# Patient Record
Sex: FEMALE | Race: WHITE | NOT HISPANIC OR LATINO | Employment: OTHER | URBAN - NONMETROPOLITAN AREA
[De-identification: names, ages, dates, MRNs, and addresses within clinical notes are randomized per-mention and may not be internally consistent; named-entity substitution may affect disease eponyms.]

---

## 2017-01-01 ENCOUNTER — HOSPITAL ENCOUNTER (OUTPATIENT)
Dept: PHYSICAL THERAPY | Facility: HOSPITAL | Age: 57
Setting detail: THERAPIES SERIES
Discharge: HOME | End: 2017-01-20
Attending: ORTHOPAEDIC SURGERY | Admitting: ORTHOPAEDIC SURGERY

## 2017-01-20 ENCOUNTER — TRANSCRIBE ORDERS (OUTPATIENT)
Dept: PHYSICAL THERAPY | Facility: HOSPITAL | Age: 57
End: 2017-01-20

## 2017-01-20 DIAGNOSIS — M25.552 LEFT HIP PAIN: Primary | ICD-10-CM

## 2017-01-25 ENCOUNTER — HOSPITAL ENCOUNTER (OUTPATIENT)
Dept: PHYSICAL THERAPY | Facility: HOSPITAL | Age: 57
Setting detail: THERAPIES SERIES
Discharge: HOME | End: 2017-01-25

## 2017-01-25 DIAGNOSIS — M25.552 LEFT HIP PAIN: Primary | ICD-10-CM

## 2017-01-25 PROCEDURE — 97110 THERAPEUTIC EXERCISES: CPT

## 2017-01-25 NOTE — PROGRESS NOTES
Outpatient Physical Therapy Ortho Treatment Note  [facility]     Patient Name: Erika Bowens  : 1960  MRN: [id number]  Today's Date: 2017      Visit Date: 2017     Subjective Improvement 60%  Pain prior to PT 7/10, Pain after PT 5/10;  Visits /; Recert Date 17,  RTMD 4 weeks    Visit Dx:    ICD-10-CM ICD-9-CM   1. Left hip pain M25.552 719.45       Patient Active Problem List   Diagnosis   • Coronary arteriosclerosis   • Depressive disorder   • Essential hypertension   • Generalized anxiety disorder   • GERD (gastroesophageal reflux disease)   • Hip pain   • Hyperlipidemia   • Osteoarthritis   • Vitamin D deficiency   • Chronic renal failure, stage 3 (moderate)        Past Medical History   Diagnosis Date   • Abdominal pain    • Coronary arteriosclerosis    • Depressive disorder      with anxiety   • Encounter for routine adult health examination    • Essential hypertension    • Generalized anxiety disorder    • GERD (gastroesophageal reflux disease)    • Hip pain    • Hyperlipidemia    • Kidney stone    • Osteoarthritis    • Radial styloid tenosynovitis of left hand    • Urinary tract infectious disease    • Vitamin D deficiency         Past Surgical History   Procedure Laterality Date   • Coronary angioplasty with stent placement  2012     PTCA implantation in the vein graft of the OM branch. Done at [facility] in [place], [place].   • Coronary artery bypass graft  2003     Emergent CABG x 5 CAD   • Cardiac catheterization  04/15/2016     Enlarged left ventricle with reduced contractility.Severe coronary artery disease as described above. [facility].   • Procedure generic converted  2015     MOST RECENT DIASTOLIC BP < 90 mmHg    • Procedure generic converted  2015     MOST RECENT SYSTOLIC BP > or = 140 mmHg    • Pap smear  2012     normal   • De quervain's release Right 2009     Release of De Quervain's to the right  thumb   • Dequervain release Left 11/18/2015     Release of de Quervain's to the left wrist.   • Total knee arthroplasty     • Procedure generic converted  11/22/2015     TOBACCO NON-USER              PT Ortho       01/25/17 1100    Precautions and Contraindications    Precautions/Limitations cardiac precautions  -CP    Precautions --   No IFC, Pacemeker, CHF  -CP    Gait Assessment/Treatment    Gait, Assistive Device --   Amb in Clinic with one axillary crutch  -CP    Gait, Gait Deviations ataxia  -CP    Gait, Comment --   Decrease stance time on left LE  -CP      User Key  (r) = Recorded By, (t) = Taken By, (c) = Cosigned By    Initials Name Provider Type    CP Edelmira Ferraro PTA Physical Therapy Assistant                            PT Assessment/Plan       01/25/17 1154          PT Assessment    Impairments Endurance;Gait  -CP      Assessment Comments Patients Increase pain limits wt bearing activities.  Patient requires rest breaks secondary to cardiac issues   -CP      PT Plan    PT Frequency 2x/week  -CP      Predicted Duration of Therapy Intervention (days/wks) 4 weeks  -CP      PT Plan Comments Biodex Balance, up and down ramp forward in rehab gym.  recheck next week  -CP        User Key  (r) = Recorded By, (t) = Taken By, (c) = Cosigned By    Initials Name Provider Type    CP Edelmira Ferraro PTA Physical Therapy Assistant                Modalities       01/25/17 1100          Moist Heat    MH Applied Yes  -CP      Location --   Left Hip/thigh  -CP      Rx Minutes 15 mins  -CP      MH S/P Rx Yes  -CP        User Key  (r) = Recorded By, (t) = Taken By, (c) = Cosigned By    Initials Name Provider Type    CP Edelmira Ferraro PTA Physical Therapy Assistant                Exercises       01/25/17 1100          Subjective Comments    Subjective Comments Patient reports in pain this date.  Pain located outer left thiigh.  States she didnt do much over the weekend.  patient states she at times walk I inside  "her home.  -CP      Subjective Pain    Able to rate subjective pain? yes  -CP      Pre-Treatment Pain Level 7  -CP      Post-Treatment Pain Level 5  -CP      Subjective Pain Comment Pain decrease to 5/10 after Pro II  -CP      Exercise 1    Exercise Name 1 Pro II Seat 10   -CP      Weights/Plates 1 --   Level 1  -CP      Time (Minutes) 1 10  -CP      Exercise 2    Exercise Name 2 Incline Stretch  -CP      Reps 2 3  -CP      Time (Seconds) 2 30  -CP      Exercise 3    Exercise Name 3 HS Stretch  -CP      Reps 3 3  -CP      Time (Seconds) 3 30  -CP      Exercise 4    Exercise Name 4 Standing Hip Fl Stretch  -CP      Reps 4 3  -CP      Time (Seconds) 4 30  -CP      Exercise 5    Exercise Name 5 Step Up 4\"  -CP      Sets 5 2  -CP      Reps 5 10  -CP      Exercise 6    Exercise Name 6 LAQ with AD Squeeze  -CP      Equipment 6 --   small ball  -CP      Sets 6 2  -CP      Reps 6 10  -CP      Exercise 7    Exercise Name 7 Supine Heelslide with AW  -CP      Weights/Plates 7 1  -CP      Sets 7 2  -CP      Reps 7 10  -CP      Exercise 8    Exercise Name 8 Single leg press therabank  -CP      Equipment 8 Theraband  -CP      Resistance 8 Yellow  -CP      Reps 8 15  -CP      Exercise 9    Exercise Name 9 Supine BKFO  -CP      Equipment 9 Theraband  -CP      Resistance 9 Yellow  -CP      Reps 9 15  -CP        User Key  (r) = Recorded By, (t) = Taken By, (c) = Cosigned By    Initials Name Provider Type    CP Edelmira Ferraro, PTA Physical Therapy Assistant                               PT OP Goals       01/25/17 1158 01/25/17 1100 01/21/17 1300    PT Short Term Goals    STG Date to Achieve   01/26/17  -DD    STG 1  Patient will increase glute stength MMT 4+/5.  -CP Patient will increase glute stength MMT 4+/5.  -DD    STG 1 Progress  --   Not assessed at this time  -CP     STG 2  Patient will walk with symmetrical weightbearing and stance time on bilateral LE.  -CP Patient will walk with symmetrical weightbearing and stance time on " bilateral LE.  -DD    STG 2 Progress  Progressing  -CP     STG 3  Patient will demonstrate nomalized left hip extension while walking.  -CP Patient will demonstrate nomalized left hip extension while walking.  -DD    STG 3 Progress  Progressing  -CP     Long Term Goals    LTG Date to Achieve   02/09/17  -DD    LTG 1  Decreased pain to 1/10.  -CP Decreased pain to 1/10.  -DD    LTG 1 Progress  Not Met  -CP     LTG 2  Increase strenght in the Left LE to 4+/5  -CP Increase strenght in the Left LE to 4+/5  -DD    LTG 2 Progress  --   Not Assessed this date  -CP     LTG 3  Patient will be able to ambulate > or = to 1,200 feet without AD.  -CP Patient will be able to ambulate > or = to 1,200 feet without AD.  -DD    LTG 3 Progress  Progressing  -CP     LTG 4  Patient will score 55/80 on the LEFS.  -CP Patient will score 55/80 on the LEFS.  -DD    LTG 4 Progress  --   Not assessed this date  -CP     LTG 5  Patient will normalize her gait pattern.  -CP Patient will normalize her gait pattern.  -DD    LTG 5 Progress  Progressing  -CP     LTG 6  Patient will resume hobbies and ADLs without limitation secondary to hip pain.  -CP Patient will resume hobbies and ADLs without limitation secondary to hip pain.  -DD    LTG 6 Progress  Progressing  -CP     LTG 7  Independent in final HEP with progression parameters.  -CP Independent in final HEP with progression parameters.  -DD    LTG 7 Progress  Ongoing  -CP     Time Calculation    PT Goal Re-Cert Due Date 02/02/17  -CP  02/02/17  -DD      User Key  (r) = Recorded By, (t) = Taken By, (c) = Cosigned By    Initials Name Provider Type    MÓNICA Jamison, PT Physical Therapist    CP Edelmira Ferraro, JANET Physical Therapy Assistant                Therapy Education       01/25/17 1148    Therapy Education    Given HEP   HEP:   Single Leg Press with theraband; BKFO with theraband  -CP    Program New  -CP    How Provided Verbal;Demonstration;Written  -CP    Provided to Patient   -CP    Level of Understanding Demonstrated  -CP      User Key  (r) = Recorded By, (t) = Taken By, (c) = Cosigned By    Initials Name Provider Type    CP Edelmira Ferraro PTA Physical Therapy Assistant                Time Calculation:   Start Time: 1105  Stop Time: 1159  Time Calculation (min): 54 min  Total Timed Code Minutes- PT: 40 minute(s)    Therapy Charges for Today     Code Description Service Date Service Provider Modifiers Qty    52411656748 HC PT THER SUPP EA 15 MIN 1/25/2017 Edelmira Ferraro PTA GP 3    89596254075 HC PT THER PROC EA 15 MIN 1/25/2017 Edelmira Ferraro PTA GP 1                    Edelmira Ferraro PTA  1/25/2017

## 2017-01-25 NOTE — MR AVS SNAPSHOT
[facility]   [phone]                    Erika Bowens   1/25/2017 11:00 AM   Therapy Treatment    Dept Phone:  [phone]   Encounter #:  [id number]    Provider:  Edelmira Ferraro PTA   Department:  [facility]                 Your Full Care Plan              Your Updated Medication List      ASK your doctor about these medications     acetaminophen 500 MG tablet   Commonly known as:  TYLENOL       aspirin 325 MG tablet       atenolol 25 MG tablet   Commonly known as:  TENORMIN       calcium carbonate-vitamin d 600-400 MG-UNIT per tablet       clonazePAM 1 MG tablet   Commonly known as:  KlonoPIN   Take 1 tablet by mouth 2 (Two) Times a Day.       clopidogrel 75 MG tablet   Commonly known as:  PLAVIX   TAKE ONE TABLET BY MOUTH DAILY       cyclobenzaprine 10 MG tablet   Commonly known as:  FLEXERIL   TAKE ONE TABLET BY MOUTH THREE TIMES A DAY AS NEEDED FOR MUSCLE SPASMS       FLUoxetine 20 MG capsule   Commonly known as:  PROzac   Take 1 capsule by mouth 3 (three) times a day.       furosemide 40 MG tablet   Commonly known as:  LASIX   TAKE ONE TABLET BY MOUTH TWICE A DAY       gabapentin 600 MG tablet   Commonly known as:  NEURONTIN   Take 1 tablet by mouth 3 (Three) Times a Day.       NITROSTAT 0.4 MG SL tablet   Generic drug:  nitroglycerin   DISSOLVE 1 TAB UNDER TONGUE FOR CHEST PAIN - IF PAIN REMAINS AFTER 5 MIN, CALL 911 AND REPEAT DOSE. MAX 3 TABS IN 15 MINUTES       omeprazole 40 MG capsule   Commonly known as:  priLOSEC   TAKE ONE CAPSULE BY MOUTH DAILY ** TAKE 12 HOURS APART FROM PLAVIX **       potassium chloride 10 MEQ CR tablet   Commonly known as:  K-DUR       PRILOSEC PO       ranolazine 500 MG 12 hr tablet   Commonly known as:  RANEXA       spironolactone 25 MG tablet   Commonly known as:  ALDACTONE   TAKE ONE TABLET BY MOUTH DAILY       tiZANidine 4 MG tablet   Commonly known as:  ZANAFLEX   Take 1 tablet by mouth 3 (Three) Times a Day  As Needed for muscle spasms.       traZODone 50 MG tablet   Commonly known as:  DESYREL   TAKE ONE AND 1/2 TABLET BY MOUTH EVERY NIGHT AT BEDTIME               You Were Diagnosed With        Codes Comments    Left hip pain    -  Primary ICD-10-CM: M25.552  ICD-9-CM: 719.45       Instructions     None    Patient Instructions History      Upcoming Appointments     Visit Type Date Time Department    TREATMENT 2017 11:00 AM  [facility]     RE-EVALUATION 2017 10:15 AM [facility]     TREATMENT 2017 10:15 AM  [facility]     OFFICE VISIT 3/6/2017  2:30 PM [facility]      MyChart Signup     [facility] [employer] allows you to send messages to your doctor, view your test results, renew your prescriptions, schedule appointments, and more. To sign up, go to [employer] and click on the Sign Up Now link in the New User? box. Enter your [employer] Activation Code exactly as it appears below along with the last four digits of your Social Security Number and your Date of Birth () to complete the sign-up process. If you do not sign up before the expiration date, you must request a new code.    [employer] Activation Code: [email or web address]  Expires: 2017 12:21 PM    If you have questions, you can email Melanie Clark [email or web address] or call [phone] to talk to our [employer] staff. Remember, [employer] is NOT to be used for urgent needs. For medical emergencies, dial 911.               Other Info from Your Visit           Your Appointments     2017 10:15 AM CST   REEVALUATION with PT DELIA SANCHEZ   [facility]  (--)    [address] [address]   [phone] 10:15 AM CST   Therapy Treatment with Edelmira Ferraro PTA   [facility]  (--)    [address] [address]   [phone]            Mar 06, 2017  2:30 PM CST   Office Visit with Nilam Willis MD   [facility]  [facility] MED[facility] (--)    [address]  [address] [place] [address] [phone]           Arrive 15 minutes prior to appointment.              Allergies     Nsaids        Reason for Visit     Left Hip - Pain     PT Treatment           Vital Signs     Smoking Status                   Never Smoker           Problems and Diagnoses Noted     Left hip pain    -  Primary

## 2017-02-10 ENCOUNTER — HOSPITAL ENCOUNTER (OUTPATIENT)
Dept: PHYSICAL THERAPY | Facility: HOSPITAL | Age: 57
Setting detail: THERAPIES SERIES
Discharge: HOME | End: 2017-02-10

## 2017-02-10 DIAGNOSIS — M25.552 LEFT HIP PAIN: Primary | ICD-10-CM

## 2017-02-10 PROCEDURE — 97164 PT RE-EVAL EST PLAN CARE: CPT | Performed by: PHYSICAL THERAPIST

## 2017-02-10 PROCEDURE — 97110 THERAPEUTIC EXERCISES: CPT | Performed by: PHYSICAL THERAPIST

## 2017-02-10 NOTE — PROGRESS NOTES
Outpatient Physical Therapy Ortho Re-Assessment  [facility]     Patient Name: Erika Bowens  : 1960  MRN: [id number]  Today's Date: 2/10/2017      Visit Date: 02/10/2017     Pt attended 13/13 scheduled visits.   Pt feels has improved 60% since beginning therapy.  Re-cert date 3/3/17  Pt will RTD after therapy      Patient Active Problem List   Diagnosis   • Coronary arteriosclerosis   • Depressive disorder   • Essential hypertension   • Generalized anxiety disorder   • GERD (gastroesophageal reflux disease)   • Hip pain   • Hyperlipidemia   • Osteoarthritis   • Vitamin D deficiency   • Chronic renal failure, stage 3 (moderate)        Past Medical History   Diagnosis Date   • Abdominal pain    • Coronary arteriosclerosis    • Depressive disorder      with anxiety   • Encounter for routine adult health examination    • Essential hypertension    • Generalized anxiety disorder    • GERD (gastroesophageal reflux disease)    • Hip pain    • Hyperlipidemia    • Kidney stone    • Osteoarthritis    • Radial styloid tenosynovitis of left hand    • Urinary tract infectious disease    • Vitamin D deficiency         Past Surgical History   Procedure Laterality Date   • Coronary angioplasty with stent placement  2012     PTCA implantation in the vein graft of the OM branch. Done at [facility] in [place], [place].   • Coronary artery bypass graft  2003     Emergent CABG x 5 CAD   • Cardiac catheterization  04/15/2016     Enlarged left ventricle with reduced contractility.Severe coronary artery disease as described above. [facility].   • Procedure generic converted  2015     MOST RECENT DIASTOLIC BP < 90 mmHg    • Procedure generic converted  2015     MOST RECENT SYSTOLIC BP > or = 140 mmHg    • Pap smear  2012     normal   • De quervain's release Right 2009     Release of De Quervain's to the right thumb   • Dequervain release Left 2015     Release  of de Quervain's to the left wrist.   • Total knee arthroplasty     • Procedure generic converted  11/22/2015     TOBACCO NON-USER        Visit Dx:     ICD-10-CM ICD-9-CM   1. Left hip pain M25.552 719.45                 PT Ortho       02/10/17 1100    Subjective Comments    Subjective Comments Pt relates stopped using the crutch to walk on 2/1/17. Relates feels is improving, less pain. States is continuing HEP.  had a death in the familly and was unable to attend therapy last week.   -LF    Precautions and Contraindications    Precautions/Limitations cardiac precautions  -LF    Precautions --   No IFC, Pacemeker, CHF  -LF    Subjective Pain    Able to rate subjective pain? yes  -LF    Pre-Treatment Pain Level 4  -LF    Gait Assessment/Treatment    Gait, Gait Deviations ataxia  -LF    Gait, Comment Decreased stance time on L LE  -LF      User Key  (r) = Recorded By, (t) = Taken By, (c) = Cosigned By    Initials Name Provider Type    LF Charlene Barrera, PT Physical Therapist                                PT OP Goals       02/10/17 1100          PT Short Term Goals    STG Date to Achieve 02/24/17  -LF      STG 1 Patient will increase glute stength MMT 4+/5.  -LF      STG 1 Progress Progressing  -LF      STG 2 Patient will walk with symmetrical weightbearing and stance time on bilateral LE.  -LF      STG 2 Progress Progressing  -LF      STG 3 Patient will demonstrate nomalized left hip extension while walking.  -LF      STG 3 Progress Progressing  -LF      Long Term Goals    LTG Date to Achieve 03/10/17  -LF      LTG 1 Decreased pain to 1/10.  -LF      LTG 1 Progress Progressing  -LF      LTG 2 Increase strenght in the Left LE to 4+/5  -LF      LTG 2 Progress Progressing  -LF      LTG 3 Patient will be able to ambulate > or = to 1,200 feet without AD.  -LF      LTG 3 Progress Progressing  -LF      LTG 4 Patient will score 55/80 on the LEFS.  -LF      LTG 4 Progress Progressing   Not assessed this date  -LF       LTG 4 Progress Comments scored 51 today, 2/10/17  -LF      LTG 5 Patient will normalize her gait pattern.  -LF      LTG 5 Progress Progressing  -LF      LTG 6 Patient will resume hobbies and ADLs without limitation secondary to hip pain.  -LF      LTG 6 Progress Progressing  -LF      LTG 7 Independent in final HEP with progression parameters.  -LF      LTG 7 Progress Ongoing  -LF      Time Calculation    PT Goal Re-Cert Due Date 03/03/17  -LF        User Key  (r) = Recorded By, (t) = Taken By, (c) = Cosigned By    Initials Name Provider Type     Charlene Barrera, PT Physical Therapist                PT Assessment/Plan       02/10/17 1100          PT Assessment    Functional Limitations Impaired gait;Limitation in home management;Limitations in community activities;Performance in sport activities  -LF      Impairments Balance;Coordination;Gait;Endurance;Impaired muscle length;Impaired muscle power;Motor function;Muscle strength;Pain;Range of motion  -LF      Assessment Comments Pt is progressing nicely. Will benefit from continuing therapy to help increase endurance, strength, normalize gait, stretch to facilitate performance of ADLs and community ambulation.   -LF      Please refer to paper survey for additional self-reported information Yes  -LF      Rehab Potential Good  -LF      Patient/caregiver participated in establishment of treatment plan and goals Yes  -LF      Patient would benefit from skilled therapy intervention Yes  -LF      PT Plan    PT Frequency 2x/week  -LF      Predicted Duration of Therapy Intervention (days/wks) 4 weeks  -LF      Planned CPT's? PT RE-EVAL: 16220;PT THER PROC EA 15 MIN: 62008;PT THER ACT EA 15 MIN: 36941;PT MANUAL THERAPY EA 15 MIN: 34970;PT NEUROMUSC RE-EDUCATION EA 15 MIN: 29769;PT GAIT TRAINING EA 15 MIN: 01512;PT AQUATIC THERAPY EA 15 MIN: 40942;PT HOT OR COLD PACK TREAT MCARE  -LF      Physical Therapy Interventions (Optional Details) aquatics exercise;gait training;gross  "motor skills;home exercise program;manual therapy techniques;modalities;neuromuscular re-education;patient/family education;postural re-education;ROM (Range of Motion);strengthening;stretching  -LF      PT Plan Comments Continue therapy involving stretchign, strengthening, Le stabilization training, modalities as ndicated, progress HEP .   -LF        User Key  (r) = Recorded By, (t) = Taken By, (c) = Cosigned By    Initials Name Provider Type    DEVIN Barrera PT Physical Therapist                Modalities       02/10/17 1100          Moist Heat    Location --   Left Hip/thigh  -LF      Rx Minutes 15 mins  -LF      MH S/P Rx Yes  -LF        User Key  (r) = Recorded By, (t) = Taken By, (c) = Cosigned By    Initials Name Provider Type    DEVIN Barrera PT Physical Therapist              Exercises       02/10/17 1100          Subjective Comments    Subjective Comments Pt viviana stopped using the crutch to walk on 2/1/17. Relates feels is improving, less pain.  is continuing HEP.  had a death in the familly and was unable to attend therapy last week.   -LF      Subjective Pain    Able to rate subjective pain? yes  -LF      Pre-Treatment Pain Level 4  -LF      Exercise 1    Exercise Name 1 Pro II   -LF      Time (Minutes) 1 20  -LF      Exercise 2    Exercise Name 2 Incline Stretch  -LF      Reps 2 3  -LF      Time (Seconds) 2 30  -LF      Exercise 3    Exercise Name 3 HS Stretch  -LF      Reps 3 3  -LF      Time (Seconds) 3 30  -LF      Exercise 4    Exercise Name 4 Standing Hip Fl Stretch  -LF      Reps 4 3  -LF      Time (Seconds) 4 30  -LF      Exercise 5    Exercise Name 5 Step Up 4\"  -LF      Sets 5 2  -LF      Reps 5 10  -LF        User Key  (r) = Recorded By, (t) = Taken By, (c) = Cosigned By    Initials Name Provider Type    DEVIN Barrera PT Physical Therapist                              Outcome Measures       02/10/17 1000          Lower Extremity Functional Index    Any of your usual " work, housework or school activities 3  -LF      Your usual hobbies, recreational or sporting activities 4  -LF      Getting into or out of the bath 4  -LF      Walking between rooms 4  -LF      Putting on your shoes or socks 4  -LF      Squatting 4  -LF      Lifting an object, like a bag of groceries from the floor 4  -LF      Performing light activities around your home 4  -LF      Performing heavy activities around your home 4  -LF      Getting into or out of a car 4  -LF      Walking 2 blocks 2  -LF      Walking a mile 0  -LF      Going up or down 10 stairs (about 1 flight of stairs) 4  -LF      Standing for 1 hour 1  -LF      Sitting for 1 hour 1  -LF      Running on even ground 0  -LF      Running on uneven ground 0  -LF      Making sharp turns while running fast 0  -LF      Hopping 0  -LF      Rolling over in bed 4  -LF      Total 51  -LF      Functional Assessment    Outcome Measure Options Lower Extremity Functional Scale (LEFS)  -LF        User Key  (r) = Recorded By, (t) = Taken By, (c) = Cosigned By    Initials Name Provider Type    LF Charlene Barrera, PT Physical Therapist            Time Calculation:         Therapy Charges for Today     Code Description Service Date Service Provider Modifiers Qty    69308485799 HC PT RE-EVAL ESTABLISHED PLAN 2 2/10/2017 Charlene Barrera, PT GP 1    22568566745 HC PT THER PROC EA 15 MIN 2/10/2017 Charlene Barrera, PT GP 1    46727812983 HC PT THER SUPP EA 15 MIN 2/10/2017 Charlene Barrera, PT GP 1          PT G-Codes  Outcome Measure Options: Lower Extremity Functional Scale (LEFS)         Charlene Barrera PT  2/10/2017

## 2017-02-14 ENCOUNTER — HOSPITAL ENCOUNTER (OUTPATIENT)
Dept: PHYSICAL THERAPY | Facility: HOSPITAL | Age: 57
Setting detail: THERAPIES SERIES
Discharge: HOME | End: 2017-02-14

## 2017-02-14 DIAGNOSIS — M25.552 LEFT HIP PAIN: Primary | ICD-10-CM

## 2017-02-14 PROCEDURE — 97110 THERAPEUTIC EXERCISES: CPT

## 2017-02-14 RX ORDER — CYCLOBENZAPRINE HCL 10 MG
TABLET ORAL
Qty: 90 TABLET | Refills: 1 | Status: SHIPPED | OUTPATIENT
Start: 2017-02-14 | End: 2017-04-12 | Stop reason: SDUPTHER

## 2017-02-14 NOTE — PROGRESS NOTES
Outpatient Physical Therapy Ortho Treatment Note  [facility]     Patient Name: Erika Bowens  : 1960  MRN: [id number]  Today's Date: 2017      Visit Date: 2017    Subjective Improvement 70%  Visits   Visit approved 30  RTMD March  Recert 3-  S/P Ant Approach THR  On 2016    Visit Dx:    ICD-10-CM ICD-9-CM   1. Left hip pain M25.552 719.45       Patient Active Problem List   Diagnosis   • Coronary arteriosclerosis   • Depressive disorder   • Essential hypertension   • Generalized anxiety disorder   • GERD (gastroesophageal reflux disease)   • Hip pain   • Hyperlipidemia   • Osteoarthritis   • Vitamin D deficiency   • Chronic renal failure, stage 3 (moderate)        Past Medical History   Diagnosis Date   • Abdominal pain    • Coronary arteriosclerosis    • Depressive disorder      with anxiety   • Encounter for routine adult health examination    • Essential hypertension    • Generalized anxiety disorder    • GERD (gastroesophageal reflux disease)    • Hip pain    • Hyperlipidemia    • Kidney stone    • Osteoarthritis    • Radial styloid tenosynovitis of left hand    • Urinary tract infectious disease    • Vitamin D deficiency         Past Surgical History   Procedure Laterality Date   • Coronary angioplasty with stent placement  2012     PTCA implantation in the vein graft of the OM branch. Done at [facility] in [place], [place].   • Coronary artery bypass graft  2003     Emergent CABG x 5 CAD   • Cardiac catheterization  04/15/2016     Enlarged left ventricle with reduced contractility.Severe coronary artery disease as described above. [facility].   • Procedure generic converted  2015     MOST RECENT DIASTOLIC BP < 90 mmHg    • Procedure generic converted  2015     MOST RECENT SYSTOLIC BP > or = 140 mmHg    • Pap smear  2012     normal   • De quervain's release Right 2009     Release of De Quervain's to the  right thumb   • Dequervain release Left 11/18/2015     Release of de Quervain's to the left wrist.   • Total knee arthroplasty     • Procedure generic converted  11/22/2015     TOBACCO NON-USER              PT Ortho       02/14/17 1500    Subjective Comments    Subjective Comments Patient now able to go up  and down stairs the right way.  -CP    Precautions and Contraindications    Precautions/Limitations cardiac precautions  -CP    Precautions No IFC, pacemeaker  -CP    Subjective Pain    Able to rate subjective pain? yes  -CP    Pre-Treatment Pain Level --   1-2  -CP    Gait Assessment/Treatment    Gait, Ward Level independent  -CP      User Key  (r) = Recorded By, (t) = Taken By, (c) = Cosigned By    Initials Name Provider Type    CP Edelmira Ferraro PTA Physical Therapy Assistant                            PT Assessment/Plan       02/14/17 1620 02/10/17 1100       PT Assessment    Functional Limitations  Impaired gait;Limitation in home management;Limitations in community activities;Performance in sport activities  -LF     Impairments  Balance;Coordination;Gait;Endurance;Impaired muscle length;Impaired muscle power;Motor function;Muscle strength;Pain;Range of motion  -LF     Assessment Comments Patient is progressing nicely.  Increase endurance this date with no rest breaks.  -CP Pt is progressing nicely. Will benefit from continuing therapy to help increase endurance, strength, normalize gait, stretch to facilitate performance of ADLs and community ambulation.   -LF     Please refer to paper survey for additional self-reported information  Yes  -LF     Rehab Potential  Good  -LF     Patient/caregiver participated in establishment of treatment plan and goals  Yes  -LF     Patient would benefit from skilled therapy intervention  Yes  -LF     PT Plan    PT Frequency 2x/week  -CP 2x/week  -LF     Predicted Duration of Therapy Intervention (days/wks) 2 weeks  -CP 4 weeks  -LF     Planned CPT's?  PT RE-EVAL:  "61433;PT THER PROC EA 15 MIN: 65216;PT THER ACT EA 15 MIN: 53834;PT MANUAL THERAPY EA 15 MIN: 47719;PT NEUROMUSC RE-EDUCATION EA 15 MIN: 80787;PT GAIT TRAINING EA 15 MIN: 84162;PT AQUATIC THERAPY EA 15 MIN: 03827;PT HOT OR COLD PACK TREAT MCARE  -LF     Physical Therapy Interventions (Optional Details)  aquatics exercise;gait training;gross motor skills;home exercise program;manual therapy techniques;modalities;neuromuscular re-education;patient/family education;postural re-education;ROM (Range of Motion);strengthening;stretching  -LF     PT Plan Comments Possible D/C at end of week to home and two month of fitness membership  -CP Continue therapy involving stretchign, strengthening, Le stabilization training, modalities as ndicated, progress HEP .   -LF       User Key  (r) = Recorded By, (t) = Taken By, (c) = Cosigned By    Initials Name Provider Type    CP Edelmira Ferraro PTA Physical Therapy Assistant    LF Charlene Barrera, PT Physical Therapist                Modalities       02/14/17 1500          Moist Heat    MH Applied Yes  -CP      Rx Minutes 15 mins  -CP      MH S/P Rx Yes  -CP        User Key  (r) = Recorded By, (t) = Taken By, (c) = Cosigned By    Initials Name Provider Type    CP Edelmira Ferraro PTA Physical Therapy Assistant                Exercises       02/14/17 1500          Subjective Comments    Subjective Comments Patient now able to go up  and down stairs the right way.  -CP      Subjective Pain    Able to rate subjective pain? yes  -CP      Pre-Treatment Pain Level --   1-2  -CP      Post-Treatment Pain Level --   2/3  -CP      Exercise 1    Exercise Name 1 Pro II  -CP      Time (Minutes) 1 10  -CP      Exercise 2    Exercise Name 2 Incline Stretch  -CP      Reps 2 3  -CP      Time (Seconds) 2 30  -CP      Exercise 3    Exercise Name 3 HS Stretch  -CP      Reps 3 3  -CP      Time (Seconds) 3 30  -CP      Exercise 4    Exercise Name 4 Step up 6\"  -CP      Sets 4 2  -CP      Reps 4 10  -CP      " Exercise 5    Exercise Name 5 Heel/toe raises  -CP      Sets 5 2  -CP      Reps 5 10  -CP      Exercise 6    Exercise Name 6 Cybex Hip AD  -CP      Weights/Plates 6 15  -CP      Sets 6 3  -CP      Reps 6 10  -CP      Exercise 7    Exercise Name 7 Cybex hip AB  -CP      Weights/Plates 7 15  -CP      Sets 7 3  -CP      Reps 7 10  -CP      Exercise 8    Exercise Name 8 Cybex leg press  -CP      Weights/Plates 8 --   65 lb  -CP      Sets 8 3  -CP      Reps 8 10  -CP        User Key  (r) = Recorded By, (t) = Taken By, (c) = Cosigned By    Initials Name Provider Type    CP Edelmira Ferraro, PTA Physical Therapy Assistant                               PT OP Goals       02/14/17 1708 02/14/17 1600 02/10/17 1100    PT Short Term Goals    STG Date to Achieve  02/24/17  -CP 02/24/17  -LF    STG 1  Patient will increase glute stength MMT 4+/5.  -CP Patient will increase glute stength MMT 4+/5.  -LF    STG 1 Progress  Progressing  -CP Progressing  -LF    STG 2  Patient will walk with symmetrical weightbearing and stance time on bilateral LE.  -CP Patient will walk with symmetrical weightbearing and stance time on bilateral LE.  -LF    STG 2 Progress  Met  -CP Progressing  -LF    STG 3  Patient will demonstrate nomalized left hip extension while walking.  -CP Patient will demonstrate nomalized left hip extension while walking.  -LF    STG 3 Progress  Met  -CP Progressing  -LF    Long Term Goals    LTG Date to Achieve  03/10/17  -CP 03/10/17  -LF    LTG 1  Decreased pain to 1/10.  -CP Decreased pain to 1/10.  -LF    LTG 1 Progress  Met  -CP Progressing  -LF    LTG 2  Increase strenght in the Left LE to 4+/5  -CP Increase strenght in the Left LE to 4+/5  -LF    LTG 2 Progress  Progressing  -CP Progressing  -LF    LTG 3  Patient will be able to ambulate > or = to 1,200 feet without AD.  -CP Patient will be able to ambulate > or = to 1,200 feet without AD.  -LF    LTG 3 Progress  Progressing  -CP Progressing  -LF    LTG 4  Patient  will score 55/80 on the LEFS.  -CP Patient will score 55/80 on the LEFS.  -LF    LTG 4 Progress  Progressing   Not assessed this date  -CP Progressing   Not assessed this date  -LF    LTG 4 Progress Comments   scored 51 today, 2/10/17  -LF    LTG 5  Patient will normalize her gait pattern.  -CP Patient will normalize her gait pattern.  -LF    LTG 5 Progress  Met  -CP Progressing  -LF    LTG 6  Patient will resume hobbies and ADLs without limitation secondary to hip pain.  -CP Patient will resume hobbies and ADLs without limitation secondary to hip pain.  -LF    LTG 6 Progress  Met  -CP Progressing  -LF    LTG 7  Independent in final HEP with progression parameters.  -CP Independent in final HEP with progression parameters.  -LF    LTG 7 Progress  Ongoing  -CP Ongoing  -LF    Time Calculation    PT Goal Re-Cert Due Date 03/03/17  -CP  03/03/17  -LF      User Key  (r) = Recorded By, (t) = Taken By, (c) = Cosigned By    Initials Name Provider Type    CP Edelmira Ferraro PTA Physical Therapy Assistant     Charlene Barrera, PT Physical Therapist                Therapy Education       02/14/17 1618    Therapy Education    Given HEP   no change to HEP  -CP    Program Reinforced  -CP    How Provided Verbal  -CP    Provided to Patient  -CP    Level of Understanding Verbalized  -CP      User Key  (r) = Recorded By, (t) = Taken By, (c) = Cosigned By    Initials Name Provider Type    CP Edelmira Ferraro PTA Physical Therapy Assistant                Time Calculation:   Start Time: 1535  Stop Time: 1630  Time Calculation (min): 55 min  Total Timed Code Minutes- PT: 40 minute(s)    Therapy Charges for Today     Code Description Service Date Service Provider Modifiers Qty    [id number]  PT THER PROC EA 15 MIN 2/14/2017 Edelmira Ferraro PTA GP 3    [id number] HC PT THER SUPP EA 15 MIN 2/14/2017 Edelmira Ferraro PTA GP 1                    Edelmira Ferraro PTA  2/14/2017

## 2017-02-16 ENCOUNTER — HOSPITAL ENCOUNTER (OUTPATIENT)
Dept: PHYSICAL THERAPY | Facility: HOSPITAL | Age: 57
Setting detail: THERAPIES SERIES
Discharge: HOME | End: 2017-02-16

## 2017-02-16 DIAGNOSIS — M25.552 LEFT HIP PAIN: Primary | ICD-10-CM

## 2017-02-16 NOTE — THERAPY DISCHARGE NOTE
Outpatient Physical Therapy Ortho Progress Note/Discharge Summary  [facility]     Patient Name: Erika Bowens  : 1960  MRN: [id number]  Today's Date: 2017      Visit Date: 2017  Patient reports 2/10 pain and 70 % improvement since initiating therapy.  Attendance  15/15 appointments scheduled,  30 approved by insurance. Next MD follow up is  3/2017.  Visit Dx:    ICD-10-CM ICD-9-CM   1. Left hip pain M25.552 719.45       Patient Active Problem List   Diagnosis   • Coronary arteriosclerosis   • Depressive disorder   • Essential hypertension   • Generalized anxiety disorder   • GERD (gastroesophageal reflux disease)   • Hip pain   • Hyperlipidemia   • Osteoarthritis   • Vitamin D deficiency   • Chronic renal failure, stage 3 (moderate)        Past Medical History   Diagnosis Date   • Abdominal pain    • Coronary arteriosclerosis    • Depressive disorder      with anxiety   • Encounter for routine adult health examination    • Essential hypertension    • Generalized anxiety disorder    • GERD (gastroesophageal reflux disease)    • Hip pain    • Hyperlipidemia    • Kidney stone    • Osteoarthritis    • Radial styloid tenosynovitis of left hand    • Urinary tract infectious disease    • Vitamin D deficiency         Past Surgical History   Procedure Laterality Date   • Coronary angioplasty with stent placement  2012     PTCA implantation in the vein graft of the OM branch. Done at [facility] in [place], [place].   • Coronary artery bypass graft  2003     Emergent CABG x 5 CAD   • Cardiac catheterization  04/15/2016     Enlarged left ventricle with reduced contractility.Severe coronary artery disease as described above. [facility].   • Procedure generic converted  2015     MOST RECENT DIASTOLIC BP < 90 mmHg    • Procedure generic converted  2015     MOST RECENT SYSTOLIC BP > or = 140 mmHg    • Pap smear  2012     normal   • De quervain's  release Right 11/30/2009     Release of De Quervain's to the right thumb   • Dequervain release Left 11/18/2015     Release of de Quervain's to the left wrist.   • Total knee arthroplasty     • Procedure generic converted  11/22/2015     TOBACCO NON-USER              PT Ortho       02/16/17 1557    Precautions and Contraindications    Precautions No IFC, pacemeaker  -DD    Posture/Observations    Posture/Observations Comments Rounded shoulder posture  -DD    Gait Assessment/Treatment    Gait, Stoddard Level independent  -DD    Gait, Comment Slow  -DD      02/14/17 1500    Subjective Comments    Subjective Comments Patient now able to go up  and down stairs the right way.  -CP    Precautions and Contraindications    Precautions/Limitations cardiac precautions  -CP    Precautions No IFC, pacemeaker  -CP    Subjective Pain    Able to rate subjective pain? yes  -CP    Pre-Treatment Pain Level --   1-2  -CP    Gait Assessment/Treatment    Gait, Stoddard Level independent  -CP      User Key  (r) = Recorded By, (t) = Taken By, (c) = Cosigned By    Initials Name Provider Type    DD Génesis Jamison, PT Physical Therapist    CP Edelmira Ferraro, PTA Physical Therapy Assistant                PT Assessment/Plan       02/16/17 1704 02/14/17 1620 02/10/17 1100    PT Assessment    Functional Limitations Limitation in home management;Limitations in community activities;Impaired gait  -DD  Impaired gait;Limitation in home management;Limitations in community activities;Performance in sport activities  -LF    Impairments Gait;Endurance;Impaired muscle power  -DD  Balance;Coordination;Gait;Endurance;Impaired muscle length;Impaired muscle power;Motor function;Muscle strength;Pain;Range of motion  -LF    Assessment Comments  Patient is progressing nicely.  Increase endurance this date with no rest breaks.  -CP Pt is progressing nicely. Will benefit from continuing therapy to help increase endurance, strength, normalize  gait, stretch to facilitate performance of ADLs and community ambulation.   -LF    Please refer to paper survey for additional self-reported information   Yes  -LF    Rehab Potential Good  -DD  Good  -LF    Patient/caregiver participated in establishment of treatment plan and goals Yes  -DD  Yes  -LF    Patient would benefit from skilled therapy intervention   Yes  -LF    PT Plan    PT Frequency  2x/week  -CP 2x/week  -LF    Predicted Duration of Therapy Intervention (days/wks)  2 weeks  -CP 4 weeks  -LF    Planned CPT's?   PT RE-EVAL: 25432;PT THER PROC EA 15 MIN: 79091;PT THER ACT EA 15 MIN: 97223;PT MANUAL THERAPY EA 15 MIN: 85656;PT NEUROMUSC RE-EDUCATION EA 15 MIN: 88091;PT GAIT TRAINING EA 15 MIN: 55363;PT AQUATIC THERAPY EA 15 MIN: 10988;PT HOT OR COLD PACK TREAT MCARE  -LF    Physical Therapy Interventions (Optional Details)   aquatics exercise;gait training;gross motor skills;home exercise program;manual therapy techniques;modalities;neuromuscular re-education;patient/family education;postural re-education;ROM (Range of Motion);strengthening;stretching  -LF    PT Plan Comments DC therapy this date  -DD Possible D/C at end of week to home and two month of fitness membership  -CP Continue therapy involving stretchign, strengthening, Le stabilization training, modalities as ndicated, progress HEP .   -LF      User Key  (r) = Recorded By, (t) = Taken By, (c) = Cosigned By    Initials Name Provider Type    MÓNICA Jamison, PT Physical Therapist    CP Edelmira Ferraro, PTA Physical Therapy Assistant     Charlene Barrera, PT Physical Therapist                Modalities       02/16/17 1557          Moist Heat    Rx Minutes 15 mins  -DD      MH S/P Rx Yes  -DD        User Key  (r) = Recorded By, (t) = Taken By, (c) = Cosigned By    Initials Name Provider Type    MÓNICA Jamison, PT Physical Therapist                Exercises       02/16/17 1557          Subjective Pain    Able to rate subjective  "pain? yes  -DD      Pre-Treatment Pain Level 2  -DD      Exercise 1    Exercise Name 1 Pro II  -DD      Resistance 1 other (comment)   Level 1  -DD      Time (Minutes) 1 10  -DD      Exercise 2    Exercise Name 2 Incline Stretch  -DD      Reps 2 3  -DD      Time (Seconds) 2 30  -DD      Exercise 3    Exercise Name 3 HS Stretch  -DD      Reps 3 3  -DD      Time (Seconds) 3 30  -DD      Exercise 4    Exercise Name 4 Step up 6\"  -DD      Sets 4 2  -DD      Reps 4 10  -DD      Exercise 5    Exercise Name 5 Heel/toe raises  -DD      Sets 5 2  -DD      Reps 5 10  -DD      Exercise 6    Exercise Name 6 Cybex Hip AD  -DD      Weights/Plates 6 15  -DD      Sets 6 3  -DD      Reps 6 10  -DD      Exercise 7    Exercise Name 7 Cybex hip AB  -DD      Weights/Plates 7 15  -DD      Sets 7 3  -DD      Reps 7 10  -DD      Exercise 8    Exercise Name 8 Cybex leg press  -DD      Weights/Plates 8 --   65 lb  -DD      Sets 8 3  -DD      Reps 8 10  -DD        User Key  (r) = Recorded By, (t) = Taken By, (c) = Cosigned By    Initials Name Provider Type    DD Génesis Jamison, PT Physical Therapist      The patient took frequent rest breaks today and was short of breath at times.            PT OP Goals       02/16/17 1557 02/14/17 1708 02/14/17 1600    PT Short Term Goals    STG Date to Achieve 02/24/17  -DD  02/24/17  -CP    STG 1 Patient will increase glute stength MMT 4+/5.  -DD  Patient will increase glute stength MMT 4+/5.  -CP    STG 1 Progress Progressing  -DD  Progressing  -CP    STG 2 Patient will walk with symmetrical weightbearing and stance time on bilateral LE.  -DD  Patient will walk with symmetrical weightbearing and stance time on bilateral LE.  -CP    STG 2 Progress Met  -DD  Met  -CP    STG 3 Patient will demonstrate nomalized left hip extension while walking.  -DD  Patient will demonstrate nomalized left hip extension while walking.  -CP    STG 3 Progress Met  -DD  Met  -CP    Long Term Goals    LTG Date to Achieve " 03/10/17  -DD  03/10/17  -CP    LTG 1 Decreased pain to 1/10.  -DD  Decreased pain to 1/10.  -CP    LTG 1 Progress Met;Partially Met   Intermittantly pain is 1  -DD  Met  -CP    LTG 2 Increase strenght in the Left LE to 4+/5  -DD  Increase strenght in the Left LE to 4+/5  -CP    LTG 2 Progress Progressing  -DD  Progressing  -CP    LTG 3 Patient will be able to ambulate > or = to 1,200 feet without AD.  -DD  Patient will be able to ambulate > or = to 1,200 feet without AD.  -CP    LTG 3 Progress Progressing  -DD  Progressing  -CP    LTG 4 Patient will score 55/80 on the LEFS.  -DD  Patient will score 55/80 on the LEFS.  -CP    LTG 4 Progress Progressing   Not assessed this date  -DD  Progressing   Not assessed this date  -CP    LTG 5 Patient will normalize her gait pattern.  -DD  Patient will normalize her gait pattern.  -CP    LTG 5 Progress Met  -DD  Met  -CP    LTG 6 Patient will resume hobbies and ADLs without limitation secondary to hip pain.  -DD  Patient will resume hobbies and ADLs without limitation secondary to hip pain.  -CP    LTG 6 Progress Met  -DD  Met  -CP    LTG 7 Independent in final HEP with progression parameters.  -DD  Independent in final HEP with progression parameters.  -CP    LTG 7 Progress Progressing  -DD  Ongoing  -CP    Time Calculation    PT Goal Re-Cert Due Date  03/03/17  -CP       02/10/17 1100          PT Short Term Goals    STG Date to Achieve 02/24/17  -LF      STG 1 Patient will increase glute stength MMT 4+/5.  -LF      STG 1 Progress Progressing  -LF      STG 2 Patient will walk with symmetrical weightbearing and stance time on bilateral LE.  -LF      STG 2 Progress Progressing  -LF      STG 3 Patient will demonstrate nomalized left hip extension while walking.  -LF      STG 3 Progress Progressing  -LF      Long Term Goals    LTG Date to Achieve 03/10/17  -LF      LTG 1 Decreased pain to 1/10.  -LF      LTG 1 Progress Progressing  -LF      LTG 2 Increase strenght in the Left LE  to 4+/5  -LF      LTG 2 Progress Progressing  -LF      LTG 3 Patient will be able to ambulate > or = to 1,200 feet without AD.  -LF      LTG 3 Progress Progressing  -LF      LTG 4 Patient will score 55/80 on the LEFS.  -LF      LTG 4 Progress Progressing   Not assessed this date  -LF      LTG 4 Progress Comments scored 51 today, 2/10/17  -LF      LTG 5 Patient will normalize her gait pattern.  -LF      LTG 5 Progress Progressing  -LF      LTG 6 Patient will resume hobbies and ADLs without limitation secondary to hip pain.  -LF      LTG 6 Progress Progressing  -LF      LTG 7 Independent in final HEP with progression parameters.  -LF      LTG 7 Progress Ongoing  -LF      Time Calculation    PT Goal Re-Cert Due Date 03/03/17  -LF        User Key  (r) = Recorded By, (t) = Taken By, (c) = Cosigned By    Initials Name Provider Type    DD Génesis Jamison, PT Physical Therapist    CP Edelmira Ferraro, JANET Physical Therapy Assistant     Charlene Barrera, PT Physical Therapist                Therapy Education       02/14/17 1618    Therapy Education    Given HEP   no change to HEP  -CP    Program Reinforced  -CP    How Provided Verbal  -CP    Provided to Patient  -CP    Level of Understanding Verbalized  -CP      User Key  (r) = Recorded By, (t) = Taken By, (c) = Cosigned By    Initials Name Provider Type    CP Edelmira Ferraro, JANET Physical Therapy Assistant          Time Calculation:   Start Time: 1557  Stop Time: 1715  Time Calculation (min): 78 min  PT Non-Billable Time (min): 15 min  Total Timed Code Minutes- PT: 36 minute(s)    Génesis Jamison, PT  2/16/2017

## 2017-03-06 ENCOUNTER — APPOINTMENT (OUTPATIENT)
Dept: LAB | Facility: HOSPITAL | Age: 57
End: 2017-03-06

## 2017-03-06 ENCOUNTER — OFFICE VISIT (OUTPATIENT)
Dept: FAMILY MEDICINE CLINIC | Facility: CLINIC | Age: 57
End: 2017-03-06

## 2017-03-06 VITALS
HEART RATE: 88 BPM | DIASTOLIC BLOOD PRESSURE: 90 MMHG | WEIGHT: 146.4 LBS | HEIGHT: 62 IN | BODY MASS INDEX: 26.94 KG/M2 | OXYGEN SATURATION: 98 % | SYSTOLIC BLOOD PRESSURE: 124 MMHG

## 2017-03-06 DIAGNOSIS — N18.30 CHRONIC RENAL FAILURE, STAGE 3 (MODERATE) (HCC): Chronic | ICD-10-CM

## 2017-03-06 DIAGNOSIS — F41.1 GENERALIZED ANXIETY DISORDER: Chronic | ICD-10-CM

## 2017-03-06 DIAGNOSIS — F32.A DEPRESSIVE DISORDER: Primary | Chronic | ICD-10-CM

## 2017-03-06 DIAGNOSIS — G47.09 OTHER INSOMNIA: Chronic | ICD-10-CM

## 2017-03-06 DIAGNOSIS — I25.10 CORONARY ARTERIOSCLEROSIS: Primary | ICD-10-CM

## 2017-03-06 LAB
ALBUMIN SERPL-MCNC: 4.5 G/DL (ref 3.4–4.8)
ALBUMIN/GLOB SERPL: 1.2 G/DL (ref 1.1–1.8)
ALP SERPL-CCNC: 84 U/L (ref 38–126)
ALT SERPL W P-5'-P-CCNC: 16 U/L (ref 9–52)
ANION GAP SERPL CALCULATED.3IONS-SCNC: 15 MMOL/L (ref 5–15)
ARTICHOKE IGE QN: 77 MG/DL (ref 1–129)
AST SERPL-CCNC: 75 U/L (ref 14–36)
BASOPHILS # BLD AUTO: 0.01 10*3/MM3 (ref 0–0.2)
BASOPHILS NFR BLD AUTO: 0.1 % (ref 0–2)
BILIRUB SERPL-MCNC: 0.6 MG/DL (ref 0.2–1.3)
BUN BLD-MCNC: 25 MG/DL (ref 7–21)
BUN/CREAT SERPL: 14.5 (ref 7–25)
CALCIUM SPEC-SCNC: 9.4 MG/DL (ref 8.4–10.2)
CHLORIDE SERPL-SCNC: 102 MMOL/L (ref 95–110)
CO2 SERPL-SCNC: 24 MMOL/L (ref 22–31)
CREAT BLD-MCNC: 1.73 MG/DL (ref 0.5–1)
DEPRECATED RDW RBC AUTO: 47.9 FL (ref 36.4–46.3)
EOSINOPHIL # BLD AUTO: 0.21 10*3/MM3 (ref 0–0.7)
EOSINOPHIL NFR BLD AUTO: 2.2 % (ref 0–7)
ERYTHROCYTE [DISTWIDTH] IN BLOOD BY AUTOMATED COUNT: 13.5 % (ref 11.5–14.5)
GFR SERPL CREATININE-BSD FRML MDRD: 30 ML/MIN/1.73 (ref 60–120)
GLOBULIN UR ELPH-MCNC: 3.9 GM/DL (ref 2.3–3.5)
GLUCOSE BLD-MCNC: 121 MG/DL (ref 60–100)
HCT VFR BLD AUTO: 45.4 % (ref 35–45)
HGB BLD-MCNC: 15.3 G/DL (ref 12–15.5)
IMM GRANULOCYTES # BLD: 0.03 10*3/MM3 (ref 0–0.02)
IMM GRANULOCYTES NFR BLD: 0.3 % (ref 0–0.5)
LYMPHOCYTES # BLD AUTO: 3.4 10*3/MM3 (ref 0.6–4.2)
LYMPHOCYTES NFR BLD AUTO: 35.2 % (ref 10–50)
MCH RBC QN AUTO: 32.6 PG (ref 26.5–34)
MCHC RBC AUTO-ENTMCNC: 33.7 G/DL (ref 31.4–36)
MCV RBC AUTO: 96.6 FL (ref 80–98)
MONOCYTES # BLD AUTO: 0.73 10*3/MM3 (ref 0–0.9)
MONOCYTES NFR BLD AUTO: 7.6 % (ref 0–12)
NEUTROPHILS # BLD AUTO: 5.28 10*3/MM3 (ref 2–8.6)
NEUTROPHILS NFR BLD AUTO: 54.6 % (ref 37–80)
PLATELET # BLD AUTO: 236 10*3/MM3 (ref 150–450)
PMV BLD AUTO: 13.4 FL (ref 8–12)
POTASSIUM BLD-SCNC: 4.3 MMOL/L (ref 3.5–5.1)
PROT SERPL-MCNC: 8.4 G/DL (ref 6.3–8.6)
RBC # BLD AUTO: 4.7 10*6/MM3 (ref 3.77–5.16)
SODIUM BLD-SCNC: 141 MMOL/L (ref 137–145)
WBC NRBC COR # BLD: 9.66 10*3/MM3 (ref 3.2–9.8)

## 2017-03-06 PROCEDURE — 83721 ASSAY OF BLOOD LIPOPROTEIN: CPT | Performed by: GENERAL PRACTICE

## 2017-03-06 PROCEDURE — 80053 COMPREHEN METABOLIC PANEL: CPT | Performed by: GENERAL PRACTICE

## 2017-03-06 PROCEDURE — 85025 COMPLETE CBC W/AUTO DIFF WBC: CPT | Performed by: GENERAL PRACTICE

## 2017-03-06 PROCEDURE — 99214 OFFICE O/P EST MOD 30 MIN: CPT | Performed by: GENERAL PRACTICE

## 2017-03-06 PROCEDURE — 36415 COLL VENOUS BLD VENIPUNCTURE: CPT | Performed by: GENERAL PRACTICE

## 2017-03-06 RX ORDER — FUROSEMIDE 40 MG/1
TABLET ORAL
Qty: 60 TABLET | Refills: 3 | Status: SHIPPED | OUTPATIENT
Start: 2017-03-06 | End: 2017-07-27 | Stop reason: SDUPTHER

## 2017-03-06 RX ORDER — SPIRONOLACTONE 25 MG/1
25 TABLET ORAL DAILY
Qty: 30 TABLET | Refills: 5 | Status: SHIPPED | OUTPATIENT
Start: 2017-03-06 | End: 2017-10-03 | Stop reason: SDUPTHER

## 2017-03-06 RX ORDER — HYDROCODONE BITARTRATE AND ACETAMINOPHEN 7.5; 325 MG/1; MG/1
1 TABLET ORAL EVERY 8 HOURS PRN
COMMUNITY
End: 2017-06-23

## 2017-03-06 RX ORDER — ATENOLOL 25 MG/1
TABLET ORAL
Qty: 30 TABLET | Refills: 7 | Status: SHIPPED | OUTPATIENT
Start: 2017-03-06 | End: 2018-04-06 | Stop reason: SDUPTHER

## 2017-03-06 RX ORDER — OMEPRAZOLE 40 MG/1
40 CAPSULE, DELAYED RELEASE ORAL DAILY
COMMUNITY
End: 2017-05-18 | Stop reason: SDUPTHER

## 2017-03-06 RX ORDER — TRAZODONE HYDROCHLORIDE 100 MG/1
TABLET ORAL
Qty: 45 TABLET | Refills: 5 | Status: SHIPPED | OUTPATIENT
Start: 2017-03-06 | End: 2018-10-16

## 2017-03-06 RX ORDER — TRAZODONE HYDROCHLORIDE 50 MG/1
TABLET ORAL
Qty: 45 TABLET | Refills: 3 | Status: SHIPPED | OUTPATIENT
Start: 2017-03-06 | End: 2017-03-06

## 2017-03-06 RX ORDER — OMEPRAZOLE 40 MG/1
CAPSULE, DELAYED RELEASE ORAL
Qty: 30 CAPSULE | Refills: 5 | Status: SHIPPED | OUTPATIENT
Start: 2017-03-06 | End: 2017-11-03 | Stop reason: SDUPTHER

## 2017-03-06 RX ORDER — CLONAZEPAM 1 MG/1
1 TABLET ORAL 2 TIMES DAILY
Qty: 60 TABLET | Refills: 2 | Status: SHIPPED | OUTPATIENT
Start: 2017-03-06 | End: 2017-06-23 | Stop reason: SDUPTHER

## 2017-03-06 NOTE — PROGRESS NOTES
Subjective   Erika Bowens is a 57 y.o. female.     Chief Complaint   Patient presents with   • Depression   • Hypertension   • Hyperlipidemia     History of Present Illness     For review and evaluation of management of chronic medical problems. Labs pending. Not sleeping well, lost her mother a month ago. Tearful. Still having some left hip pain from previous total hip arthroplasty. No further heart issues.     The following portions of the patient's history were reviewed and updated as appropriate: allergies, current medications, past social history and problem list.    Current Outpatient Prescriptions:   •  acetaminophen (TYLENOL) 500 MG tablet, Take 500-1,000 mg by mouth every 4 (four) hours as needed for mild pain (1-3)., Disp: , Rfl:   •  aspirin 325 MG tablet, Take 325 mg by mouth daily with breakfast., Disp: , Rfl:   •  calcium carbonate-vitamin d 600-400 MG-UNIT per tablet, Take 1 tablet by mouth every night., Disp: , Rfl:   •  clonazePAM (KlonoPIN) 1 MG tablet, Take 1 tablet by mouth 2 (Two) Times a Day., Disp: 60 tablet, Rfl: 2  •  clopidogrel (PLAVIX) 75 MG tablet, TAKE ONE TABLET BY MOUTH DAILY, Disp: 30 tablet, Rfl: 3  •  cyclobenzaprine (FLEXERIL) 10 MG tablet, TAKE ONE TABLET BY MOUTH THREE TIMES A DAY AS NEEDED FOR MUSCLE SPASMS, Disp: 90 tablet, Rfl: 1  •  FLUoxetine (PROzac) 20 MG capsule, Take 1 capsule by mouth 3 (three) times a day., Disp: 90 capsule, Rfl: 5  •  gabapentin (NEURONTIN) 600 MG tablet, Take 1 tablet by mouth 3 (Three) Times a Day., Disp: 90 tablet, Rfl: 5  •  HYDROcodone-acetaminophen (NORCO) 7.5-325 MG per tablet, Take 1 tablet by mouth Every 8 (Eight) Hours As Needed for moderate pain (4-6)., Disp: , Rfl:   •  NITROSTAT 0.4 MG SL tablet, DISSOLVE 1 TAB UNDER TONGUE FOR CHEST PAIN - IF PAIN REMAINS AFTER 5 MIN, CALL 911 AND REPEAT DOSE. MAX 3 TABS IN 15 MINUTES, Disp: 25 tablet, Rfl: 5  •  omeprazole (priLOSEC) 40 MG capsule, TAKE ONE CAPSULE BY MOUTH DAILY ** TAKE 12 HOURS  APART FROM PLAVIX **, Disp: 30 capsule, Rfl: 1  •  omeprazole (priLOSEC) 40 MG capsule, Take 40 mg by mouth Daily., Disp: , Rfl:   •  potassium chloride (K-DUR) 10 MEQ CR tablet, , Disp: , Rfl:   •  ranolazine (RANEXA) 500 MG 12 hr tablet, Take 500 mg by mouth 2 (two) times a day., Disp: , Rfl:   •  tiZANidine (ZANAFLEX) 4 MG tablet, Take 1 tablet by mouth 3 (Three) Times a Day As Needed for muscle spasms., Disp: 90 tablet, Rfl: 5  •  atenolol (TENORMIN) 25 MG tablet, TAKE ONE TABLET BY MOUTH DAILY, Disp: 30 tablet, Rfl: 7  •  furosemide (LASIX) 40 MG tablet, TAKE ONE TABLET BY MOUTH TWICE A DAY, Disp: 60 tablet, Rfl: 3  •  omeprazole (priLOSEC) 40 MG capsule, TAKE ONE CAPSULE BY MOUTH DAILY *TAKE 12 HOURS APART FROM PLAVIX*, Disp: 30 capsule, Rfl: 5  •  spironolactone (ALDACTONE) 25 MG tablet, Take 1 tablet by mouth Daily., Disp: 30 tablet, Rfl: 5  •  traZODone (DESYREL) 50 MG tablet, TAKE ONE AND ONE-HALF (1 & 1/2) TABLET BY MOUTH EVERY NIGHT AT BEDTIME, Disp: 45 tablet, Rfl: 3    Review of Systems   Constitutional: Negative.  Negative for activity change, appetite change, chills, fatigue, fever and unexpected weight change.   HENT: Negative.  Negative for congestion, ear pain, hearing loss, nosebleeds, rhinorrhea, sinus pressure, sneezing, sore throat, tinnitus and trouble swallowing.    Eyes: Negative.  Negative for pain, discharge, redness, itching and visual disturbance.   Respiratory: Negative.  Negative for apnea, cough, chest tightness, shortness of breath and wheezing.    Cardiovascular: Negative.  Negative for chest pain, palpitations and leg swelling.   Gastrointestinal: Negative.  Negative for abdominal distention, abdominal pain, constipation, diarrhea, nausea and vomiting.   Endocrine: Negative.    Genitourinary: Negative.  Negative for dysuria, frequency and urgency.   Musculoskeletal: Positive for arthralgias, back pain and gait problem. Negative for joint swelling, myalgias, neck pain and neck  "stiffness.   Skin: Negative.  Negative for color change and rash.   Allergic/Immunologic: Negative.    Neurological: Negative for dizziness, weakness, light-headedness, numbness and headaches.   Hematological: Negative.  Negative for adenopathy.   Psychiatric/Behavioral: Positive for dysphoric mood and sleep disturbance. The patient is nervous/anxious.      Objective     Visit Vitals   • /90 (BP Location: Left arm, Patient Position: Sitting, Cuff Size: Adult)   • Pulse 88   • Ht 62\" (157.5 cm)   • Wt 146 lb 6.4 oz (66.4 kg)   • SpO2 98%   • BMI 26.78 kg/m2     Physical Exam   Constitutional: She is oriented to person, place, and time. She appears well-developed and well-nourished. No distress.   HENT:   Head: Normocephalic and atraumatic.   Nose: Nose normal.   Mouth/Throat: Oropharynx is clear and moist.   Eyes: Conjunctivae and EOM are normal. Pupils are equal, round, and reactive to light. Right eye exhibits no discharge. Left eye exhibits no discharge.   Neck: No thyromegaly present.   Cardiovascular: Normal rate, regular rhythm, normal heart sounds and intact distal pulses.    Pulmonary/Chest: Effort normal and breath sounds normal.   Lymphadenopathy:     She has no cervical adenopathy.   Neurological: She is alert and oriented to person, place, and time.   Skin: Skin is warm and dry.   Psychiatric: She exhibits a depressed mood (tearful regarding her mother's death).   Nursing note and vitals reviewed.    Assessment/Plan     Problem List Items Addressed This Visit        Genitourinary    Chronic renal failure, stage 3 (moderate)       Other    Depressive disorder - Primary    Generalized anxiety disorder      Other Visit Diagnoses     Other insomnia  (Chronic)           Will notify regarding results. Increase trazodone.     New Medications Ordered This Visit   Medications   • omeprazole (priLOSEC) 40 MG capsule     Sig: Take 40 mg by mouth Daily.   • HYDROcodone-acetaminophen (NORCO) 7.5-325 MG per " tablet     Sig: Take 1 tablet by mouth Every 8 (Eight) Hours As Needed for moderate pain (4-6).   • clonazePAM (KlonoPIN) 1 MG tablet     Sig: Take 1 tablet by mouth 2 (Two) Times a Day.     Dispense:  60 tablet     Refill:  2

## 2017-04-12 RX ORDER — FLUOXETINE HYDROCHLORIDE 20 MG/1
CAPSULE ORAL
Qty: 90 CAPSULE | Refills: 0 | Status: SHIPPED | OUTPATIENT
Start: 2017-04-12 | End: 2017-05-18 | Stop reason: SDUPTHER

## 2017-04-12 RX ORDER — CYCLOBENZAPRINE HCL 10 MG
TABLET ORAL
Qty: 90 TABLET | Refills: 0 | Status: SHIPPED | OUTPATIENT
Start: 2017-04-12 | End: 2017-05-18 | Stop reason: SDUPTHER

## 2017-04-27 RX ORDER — CLOPIDOGREL BISULFATE 75 MG/1
TABLET ORAL
Qty: 30 TABLET | Refills: 5 | Status: SHIPPED | OUTPATIENT
Start: 2017-04-27 | End: 2017-12-06 | Stop reason: SDUPTHER

## 2017-05-18 RX ORDER — FLUOXETINE HYDROCHLORIDE 20 MG/1
CAPSULE ORAL
Qty: 90 CAPSULE | Refills: 1 | Status: SHIPPED | OUTPATIENT
Start: 2017-05-18 | End: 2017-07-27 | Stop reason: SDUPTHER

## 2017-05-18 RX ORDER — CYCLOBENZAPRINE HCL 10 MG
TABLET ORAL
Qty: 90 TABLET | Refills: 1 | Status: SHIPPED | OUTPATIENT
Start: 2017-05-18 | End: 2017-07-27 | Stop reason: SDUPTHER

## 2017-06-23 ENCOUNTER — OFFICE VISIT (OUTPATIENT)
Dept: FAMILY MEDICINE CLINIC | Facility: CLINIC | Age: 57
End: 2017-06-23

## 2017-06-23 VITALS
HEART RATE: 75 BPM | OXYGEN SATURATION: 98 % | SYSTOLIC BLOOD PRESSURE: 138 MMHG | WEIGHT: 146 LBS | DIASTOLIC BLOOD PRESSURE: 70 MMHG | BODY MASS INDEX: 26.87 KG/M2 | HEIGHT: 62 IN

## 2017-06-23 DIAGNOSIS — F41.1 GENERALIZED ANXIETY DISORDER: ICD-10-CM

## 2017-06-23 DIAGNOSIS — J20.9 ACUTE BRONCHITIS, UNSPECIFIED ORGANISM: ICD-10-CM

## 2017-06-23 DIAGNOSIS — I10 ESSENTIAL HYPERTENSION: Primary | Chronic | ICD-10-CM

## 2017-06-23 PROCEDURE — 99213 OFFICE O/P EST LOW 20 MIN: CPT | Performed by: GENERAL PRACTICE

## 2017-06-23 RX ORDER — CEFPROZIL 500 MG/1
500 TABLET, FILM COATED ORAL 2 TIMES DAILY
Qty: 14 TABLET | Refills: 0 | Status: SHIPPED | OUTPATIENT
Start: 2017-06-23 | End: 2017-09-19

## 2017-06-23 RX ORDER — TIZANIDINE 4 MG/1
4 TABLET ORAL NIGHTLY PRN
Qty: 13 TABLET | Refills: 5 | Status: SHIPPED | OUTPATIENT
Start: 2017-06-23 | End: 2017-06-26 | Stop reason: SDUPTHER

## 2017-06-23 RX ORDER — PRAVASTATIN SODIUM 80 MG/1
TABLET ORAL
COMMUNITY
Start: 2017-05-17 | End: 2018-10-16 | Stop reason: SDUPTHER

## 2017-06-23 RX ORDER — TIZANIDINE 4 MG/1
4 TABLET ORAL NIGHTLY PRN
COMMUNITY
End: 2017-06-23 | Stop reason: SDUPTHER

## 2017-06-23 RX ORDER — CLONAZEPAM 1 MG/1
1 TABLET ORAL 2 TIMES DAILY
Qty: 60 TABLET | Refills: 2 | Status: SHIPPED | OUTPATIENT
Start: 2017-06-23 | End: 2017-09-19 | Stop reason: SDUPTHER

## 2017-06-23 NOTE — PROGRESS NOTES
Subjective   Erika Bowens is a 57 y.o. female.     Chief Complaint   Patient presents with   • Depression   • Hypertension     For review and evaluation of management of chronic medical problems. Still having some left hip pain from previous total hip arthroplasty. No further heart issues. Recent cruise and now has sore throat and cough, nonproductive. No fever or chills. Anxiety is stable.   Hypertension   This is a chronic problem. The current episode started more than 1 year ago. The problem is unchanged. The problem is controlled. Pertinent negatives include no chest pain, headaches or neck pain. There are no associated agents to hypertension. Past treatments include beta blockers. The current treatment provides significant improvement. There are no compliance problems.       The following portions of the patient's history were reviewed and updated as appropriate: allergies, current medications, past social history and problem list.    Current Outpatient Prescriptions:   •  acetaminophen (TYLENOL) 500 MG tablet, Take 500-1,000 mg by mouth every 4 (four) hours as needed for mild pain (1-3)., Disp: , Rfl:   •  aspirin 325 MG tablet, Take 325 mg by mouth daily with breakfast., Disp: , Rfl:   •  atenolol (TENORMIN) 25 MG tablet, TAKE ONE TABLET BY MOUTH DAILY, Disp: 30 tablet, Rfl: 7  •  calcium carbonate-vitamin d 600-400 MG-UNIT per tablet, Take 1 tablet by mouth every night., Disp: , Rfl:   •  clonazePAM (KlonoPIN) 1 MG tablet, Take 1 tablet by mouth 2 (Two) Times a Day., Disp: 60 tablet, Rfl: 2  •  clopidogrel (PLAVIX) 75 MG tablet, TAKE ONE TABLET BY MOUTH DAILY, Disp: 30 tablet, Rfl: 5  •  cyclobenzaprine (FLEXERIL) 10 MG tablet, TAKE ONE TABLET BY MOUTH THREE TIMES A DAY AS NEEDED FOR MUSCLE SPASMS, Disp: 90 tablet, Rfl: 1  •  FLUoxetine (PROzac) 20 MG capsule, TAKE ONE CAPSULE BY MOUTH THREE TIMES A DAY, Disp: 90 capsule, Rfl: 1  •  furosemide (LASIX) 40 MG tablet, TAKE ONE TABLET BY MOUTH TWICE A DAY,  Disp: 60 tablet, Rfl: 3  •  gabapentin (NEURONTIN) 600 MG tablet, Take 1 tablet by mouth 3 (Three) Times a Day., Disp: 90 tablet, Rfl: 5  •  NITROSTAT 0.4 MG SL tablet, DISSOLVE 1 TAB UNDER TONGUE FOR CHEST PAIN - IF PAIN REMAINS AFTER 5 MIN, CALL 911 AND REPEAT DOSE. MAX 3 TABS IN 15 MINUTES, Disp: 25 tablet, Rfl: 5  •  omeprazole (priLOSEC) 40 MG capsule, TAKE ONE CAPSULE BY MOUTH DAILY *TAKE 12 HOURS APART FROM PLAVIX*, Disp: 30 capsule, Rfl: 5  •  potassium chloride (K-DUR) 10 MEQ CR tablet, , Disp: , Rfl:   •  pravastatin (PRAVACHOL) 80 MG tablet, , Disp: , Rfl:   •  ranolazine (RANEXA) 500 MG 12 hr tablet, Take 500 mg by mouth 2 (two) times a day., Disp: , Rfl:   •  spironolactone (ALDACTONE) 25 MG tablet, Take 1 tablet by mouth Daily., Disp: 30 tablet, Rfl: 5  •  tiZANidine (ZANAFLEX) 4 MG tablet, Take 1 tablet by mouth Every 8 (Eight) Hours As Needed for Muscle Spasms., Disp: 90 tablet, Rfl: 3  •  traZODone (DESYREL) 100 MG tablet, 1 - 1 1/2 tab qhs prn sleep, Disp: 45 tablet, Rfl: 5  •  cefprozil (CEFZIL) 500 MG tablet, Take 1 tablet by mouth 2 (Two) Times a Day., Disp: 14 tablet, Rfl: 0    Review of Systems   Constitutional: Negative.  Negative for activity change, appetite change, chills, fatigue, fever and unexpected weight change.   HENT: Negative.  Negative for congestion, ear pain, hearing loss, nosebleeds, rhinorrhea, sinus pressure, sneezing, sore throat, tinnitus and trouble swallowing.    Eyes: Negative.  Negative for pain, discharge, redness, itching and visual disturbance.   Respiratory: Negative.  Negative for apnea, cough, chest tightness and wheezing.    Cardiovascular: Negative.  Negative for chest pain and leg swelling.   Gastrointestinal: Negative.  Negative for abdominal distention, abdominal pain, constipation, diarrhea, nausea and vomiting.   Endocrine: Negative.    Genitourinary: Negative.  Negative for dysuria, frequency and urgency.   Musculoskeletal: Positive for arthralgias, back  "pain and gait problem. Negative for joint swelling, myalgias, neck pain and neck stiffness.   Skin: Negative.  Negative for color change and rash.   Allergic/Immunologic: Negative.    Neurological: Negative for dizziness, weakness, light-headedness, numbness and headaches.   Hematological: Negative.  Negative for adenopathy.   Psychiatric/Behavioral: Positive for dysphoric mood and sleep disturbance.     Objective     Visit Vitals   • /70   • Pulse 75   • Ht 62\" (157.5 cm)   • Wt 146 lb (66.2 kg)   • SpO2 98%   • BMI 26.7 kg/m2      Physical Exam   Constitutional: She is oriented to person, place, and time. She appears well-developed and well-nourished. No distress.   HENT:   Head: Normocephalic and atraumatic.   Nose: Nose normal.   Mouth/Throat: Oropharynx is clear and moist.   Eyes: Conjunctivae and EOM are normal. Pupils are equal, round, and reactive to light. Right eye exhibits no discharge. Left eye exhibits no discharge.   Neck: No thyromegaly present.   Cardiovascular: Normal rate, regular rhythm, normal heart sounds and intact distal pulses.    Pulmonary/Chest: Effort normal and breath sounds normal.   Lymphadenopathy:     She has no cervical adenopathy.   Neurological: She is alert and oriented to person, place, and time.   Skin: Skin is warm and dry.   Nursing note and vitals reviewed.    Assessment/Plan     Problem List Items Addressed This Visit        Cardiovascular and Mediastinum    Essential hypertension - Primary (Chronic)       Other    Generalized anxiety disorder      Other Visit Diagnoses     Acute bronchitis, unspecified organism            Will notify regarding results.    New Medications Ordered This Visit   Medications   • pravastatin (PRAVACHOL) 80 MG tablet   • cefprozil (CEFZIL) 500 MG tablet     Sig: Take 1 tablet by mouth 2 (Two) Times a Day.     Dispense:  14 tablet     Refill:  0   • clonazePAM (KlonoPIN) 1 MG tablet     Sig: Take 1 tablet by mouth 2 (Two) Times a Day.     " Dispense:  60 tablet     Refill:  2   • tiZANidine (ZANAFLEX) 4 MG tablet     Sig: Take 1 tablet by mouth Every 8 (Eight) Hours As Needed for Muscle Spasms.     Dispense:  90 tablet     Refill:  3

## 2017-06-26 RX ORDER — TIZANIDINE 4 MG/1
4 TABLET ORAL EVERY 8 HOURS PRN
Qty: 90 TABLET | Refills: 3 | Status: SHIPPED | OUTPATIENT
Start: 2017-06-26 | End: 2017-11-03 | Stop reason: SDUPTHER

## 2017-07-27 RX ORDER — FLUOXETINE HYDROCHLORIDE 20 MG/1
CAPSULE ORAL
Qty: 90 CAPSULE | Refills: 1 | Status: SHIPPED | OUTPATIENT
Start: 2017-07-27 | End: 2017-10-03 | Stop reason: SDUPTHER

## 2017-07-27 RX ORDER — TRAZODONE HYDROCHLORIDE 50 MG/1
TABLET ORAL
Qty: 45 TABLET | Refills: 2 | Status: SHIPPED | OUTPATIENT
Start: 2017-07-27 | End: 2017-11-03 | Stop reason: SDUPTHER

## 2017-07-27 RX ORDER — FUROSEMIDE 40 MG/1
TABLET ORAL
Qty: 60 TABLET | Refills: 2 | Status: SHIPPED | OUTPATIENT
Start: 2017-07-27 | End: 2017-11-03 | Stop reason: SDUPTHER

## 2017-07-27 RX ORDER — CYCLOBENZAPRINE HCL 10 MG
TABLET ORAL
Qty: 90 TABLET | Refills: 1 | Status: SHIPPED | OUTPATIENT
Start: 2017-07-27 | End: 2017-10-03 | Stop reason: SDUPTHER

## 2017-09-19 ENCOUNTER — OFFICE VISIT (OUTPATIENT)
Dept: FAMILY MEDICINE CLINIC | Facility: CLINIC | Age: 57
End: 2017-09-19

## 2017-09-19 ENCOUNTER — APPOINTMENT (OUTPATIENT)
Dept: LAB | Facility: HOSPITAL | Age: 57
End: 2017-09-19

## 2017-09-19 VITALS
DIASTOLIC BLOOD PRESSURE: 75 MMHG | HEART RATE: 70 BPM | HEIGHT: 63 IN | SYSTOLIC BLOOD PRESSURE: 122 MMHG | BODY MASS INDEX: 26.15 KG/M2 | WEIGHT: 147.6 LBS | OXYGEN SATURATION: 98 %

## 2017-09-19 DIAGNOSIS — E78.2 MIXED HYPERLIPIDEMIA: ICD-10-CM

## 2017-09-19 DIAGNOSIS — Z13.820 ENCOUNTER FOR SCREENING FOR OSTEOPOROSIS: Primary | ICD-10-CM

## 2017-09-19 DIAGNOSIS — I10 ESSENTIAL HYPERTENSION: Primary | Chronic | ICD-10-CM

## 2017-09-19 DIAGNOSIS — F32.A DEPRESSIVE DISORDER: Chronic | ICD-10-CM

## 2017-09-19 DIAGNOSIS — N18.30 CHRONIC RENAL FAILURE, STAGE 3 (MODERATE) (HCC): ICD-10-CM

## 2017-09-19 DIAGNOSIS — F41.1 GENERALIZED ANXIETY DISORDER: Chronic | ICD-10-CM

## 2017-09-19 DIAGNOSIS — Z12.31 ENCOUNTER FOR SCREENING MAMMOGRAM FOR MALIGNANT NEOPLASM OF BREAST: ICD-10-CM

## 2017-09-19 DIAGNOSIS — I25.10 CORONARY ARTERIOSCLEROSIS: ICD-10-CM

## 2017-09-19 LAB
ALBUMIN SERPL-MCNC: 4.4 G/DL (ref 3.4–4.8)
ALBUMIN/GLOB SERPL: 1.2 G/DL (ref 1.1–1.8)
ALP SERPL-CCNC: 77 U/L (ref 38–126)
ALT SERPL W P-5'-P-CCNC: 20 U/L (ref 9–52)
ANION GAP SERPL CALCULATED.3IONS-SCNC: 12 MMOL/L (ref 5–15)
ARTICHOKE IGE QN: 97 MG/DL (ref 1–129)
AST SERPL-CCNC: 44 U/L (ref 14–36)
BASOPHILS # BLD AUTO: 0.04 10*3/MM3 (ref 0–0.2)
BASOPHILS NFR BLD AUTO: 0.5 % (ref 0–2)
BILIRUB SERPL-MCNC: 0.5 MG/DL (ref 0.2–1.3)
BILIRUB UR QL STRIP: NEGATIVE
BUN BLD-MCNC: 37 MG/DL (ref 7–21)
BUN/CREAT SERPL: 16.5 (ref 7–25)
CALCIUM SPEC-SCNC: 9 MG/DL (ref 8.4–10.2)
CHLORIDE SERPL-SCNC: 102 MMOL/L (ref 95–110)
CLARITY UR: CLEAR
CO2 SERPL-SCNC: 25 MMOL/L (ref 22–31)
COLOR UR: YELLOW
CREAT BLD-MCNC: 2.24 MG/DL (ref 0.5–1)
DEPRECATED RDW RBC AUTO: 44.8 FL (ref 36.4–46.3)
EOSINOPHIL # BLD AUTO: 0.14 10*3/MM3 (ref 0–0.7)
EOSINOPHIL NFR BLD AUTO: 1.8 % (ref 0–7)
ERYTHROCYTE [DISTWIDTH] IN BLOOD BY AUTOMATED COUNT: 12.4 % (ref 11.5–14.5)
GFR SERPL CREATININE-BSD FRML MDRD: 22 ML/MIN/1.73 (ref 60–120)
GLOBULIN UR ELPH-MCNC: 3.8 GM/DL (ref 2.3–3.5)
GLUCOSE BLD-MCNC: 100 MG/DL (ref 60–100)
GLUCOSE UR STRIP-MCNC: NEGATIVE MG/DL
HCT VFR BLD AUTO: 41.4 % (ref 35–45)
HGB BLD-MCNC: 13.3 G/DL (ref 12–15.5)
HGB UR QL STRIP.AUTO: NEGATIVE
IMM GRANULOCYTES # BLD: 0.02 10*3/MM3 (ref 0–0.02)
IMM GRANULOCYTES NFR BLD: 0.3 % (ref 0–0.5)
KETONES UR QL STRIP: NEGATIVE
LEUKOCYTE ESTERASE UR QL STRIP.AUTO: NEGATIVE
LYMPHOCYTES # BLD AUTO: 2.22 10*3/MM3 (ref 0.6–4.2)
LYMPHOCYTES NFR BLD AUTO: 29.2 % (ref 10–50)
MCH RBC QN AUTO: 31.4 PG (ref 26.5–34)
MCHC RBC AUTO-ENTMCNC: 32.1 G/DL (ref 31.4–36)
MCV RBC AUTO: 97.9 FL (ref 80–98)
MONOCYTES # BLD AUTO: 0.64 10*3/MM3 (ref 0–0.9)
MONOCYTES NFR BLD AUTO: 8.4 % (ref 0–12)
NEUTROPHILS # BLD AUTO: 4.55 10*3/MM3 (ref 2–8.6)
NEUTROPHILS NFR BLD AUTO: 59.8 % (ref 37–80)
NITRITE UR QL STRIP: NEGATIVE
PH UR STRIP.AUTO: 7 [PH] (ref 5–9)
PLATELET # BLD AUTO: 226 10*3/MM3 (ref 150–450)
PMV BLD AUTO: 12.9 FL (ref 8–12)
POTASSIUM BLD-SCNC: 5.2 MMOL/L (ref 3.5–5.1)
PROT SERPL-MCNC: 8.2 G/DL (ref 6.3–8.6)
PROT UR QL STRIP: NEGATIVE
RBC # BLD AUTO: 4.23 10*6/MM3 (ref 3.77–5.16)
SODIUM BLD-SCNC: 139 MMOL/L (ref 137–145)
SP GR UR STRIP: 1.02 (ref 1–1.03)
UROBILINOGEN UR QL STRIP: NORMAL
WBC NRBC COR # BLD: 7.61 10*3/MM3 (ref 3.2–9.8)

## 2017-09-19 PROCEDURE — 85025 COMPLETE CBC W/AUTO DIFF WBC: CPT | Performed by: GENERAL PRACTICE

## 2017-09-19 PROCEDURE — 99214 OFFICE O/P EST MOD 30 MIN: CPT | Performed by: GENERAL PRACTICE

## 2017-09-19 PROCEDURE — 83721 ASSAY OF BLOOD LIPOPROTEIN: CPT | Performed by: GENERAL PRACTICE

## 2017-09-19 PROCEDURE — 81003 URINALYSIS AUTO W/O SCOPE: CPT | Performed by: GENERAL PRACTICE

## 2017-09-19 PROCEDURE — 80053 COMPREHEN METABOLIC PANEL: CPT | Performed by: GENERAL PRACTICE

## 2017-09-19 PROCEDURE — 36415 COLL VENOUS BLD VENIPUNCTURE: CPT | Performed by: GENERAL PRACTICE

## 2017-09-19 RX ORDER — CLONAZEPAM 1 MG/1
1 TABLET ORAL 2 TIMES DAILY
Qty: 60 TABLET | Refills: 2 | Status: SHIPPED | OUTPATIENT
Start: 2017-09-19 | End: 2018-01-05 | Stop reason: SDUPTHER

## 2017-09-20 DIAGNOSIS — N18.30 CHRONIC RENAL FAILURE, STAGE 3 (MODERATE) (HCC): Primary | ICD-10-CM

## 2017-10-04 RX ORDER — CYCLOBENZAPRINE HCL 10 MG
TABLET ORAL
Qty: 90 TABLET | Refills: 1 | Status: SHIPPED | OUTPATIENT
Start: 2017-10-04 | End: 2017-12-06 | Stop reason: SDUPTHER

## 2017-10-04 RX ORDER — FLUOXETINE HYDROCHLORIDE 20 MG/1
CAPSULE ORAL
Qty: 90 CAPSULE | Refills: 1 | Status: SHIPPED | OUTPATIENT
Start: 2017-10-04 | End: 2017-12-06 | Stop reason: SDUPTHER

## 2017-10-04 RX ORDER — SPIRONOLACTONE 25 MG/1
TABLET ORAL
Qty: 30 TABLET | Refills: 1 | Status: SHIPPED | OUTPATIENT
Start: 2017-10-04 | End: 2017-12-06 | Stop reason: SDUPTHER

## 2017-11-06 RX ORDER — TRAZODONE HYDROCHLORIDE 50 MG/1
TABLET ORAL
Qty: 45 TABLET | Refills: 3 | Status: SHIPPED | OUTPATIENT
Start: 2017-11-06 | End: 2018-10-16

## 2017-11-06 RX ORDER — FUROSEMIDE 40 MG/1
TABLET ORAL
Qty: 60 TABLET | Refills: 3 | Status: SHIPPED | OUTPATIENT
Start: 2017-11-06 | End: 2018-04-06 | Stop reason: SDUPTHER

## 2017-11-06 RX ORDER — OMEPRAZOLE 40 MG/1
CAPSULE, DELAYED RELEASE ORAL
Qty: 30 CAPSULE | Refills: 3 | Status: SHIPPED | OUTPATIENT
Start: 2017-11-06 | End: 2018-04-06 | Stop reason: SDUPTHER

## 2017-11-06 RX ORDER — TIZANIDINE 4 MG/1
TABLET ORAL
Qty: 90 TABLET | Refills: 3 | Status: SHIPPED | OUTPATIENT
Start: 2017-11-06 | End: 2018-04-06 | Stop reason: SDUPTHER

## 2017-12-06 RX ORDER — CLOPIDOGREL BISULFATE 75 MG/1
TABLET ORAL
Qty: 30 TABLET | Refills: 1 | Status: SHIPPED | OUTPATIENT
Start: 2017-12-06 | End: 2018-02-20 | Stop reason: SDUPTHER

## 2017-12-06 RX ORDER — SPIRONOLACTONE 25 MG/1
TABLET ORAL
Qty: 30 TABLET | Refills: 0 | Status: SHIPPED | OUTPATIENT
Start: 2017-12-06 | End: 2018-01-10 | Stop reason: SDUPTHER

## 2017-12-06 RX ORDER — CYCLOBENZAPRINE HCL 10 MG
TABLET ORAL
Qty: 90 TABLET | Refills: 0 | Status: SHIPPED | OUTPATIENT
Start: 2017-12-06 | End: 2018-01-10 | Stop reason: SDUPTHER

## 2017-12-06 RX ORDER — FLUOXETINE HYDROCHLORIDE 20 MG/1
CAPSULE ORAL
Qty: 90 CAPSULE | Refills: 0 | Status: SHIPPED | OUTPATIENT
Start: 2017-12-06 | End: 2018-01-10 | Stop reason: SDUPTHER

## 2018-01-04 ENCOUNTER — LAB (OUTPATIENT)
Dept: LAB | Facility: HOSPITAL | Age: 58
End: 2018-01-04

## 2018-01-04 DIAGNOSIS — I10 ESSENTIAL HYPERTENSION: Chronic | ICD-10-CM

## 2018-01-04 DIAGNOSIS — I10 ESSENTIAL HYPERTENSION: Primary | Chronic | ICD-10-CM

## 2018-01-04 LAB
ANION GAP SERPL CALCULATED.3IONS-SCNC: 12 MMOL/L (ref 5–15)
BUN BLD-MCNC: 13 MG/DL (ref 7–21)
BUN/CREAT SERPL: 12.3 (ref 7–25)
CALCIUM SPEC-SCNC: 9.2 MG/DL (ref 8.4–10.2)
CHLORIDE SERPL-SCNC: 102 MMOL/L (ref 95–110)
CO2 SERPL-SCNC: 24 MMOL/L (ref 22–31)
CREAT BLD-MCNC: 1.06 MG/DL (ref 0.5–1)
GFR SERPL CREATININE-BSD FRML MDRD: 53 ML/MIN/1.73 (ref 51–120)
GLUCOSE BLD-MCNC: 68 MG/DL (ref 60–100)
POTASSIUM BLD-SCNC: 3.6 MMOL/L (ref 3.5–5.1)
SODIUM BLD-SCNC: 138 MMOL/L (ref 137–145)

## 2018-01-04 PROCEDURE — 36415 COLL VENOUS BLD VENIPUNCTURE: CPT

## 2018-01-04 PROCEDURE — 80048 BASIC METABOLIC PNL TOTAL CA: CPT

## 2018-01-05 ENCOUNTER — OFFICE VISIT (OUTPATIENT)
Dept: FAMILY MEDICINE CLINIC | Facility: CLINIC | Age: 58
End: 2018-01-05

## 2018-01-05 VITALS
HEIGHT: 63 IN | HEART RATE: 75 BPM | DIASTOLIC BLOOD PRESSURE: 70 MMHG | BODY MASS INDEX: 26.05 KG/M2 | WEIGHT: 147 LBS | OXYGEN SATURATION: 98 % | SYSTOLIC BLOOD PRESSURE: 110 MMHG

## 2018-01-05 DIAGNOSIS — I10 ESSENTIAL HYPERTENSION: Primary | ICD-10-CM

## 2018-01-05 DIAGNOSIS — Z00.00 ANNUAL PHYSICAL EXAM: ICD-10-CM

## 2018-01-05 DIAGNOSIS — F32.A DEPRESSIVE DISORDER: ICD-10-CM

## 2018-01-05 DIAGNOSIS — N18.30 CHRONIC RENAL FAILURE, STAGE 3 (MODERATE) (HCC): ICD-10-CM

## 2018-01-05 DIAGNOSIS — F41.1 GENERALIZED ANXIETY DISORDER: ICD-10-CM

## 2018-01-05 DIAGNOSIS — E55.9 VITAMIN D DEFICIENCY: ICD-10-CM

## 2018-01-05 DIAGNOSIS — I25.10 CORONARY ARTERIOSCLEROSIS: ICD-10-CM

## 2018-01-05 PROCEDURE — 99214 OFFICE O/P EST MOD 30 MIN: CPT | Performed by: GENERAL PRACTICE

## 2018-01-05 RX ORDER — CLONAZEPAM 1 MG/1
1 TABLET ORAL 2 TIMES DAILY PRN
Qty: 60 TABLET | Refills: 2 | Status: SHIPPED | OUTPATIENT
Start: 2018-01-05 | End: 2018-04-06 | Stop reason: SDUPTHER

## 2018-01-05 NOTE — PROGRESS NOTES
Subjective   Erika Bowens is a 57 y.o. female.   Chief Complaint   Patient presents with   • Follow-up   • Hypertension   • Depression   • Anxiety     For review and evaluation of management of chronic medical problems. Depression stable.  Anxiety controlled. Labs reviewed.   Hypertension   This is a chronic problem. The current episode started more than 1 year ago. The problem is unchanged. The problem is controlled. Pertinent negatives include no chest pain, headaches, neck pain, palpitations or shortness of breath. There are no associated agents to hypertension. Past treatments include beta blockers. The current treatment provides significant improvement. There are no compliance problems.  Hypertensive end-organ damage includes CAD/MI.      The following portions of the patient's history were reviewed and updated as appropriate: allergies, current medications, past social history and problem list.    Outpatient Medications Prior to Visit   Medication Sig Dispense Refill   • acetaminophen (TYLENOL) 500 MG tablet Take 500-1,000 mg by mouth every 4 (four) hours as needed for mild pain (1-3).     • aspirin 325 MG tablet Take 325 mg by mouth daily with breakfast.     • atenolol (TENORMIN) 25 MG tablet TAKE ONE TABLET BY MOUTH DAILY 30 tablet 7   • calcium carbonate-vitamin d 600-400 MG-UNIT per tablet Take 1 tablet by mouth every night.     • clopidogrel (PLAVIX) 75 MG tablet TAKE ONE TABLET BY MOUTH DAILY 30 tablet 1   • furosemide (LASIX) 40 MG tablet TAKE ONE TABLET BY MOUTH TWICE A DAY 60 tablet 3   • gabapentin (NEURONTIN) 600 MG tablet Take 1 tablet by mouth 3 (Three) Times a Day. 90 tablet 5   • omeprazole (priLOSEC) 40 MG capsule TAKE ONE CAPSULE BY MOUTH DAILY *TAKE 12 HOURS APART FROM PLAVIX* 30 capsule 3   • pravastatin (PRAVACHOL) 80 MG tablet      • ranolazine (RANEXA) 500 MG 12 hr tablet Take 500 mg by mouth 2 (two) times a day.     • tiZANidine (ZANAFLEX) 4 MG tablet TAKE ONE TABLET BY MOUTH EVERY 8  HOURS AS NEEDED FOR MUSCLE SPASMS 90 tablet 3   • traZODone (DESYREL) 100 MG tablet 1 - 1 1/2 tab qhs prn sleep 45 tablet 5   • traZODone (DESYREL) 50 MG tablet TAKE 1 AND 1/2 TABLETS BY MOUTH EVERY NIGHT AT BEDTIME 45 tablet 3   • clonazePAM (KlonoPIN) 1 MG tablet Take 1 tablet by mouth 2 (Two) Times a Day. 60 tablet 2   • cyclobenzaprine (FLEXERIL) 10 MG tablet TAKE ONE TABLET BY MOUTH THREE TIMES A DAY AS NEEDED FOR MUSCLE SPASMS 90 tablet 0   • FLUoxetine (PROzac) 20 MG capsule TAKE ONE CAPSULE BY MOUTH THREE TIMES A DAY 90 capsule 0   • NITROSTAT 0.4 MG SL tablet DISSOLVE 1 TAB UNDER TONGUE FOR CHEST PAIN - IF PAIN REMAINS AFTER 5 MIN, CALL 911 AND REPEAT DOSE. MAX 3 TABS IN 15 MINUTES 25 tablet 5   • potassium chloride (K-DUR) 10 MEQ CR tablet      • spironolactone (ALDACTONE) 25 MG tablet TAKE ONE TABLET BY MOUTH DAILY 30 tablet 0     No facility-administered medications prior to visit.        Review of Systems   Constitutional: Negative.  Negative for activity change, appetite change, chills, fatigue, fever and unexpected weight change.   HENT: Negative.  Negative for congestion, ear pain, hearing loss, nosebleeds, rhinorrhea, sinus pressure, sneezing, sore throat, tinnitus and trouble swallowing.    Eyes: Negative.  Negative for pain, discharge, redness, itching and visual disturbance.   Respiratory: Negative.  Negative for apnea, cough, chest tightness, shortness of breath and wheezing.    Cardiovascular: Negative.  Negative for chest pain, palpitations and leg swelling.   Gastrointestinal: Negative.  Negative for abdominal distention, abdominal pain, constipation, diarrhea, nausea and vomiting.   Endocrine: Negative.    Genitourinary: Negative.  Negative for dysuria, frequency and urgency.   Musculoskeletal: Negative.  Negative for arthralgias, back pain, joint swelling, myalgias, neck pain and neck stiffness.   Skin: Negative.  Negative for color change and rash.   Allergic/Immunologic: Negative.   "  Neurological: Negative.  Negative for dizziness, weakness, light-headedness, numbness and headaches.   Hematological: Negative.  Negative for adenopathy.   Psychiatric/Behavioral: Negative.  Negative for dysphoric mood and sleep disturbance. The patient is not nervous/anxious.      Objective   Visit Vitals   • /70 (BP Location: Left arm, Patient Position: Sitting, Cuff Size: Adult)   • Pulse 75   • Ht 160 cm (63\")   • Wt 66.7 kg (147 lb)   • SpO2 98%   • BMI 26.04 kg/m2     Physical Exam   Constitutional: She is oriented to person, place, and time. She appears well-developed and well-nourished. No distress.   HENT:   Head: Normocephalic and atraumatic.   Nose: Nose normal.   Mouth/Throat: Oropharynx is clear and moist.   Eyes: Conjunctivae and EOM are normal. Pupils are equal, round, and reactive to light. Right eye exhibits no discharge. Left eye exhibits no discharge.   Neck: No thyromegaly present.   Cardiovascular: Normal rate, regular rhythm, normal heart sounds and intact distal pulses.    Pulmonary/Chest: Effort normal and breath sounds normal.   Lymphadenopathy:     She has no cervical adenopathy.   Neurological: She is alert and oriented to person, place, and time.   Skin: Skin is warm and dry.   Psychiatric: She has a normal mood and affect.   Nursing note and vitals reviewed.    Results for orders placed or performed in visit on 01/04/18   Basic Metabolic Panel   Result Value Ref Range    Glucose 68 60 - 100 mg/dL    BUN 13 7 - 21 mg/dL    Creatinine 1.06 (H) 0.50 - 1.00 mg/dL    Sodium 138 137 - 145 mmol/L    Potassium 3.6 3.5 - 5.1 mmol/L    Chloride 102 95 - 110 mmol/L    CO2 24.0 22.0 - 31.0 mmol/L    Calcium 9.2 8.4 - 10.2 mg/dL    eGFR Non  Amer 53 51 - 120 mL/min/1.73    BUN/Creatinine Ratio 12.3 7.0 - 25.0    Anion Gap 12.0 5.0 - 15.0 mmol/L      Assessment/Plan   Problem List Items Addressed This Visit        Cardiovascular and Mediastinum    Essential hypertension - Primary " (Chronic)    Relevant Orders    Comprehensive Metabolic Panel    Lipid Panel    Urinalysis With Microscopic - Urine, Clean Catch    Coronary arteriosclerosis    Relevant Orders    Lipid Panel    CBC & Differential       Digestive    Vitamin D deficiency    Relevant Orders    Vitamin D 25 Hydroxy       Genitourinary    Chronic renal failure, stage 3 (moderate)       Other    Depressive disorder (Chronic)    Generalized anxiety disorder (Chronic)      Other Visit Diagnoses     Annual physical exam        Relevant Orders    Comprehensive Metabolic Panel    Lipid Panel    Urinalysis With Microscopic - Urine, Clean Catch    Vitamin D 25 Hydroxy    CBC & Differential          Continue current treatment. Luis Angel reviewed and appropriate. Not recommended to drive or operate heavy equipment while taking potentially sedating meds.  Patient understands the risks associated with this controlled medication, including tolerance and addiction. They also agree to obtain this medication only from me, and not from a another provider, unless that provider is covering for me in my absence. They also agree to be compliant in dosing, and not self adjust the dose of medication.  A signed controlled substance agreement is on file, and they have received a controlled substance education sheet at this or a previous visit. They have also signed a consent for treatment with a controlled substance as per [facility] policy.      New Medications Ordered This Visit   Medications   • clonazePAM (KlonoPIN) 1 MG tablet     Sig: Take 1 tablet by mouth 2 (Two) Times a Day As Needed for Anxiety.     Dispense:  60 tablet     Refill:  2     Return in about 3 months (around 4/5/2018) for Annual physical.

## 2018-01-10 RX ORDER — CLONAZEPAM 1 MG/1
TABLET ORAL
Qty: 60 TABLET | Refills: 1 | OUTPATIENT
Start: 2018-01-10

## 2018-01-10 RX ORDER — NITROGLYCERIN 0.4 MG/1
TABLET SUBLINGUAL
Qty: 25 TABLET | Refills: 2 | Status: SHIPPED | OUTPATIENT
Start: 2018-01-10 | End: 2022-11-18 | Stop reason: SDUPTHER

## 2018-01-10 RX ORDER — CYCLOBENZAPRINE HCL 10 MG
TABLET ORAL
Qty: 90 TABLET | Refills: 1 | Status: SHIPPED | OUTPATIENT
Start: 2018-01-10 | End: 2018-04-06 | Stop reason: SDUPTHER

## 2018-01-10 RX ORDER — SPIRONOLACTONE 25 MG/1
TABLET ORAL
Qty: 30 TABLET | Refills: 5 | Status: SHIPPED | OUTPATIENT
Start: 2018-01-10 | End: 2018-09-12 | Stop reason: SDUPTHER

## 2018-01-10 RX ORDER — FLUOXETINE HYDROCHLORIDE 20 MG/1
CAPSULE ORAL
Qty: 90 CAPSULE | Refills: 1 | Status: SHIPPED | OUTPATIENT
Start: 2018-01-10 | End: 2018-04-06 | Stop reason: SDUPTHER

## 2018-02-21 RX ORDER — CLOPIDOGREL BISULFATE 75 MG/1
TABLET ORAL
Qty: 30 TABLET | Refills: 3 | Status: SHIPPED | OUTPATIENT
Start: 2018-02-21 | End: 2018-07-10 | Stop reason: SDUPTHER

## 2018-03-28 ENCOUNTER — LAB (OUTPATIENT)
Dept: LAB | Facility: HOSPITAL | Age: 58
End: 2018-03-28

## 2018-03-28 DIAGNOSIS — Z00.00 ANNUAL PHYSICAL EXAM: ICD-10-CM

## 2018-03-28 DIAGNOSIS — I25.10 CORONARY ARTERIOSCLEROSIS: ICD-10-CM

## 2018-03-28 DIAGNOSIS — I10 ESSENTIAL HYPERTENSION: ICD-10-CM

## 2018-03-28 DIAGNOSIS — E55.9 VITAMIN D DEFICIENCY: ICD-10-CM

## 2018-03-28 LAB
25(OH)D3 SERPL-MCNC: 15.3 NG/ML (ref 30–100)
ALBUMIN SERPL-MCNC: 4.6 G/DL (ref 3.4–4.8)
ALBUMIN/GLOB SERPL: 1.2 G/DL (ref 1.1–1.8)
ALP SERPL-CCNC: 83 U/L (ref 38–126)
ALT SERPL W P-5'-P-CCNC: 46 U/L (ref 9–52)
ANION GAP SERPL CALCULATED.3IONS-SCNC: 19 MMOL/L (ref 5–15)
ARTICHOKE IGE QN: 144 MG/DL (ref 1–129)
AST SERPL-CCNC: 81 U/L (ref 14–36)
BACTERIA UR QL AUTO: ABNORMAL /HPF
BASOPHILS # BLD AUTO: 0.03 10*3/MM3 (ref 0–0.2)
BASOPHILS NFR BLD AUTO: 0.2 % (ref 0–2)
BILIRUB SERPL-MCNC: 0.4 MG/DL (ref 0.2–1.3)
BILIRUB UR QL STRIP: ABNORMAL
BUN BLD-MCNC: 18 MG/DL (ref 7–21)
BUN/CREAT SERPL: 12.2 (ref 7–25)
CALCIUM SPEC-SCNC: 9.2 MG/DL (ref 8.4–10.2)
CHLORIDE SERPL-SCNC: 99 MMOL/L (ref 95–110)
CHOLEST SERPL-MCNC: 250 MG/DL (ref 0–199)
CLARITY UR: CLEAR
CO2 SERPL-SCNC: 23 MMOL/L (ref 22–31)
COLOR UR: YELLOW
CREAT BLD-MCNC: 1.47 MG/DL (ref 0.5–1)
DEPRECATED RDW RBC AUTO: 43.6 FL (ref 36.4–46.3)
EOSINOPHIL # BLD AUTO: 0.03 10*3/MM3 (ref 0–0.7)
EOSINOPHIL NFR BLD AUTO: 0.2 % (ref 0–7)
ERYTHROCYTE [DISTWIDTH] IN BLOOD BY AUTOMATED COUNT: 12.7 % (ref 11.5–14.5)
GFR SERPL CREATININE-BSD FRML MDRD: 36 ML/MIN/1.73 (ref 45–104)
GLOBULIN UR ELPH-MCNC: 3.8 GM/DL (ref 2.3–3.5)
GLUCOSE BLD-MCNC: 102 MG/DL (ref 60–100)
GLUCOSE UR STRIP-MCNC: NEGATIVE MG/DL
HCT VFR BLD AUTO: 42.3 % (ref 35–45)
HDLC SERPL-MCNC: 50 MG/DL (ref 60–200)
HGB BLD-MCNC: 14.6 G/DL (ref 12–15.5)
HGB UR QL STRIP.AUTO: NEGATIVE
HYALINE CASTS UR QL AUTO: ABNORMAL /LPF
IMM GRANULOCYTES # BLD: 0.03 10*3/MM3 (ref 0–0.02)
IMM GRANULOCYTES NFR BLD: 0.2 % (ref 0–0.5)
KETONES UR QL STRIP: ABNORMAL
LDLC/HDLC SERPL: 3.2 {RATIO} (ref 0–3.22)
LEUKOCYTE ESTERASE UR QL STRIP.AUTO: NEGATIVE
LYMPHOCYTES # BLD AUTO: 3.1 10*3/MM3 (ref 0.6–4.2)
LYMPHOCYTES NFR BLD AUTO: 25.4 % (ref 10–50)
MCH RBC QN AUTO: 32.5 PG (ref 26.5–34)
MCHC RBC AUTO-ENTMCNC: 34.5 G/DL (ref 31.4–36)
MCV RBC AUTO: 94.2 FL (ref 80–98)
MONOCYTES # BLD AUTO: 0.79 10*3/MM3 (ref 0–0.9)
MONOCYTES NFR BLD AUTO: 6.5 % (ref 0–12)
NEUTROPHILS # BLD AUTO: 8.21 10*3/MM3 (ref 2–8.6)
NEUTROPHILS NFR BLD AUTO: 67.5 % (ref 37–80)
NITRITE UR QL STRIP: NEGATIVE
PH UR STRIP.AUTO: 5.5 [PH] (ref 5–9)
PLATELET # BLD AUTO: 290 10*3/MM3 (ref 150–450)
PMV BLD AUTO: 12.4 FL (ref 8–12)
POTASSIUM BLD-SCNC: 3.6 MMOL/L (ref 3.5–5.1)
PROT SERPL-MCNC: 8.4 G/DL (ref 6.3–8.6)
PROT UR QL STRIP: NEGATIVE
RBC # BLD AUTO: 4.49 10*6/MM3 (ref 3.77–5.16)
RBC # UR: ABNORMAL /HPF
REF LAB TEST METHOD: ABNORMAL
SODIUM BLD-SCNC: 141 MMOL/L (ref 137–145)
SP GR UR STRIP: 1.02 (ref 1–1.03)
SQUAMOUS #/AREA URNS HPF: ABNORMAL /HPF
TRIGL SERPL-MCNC: 199 MG/DL (ref 20–199)
UROBILINOGEN UR QL STRIP: ABNORMAL
WBC NRBC COR # BLD: 12.19 10*3/MM3 (ref 3.2–9.8)
WBC UR QL AUTO: ABNORMAL /HPF

## 2018-03-28 PROCEDURE — 81001 URINALYSIS AUTO W/SCOPE: CPT

## 2018-03-28 PROCEDURE — 85025 COMPLETE CBC W/AUTO DIFF WBC: CPT

## 2018-03-28 PROCEDURE — 80053 COMPREHEN METABOLIC PANEL: CPT

## 2018-03-28 PROCEDURE — 36415 COLL VENOUS BLD VENIPUNCTURE: CPT

## 2018-03-28 PROCEDURE — 82306 VITAMIN D 25 HYDROXY: CPT

## 2018-03-28 PROCEDURE — 80061 LIPID PANEL: CPT

## 2018-04-06 ENCOUNTER — OFFICE VISIT (OUTPATIENT)
Dept: FAMILY MEDICINE CLINIC | Facility: CLINIC | Age: 58
End: 2018-04-06

## 2018-04-06 VITALS
OXYGEN SATURATION: 98 % | HEIGHT: 63 IN | DIASTOLIC BLOOD PRESSURE: 80 MMHG | SYSTOLIC BLOOD PRESSURE: 140 MMHG | WEIGHT: 159.2 LBS | BODY MASS INDEX: 28.21 KG/M2 | HEART RATE: 75 BPM

## 2018-04-06 DIAGNOSIS — E55.9 VITAMIN D DEFICIENCY: Chronic | ICD-10-CM

## 2018-04-06 DIAGNOSIS — F41.1 GENERALIZED ANXIETY DISORDER: Chronic | ICD-10-CM

## 2018-04-06 DIAGNOSIS — F32.A DEPRESSIVE DISORDER: Chronic | ICD-10-CM

## 2018-04-06 DIAGNOSIS — I10 ESSENTIAL HYPERTENSION: Primary | Chronic | ICD-10-CM

## 2018-04-06 DIAGNOSIS — E78.2 MIXED HYPERLIPIDEMIA: Chronic | ICD-10-CM

## 2018-04-06 PROCEDURE — 99214 OFFICE O/P EST MOD 30 MIN: CPT | Performed by: GENERAL PRACTICE

## 2018-04-06 RX ORDER — CLONAZEPAM 1 MG/1
1 TABLET ORAL 2 TIMES DAILY PRN
Qty: 60 TABLET | Refills: 2 | Status: SHIPPED | OUTPATIENT
Start: 2018-04-06 | End: 2018-07-10 | Stop reason: SDUPTHER

## 2018-04-06 RX ORDER — FUROSEMIDE 40 MG/1
40 TABLET ORAL 2 TIMES DAILY
Qty: 60 TABLET | Refills: 5 | Status: SHIPPED | OUTPATIENT
Start: 2018-04-06 | End: 2018-11-27 | Stop reason: SDUPTHER

## 2018-04-06 RX ORDER — CYCLOBENZAPRINE HCL 10 MG
10 TABLET ORAL 3 TIMES DAILY PRN
Qty: 90 TABLET | Refills: 5 | Status: SHIPPED | OUTPATIENT
Start: 2018-04-06 | End: 2018-10-16 | Stop reason: SDUPTHER

## 2018-04-06 RX ORDER — FLUOXETINE HYDROCHLORIDE 20 MG/1
60 CAPSULE ORAL DAILY
Qty: 90 CAPSULE | Refills: 5 | Status: SHIPPED | OUTPATIENT
Start: 2018-04-06 | End: 2018-10-16 | Stop reason: SDUPTHER

## 2018-04-06 RX ORDER — OMEPRAZOLE 40 MG/1
40 CAPSULE, DELAYED RELEASE ORAL DAILY
Qty: 30 CAPSULE | Refills: 5 | Status: SHIPPED | OUTPATIENT
Start: 2018-04-06 | End: 2019-05-21 | Stop reason: SDUPTHER

## 2018-04-06 RX ORDER — ATENOLOL 25 MG/1
25 TABLET ORAL DAILY
Qty: 30 TABLET | Refills: 11 | Status: SHIPPED | OUTPATIENT
Start: 2018-04-06 | End: 2019-05-14

## 2018-04-06 RX ORDER — TIZANIDINE 4 MG/1
4 TABLET ORAL EVERY 8 HOURS PRN
Qty: 90 TABLET | Refills: 5 | Status: SHIPPED | OUTPATIENT
Start: 2018-04-06 | End: 2018-07-16 | Stop reason: SDUPTHER

## 2018-04-06 NOTE — PROGRESS NOTES
Subjective   Erika Bowens is a 58 y.o. female.   Chief Complaint   Patient presents with   • Follow-up   • Hypertension   • Anxiety   • Depression   • Osteoarthritis   • Hyperlipidemia     For review and evaluation of management of chronic medical problems. Labs reviewed. Depression and anxiety are stable. Has moved to [place].   Hypertension   This is a chronic problem. The current episode started more than 1 year ago. The problem is unchanged. The problem is controlled. Pertinent negatives include no chest pain, headaches, neck pain, palpitations or shortness of breath. There are no associated agents to hypertension. Current antihypertension treatment includes beta blockers. The current treatment provides significant improvement. There are no compliance problems.  Hypertensive end-organ damage includes CAD/MI. Identifiable causes of hypertension include chronic renal disease.   Hyperlipidemia   This is a chronic problem. The current episode started more than 1 year ago. The problem is controlled. Recent lipid tests were reviewed and are normal. Exacerbating diseases include chronic renal disease. There are no known factors aggravating her hyperlipidemia. Pertinent negatives include no chest pain, myalgias or shortness of breath. Current antihyperlipidemic treatment includes statins. The current treatment provides significant improvement of lipids. There are no compliance problems.       The following portions of the patient's history were reviewed and updated as appropriate: allergies, current medications, past social history and problem list.    Outpatient Medications Prior to Visit   Medication Sig Dispense Refill   • acetaminophen (TYLENOL) 500 MG tablet Take 500-1,000 mg by mouth every 4 (four) hours as needed for mild pain (1-3).     • aspirin 325 MG tablet Take 325 mg by mouth daily with breakfast.     • calcium carbonate-vitamin d 600-400 MG-UNIT per tablet Take 1 tablet by mouth every night.      • clopidogrel (PLAVIX) 75 MG tablet TAKE ONE TABLET BY MOUTH DAILY 30 tablet 3   • gabapentin (NEURONTIN) 600 MG tablet Take 1 tablet by mouth 3 (Three) Times a Day. 90 tablet 5   • nitroglycerin (NITROSTAT) 0.4 MG SL tablet DISSOLVE 1 TAB UNDER TONGUE FOR CHEST PAIN - IF PAIN REMAINS AFTER 5 MIN, CALL 911 AND REPEAT DOSE. MAX 3 TABS IN 15 MINUTES 25 tablet 2   • pravastatin (PRAVACHOL) 80 MG tablet      • ranolazine (RANEXA) 500 MG 12 hr tablet Take 500 mg by mouth 2 (two) times a day.     • spironolactone (ALDACTONE) 25 MG tablet TAKE ONE TABLET BY MOUTH DAILY 30 tablet 5   • traZODone (DESYREL) 100 MG tablet 1 - 1 1/2 tab qhs prn sleep 45 tablet 5   • traZODone (DESYREL) 50 MG tablet TAKE 1 AND 1/2 TABLETS BY MOUTH EVERY NIGHT AT BEDTIME 45 tablet 3   • atenolol (TENORMIN) 25 MG tablet TAKE ONE TABLET BY MOUTH DAILY 30 tablet 7   • clonazePAM (KlonoPIN) 1 MG tablet Take 1 tablet by mouth 2 (Two) Times a Day As Needed for Anxiety. 60 tablet 2   • cyclobenzaprine (FLEXERIL) 10 MG tablet TAKE ONE TABLET BY MOUTH THREE TIMES A DAY AS NEEDED FOR MUSCLE SPASMS 90 tablet 1   • FLUoxetine (PROzac) 20 MG capsule TAKE ONE CAPSULE BY MOUTH THREE TIMES A DAY 90 capsule 1   • furosemide (LASIX) 40 MG tablet TAKE ONE TABLET BY MOUTH TWICE A DAY 60 tablet 3   • omeprazole (priLOSEC) 40 MG capsule TAKE ONE CAPSULE BY MOUTH DAILY *TAKE 12 HOURS APART FROM PLAVIX* 30 capsule 3   • tiZANidine (ZANAFLEX) 4 MG tablet TAKE ONE TABLET BY MOUTH EVERY 8 HOURS AS NEEDED FOR MUSCLE SPASMS 90 tablet 3     No facility-administered medications prior to visit.        Review of Systems   Constitutional: Negative.  Negative for activity change, appetite change, chills, fatigue, fever and unexpected weight change.   HENT: Negative.  Negative for congestion, ear pain, hearing loss, nosebleeds, rhinorrhea, sinus pressure, sneezing, sore throat, tinnitus and trouble swallowing.    Eyes: Negative.  Negative for pain, discharge, redness, itching and  "visual disturbance.   Respiratory: Negative.  Negative for apnea, cough, chest tightness, shortness of breath and wheezing.    Cardiovascular: Negative.  Negative for chest pain, palpitations and leg swelling.   Gastrointestinal: Negative.  Negative for abdominal distention, abdominal pain, constipation, diarrhea, nausea and vomiting.   Endocrine: Negative.    Genitourinary: Negative.  Negative for dysuria, frequency and urgency.   Musculoskeletal: Positive for arthralgias and back pain. Negative for joint swelling, myalgias, neck pain and neck stiffness.   Skin: Negative.  Negative for color change and rash.   Allergic/Immunologic: Negative.    Neurological: Negative for dizziness, weakness, light-headedness, numbness and headaches.   Hematological: Negative.  Negative for adenopathy.   Psychiatric/Behavioral: Negative.  Negative for dysphoric mood and sleep disturbance. The patient is not nervous/anxious.        Objective   Visit Vitals  /80 (BP Location: Left arm, Patient Position: Sitting, Cuff Size: Adult)   Pulse 75   Ht 160 cm (63\")   Wt 72.2 kg (159 lb 3.2 oz)   SpO2 98%   BMI 28.20 kg/m²     Physical Exam   Constitutional: She is oriented to person, place, and time. She appears well-developed and well-nourished. No distress.   HENT:   Head: Normocephalic and atraumatic.   Nose: Nose normal.   Mouth/Throat: Oropharynx is clear and moist.   Eyes: Conjunctivae and EOM are normal. Pupils are equal, round, and reactive to light. Right eye exhibits no discharge. Left eye exhibits no discharge.   Neck: No thyromegaly present.   Cardiovascular: Normal rate, regular rhythm, normal heart sounds and intact distal pulses.    Pulmonary/Chest: Effort normal and breath sounds normal.   Lymphadenopathy:     She has no cervical adenopathy.   Neurological: She is alert and oriented to person, place, and time.   Skin: Skin is warm and dry.   Psychiatric: She has a normal mood and affect.   Nursing note and vitals " reviewed.    Results for orders placed or performed in visit on 03/28/18   Comprehensive Metabolic Panel   Result Value Ref Range    Glucose 102 (H) 60 - 100 mg/dL    BUN 18 7 - 21 mg/dL    Creatinine 1.47 (H) 0.50 - 1.00 mg/dL    Sodium 141 137 - 145 mmol/L    Potassium 3.6 3.5 - 5.1 mmol/L    Chloride 99 95 - 110 mmol/L    CO2 23.0 22.0 - 31.0 mmol/L    Calcium 9.2 8.4 - 10.2 mg/dL    Total Protein 8.4 6.3 - 8.6 g/dL    Albumin 4.60 3.40 - 4.80 g/dL    ALT (SGPT) 46 9 - 52 U/L    AST (SGOT) 81 (H) 14 - 36 U/L    Alkaline Phosphatase 83 38 - 126 U/L    Total Bilirubin 0.4 0.2 - 1.3 mg/dL    eGFR Non  Amer 36 (L) 45 - 104 mL/min/1.73    Globulin 3.8 (H) 2.3 - 3.5 gm/dL    A/G Ratio 1.2 1.1 - 1.8 g/dL    BUN/Creatinine Ratio 12.2 7.0 - 25.0    Anion Gap 19.0 (H) 5.0 - 15.0 mmol/L   Lipid Panel   Result Value Ref Range    Total Cholesterol 250 (H) 0 - 199 mg/dL    Triglycerides 199 20 - 199 mg/dL    HDL Cholesterol 50 (L) 60 - 200 mg/dL    LDL Cholesterol  144 (H) 1 - 129 mg/dL    LDL/HDL Ratio 3.20 0.00 - 3.22   Vitamin D 25 Hydroxy   Result Value Ref Range    25 Hydroxy, Vitamin D 15.3 (L) 30.0 - 100.0 ng/ml   Urinalysis - Urine, Clean Catch   Result Value Ref Range    Color, UA Yellow Yellow, Straw, Dark Yellow, Willow    Appearance, UA Clear Clear    pH, UA 5.5 5.0 - 9.0    Specific Gravity, UA 1.022 1.003 - 1.030    Glucose, UA Negative Negative    Ketones, UA Trace (A) Negative    Bilirubin, UA Small (1+) (A) Negative    Blood, UA Negative Negative    Protein, UA Negative Negative    Leuk Esterase, UA Negative Negative    Nitrite, UA Negative Negative    Urobilinogen, UA 1.0 E.U./dL 0.2 - 1.0 E.U./dL   Urinalysis, Microscopic Only - Urine, Clean Catch   Result Value Ref Range    RBC, UA 0-2 (A) None Seen /HPF    WBC, UA 0-2 None Seen, 0-2, 3-5 /HPF    Bacteria, UA None Seen None Seen /HPF    Squamous Epithelial Cells, UA None Seen None Seen, 0-2 /HPF    Hyaline Casts, UA 0-2 None Seen /LPF    Methodology  Automated Microscopy    CBC Auto Differential   Result Value Ref Range    WBC 12.19 (H) 3.20 - 9.80 10*3/mm3    RBC 4.49 3.77 - 5.16 10*6/mm3    Hemoglobin 14.6 12.0 - 15.5 g/dL    Hematocrit 42.3 35.0 - 45.0 %    MCV 94.2 80.0 - 98.0 fL    MCH 32.5 26.5 - 34.0 pg    MCHC 34.5 31.4 - 36.0 g/dL    RDW 12.7 11.5 - 14.5 %    RDW-SD 43.6 36.4 - 46.3 fl    MPV 12.4 (H) 8.0 - 12.0 fL    Platelets 290 150 - 450 10*3/mm3    Neutrophil % 67.5 37.0 - 80.0 %    Lymphocyte % 25.4 10.0 - 50.0 %    Monocyte % 6.5 0.0 - 12.0 %    Eosinophil % 0.2 0.0 - 7.0 %    Basophil % 0.2 0.0 - 2.0 %    Immature Grans % 0.2 0.0 - 0.5 %    Neutrophils, Absolute 8.21 2.00 - 8.60 10*3/mm3    Lymphocytes, Absolute 3.10 0.60 - 4.20 10*3/mm3    Monocytes, Absolute 0.79 0.00 - 0.90 10*3/mm3    Eosinophils, Absolute 0.03 0.00 - 0.70 10*3/mm3    Basophils, Absolute 0.03 0.00 - 0.20 10*3/mm3    Immature Grans, Absolute 0.03 (H) 0.00 - 0.02 10*3/mm3        Assessment/Plan   Problem List Items Addressed This Visit        Cardiovascular and Mediastinum    Essential hypertension - Primary (Chronic)    Relevant Medications    furosemide (LASIX) 40 MG tablet    atenolol (TENORMIN) 25 MG tablet    Mixed hyperlipidemia    Relevant Orders    Comprehensive Metabolic Panel    LDL Cholesterol, Direct       Digestive    Vitamin D deficiency    Relevant Orders    Vitamin D 25 Hydroxy       Other    Depressive disorder (Chronic)    Relevant Medications    FLUoxetine (PROzac) 20 MG capsule    Generalized anxiety disorder (Chronic)    Relevant Medications    FLUoxetine (PROzac) 20 MG capsule    clonazePAM (KlonoPIN) 1 MG tablet      Other Visit Diagnoses    None.         Continue current treatment. Decrease fat in diet. Vit D supplement 2000 units daily    New Medications Ordered This Visit   Medications   • omeprazole (priLOSEC) 40 MG capsule     Sig: Take 1 capsule by mouth Daily.     Dispense:  30 capsule     Refill:  5   • furosemide (LASIX) 40 MG tablet     Sig:  Take 1 tablet by mouth 2 (Two) Times a Day.     Dispense:  60 tablet     Refill:  5   • tiZANidine (ZANAFLEX) 4 MG tablet     Sig: Take 1 tablet by mouth Every 8 (Eight) Hours As Needed for Muscle Spasms.     Dispense:  90 tablet     Refill:  5   • cyclobenzaprine (FLEXERIL) 10 MG tablet     Sig: Take 1 tablet by mouth 3 (Three) Times a Day As Needed for Muscle Spasms.     Dispense:  90 tablet     Refill:  5   • FLUoxetine (PROzac) 20 MG capsule     Sig: Take 3 capsules by mouth Daily.     Dispense:  90 capsule     Refill:  5   • atenolol (TENORMIN) 25 MG tablet     Sig: Take 1 tablet by mouth Daily.     Dispense:  30 tablet     Refill:  11   • clonazePAM (KlonoPIN) 1 MG tablet     Sig: Take 1 tablet by mouth 2 (Two) Times a Day As Needed for Anxiety.     Dispense:  60 tablet     Refill:  2     Return in about 3 months (around 7/6/2018) for Recheck.

## 2018-07-10 ENCOUNTER — OFFICE VISIT (OUTPATIENT)
Dept: FAMILY MEDICINE CLINIC | Facility: CLINIC | Age: 58
End: 2018-07-10

## 2018-07-10 ENCOUNTER — LAB (OUTPATIENT)
Dept: LAB | Facility: HOSPITAL | Age: 58
End: 2018-07-10

## 2018-07-10 VITALS
DIASTOLIC BLOOD PRESSURE: 75 MMHG | WEIGHT: 157.3 LBS | HEIGHT: 63 IN | OXYGEN SATURATION: 98 % | HEART RATE: 54 BPM | SYSTOLIC BLOOD PRESSURE: 130 MMHG | BODY MASS INDEX: 27.87 KG/M2

## 2018-07-10 DIAGNOSIS — F41.1 GENERALIZED ANXIETY DISORDER: Chronic | ICD-10-CM

## 2018-07-10 DIAGNOSIS — E55.9 VITAMIN D DEFICIENCY: Chronic | ICD-10-CM

## 2018-07-10 DIAGNOSIS — I10 ESSENTIAL HYPERTENSION: Primary | Chronic | ICD-10-CM

## 2018-07-10 DIAGNOSIS — E78.2 MIXED HYPERLIPIDEMIA: Chronic | ICD-10-CM

## 2018-07-10 LAB
25(OH)D3 SERPL-MCNC: 21.3 NG/ML (ref 30–100)
ALBUMIN SERPL-MCNC: 4.5 G/DL (ref 3.4–4.8)
ALBUMIN/GLOB SERPL: 1.2 G/DL (ref 1.1–1.8)
ALP SERPL-CCNC: 73 U/L (ref 38–126)
ALT SERPL W P-5'-P-CCNC: 36 U/L (ref 9–52)
ANION GAP SERPL CALCULATED.3IONS-SCNC: 12 MMOL/L (ref 5–15)
ARTICHOKE IGE QN: 83 MG/DL (ref 1–129)
AST SERPL-CCNC: 29 U/L (ref 14–36)
BILIRUB SERPL-MCNC: 0.3 MG/DL (ref 0.2–1.3)
BUN BLD-MCNC: 15 MG/DL (ref 7–21)
BUN/CREAT SERPL: 12.8 (ref 7–25)
CALCIUM SPEC-SCNC: 9.1 MG/DL (ref 8.4–10.2)
CHLORIDE SERPL-SCNC: 107 MMOL/L (ref 95–110)
CO2 SERPL-SCNC: 21 MMOL/L (ref 22–31)
CREAT BLD-MCNC: 1.17 MG/DL (ref 0.5–1)
GFR SERPL CREATININE-BSD FRML MDRD: 47 ML/MIN/1.73 (ref 45–104)
GLOBULIN UR ELPH-MCNC: 3.8 GM/DL (ref 2.3–3.5)
GLUCOSE BLD-MCNC: 103 MG/DL (ref 60–100)
POTASSIUM BLD-SCNC: 4 MMOL/L (ref 3.5–5.1)
PROT SERPL-MCNC: 8.3 G/DL (ref 6.3–8.6)
SODIUM BLD-SCNC: 140 MMOL/L (ref 137–145)

## 2018-07-10 PROCEDURE — 83721 ASSAY OF BLOOD LIPOPROTEIN: CPT

## 2018-07-10 PROCEDURE — 36415 COLL VENOUS BLD VENIPUNCTURE: CPT

## 2018-07-10 PROCEDURE — 80053 COMPREHEN METABOLIC PANEL: CPT

## 2018-07-10 PROCEDURE — 82306 VITAMIN D 25 HYDROXY: CPT

## 2018-07-10 PROCEDURE — 99213 OFFICE O/P EST LOW 20 MIN: CPT | Performed by: GENERAL PRACTICE

## 2018-07-10 RX ORDER — CLONAZEPAM 1 MG/1
1 TABLET ORAL 2 TIMES DAILY PRN
Qty: 60 TABLET | Refills: 2 | Status: SHIPPED | OUTPATIENT
Start: 2018-07-10 | End: 2018-10-16 | Stop reason: SDUPTHER

## 2018-07-10 RX ORDER — CLOPIDOGREL BISULFATE 75 MG/1
75 TABLET ORAL DAILY
Qty: 30 TABLET | Refills: 5 | Status: SHIPPED | OUTPATIENT
Start: 2018-07-10 | End: 2019-08-13 | Stop reason: SDUPTHER

## 2018-07-10 NOTE — PROGRESS NOTES
Subjective   Erika Bowens is a 58 y.o. female.   Chief Complaint   Patient presents with   • Follow-up   • Hypertension   • Anxiety     For review and evaluation of management of chronic medical problems. Labs reviewed. Depression and anxiety are stable. Has moved to [place]. Has had some chest pain off and on, seeing cardiology next week.   Hypertension   This is a chronic problem. The current episode started more than 1 year ago. The problem is unchanged. The problem is controlled. Pertinent negatives include no neck pain. There are no associated agents to hypertension. Current antihypertension treatment includes beta blockers. The current treatment provides significant improvement. There are no compliance problems.  Hypertensive end-organ damage includes CAD/MI.      The following portions of the patient's history were reviewed and updated as appropriate: allergies, current medications, past social history and problem list.    Outpatient Medications Prior to Visit   Medication Sig Dispense Refill   • acetaminophen (TYLENOL) 500 MG tablet Take 500-1,000 mg by mouth every 4 (four) hours as needed for mild pain (1-3).     • aspirin 325 MG tablet Take 325 mg by mouth daily with breakfast.     • atenolol (TENORMIN) 25 MG tablet Take 1 tablet by mouth Daily. 30 tablet 11   • calcium carbonate-vitamin d 600-400 MG-UNIT per tablet Take 1 tablet by mouth every night.     • cyclobenzaprine (FLEXERIL) 10 MG tablet Take 1 tablet by mouth 3 (Three) Times a Day As Needed for Muscle Spasms. 90 tablet 5   • FLUoxetine (PROzac) 20 MG capsule Take 3 capsules by mouth Daily. 90 capsule 5   • furosemide (LASIX) 40 MG tablet Take 1 tablet by mouth 2 (Two) Times a Day. 60 tablet 5   • gabapentin (NEURONTIN) 600 MG tablet Take 1 tablet by mouth 3 (Three) Times a Day. 90 tablet 5   • nitroglycerin (NITROSTAT) 0.4 MG SL tablet DISSOLVE 1 TAB UNDER TONGUE FOR CHEST PAIN - IF PAIN REMAINS AFTER 5 MIN, CALL 911 AND REPEAT DOSE.  MAX 3 TABS IN 15 MINUTES 25 tablet 2   • omeprazole (priLOSEC) 40 MG capsule Take 1 capsule by mouth Daily. 30 capsule 5   • pravastatin (PRAVACHOL) 80 MG tablet      • ranolazine (RANEXA) 500 MG 12 hr tablet Take 500 mg by mouth 2 (two) times a day.     • spironolactone (ALDACTONE) 25 MG tablet TAKE ONE TABLET BY MOUTH DAILY 30 tablet 5   • traZODone (DESYREL) 100 MG tablet 1 - 1 1/2 tab qhs prn sleep 45 tablet 5   • traZODone (DESYREL) 50 MG tablet TAKE 1 AND 1/2 TABLETS BY MOUTH EVERY NIGHT AT BEDTIME 45 tablet 3   • clonazePAM (KlonoPIN) 1 MG tablet Take 1 tablet by mouth 2 (Two) Times a Day As Needed for Anxiety. 60 tablet 2   • clopidogrel (PLAVIX) 75 MG tablet TAKE ONE TABLET BY MOUTH DAILY 30 tablet 3   • tiZANidine (ZANAFLEX) 4 MG tablet Take 1 tablet by mouth Every 8 (Eight) Hours As Needed for Muscle Spasms. 90 tablet 5     No facility-administered medications prior to visit.        Review of Systems   Constitutional: Negative.  Negative for activity change, appetite change, chills, fatigue and unexpected weight change.   HENT: Negative.  Negative for congestion, ear pain, hearing loss, nosebleeds, rhinorrhea, sinus pressure, sneezing, sore throat, tinnitus and trouble swallowing.    Eyes: Negative.  Negative for pain, discharge, redness, itching and visual disturbance.   Respiratory: Negative.  Negative for apnea, chest tightness and wheezing.    Cardiovascular: Negative for leg swelling.   Gastrointestinal: Negative.  Negative for abdominal distention, constipation and diarrhea.   Endocrine: Negative.    Genitourinary: Negative.  Negative for dysuria, frequency and urgency.   Musculoskeletal: Positive for arthralgias. Negative for joint swelling, neck pain and neck stiffness.   Skin: Negative.  Negative for color change and rash.   Allergic/Immunologic: Negative.    Neurological: Negative for light-headedness.   Hematological: Negative.  Negative for adenopathy.   Psychiatric/Behavioral: Negative.   "Negative for dysphoric mood and sleep disturbance.       Objective   Visit Vitals  /75 (BP Location: Left arm, Patient Position: Sitting, Cuff Size: Adult)   Pulse 54   Ht 160 cm (63\")   Wt 71.4 kg (157 lb 4.8 oz)   SpO2 98%   BMI 27.86 kg/m²     Physical Exam   Constitutional: She is oriented to person, place, and time. She appears well-developed and well-nourished. No distress.   HENT:   Head: Normocephalic and atraumatic.   Nose: Nose normal.   Mouth/Throat: Oropharynx is clear and moist.   Eyes: Conjunctivae and EOM are normal. Pupils are equal, round, and reactive to light. Right eye exhibits no discharge. Left eye exhibits no discharge.   Neck: No thyromegaly present.   Cardiovascular: Normal rate, regular rhythm, normal heart sounds and intact distal pulses.    Pulmonary/Chest: Effort normal and breath sounds normal.   Lymphadenopathy:     She has no cervical adenopathy.   Neurological: She is alert and oriented to person, place, and time.   Skin: Skin is warm and dry.   Psychiatric: She has a normal mood and affect.   Nursing note and vitals reviewed.    Assessment/Plan   Problem List Items Addressed This Visit        Cardiovascular and Mediastinum    Essential hypertension - Primary (Chronic)       Other    Generalized anxiety disorder (Chronic)    Relevant Medications    clonazePAM (KlonoPIN) 1 MG tablet          Continue current treatment.    New Medications Ordered This Visit   Medications   • clopidogrel (PLAVIX) 75 MG tablet     Sig: Take 1 tablet by mouth Daily.     Dispense:  30 tablet     Refill:  5   • clonazePAM (KlonoPIN) 1 MG tablet     Sig: Take 1 tablet by mouth 2 (Two) Times a Day As Needed for Anxiety.     Dispense:  60 tablet     Refill:  2     Return in about 3 months (around 10/10/2018) for Recheck.  "

## 2018-07-11 DIAGNOSIS — F41.1 GENERALIZED ANXIETY DISORDER: Chronic | ICD-10-CM

## 2018-07-11 RX ORDER — CLONAZEPAM 1 MG/1
TABLET ORAL
Qty: 60 TABLET | Refills: 0 | OUTPATIENT
Start: 2018-07-11

## 2018-07-11 NOTE — TELEPHONE ENCOUNTER
----- Message from Nilam Willis MD sent at 7/11/2018  3:27 PM CDT -----  Call and tell labs are looking better, continue on current medications.

## 2018-07-11 NOTE — PROGRESS NOTES
Pr Dr. Willis, Ms. Bowens has been called with her recent lab results & recommendations.  Continue her current medications and follow-up as planned or sooner if any problems.

## 2018-07-16 ENCOUNTER — TELEPHONE (OUTPATIENT)
Dept: FAMILY MEDICINE CLINIC | Facility: CLINIC | Age: 58
End: 2018-07-16

## 2018-07-16 RX ORDER — TIZANIDINE 4 MG/1
4 TABLET ORAL EVERY 8 HOURS PRN
Qty: 90 TABLET | Refills: 5 | Status: SHIPPED | OUTPATIENT
Start: 2018-07-16 | End: 2018-07-23 | Stop reason: SDUPTHER

## 2018-07-16 NOTE — TELEPHONE ENCOUNTER
Requested Refills Sent    ----- Message from Araceli Pagan sent at 7/16/2018  2:52 PM CDT -----  Contact: ROD  Needs refill for Tizanidine to [facility]    [phone]

## 2018-07-23 RX ORDER — TIZANIDINE 4 MG/1
4 TABLET ORAL EVERY 8 HOURS PRN
Qty: 90 TABLET | Refills: 5 | Status: SHIPPED | OUTPATIENT
Start: 2018-07-23 | End: 2019-08-08 | Stop reason: SDUPTHER

## 2018-09-12 RX ORDER — SPIRONOLACTONE 25 MG/1
TABLET ORAL
Qty: 30 TABLET | Refills: 1 | Status: SHIPPED | OUTPATIENT
Start: 2018-09-12 | End: 2018-10-16 | Stop reason: SDUPTHER

## 2018-10-16 ENCOUNTER — OFFICE VISIT (OUTPATIENT)
Dept: FAMILY MEDICINE CLINIC | Facility: CLINIC | Age: 58
End: 2018-10-16

## 2018-10-16 VITALS
BODY MASS INDEX: 27.75 KG/M2 | DIASTOLIC BLOOD PRESSURE: 72 MMHG | OXYGEN SATURATION: 99 % | WEIGHT: 156.6 LBS | HEART RATE: 64 BPM | SYSTOLIC BLOOD PRESSURE: 128 MMHG | HEIGHT: 63 IN

## 2018-10-16 DIAGNOSIS — I10 ESSENTIAL HYPERTENSION: Primary | Chronic | ICD-10-CM

## 2018-10-16 DIAGNOSIS — F41.1 GENERALIZED ANXIETY DISORDER: Chronic | ICD-10-CM

## 2018-10-16 DIAGNOSIS — F32.A DEPRESSIVE DISORDER: Chronic | ICD-10-CM

## 2018-10-16 PROCEDURE — 99213 OFFICE O/P EST LOW 20 MIN: CPT | Performed by: GENERAL PRACTICE

## 2018-10-16 RX ORDER — FLUOXETINE HYDROCHLORIDE 20 MG/1
60 CAPSULE ORAL DAILY
Qty: 90 CAPSULE | Refills: 5 | Status: SHIPPED | OUTPATIENT
Start: 2018-10-16 | End: 2019-06-02 | Stop reason: SDUPTHER

## 2018-10-16 RX ORDER — CYCLOBENZAPRINE HCL 10 MG
10 TABLET ORAL 3 TIMES DAILY PRN
Qty: 90 TABLET | Refills: 5 | Status: SHIPPED | OUTPATIENT
Start: 2018-10-16 | End: 2019-06-02 | Stop reason: SDUPTHER

## 2018-10-16 RX ORDER — PRAVASTATIN SODIUM 80 MG/1
80 TABLET ORAL NIGHTLY
Qty: 30 TABLET | Refills: 11 | Status: SHIPPED | OUTPATIENT
Start: 2018-10-16 | End: 2020-11-23 | Stop reason: SDUPTHER

## 2018-10-16 RX ORDER — CLONAZEPAM 1 MG/1
1 TABLET ORAL 2 TIMES DAILY PRN
Qty: 60 TABLET | Refills: 2 | Status: SHIPPED | OUTPATIENT
Start: 2018-10-16 | End: 2019-02-12 | Stop reason: SDUPTHER

## 2018-10-16 RX ORDER — ISOSORBIDE MONONITRATE 30 MG/1
15 TABLET, EXTENDED RELEASE ORAL DAILY
COMMUNITY
End: 2019-02-12

## 2018-10-16 RX ORDER — SPIRONOLACTONE 25 MG/1
25 TABLET ORAL DAILY
Qty: 30 TABLET | Refills: 11 | Status: SHIPPED | OUTPATIENT
Start: 2018-10-16 | End: 2021-01-08 | Stop reason: SDUPTHER

## 2018-10-16 NOTE — PROGRESS NOTES
Subjective   Erika Bowens is a 58 y.o. female.   Chief Complaint   Patient presents with   • Follow-up   • Hypertension   • Anxiety     For review and evaluation of management of chronic medical problems. Labs reviewed. Depression and anxiety are stable. Has moved to [place]. Seeing cardiology there.   Hypertension   This is a chronic problem. The current episode started more than 1 year ago. The problem is unchanged. The problem is controlled. Pertinent negatives include no neck pain. There are no associated agents to hypertension. Current antihypertension treatment includes beta blockers and diuretics. The current treatment provides significant improvement. There are no compliance problems.  Hypertensive end-organ damage includes CAD/MI.      The following portions of the patient's history were reviewed and updated as appropriate: allergies, current medications, past social history and problem list.    Outpatient Medications Prior to Visit   Medication Sig Dispense Refill   • acetaminophen (TYLENOL) 500 MG tablet Take 500-1,000 mg by mouth every 4 (four) hours as needed for mild pain (1-3).     • aspirin 325 MG tablet Take 325 mg by mouth daily with breakfast.     • atenolol (TENORMIN) 25 MG tablet Take 1 tablet by mouth Daily. 30 tablet 11   • calcium carbonate-vitamin d 600-400 MG-UNIT per tablet Take 1 tablet by mouth every night.     • clopidogrel (PLAVIX) 75 MG tablet Take 1 tablet by mouth Daily. 30 tablet 5   • furosemide (LASIX) 40 MG tablet Take 1 tablet by mouth 2 (Two) Times a Day. 60 tablet 5   • gabapentin (NEURONTIN) 600 MG tablet Take 1 tablet by mouth 3 (Three) Times a Day. 90 tablet 5   • nitroglycerin (NITROSTAT) 0.4 MG SL tablet DISSOLVE 1 TAB UNDER TONGUE FOR CHEST PAIN - IF PAIN REMAINS AFTER 5 MIN, CALL 911 AND REPEAT DOSE. MAX 3 TABS IN 15 MINUTES 25 tablet 2   • omeprazole (priLOSEC) 40 MG capsule Take 1 capsule by mouth Daily. 30 capsule 5   • tiZANidine (ZANAFLEX) 4 MG tablet  Take 1 tablet by mouth Every 8 (Eight) Hours As Needed for Muscle Spasms. 90 tablet 5   • clonazePAM (KlonoPIN) 1 MG tablet Take 1 tablet by mouth 2 (Two) Times a Day As Needed for Anxiety. 60 tablet 2   • cyclobenzaprine (FLEXERIL) 10 MG tablet Take 1 tablet by mouth 3 (Three) Times a Day As Needed for Muscle Spasms. 90 tablet 5   • FLUoxetine (PROzac) 20 MG capsule Take 3 capsules by mouth Daily. 90 capsule 5   • pravastatin (PRAVACHOL) 80 MG tablet      • ranolazine (RANEXA) 500 MG 12 hr tablet Take 500 mg by mouth 2 (two) times a day.     • spironolactone (ALDACTONE) 25 MG tablet TAKE ONE TABLET BY MOUTH DAILY 30 tablet 1   • traZODone (DESYREL) 100 MG tablet 1 - 1 1/2 tab qhs prn sleep 45 tablet 5   • traZODone (DESYREL) 50 MG tablet TAKE 1 AND 1/2 TABLETS BY MOUTH EVERY NIGHT AT BEDTIME 45 tablet 3     No facility-administered medications prior to visit.        Review of Systems   Constitutional: Negative.  Negative for activity change, appetite change, chills, fatigue and unexpected weight change.   HENT: Negative.  Negative for congestion, ear pain, hearing loss, nosebleeds, rhinorrhea, sinus pressure, sneezing, sore throat, tinnitus and trouble swallowing.    Eyes: Negative.  Negative for pain, discharge, redness, itching and visual disturbance.   Respiratory: Negative.  Negative for apnea, chest tightness and wheezing.    Cardiovascular: Negative for leg swelling.   Gastrointestinal: Negative.  Negative for abdominal distention, constipation and diarrhea.   Endocrine: Negative.    Genitourinary: Negative.  Negative for dysuria, frequency and urgency.   Musculoskeletal: Positive for arthralgias. Negative for joint swelling, neck pain and neck stiffness.   Skin: Negative.  Negative for color change and rash.   Allergic/Immunologic: Negative.    Neurological: Negative for light-headedness.   Hematological: Negative.  Negative for adenopathy.   Psychiatric/Behavioral: Negative.  Negative for dysphoric mood and  "sleep disturbance.       Objective   Visit Vitals  /72 (BP Location: Left arm, Patient Position: Sitting, Cuff Size: Adult)   Pulse 64   Ht 160 cm (63\")   Wt 71 kg (156 lb 9.6 oz)   SpO2 99%   BMI 27.74 kg/m²     Physical Exam   Constitutional: She is oriented to person, place, and time. She appears well-developed and well-nourished. No distress.   HENT:   Head: Normocephalic and atraumatic.   Nose: Nose normal.   Mouth/Throat: Oropharynx is clear and moist.   Eyes: Pupils are equal, round, and reactive to light. Conjunctivae and EOM are normal. Right eye exhibits no discharge. Left eye exhibits no discharge.   Neck: No thyromegaly present.   Cardiovascular: Normal rate, regular rhythm, normal heart sounds and intact distal pulses.    Pulmonary/Chest: Effort normal and breath sounds normal.   Lymphadenopathy:     She has no cervical adenopathy.   Neurological: She is alert and oriented to person, place, and time.   Skin: Skin is warm and dry.   Psychiatric: She has a normal mood and affect.   Nursing note and vitals reviewed.    Assessment/Plan   Problem List Items Addressed This Visit        Cardiovascular and Mediastinum    Essential hypertension - Primary (Chronic)    Relevant Medications    spironolactone (ALDACTONE) 25 MG tablet       Other    Depressive disorder (Chronic)    Relevant Medications    FLUoxetine (PROzac) 20 MG capsule    Generalized anxiety disorder (Chronic)    Relevant Medications    FLUoxetine (PROzac) 20 MG capsule    clonazePAM (KlonoPIN) 1 MG tablet          Continue current treatment.    New Medications Ordered This Visit   Medications   • spironolactone (ALDACTONE) 25 MG tablet     Sig: Take 1 tablet by mouth Daily.     Dispense:  30 tablet     Refill:  11   • FLUoxetine (PROzac) 20 MG capsule     Sig: Take 3 capsules by mouth Daily.     Dispense:  90 capsule     Refill:  5   • cyclobenzaprine (FLEXERIL) 10 MG tablet     Sig: Take 1 tablet by mouth 3 (Three) Times a Day As Needed " for Muscle Spasms.     Dispense:  90 tablet     Refill:  5   • pravastatin (PRAVACHOL) 80 MG tablet     Sig: Take 1 tablet by mouth Every Night.     Dispense:  30 tablet     Refill:  11   • clonazePAM (KlonoPIN) 1 MG tablet     Sig: Take 1 tablet by mouth 2 (Two) Times a Day As Needed for Anxiety.     Dispense:  60 tablet     Refill:  2     Return in about 3 months (around 1/16/2019) for Annual physical.

## 2018-11-16 ENCOUNTER — OFFICE VISIT CONVERTED (OUTPATIENT)
Dept: ORTHOPEDIC SURGERY | Facility: CLINIC | Age: 58
End: 2018-11-16
Attending: ORTHOPAEDIC SURGERY

## 2018-11-27 RX ORDER — FUROSEMIDE 40 MG/1
TABLET ORAL
Qty: 60 TABLET | Refills: 3 | Status: SHIPPED | OUTPATIENT
Start: 2018-11-27 | End: 2019-04-17 | Stop reason: SDUPTHER

## 2018-12-06 ENCOUNTER — TELEPHONE (OUTPATIENT)
Dept: FAMILY MEDICINE CLINIC | Facility: CLINIC | Age: 58
End: 2018-12-06

## 2018-12-06 NOTE — TELEPHONE ENCOUNTER
MARISA SALOMON IS  WITH ROD CASTANEDA..WHO IS NOW IN THE [facility] IN [facility]..SHE HAS HAD A FULL BLOWN HEART ATTACK...IF  WILL CALL MARISA AT  [phone] or  [phone] SHE WILL BRING HER UP TO DATE....ROD IS WANTING SOMEONE TO BE TREATING HER FOR HER HEAD AND SHLDR INJURYS TOO BUT NOT ADDRESSING THAT AT ALL..ROD IS WANTING TO BE MOVED ELSEWHERE ?

## 2019-01-24 ENCOUNTER — OFFICE VISIT CONVERTED (OUTPATIENT)
Dept: ORTHOPEDIC SURGERY | Facility: CLINIC | Age: 59
End: 2019-01-24
Attending: PHYSICIAN ASSISTANT

## 2019-02-11 DIAGNOSIS — F41.1 GENERALIZED ANXIETY DISORDER: Chronic | ICD-10-CM

## 2019-02-11 RX ORDER — CLONAZEPAM 1 MG/1
TABLET ORAL
Qty: 60 TABLET | Refills: 1 | Status: CANCELLED | OUTPATIENT
Start: 2019-02-11

## 2019-02-11 NOTE — TELEPHONE ENCOUNTER
Dr. Willis,     Ms. Erika Bowens is requesting a refill on her Clonazepam 1 mg #60 Take 1 BID as needed for Anxiety.      Last OV           10/16/18          Next Pam OV 02/12/19  Tomorrow      Last Script Written 10/16/18  #60 with 2 refills  (Last Filled at the Pharmacy 10/10/19)    Last ALEJANDRA  10/16/18    Please Advise on refills    Thank you

## 2019-02-12 ENCOUNTER — APPOINTMENT (OUTPATIENT)
Dept: LAB | Facility: HOSPITAL | Age: 59
End: 2019-02-12

## 2019-02-12 ENCOUNTER — OFFICE VISIT (OUTPATIENT)
Dept: FAMILY MEDICINE CLINIC | Facility: CLINIC | Age: 59
End: 2019-02-12

## 2019-02-12 VITALS
SYSTOLIC BLOOD PRESSURE: 100 MMHG | WEIGHT: 153.5 LBS | DIASTOLIC BLOOD PRESSURE: 70 MMHG | BODY MASS INDEX: 27.2 KG/M2 | OXYGEN SATURATION: 99 % | HEART RATE: 58 BPM | HEIGHT: 63 IN

## 2019-02-12 DIAGNOSIS — K62.5 RECTAL BLEEDING: ICD-10-CM

## 2019-02-12 DIAGNOSIS — E86.0 DEHYDRATION: ICD-10-CM

## 2019-02-12 DIAGNOSIS — F41.1 GENERALIZED ANXIETY DISORDER: Chronic | ICD-10-CM

## 2019-02-12 DIAGNOSIS — I25.10 CORONARY ARTERIOSCLEROSIS: Primary | Chronic | ICD-10-CM

## 2019-02-12 DIAGNOSIS — I10 ESSENTIAL HYPERTENSION: Chronic | ICD-10-CM

## 2019-02-12 DIAGNOSIS — E78.2 MIXED HYPERLIPIDEMIA: ICD-10-CM

## 2019-02-12 LAB
ALBUMIN SERPL-MCNC: 4.7 G/DL (ref 3.4–4.8)
ALBUMIN/GLOB SERPL: 1.3 G/DL (ref 1.1–1.8)
ALP SERPL-CCNC: 76 U/L (ref 38–126)
ALT SERPL W P-5'-P-CCNC: 13 U/L (ref 9–52)
ANION GAP SERPL CALCULATED.3IONS-SCNC: 9 MMOL/L (ref 5–15)
ARTICHOKE IGE QN: 87 MG/DL (ref 1–129)
AST SERPL-CCNC: 76 U/L (ref 14–36)
BASOPHILS # BLD AUTO: 0.04 10*3/MM3 (ref 0–0.2)
BASOPHILS NFR BLD AUTO: 0.5 % (ref 0–1.5)
BILIRUB SERPL-MCNC: 0.4 MG/DL (ref 0.2–1.3)
BUN BLD-MCNC: 31 MG/DL (ref 7–21)
BUN/CREAT SERPL: 16.7 (ref 7–25)
CALCIUM SPEC-SCNC: 9.5 MG/DL (ref 8.4–10.2)
CHLORIDE SERPL-SCNC: 98 MMOL/L (ref 95–110)
CHOLEST SERPL-MCNC: 167 MG/DL (ref 0–199)
CO2 SERPL-SCNC: 29 MMOL/L (ref 22–31)
CREAT BLD-MCNC: 1.86 MG/DL (ref 0.5–1)
DEPRECATED RDW RBC AUTO: 44.6 FL (ref 37–54)
EOSINOPHIL # BLD AUTO: 0.09 10*3/MM3 (ref 0–0.4)
EOSINOPHIL NFR BLD AUTO: 1.1 % (ref 0.3–6.2)
ERYTHROCYTE [DISTWIDTH] IN BLOOD BY AUTOMATED COUNT: 13.2 % (ref 12.3–15.4)
GFR SERPL CREATININE-BSD FRML MDRD: 28 ML/MIN/1.73 (ref 45–104)
GLOBULIN UR ELPH-MCNC: 3.5 GM/DL (ref 2.3–3.5)
GLUCOSE BLD-MCNC: 106 MG/DL (ref 60–100)
HCT VFR BLD AUTO: 38 % (ref 34–46.6)
HDLC SERPL-MCNC: 42 MG/DL (ref 60–200)
HGB BLD-MCNC: 11.9 G/DL (ref 12–15.9)
IMM GRANULOCYTES # BLD AUTO: 0.02 10*3/MM3 (ref 0–0.05)
IMM GRANULOCYTES NFR BLD AUTO: 0.2 % (ref 0–0.5)
LDLC/HDLC SERPL: 2.25 {RATIO} (ref 0–3.22)
LYMPHOCYTES # BLD AUTO: 1.89 10*3/MM3 (ref 0.7–3.1)
LYMPHOCYTES NFR BLD AUTO: 22.3 % (ref 19.6–45.3)
MCH RBC QN AUTO: 29 PG (ref 26.6–33)
MCHC RBC AUTO-ENTMCNC: 31.3 G/DL (ref 31.5–35.7)
MCV RBC AUTO: 92.7 FL (ref 79–97)
MONOCYTES # BLD AUTO: 0.67 10*3/MM3 (ref 0.1–0.9)
MONOCYTES NFR BLD AUTO: 7.9 % (ref 5–12)
NEUTROPHILS # BLD AUTO: 5.77 10*3/MM3 (ref 1.4–7)
NEUTROPHILS NFR BLD AUTO: 68 % (ref 42.7–76)
NRBC BLD AUTO-RTO: 0 /100 WBC (ref 0–0)
PLATELET # BLD AUTO: 295 10*3/MM3 (ref 140–450)
PMV BLD AUTO: 12.2 FL (ref 6–12)
POTASSIUM BLD-SCNC: 4.7 MMOL/L (ref 3.5–5.1)
PROT SERPL-MCNC: 8.2 G/DL (ref 6.3–8.6)
RBC # BLD AUTO: 4.1 10*6/MM3 (ref 3.77–5.28)
SODIUM BLD-SCNC: 136 MMOL/L (ref 137–145)
TRIGL SERPL-MCNC: 152 MG/DL (ref 20–199)
WBC NRBC COR # BLD: 8.48 10*3/MM3 (ref 3.4–10.8)

## 2019-02-12 PROCEDURE — 80061 LIPID PANEL: CPT | Performed by: GENERAL PRACTICE

## 2019-02-12 PROCEDURE — 36415 COLL VENOUS BLD VENIPUNCTURE: CPT | Performed by: GENERAL PRACTICE

## 2019-02-12 PROCEDURE — 99214 OFFICE O/P EST MOD 30 MIN: CPT | Performed by: GENERAL PRACTICE

## 2019-02-12 PROCEDURE — 80053 COMPREHEN METABOLIC PANEL: CPT | Performed by: GENERAL PRACTICE

## 2019-02-12 PROCEDURE — 85025 COMPLETE CBC W/AUTO DIFF WBC: CPT | Performed by: GENERAL PRACTICE

## 2019-02-12 RX ORDER — CLONAZEPAM 1 MG/1
1 TABLET ORAL 2 TIMES DAILY PRN
Qty: 60 TABLET | Refills: 2 | Status: SHIPPED | OUTPATIENT
Start: 2019-02-12 | End: 2019-05-14 | Stop reason: SDUPTHER

## 2019-02-12 RX ORDER — SACUBITRIL AND VALSARTAN 24; 26 MG/1; MG/1
1 TABLET, FILM COATED ORAL DAILY
COMMUNITY
Start: 2019-01-27 | End: 2019-05-14

## 2019-02-12 NOTE — PROGRESS NOTES
Subjective   Erika Bowens is a 59 y.o. female.   Chief Complaint   Patient presents with   • Annual Exam   • Hypertension   • Hyperlipidemia     For review and evaluation of management of chronic medical problems. Had an MI 2 months ago requiring stent to 3 of her vein grafts, was hospitalized in [place]. Is on nocturnal 02. Has been having some blood in stool, denies having any history of hemorrhoids. Has never had a colonoscopy. Is on Plavix and ASA.  Hypertension   This is a chronic problem. The current episode started more than 1 year ago. The problem is unchanged. The problem is controlled. Associated symptoms include shortness of breath. Pertinent negatives include no chest pain, headaches, neck pain or palpitations. There are no associated agents to hypertension. Current antihypertension treatment includes beta blockers and diuretics. The current treatment provides significant improvement. There are no compliance problems.  Hypertensive end-organ damage includes CAD/MI. Identifiable causes of hypertension include chronic renal disease.   Hyperlipidemia   This is a chronic problem. The current episode started more than 1 year ago. The problem is controlled. Recent lipid tests were reviewed and are normal. Exacerbating diseases include chronic renal disease. There are no known factors aggravating her hyperlipidemia. Associated symptoms include shortness of breath. Pertinent negatives include no chest pain or myalgias. Current antihyperlipidemic treatment includes statins. The current treatment provides significant improvement of lipids. There are no compliance problems.       The following portions of the patient's history were reviewed and updated as appropriate: allergies, current medications, past social history and problem list.    Outpatient Medications Prior to Visit   Medication Sig Dispense Refill   • acetaminophen (TYLENOL) 500 MG tablet Take 500-1,000 mg by mouth every 4 (four) hours as  needed for mild pain (1-3).     • aspirin 325 MG tablet Take 325 mg by mouth daily with breakfast.     • atenolol (TENORMIN) 25 MG tablet Take 1 tablet by mouth Daily. 30 tablet 11   • calcium carbonate-vitamin d 600-400 MG-UNIT per tablet Take 1 tablet by mouth every night.     • clopidogrel (PLAVIX) 75 MG tablet Take 1 tablet by mouth Daily. 30 tablet 5   • cyclobenzaprine (FLEXERIL) 10 MG tablet Take 1 tablet by mouth 3 (Three) Times a Day As Needed for Muscle Spasms. 90 tablet 5   • ENTRESTO 24-26 MG tablet Take 1 tablet by mouth Daily. Takes 1/2 tablet bid with spironolactone     • FLUoxetine (PROzac) 20 MG capsule Take 3 capsules by mouth Daily. 90 capsule 5   • furosemide (LASIX) 40 MG tablet TAKE ONE TABLET BY MOUTH TWICE A DAY 60 tablet 3   • nitroglycerin (NITROSTAT) 0.4 MG SL tablet DISSOLVE 1 TAB UNDER TONGUE FOR CHEST PAIN - IF PAIN REMAINS AFTER 5 MIN, CALL 911 AND REPEAT DOSE. MAX 3 TABS IN 15 MINUTES 25 tablet 2   • omeprazole (priLOSEC) 40 MG capsule Take 1 capsule by mouth Daily. 30 capsule 5   • pravastatin (PRAVACHOL) 80 MG tablet Take 1 tablet by mouth Every Night. 30 tablet 11   • spironolactone (ALDACTONE) 25 MG tablet Take 1 tablet by mouth Daily. (Patient taking differently: Take 25 mg by mouth 2 (Two) Times a Day.) 30 tablet 11   • tiZANidine (ZANAFLEX) 4 MG tablet Take 1 tablet by mouth Every 8 (Eight) Hours As Needed for Muscle Spasms. 90 tablet 5   • clonazePAM (KlonoPIN) 1 MG tablet Take 1 tablet by mouth 2 (Two) Times a Day As Needed for Anxiety. 60 tablet 2   • gabapentin (NEURONTIN) 600 MG tablet Take 1 tablet by mouth 3 (Three) Times a Day. 90 tablet 5   • isosorbide mononitrate (IMDUR) 30 MG 24 hr tablet Take 15 mg by mouth Daily.       No facility-administered medications prior to visit.        Review of Systems   Constitutional: Negative.  Negative for chills, fatigue, fever and unexpected weight change.   HENT: Negative.  Negative for congestion, ear pain, hearing loss,  "nosebleeds, rhinorrhea, sneezing, sore throat and tinnitus.    Eyes: Negative.  Negative for discharge.   Respiratory: Positive for shortness of breath. Negative for cough and wheezing.    Cardiovascular: Negative.  Negative for chest pain and palpitations.   Gastrointestinal: Positive for blood in stool. Negative for abdominal pain, constipation, diarrhea, nausea and vomiting.   Endocrine: Negative.    Genitourinary: Negative.  Negative for dysuria, frequency and urgency.   Musculoskeletal: Negative.  Negative for arthralgias, back pain, joint swelling, myalgias and neck pain.   Skin: Negative.  Negative for rash.   Allergic/Immunologic: Negative.    Neurological: Negative.  Negative for dizziness, weakness, numbness and headaches.   Hematological: Negative.  Negative for adenopathy.   Psychiatric/Behavioral: Negative.  Negative for dysphoric mood and sleep disturbance. The patient is not nervous/anxious.      Objective   Visit Vitals  /70 (BP Location: Left arm, Patient Position: Sitting)   Pulse 58   Ht 160 cm (63\")   Wt 69.6 kg (153 lb 8 oz)   SpO2 99%   BMI 27.19 kg/m²     Physical Exam   Constitutional: She is oriented to person, place, and time. She appears well-developed and well-nourished. No distress.   HENT:   Head: Normocephalic and atraumatic.   Nose: Nose normal.   Mouth/Throat: Oropharynx is clear and moist.   Eyes: Conjunctivae and EOM are normal. Pupils are equal, round, and reactive to light. Right eye exhibits no discharge. Left eye exhibits no discharge.   Neck: No thyromegaly present.   Cardiovascular: Normal rate, regular rhythm, normal heart sounds and intact distal pulses.   Pulmonary/Chest: Effort normal and breath sounds normal.   Lymphadenopathy:     She has no cervical adenopathy.   Neurological: She is alert and oriented to person, place, and time.   Skin: Skin is warm and dry.   Psychiatric: She has a normal mood and affect.   Nursing note and vitals reviewed.    Results for " orders placed or performed in visit on 02/12/19   Lipid Panel   Result Value Ref Range    Total Cholesterol 167 0 - 199 mg/dL    Triglycerides 152 20 - 199 mg/dL    HDL Cholesterol 42 (L) 60 - 200 mg/dL    LDL Cholesterol  87 1 - 129 mg/dL    LDL/HDL Ratio 2.25 0.00 - 3.22   Comprehensive Metabolic Panel   Result Value Ref Range    Glucose 106 (H) 60 - 100 mg/dL    BUN 31 (H) 7 - 21 mg/dL    Creatinine 1.86 (H) 0.50 - 1.00 mg/dL    Sodium 136 (L) 137 - 145 mmol/L    Potassium 4.7 3.5 - 5.1 mmol/L    Chloride 98 95 - 110 mmol/L    CO2 29.0 22.0 - 31.0 mmol/L    Calcium 9.5 8.4 - 10.2 mg/dL    Total Protein 8.2 6.3 - 8.6 g/dL    Albumin 4.70 3.40 - 4.80 g/dL    ALT (SGPT) 13 9 - 52 U/L    AST (SGOT) 76 (H) 14 - 36 U/L    Alkaline Phosphatase 76 38 - 126 U/L    Total Bilirubin 0.4 0.2 - 1.3 mg/dL    eGFR Non  Amer 28 (L) 45 - 104 mL/min/1.73    Globulin 3.5 2.3 - 3.5 gm/dL    A/G Ratio 1.3 1.1 - 1.8 g/dL    BUN/Creatinine Ratio 16.7 7.0 - 25.0    Anion Gap 9.0 5.0 - 15.0 mmol/L   CBC Auto Differential   Result Value Ref Range    WBC 8.48 3.40 - 10.80 10*3/mm3    RBC 4.10 3.77 - 5.28 10*6/mm3    Hemoglobin 11.9 (L) 12.0 - 15.9 g/dL    Hematocrit 38.0 34.0 - 46.6 %    MCV 92.7 79.0 - 97.0 fL    MCH 29.0 26.6 - 33.0 pg    MCHC 31.3 (L) 31.5 - 35.7 g/dL    RDW 13.2 12.3 - 15.4 %    RDW-SD 44.6 37.0 - 54.0 fl    MPV 12.2 (H) 6.0 - 12.0 fL    Platelets 295 140 - 450 10*3/mm3    Neutrophil % 68.0 42.7 - 76.0 %    Lymphocyte % 22.3 19.6 - 45.3 %    Monocyte % 7.9 5.0 - 12.0 %    Eosinophil % 1.1 0.3 - 6.2 %    Basophil % 0.5 0.0 - 1.5 %    Immature Grans % 0.2 0.0 - 0.5 %    Neutrophils, Absolute 5.77 1.40 - 7.00 10*3/mm3    Lymphocytes, Absolute 1.89 0.70 - 3.10 10*3/mm3    Monocytes, Absolute 0.67 0.10 - 0.90 10*3/mm3    Eosinophils, Absolute 0.09 0.00 - 0.40 10*3/mm3    Basophils, Absolute 0.04 0.00 - 0.20 10*3/mm3    Immature Grans, Absolute 0.02 0.00 - 0.05 10*3/mm3    nRBC 0.0 0.0 - 0.0 /100 WBC       Assessment/Plan   Problem List Items Addressed This Visit        Cardiovascular and Mediastinum    Essential hypertension (Chronic)    Coronary arteriosclerosis - Primary    Relevant Medications    ENTRESTO 24-26 MG tablet    Mixed hyperlipidemia    Relevant Orders    Lipid Panel (Completed)    Comprehensive Metabolic Panel (Completed)       Other    Generalized anxiety disorder (Chronic)    Relevant Medications    clonazePAM (KlonoPIN) 1 MG tablet      Other Visit Diagnoses     Rectal bleeding        Relevant Orders    CBC & Differential (Completed)    CBC Auto Differential (Completed)    Ambulatory Referral to Gastroenterology    Dehydration             Advised to cut down on her Lasix to one half tab for the next 3 days.  Follow-up with her cardiologist as scheduled.  Will need referral to gastroenterologist.  If she develops any gross rectal bleeding then she should present to the emergency room.  Luis Angel reviewed and appropriate. Not recommended to drive or operate heavy equipment while taking potentially sedating meds.      New Medications Ordered This Visit   Medications   • clonazePAM (KlonoPIN) 1 MG tablet     Sig: Take 1 tablet by mouth 2 (Two) Times a Day As Needed for Anxiety.     Dispense:  60 tablet     Refill:  2     Return in about 3 months (around 5/12/2019) for Recheck.

## 2019-03-07 ENCOUNTER — OFFICE VISIT CONVERTED (OUTPATIENT)
Dept: GASTROENTEROLOGY | Facility: CLINIC | Age: 59
End: 2019-03-07
Attending: INTERNAL MEDICINE

## 2019-03-12 ENCOUNTER — OFFICE VISIT CONVERTED (OUTPATIENT)
Dept: ORTHOPEDIC SURGERY | Facility: CLINIC | Age: 59
End: 2019-03-12
Attending: PHYSICIAN ASSISTANT

## 2019-03-25 ENCOUNTER — HOSPITAL ENCOUNTER (OUTPATIENT)
Dept: GASTROENTEROLOGY | Facility: HOSPITAL | Age: 59
Setting detail: HOSPITAL OUTPATIENT SURGERY
Discharge: HOME | End: 2019-03-25
Attending: INTERNAL MEDICINE

## 2019-03-28 ENCOUNTER — OFFICE VISIT CONVERTED (OUTPATIENT)
Dept: SURGERY | Facility: CLINIC | Age: 59
End: 2019-03-28
Attending: SURGERY

## 2019-03-28 ENCOUNTER — HOSPITAL ENCOUNTER (OUTPATIENT)
Dept: CT IMAGING | Facility: HOSPITAL | Age: 59
Discharge: HOME | End: 2019-03-28
Attending: INTERNAL MEDICINE

## 2019-03-28 LAB
CREAT BLD-MCNC: 2 MG/DL (ref 0.6–1.4)
GFR SERPLBLD BASED ON 1.73 SQ M-ARVRAT: 26 ML/MIN/{1.73_M2}

## 2019-04-04 ENCOUNTER — HOSPITAL ENCOUNTER (OUTPATIENT)
Dept: ONCOLOGY | Facility: HOSPITAL | Age: 59
Discharge: HOME | End: 2019-04-04
Attending: INTERNAL MEDICINE

## 2019-04-04 ENCOUNTER — OFFICE VISIT CONVERTED (OUTPATIENT)
Dept: ONCOLOGY | Facility: HOSPITAL | Age: 59
End: 2019-04-04
Attending: INTERNAL MEDICINE

## 2019-04-04 LAB
ALBUMIN SERPL-MCNC: 4.3 G/DL (ref 3.5–5)
ALBUMIN/GLOB SERPL: 1.2 {RATIO} (ref 1.4–2.6)
ALP SERPL-CCNC: 78 U/L (ref 43–160)
ALT SERPL-CCNC: 18 U/L (ref 10–40)
ANION GAP SERPL CALC-SCNC: 17 MMOL/L (ref 8–19)
AST SERPL-CCNC: 16 U/L (ref 15–50)
BASOPHILS # BLD AUTO: 0.05 10*3/UL (ref 0–0.2)
BASOPHILS NFR BLD AUTO: 0.6 % (ref 0–3)
BILIRUB SERPL-MCNC: 0.22 MG/DL (ref 0.2–1.3)
BUN SERPL-MCNC: 45 MG/DL (ref 5–25)
BUN/CREAT SERPL: 17 {RATIO} (ref 6–20)
CALCIUM SERPL-MCNC: 9.3 MG/DL (ref 8.7–10.4)
CEA SERPL-MCNC: 3.4 NG/ML (ref 0–5)
CHLORIDE SERPL-SCNC: 100 MMOL/L (ref 99–111)
CONV ABS IMM GRAN: 0.03 10*3/UL (ref 0–0.2)
CONV CO2: 25 MMOL/L (ref 22–32)
CONV IMMATURE GRAN: 0.4 % (ref 0–1.8)
CONV TOTAL PROTEIN: 7.8 G/DL (ref 6.3–8.2)
CREAT UR-MCNC: 2.59 MG/DL (ref 0.5–0.9)
DEPRECATED RDW RBC AUTO: 47.2 FL (ref 36.4–46.3)
EOSINOPHIL # BLD AUTO: 0.17 10*3/UL (ref 0–0.7)
EOSINOPHIL # BLD AUTO: 2.2 % (ref 0–7)
ERYTHROCYTE [DISTWIDTH] IN BLOOD BY AUTOMATED COUNT: 13.8 % (ref 11.7–14.4)
GFR SERPLBLD BASED ON 1.73 SQ M-ARVRAT: 19 ML/MIN/{1.73_M2}
GLOBULIN UR ELPH-MCNC: 3.5 G/DL (ref 2–3.5)
GLUCOSE SERPL-MCNC: 108 MG/DL (ref 65–99)
HBA1C MFR BLD: 11 G/DL (ref 12–16)
HCT VFR BLD AUTO: 37.1 % (ref 37–47)
LYMPHOCYTES # BLD AUTO: 1.89 10*3/UL (ref 1–5)
MCH RBC QN AUTO: 27.3 PG (ref 27–31)
MCHC RBC AUTO-ENTMCNC: 29.6 G/DL (ref 33–37)
MCV RBC AUTO: 92.1 FL (ref 81–99)
MONOCYTES # BLD AUTO: 0.76 10*3/UL (ref 0.2–1.2)
MONOCYTES NFR BLD AUTO: 9.7 % (ref 3–10)
NEUTROPHILS # BLD AUTO: 4.91 10*3/UL (ref 2–8)
NEUTROPHILS NFR BLD AUTO: 62.9 % (ref 30–85)
NRBC CBCN: 0 % (ref 0–0.7)
OSMOLALITY SERPL CALC.SUM OF ELEC: 298 MOSM/KG (ref 273–304)
PLATELET # BLD AUTO: 325 10*3/UL (ref 130–400)
PMV BLD AUTO: 11.7 FL (ref 9.4–12.3)
POTASSIUM SERPL-SCNC: 4.1 MMOL/L (ref 3.5–5.3)
RBC # BLD AUTO: 4.03 10*6/UL (ref 4.2–5.4)
SODIUM SERPL-SCNC: 138 MMOL/L (ref 135–147)
VARIANT LYMPHS NFR BLD MANUAL: 24.2 % (ref 20–45)
WBC # BLD AUTO: 7.81 10*3/UL (ref 4.8–10.8)

## 2019-04-18 RX ORDER — FUROSEMIDE 40 MG/1
TABLET ORAL
Qty: 60 TABLET | Refills: 2 | Status: SHIPPED | OUTPATIENT
Start: 2019-04-18 | End: 2021-02-19 | Stop reason: SDUPTHER

## 2019-05-08 ENCOUNTER — HOSPITAL ENCOUNTER (OUTPATIENT)
Dept: INFUSION THERAPY | Facility: HOSPITAL | Age: 59
Setting detail: RECURRING SERIES
Discharge: HOME | End: 2019-05-31
Attending: NURSE PRACTITIONER

## 2019-05-13 ENCOUNTER — OFFICE VISIT CONVERTED (OUTPATIENT)
Dept: ONCOLOGY | Facility: HOSPITAL | Age: 59
End: 2019-05-13
Attending: INTERNAL MEDICINE

## 2019-05-13 ENCOUNTER — HOSPITAL ENCOUNTER (OUTPATIENT)
Dept: ONCOLOGY | Facility: HOSPITAL | Age: 59
Discharge: HOME | End: 2019-05-13
Attending: INTERNAL MEDICINE

## 2019-05-14 ENCOUNTER — OFFICE VISIT (OUTPATIENT)
Dept: FAMILY MEDICINE CLINIC | Facility: CLINIC | Age: 59
End: 2019-05-14

## 2019-05-14 ENCOUNTER — APPOINTMENT (OUTPATIENT)
Dept: LAB | Facility: HOSPITAL | Age: 59
End: 2019-05-14

## 2019-05-14 VITALS
DIASTOLIC BLOOD PRESSURE: 80 MMHG | SYSTOLIC BLOOD PRESSURE: 130 MMHG | WEIGHT: 142.6 LBS | OXYGEN SATURATION: 96 % | HEART RATE: 91 BPM | HEIGHT: 63 IN | BODY MASS INDEX: 25.27 KG/M2

## 2019-05-14 DIAGNOSIS — F41.1 GENERALIZED ANXIETY DISORDER: Chronic | ICD-10-CM

## 2019-05-14 DIAGNOSIS — C18.7 MALIGNANT NEOPLASM OF SIGMOID COLON (HCC): ICD-10-CM

## 2019-05-14 DIAGNOSIS — I10 ESSENTIAL HYPERTENSION: Primary | ICD-10-CM

## 2019-05-14 DIAGNOSIS — D64.9 ANEMIA, UNSPECIFIED TYPE: ICD-10-CM

## 2019-05-14 DIAGNOSIS — N18.30 CHRONIC RENAL FAILURE, STAGE 3 (MODERATE) (HCC): ICD-10-CM

## 2019-05-14 LAB
ALBUMIN SERPL-MCNC: 3.9 G/DL (ref 3.5–5.2)
ALBUMIN/GLOB SERPL: 1 G/DL
ALP SERPL-CCNC: 94 U/L (ref 39–117)
ALT SERPL W P-5'-P-CCNC: 12 U/L (ref 1–33)
ANION GAP SERPL CALCULATED.3IONS-SCNC: 14.6 MMOL/L
AST SERPL-CCNC: 18 U/L (ref 1–32)
BASOPHILS # BLD AUTO: 0.07 10*3/MM3 (ref 0–0.2)
BASOPHILS NFR BLD AUTO: 0.7 % (ref 0–1.5)
BILIRUB SERPL-MCNC: 0.5 MG/DL (ref 0.2–1.2)
BUN BLD-MCNC: 11 MG/DL (ref 8–23)
BUN/CREAT SERPL: 8.3 (ref 7–25)
CALCIUM SPEC-SCNC: 9.2 MG/DL (ref 8.6–10.5)
CHLORIDE SERPL-SCNC: 100 MMOL/L (ref 98–107)
CO2 SERPL-SCNC: 25.4 MMOL/L (ref 22–29)
CREAT BLD-MCNC: 1.32 MG/DL (ref 0.57–1)
DEPRECATED RDW RBC AUTO: 44.8 FL (ref 37–54)
EOSINOPHIL # BLD AUTO: 0.17 10*3/MM3 (ref 0–0.4)
EOSINOPHIL NFR BLD AUTO: 1.7 % (ref 0.3–6.2)
ERYTHROCYTE [DISTWIDTH] IN BLOOD BY AUTOMATED COUNT: 14.6 % (ref 12.3–15.4)
GFR SERPL CREATININE-BSD FRML MDRD: 41 ML/MIN/1.73
GLOBULIN UR ELPH-MCNC: 3.9 GM/DL
GLUCOSE BLD-MCNC: 106 MG/DL (ref 65–99)
HCT VFR BLD AUTO: 31.7 % (ref 34–46.6)
HGB BLD-MCNC: 9.6 G/DL (ref 12–15.9)
IMM GRANULOCYTES # BLD AUTO: 0.05 10*3/MM3 (ref 0–0.05)
IMM GRANULOCYTES NFR BLD AUTO: 0.5 % (ref 0–0.5)
LYMPHOCYTES # BLD AUTO: 1.55 10*3/MM3 (ref 0.7–3.1)
LYMPHOCYTES NFR BLD AUTO: 15.8 % (ref 19.6–45.3)
MCH RBC QN AUTO: 25.6 PG (ref 26.6–33)
MCHC RBC AUTO-ENTMCNC: 30.3 G/DL (ref 31.5–35.7)
MCV RBC AUTO: 84.5 FL (ref 79–97)
MONOCYTES # BLD AUTO: 0.8 10*3/MM3 (ref 0.1–0.9)
MONOCYTES NFR BLD AUTO: 8.1 % (ref 5–12)
NEUTROPHILS # BLD AUTO: 7.18 10*3/MM3 (ref 1.7–7)
NEUTROPHILS NFR BLD AUTO: 73.2 % (ref 42.7–76)
NRBC BLD AUTO-RTO: 0 /100 WBC (ref 0–0.2)
PLATELET # BLD AUTO: 533 10*3/MM3 (ref 140–450)
PMV BLD AUTO: 10.9 FL (ref 6–12)
POTASSIUM BLD-SCNC: 3.5 MMOL/L (ref 3.5–5.2)
PROT SERPL-MCNC: 7.8 G/DL (ref 6–8.5)
RBC # BLD AUTO: 3.75 10*6/MM3 (ref 3.77–5.28)
SODIUM BLD-SCNC: 140 MMOL/L (ref 136–145)
WBC NRBC COR # BLD: 9.82 10*3/MM3 (ref 3.4–10.8)

## 2019-05-14 PROCEDURE — 99214 OFFICE O/P EST MOD 30 MIN: CPT | Performed by: GENERAL PRACTICE

## 2019-05-14 PROCEDURE — 85025 COMPLETE CBC W/AUTO DIFF WBC: CPT | Performed by: GENERAL PRACTICE

## 2019-05-14 PROCEDURE — 36415 COLL VENOUS BLD VENIPUNCTURE: CPT | Performed by: GENERAL PRACTICE

## 2019-05-14 PROCEDURE — 80053 COMPREHEN METABOLIC PANEL: CPT | Performed by: GENERAL PRACTICE

## 2019-05-14 RX ORDER — ASPIRIN 81 MG/1
81 TABLET ORAL DAILY
COMMUNITY

## 2019-05-14 RX ORDER — LOSARTAN POTASSIUM 25 MG/1
25 TABLET ORAL DAILY
COMMUNITY
End: 2021-03-16 | Stop reason: SDUPTHER

## 2019-05-14 RX ORDER — CLONAZEPAM 1 MG/1
1 TABLET ORAL 2 TIMES DAILY PRN
Qty: 60 TABLET | Refills: 2 | Status: SHIPPED | OUTPATIENT
Start: 2019-05-14 | End: 2019-08-13 | Stop reason: SDUPTHER

## 2019-05-14 RX ORDER — METOPROLOL SUCCINATE 25 MG/1
25 TABLET, EXTENDED RELEASE ORAL DAILY
COMMUNITY
End: 2020-11-23 | Stop reason: SDUPTHER

## 2019-05-14 NOTE — PROGRESS NOTES
Subjective   Erika Bowens is a 59 y.o. female.   Chief Complaint   Patient presents with   • Hypertension     For review and evaluation of management of chronic medical problems. Had an MI 2 months ago requiring stent to 3 of her vein grafts, was hospitalized in [place]. Is on nocturnal 02. Had been having some blood in stool,  had never had a colonoscopy. Had one recently and found to have colon cancer requiring a sigmoid colon resection. Is recovering.  Depression and anxiety is stable.  She is still very fatigued.  Hypertension   This is a chronic problem. The current episode started more than 1 year ago. The problem is unchanged. The problem is controlled. Associated symptoms include shortness of breath. Pertinent negatives include no chest pain, headaches, neck pain or palpitations. There are no associated agents to hypertension. Current antihypertension treatment includes beta blockers and diuretics. The current treatment provides significant improvement. There are no compliance problems.  Hypertensive end-organ damage includes CAD/MI. Identifiable causes of hypertension include chronic renal disease.   Hyperlipidemia   This is a chronic problem. The current episode started more than 1 year ago. The problem is controlled. Recent lipid tests were reviewed and are normal. Exacerbating diseases include chronic renal disease. There are no known factors aggravating her hyperlipidemia. Associated symptoms include shortness of breath. Pertinent negatives include no chest pain or myalgias. Current antihyperlipidemic treatment includes statins. The current treatment provides significant improvement of lipids. There are no compliance problems.       The following portions of the patient's history were reviewed and updated as appropriate: allergies, current medications, past social history and problem list.    Outpatient Medications Prior to Visit   Medication Sig Dispense Refill   • acetaminophen (TYLENOL) 500  MG tablet Take 500-1,000 mg by mouth every 4 (four) hours as needed for mild pain (1-3).     • aspirin 81 MG EC tablet Take 81 mg by mouth Daily.     • calcium carbonate-vitamin d 600-400 MG-UNIT per tablet Take 1 tablet by mouth every night.     • clopidogrel (PLAVIX) 75 MG tablet Take 1 tablet by mouth Daily. 30 tablet 5   • cyclobenzaprine (FLEXERIL) 10 MG tablet Take 1 tablet by mouth 3 (Three) Times a Day As Needed for Muscle Spasms. 90 tablet 5   • FLUoxetine (PROzac) 20 MG capsule Take 3 capsules by mouth Daily. 90 capsule 5   • furosemide (LASIX) 40 MG tablet TAKE ONE TABLET BY MOUTH TWICE A DAY 60 tablet 2   • losartan (COZAAR) 25 MG tablet Take 25 mg by mouth Daily.     • metoprolol succinate XL (TOPROL-XL) 25 MG 24 hr tablet Take 25 mg by mouth Daily.     • nitroglycerin (NITROSTAT) 0.4 MG SL tablet DISSOLVE 1 TAB UNDER TONGUE FOR CHEST PAIN - IF PAIN REMAINS AFTER 5 MIN, CALL 911 AND REPEAT DOSE. MAX 3 TABS IN 15 MINUTES 25 tablet 2   • omeprazole (priLOSEC) 40 MG capsule Take 1 capsule by mouth Daily. 30 capsule 5   • pravastatin (PRAVACHOL) 80 MG tablet Take 1 tablet by mouth Every Night. 30 tablet 11   • spironolactone (ALDACTONE) 25 MG tablet Take 1 tablet by mouth Daily. (Patient taking differently: Take 25 mg by mouth 2 (Two) Times a Day.) 30 tablet 11   • tiZANidine (ZANAFLEX) 4 MG tablet Take 1 tablet by mouth Every 8 (Eight) Hours As Needed for Muscle Spasms. 90 tablet 5   • aspirin 325 MG tablet Take 325 mg by mouth daily with breakfast.     • atenolol (TENORMIN) 25 MG tablet Take 1 tablet by mouth Daily. 30 tablet 11   • clonazePAM (KlonoPIN) 1 MG tablet Take 1 tablet by mouth 2 (Two) Times a Day As Needed for Anxiety. 60 tablet 2   • ENTRESTO 24-26 MG tablet Take 1 tablet by mouth Daily. Takes 1/2 tablet bid with spironolactone       No facility-administered medications prior to visit.        Review of Systems   Constitutional: Positive for fatigue. Negative for chills, fever and unexpected  "weight change.   HENT: Negative.  Negative for congestion, ear pain, hearing loss, nosebleeds, rhinorrhea, sneezing, sore throat and tinnitus.    Eyes: Negative.  Negative for discharge.   Respiratory: Positive for shortness of breath. Negative for cough and wheezing.    Cardiovascular: Negative.  Negative for chest pain and palpitations.   Gastrointestinal: Negative for abdominal pain, blood in stool, constipation, diarrhea, nausea and vomiting.   Endocrine: Negative.    Genitourinary: Negative.  Negative for dysuria, frequency and urgency.   Musculoskeletal: Negative.  Negative for arthralgias, back pain, joint swelling, myalgias and neck pain.   Skin: Negative.  Negative for rash.   Allergic/Immunologic: Negative.    Neurological: Negative.  Negative for dizziness, weakness, numbness and headaches.   Hematological: Negative.  Negative for adenopathy.   Psychiatric/Behavioral: Negative.  Negative for dysphoric mood and sleep disturbance. The patient is not nervous/anxious.      Objective   Visit Vitals  /80 (BP Location: Left arm)   Pulse 91   Ht 160 cm (63\")   Wt 64.7 kg (142 lb 9.6 oz)   SpO2 96%   BMI 25.26 kg/m²     Physical Exam   Constitutional: She is oriented to person, place, and time. She appears well-developed and well-nourished. No distress.   HENT:   Head: Normocephalic and atraumatic.   Nose: Nose normal.   Mouth/Throat: Oropharynx is clear and moist.   Eyes: Conjunctivae and EOM are normal. Pupils are equal, round, and reactive to light. Right eye exhibits no discharge. Left eye exhibits no discharge.   Neck: No thyromegaly present.   Cardiovascular: Normal rate, regular rhythm, normal heart sounds and intact distal pulses.   Pulmonary/Chest: Effort normal and breath sounds normal.   Abdominal: Soft. Normal appearance and bowel sounds are normal. She exhibits no distension. There is no tenderness.       Lymphadenopathy:     She has no cervical adenopathy.   Neurological: She is alert and " oriented to person, place, and time.   Skin: Skin is warm and dry.   Psychiatric: She has a normal mood and affect.   Nursing note and vitals reviewed.    Assessment/Plan   Problem List Items Addressed This Visit        Cardiovascular and Mediastinum    Essential hypertension - Primary (Chronic)    Relevant Medications    losartan (COZAAR) 25 MG tablet    metoprolol succinate XL (TOPROL-XL) 25 MG 24 hr tablet    Other Relevant Orders    CBC & Differential (Completed)    Comprehensive Metabolic Panel (Completed)    CBC Auto Differential (Completed)    CBC & Differential    Comprehensive Metabolic Panel       Digestive    Malignant neoplasm of sigmoid colon (CMS/HCC)       Genitourinary    Chronic renal failure, stage 3 (moderate) (CMS/HCC)    Relevant Orders    CBC & Differential (Completed)    Comprehensive Metabolic Panel (Completed)    CBC Auto Differential (Completed)    CBC & Differential    Comprehensive Metabolic Panel       Other    Generalized anxiety disorder (Chronic)    Relevant Medications    clonazePAM (KlonoPIN) 1 MG tablet      Other Visit Diagnoses     Anemia, unspecified type        Relevant Orders    CBC & Differential (Completed)    Comprehensive Metabolic Panel (Completed)    CBC Auto Differential (Completed)    CBC & Differential    Comprehensive Metabolic Panel        Will notify regarding results. Continue current treatment. Follow up with specialists as scheduled. Luis Angel reviewed and appropriate. Not recommended to drive or operate heavy equipment while taking potentially sedating meds.  Patient understands the risks associated with this controlled medication, including tolerance and addiction. They also agree to obtain this medication only from me, and not from a another provider, unless that provider is covering for me in my absence. They also agree to be compliant in dosing, and not self adjust the dose of medication.  A signed controlled substance agreement is on file, and they have  received a controlled substance education sheet at this or a previous visit. They have also signed a consent for treatment with a controlled substance as per [facility] policy.          New Medications Ordered This Visit   Medications   • clonazePAM (KlonoPIN) 1 MG tablet     Sig: Take 1 tablet by mouth 2 (Two) Times a Day As Needed for Anxiety.     Dispense:  60 tablet     Refill:  2     Return in about 3 months (around 8/14/2019) for Recheck.

## 2019-05-15 ENCOUNTER — OFFICE VISIT CONVERTED (OUTPATIENT)
Dept: SURGERY | Facility: CLINIC | Age: 59
End: 2019-05-15
Attending: SURGERY

## 2019-05-15 RX ORDER — FERROUS SULFATE 325(65) MG
325 TABLET ORAL
Qty: 50 TABLET | Refills: 0 | Status: SHIPPED | OUTPATIENT
Start: 2019-05-15 | End: 2021-03-16

## 2019-05-20 PROBLEM — C18.7 MALIGNANT NEOPLASM OF SIGMOID COLON: Status: ACTIVE | Noted: 2019-05-20

## 2019-05-21 RX ORDER — OMEPRAZOLE 40 MG/1
40 CAPSULE, DELAYED RELEASE ORAL DAILY
Qty: 30 CAPSULE | Refills: 5 | Status: SHIPPED | OUTPATIENT
Start: 2019-05-21 | End: 2020-03-09

## 2019-05-24 ENCOUNTER — HOSPITAL ENCOUNTER (OUTPATIENT)
Dept: LAB | Facility: HOSPITAL | Age: 59
Discharge: HOME | End: 2019-05-24
Attending: INTERNAL MEDICINE

## 2019-05-24 LAB
25(OH)D3 SERPL-MCNC: 23.2 NG/ML (ref 30–100)
ALBUMIN SERPL-MCNC: 3.8 G/DL (ref 3.5–5)
ANION GAP SERPL CALC-SCNC: 19 MMOL/L (ref 8–19)
APPEARANCE UR: CLEAR
BASOPHILS # BLD AUTO: 0.05 10*3/UL (ref 0–0.2)
BASOPHILS NFR BLD AUTO: 0.7 % (ref 0–3)
BILIRUB UR QL: NEGATIVE
BUN SERPL-MCNC: 13 MG/DL (ref 5–25)
BUN/CREAT SERPL: 10 {RATIO} (ref 6–20)
CALCIUM SERPL-MCNC: 9.3 MG/DL (ref 8.7–10.4)
CHLORIDE SERPL-SCNC: 104 MMOL/L (ref 99–111)
COLOR UR: YELLOW
CONV ABS IMM GRAN: 0.02 10*3/UL (ref 0–0.2)
CONV BACTERIA: NEGATIVE
CONV CO2: 25 MMOL/L (ref 22–32)
CONV COLLECTION SOURCE (UA): ABNORMAL
CONV CREATININE URINE, RANDOM: 97 MG/DL (ref 10–300)
CONV IMMATURE GRAN: 0.3 % (ref 0–1.8)
CONV UROBILINOGEN IN URINE BY AUTOMATED TEST STRIP: 0.2 {EHRLICHU}/DL (ref 0.1–1)
CREAT UR-MCNC: 1.27 MG/DL (ref 0.5–0.9)
DEPRECATED RDW RBC AUTO: 56.5 FL (ref 36.4–46.3)
EOSINOPHIL # BLD AUTO: 0.12 10*3/UL (ref 0–0.7)
EOSINOPHIL # BLD AUTO: 1.7 % (ref 0–7)
ERYTHROCYTE [DISTWIDTH] IN BLOOD BY AUTOMATED COUNT: 18 % (ref 11.7–14.4)
GFR SERPLBLD BASED ON 1.73 SQ M-ARVRAT: 45 ML/MIN/{1.73_M2}
GLUCOSE SERPL-MCNC: 113 MG/DL (ref 65–99)
GLUCOSE UR QL: NEGATIVE MG/DL
HBA1C MFR BLD: 10.1 G/DL (ref 12–16)
HCT VFR BLD AUTO: 34.3 % (ref 37–47)
HGB UR QL STRIP: NEGATIVE
KETONES UR QL STRIP: NEGATIVE MG/DL
LEUKOCYTE ESTERASE UR QL STRIP: ABNORMAL
LYMPHOCYTES # BLD AUTO: 1.06 10*3/UL (ref 1–5)
MCH RBC QN AUTO: 26.2 PG (ref 27–31)
MCHC RBC AUTO-ENTMCNC: 29.4 G/DL (ref 33–37)
MCV RBC AUTO: 89.1 FL (ref 81–99)
MONOCYTES # BLD AUTO: 0.6 10*3/UL (ref 0.2–1.2)
MONOCYTES NFR BLD AUTO: 8.7 % (ref 3–10)
NEUTROPHILS # BLD AUTO: 5.01 10*3/UL (ref 2–8)
NEUTROPHILS NFR BLD AUTO: 73.1 % (ref 30–85)
NITRITE UR QL STRIP: NEGATIVE
NRBC CBCN: 0 % (ref 0–0.7)
PH UR STRIP.AUTO: 6 [PH] (ref 5–8)
PHOSPHATE SERPL-MCNC: 2.2 MG/DL (ref 2.4–4.5)
PLATELET # BLD AUTO: 296 10*3/UL (ref 130–400)
PMV BLD AUTO: 12.5 FL (ref 9.4–12.3)
POTASSIUM SERPL-SCNC: 3.2 MMOL/L (ref 3.5–5.3)
PROT UR QL: NEGATIVE MG/DL
PROT UR-MCNC: 8.2 MG/DL
PTH-INTACT SERPL-MCNC: 129.2 PG/ML (ref 11.1–79.5)
RBC # BLD AUTO: 3.85 10*6/UL (ref 4.2–5.4)
RBC #/AREA URNS HPF: ABNORMAL /[HPF]
SODIUM SERPL-SCNC: 145 MMOL/L (ref 135–147)
SP GR UR: 1.01 (ref 1–1.03)
SQUAMOUS SPT QL MICRO: ABNORMAL /[HPF]
VARIANT LYMPHS NFR BLD MANUAL: 15.5 % (ref 20–45)
WBC # BLD AUTO: 6.86 10*3/UL (ref 4.8–10.8)
WBC #/AREA URNS HPF: ABNORMAL /[HPF]

## 2019-06-01 ENCOUNTER — HOSPITAL ENCOUNTER (OUTPATIENT)
Dept: INFUSION THERAPY | Facility: HOSPITAL | Age: 59
Setting detail: RECURRING SERIES
Discharge: HOME | End: 2019-06-30
Attending: NURSE PRACTITIONER

## 2019-06-02 DIAGNOSIS — F41.1 GENERALIZED ANXIETY DISORDER: Chronic | ICD-10-CM

## 2019-06-02 DIAGNOSIS — F32.A DEPRESSIVE DISORDER: Chronic | ICD-10-CM

## 2019-06-03 RX ORDER — FLUOXETINE HYDROCHLORIDE 20 MG/1
CAPSULE ORAL
Qty: 90 CAPSULE | Refills: 2 | Status: SHIPPED | OUTPATIENT
Start: 2019-06-03 | End: 2019-09-15 | Stop reason: SDUPTHER

## 2019-06-03 RX ORDER — CLONAZEPAM 1 MG/1
TABLET ORAL
Qty: 60 TABLET | Refills: 1 | OUTPATIENT
Start: 2019-06-03

## 2019-06-03 RX ORDER — CYCLOBENZAPRINE HCL 10 MG
TABLET ORAL
Qty: 90 TABLET | Refills: 2 | Status: SHIPPED | OUTPATIENT
Start: 2019-06-03 | End: 2019-09-15 | Stop reason: SDUPTHER

## 2019-06-19 ENCOUNTER — OFFICE VISIT CONVERTED (OUTPATIENT)
Dept: SURGERY | Facility: CLINIC | Age: 59
End: 2019-06-19
Attending: SURGERY

## 2019-06-26 ENCOUNTER — HOSPITAL ENCOUNTER (OUTPATIENT)
Dept: LAB | Facility: HOSPITAL | Age: 59
Discharge: HOME | End: 2019-06-26
Attending: INTERNAL MEDICINE

## 2019-06-26 LAB
ANION GAP SERPL CALC-SCNC: 17 MMOL/L (ref 8–19)
BUN SERPL-MCNC: 16 MG/DL (ref 5–25)
BUN/CREAT SERPL: 10 {RATIO} (ref 6–20)
CALCIUM SERPL-MCNC: 9.3 MG/DL (ref 8.7–10.4)
CHLORIDE SERPL-SCNC: 101 MMOL/L (ref 99–111)
CONV CO2: 26 MMOL/L (ref 22–32)
CREAT UR-MCNC: 1.65 MG/DL (ref 0.5–0.9)
GFR SERPLBLD BASED ON 1.73 SQ M-ARVRAT: 33 ML/MIN/{1.73_M2}
GLUCOSE SERPL-MCNC: 96 MG/DL (ref 65–99)
MAGNESIUM SERPL-MCNC: 2.33 MG/DL (ref 1.6–2.3)
OSMOLALITY SERPL CALC.SUM OF ELEC: 291 MOSM/KG (ref 273–304)
POTASSIUM SERPL-SCNC: 4.3 MMOL/L (ref 3.5–5.3)
SODIUM SERPL-SCNC: 140 MMOL/L (ref 135–147)

## 2019-07-01 ENCOUNTER — HOSPITAL ENCOUNTER (OUTPATIENT)
Dept: INFUSION THERAPY | Facility: HOSPITAL | Age: 59
Setting detail: RECURRING SERIES
Discharge: HOME | End: 2019-07-31
Attending: NURSE PRACTITIONER

## 2019-07-22 ENCOUNTER — OFFICE VISIT CONVERTED (OUTPATIENT)
Dept: SURGERY | Facility: CLINIC | Age: 59
End: 2019-07-22
Attending: SURGERY

## 2019-08-08 RX ORDER — TIZANIDINE 4 MG/1
TABLET ORAL
Qty: 90 TABLET | Refills: 4 | Status: SHIPPED | OUTPATIENT
Start: 2019-08-08 | End: 2020-02-10

## 2019-08-12 ENCOUNTER — OFFICE VISIT CONVERTED (OUTPATIENT)
Dept: ONCOLOGY | Facility: HOSPITAL | Age: 59
End: 2019-08-12
Attending: INTERNAL MEDICINE

## 2019-08-12 ENCOUNTER — HOSPITAL ENCOUNTER (OUTPATIENT)
Dept: ONCOLOGY | Facility: HOSPITAL | Age: 59
Discharge: HOME | End: 2019-08-12
Attending: INTERNAL MEDICINE

## 2019-08-12 LAB
ALBUMIN SERPL-MCNC: 4.3 G/DL (ref 3.5–5)
ALBUMIN/GLOB SERPL: 1.3 {RATIO} (ref 1.4–2.6)
ALP SERPL-CCNC: 87 U/L (ref 43–160)
ALT SERPL-CCNC: 54 U/L (ref 10–40)
ANION GAP SERPL CALC-SCNC: 21 MMOL/L (ref 8–19)
AST SERPL-CCNC: 42 U/L (ref 15–50)
BASOPHILS # BLD AUTO: 0.03 10*3/UL (ref 0–0.2)
BASOPHILS NFR BLD AUTO: 0.3 % (ref 0–3)
BILIRUB SERPL-MCNC: 0.26 MG/DL (ref 0.2–1.3)
BUN SERPL-MCNC: 16 MG/DL (ref 5–25)
BUN/CREAT SERPL: 11 {RATIO} (ref 6–20)
CALCIUM SERPL-MCNC: 9.6 MG/DL (ref 8.7–10.4)
CEA SERPL-MCNC: 2.5 NG/ML (ref 0–5)
CHLORIDE SERPL-SCNC: 104 MMOL/L (ref 99–111)
CONV ABS IMM GRAN: 0.05 10*3/UL (ref 0–0.2)
CONV CO2: 23 MMOL/L (ref 22–32)
CONV IMMATURE GRAN: 0.6 % (ref 0–1.8)
CONV TOTAL PROTEIN: 7.7 G/DL (ref 6.3–8.2)
CREAT UR-MCNC: 1.45 MG/DL (ref 0.5–0.9)
DEPRECATED RDW RBC AUTO: 52.2 FL (ref 36.4–46.3)
EOSINOPHIL # BLD AUTO: 0.12 10*3/UL (ref 0–0.7)
EOSINOPHIL # BLD AUTO: 1.4 % (ref 0–7)
ERYTHROCYTE [DISTWIDTH] IN BLOOD BY AUTOMATED COUNT: 14.8 % (ref 11.7–14.4)
GFR SERPLBLD BASED ON 1.73 SQ M-ARVRAT: 39 ML/MIN/{1.73_M2}
GLOBULIN UR ELPH-MCNC: 3.4 G/DL (ref 2–3.5)
GLUCOSE SERPL-MCNC: 87 MG/DL (ref 65–99)
HCT VFR BLD AUTO: 45.6 % (ref 37–47)
HGB BLD-MCNC: 14.2 G/DL (ref 12–16)
LYMPHOCYTES # BLD AUTO: 2.05 10*3/UL (ref 1–5)
LYMPHOCYTES NFR BLD AUTO: 23.4 % (ref 20–45)
MCH RBC QN AUTO: 30 PG (ref 27–31)
MCHC RBC AUTO-ENTMCNC: 31.1 G/DL (ref 33–37)
MCV RBC AUTO: 96.2 FL (ref 81–99)
MONOCYTES # BLD AUTO: 0.67 10*3/UL (ref 0.2–1.2)
MONOCYTES NFR BLD AUTO: 7.6 % (ref 3–10)
NEUTROPHILS # BLD AUTO: 5.85 10*3/UL (ref 2–8)
NEUTROPHILS NFR BLD AUTO: 66.7 % (ref 30–85)
NRBC CBCN: 0 % (ref 0–0.7)
OSMOLALITY SERPL CALC.SUM OF ELEC: 299 MOSM/KG (ref 273–304)
PLATELET # BLD AUTO: 222 10*3/UL (ref 130–400)
PMV BLD AUTO: 12.1 FL (ref 9.4–12.3)
POTASSIUM SERPL-SCNC: 4.2 MMOL/L (ref 3.5–5.3)
RBC # BLD AUTO: 4.74 10*6/UL (ref 4.2–5.4)
SODIUM SERPL-SCNC: 144 MMOL/L (ref 135–147)
WBC # BLD AUTO: 8.77 10*3/UL (ref 4.8–10.8)

## 2019-08-13 ENCOUNTER — OFFICE VISIT (OUTPATIENT)
Dept: FAMILY MEDICINE CLINIC | Facility: CLINIC | Age: 59
End: 2019-08-13

## 2019-08-13 VITALS
OXYGEN SATURATION: 98 % | HEIGHT: 63 IN | DIASTOLIC BLOOD PRESSURE: 80 MMHG | WEIGHT: 145.9 LBS | HEART RATE: 62 BPM | BODY MASS INDEX: 25.85 KG/M2 | SYSTOLIC BLOOD PRESSURE: 105 MMHG

## 2019-08-13 DIAGNOSIS — Z00.00 ANNUAL PHYSICAL EXAM: ICD-10-CM

## 2019-08-13 DIAGNOSIS — I10 ESSENTIAL HYPERTENSION: Chronic | ICD-10-CM

## 2019-08-13 DIAGNOSIS — F32.5 MAJOR DEPRESSIVE DISORDER WITH SINGLE EPISODE, IN FULL REMISSION (HCC): Primary | ICD-10-CM

## 2019-08-13 DIAGNOSIS — F41.1 GENERALIZED ANXIETY DISORDER: Chronic | ICD-10-CM

## 2019-08-13 PROCEDURE — 99213 OFFICE O/P EST LOW 20 MIN: CPT | Performed by: GENERAL PRACTICE

## 2019-08-13 RX ORDER — CLONAZEPAM 1 MG/1
1 TABLET ORAL 2 TIMES DAILY PRN
Qty: 60 TABLET | Refills: 2 | Status: SHIPPED | OUTPATIENT
Start: 2019-08-13 | End: 2019-12-13 | Stop reason: SDUPTHER

## 2019-08-13 RX ORDER — CLOPIDOGREL BISULFATE 75 MG/1
75 TABLET ORAL DAILY
Qty: 30 TABLET | Refills: 11 | Status: SHIPPED | OUTPATIENT
Start: 2019-08-13 | End: 2020-11-23 | Stop reason: SDUPTHER

## 2019-08-13 NOTE — PROGRESS NOTES
Subjective   Erika Bowens is a 59 y.o. female.   Chief Complaint   Patient presents with   • Hypertension     For review and evaluation of management of chronic medical problems. No further heart issues. Has recovered from sigmoid colon resection for colon cancer, no further treatment required. Depression and anxiety is stable.   Hypertension   This is a chronic problem. The current episode started more than 1 year ago. The problem is unchanged. The problem is controlled. Pertinent negatives include no headaches, neck pain or palpitations. There are no associated agents to hypertension. Current antihypertension treatment includes beta blockers, diuretics and angiotensin blockers. The current treatment provides significant improvement. There are no compliance problems.  Hypertensive end-organ damage includes CAD/MI.      The following portions of the patient's history were reviewed and updated as appropriate: allergies, current medications, past social history and problem list.    Outpatient Medications Prior to Visit   Medication Sig Dispense Refill   • acetaminophen (TYLENOL) 500 MG tablet Take 500-1,000 mg by mouth every 4 (four) hours as needed for mild pain (1-3).     • aspirin 81 MG EC tablet Take 81 mg by mouth Daily.     • calcium carbonate-vitamin d 600-400 MG-UNIT per tablet Take 1 tablet by mouth every night.     • cyclobenzaprine (FLEXERIL) 10 MG tablet TAKE ONE TABLET BY MOUTH THREE TIMES A DAY AS NEEDED FOR MUSCLE SPASMS 90 tablet 2   • ferrous sulfate 325 (65 FE) MG tablet Take 1 tablet by mouth Daily With Breakfast. 50 tablet 0   • FLUoxetine (PROzac) 20 MG capsule TAKE THREE CAPSULES BY MOUTH DAILY 90 capsule 2   • furosemide (LASIX) 40 MG tablet TAKE ONE TABLET BY MOUTH TWICE A DAY 60 tablet 2   • losartan (COZAAR) 25 MG tablet Take 25 mg by mouth Daily.     • metoprolol succinate XL (TOPROL-XL) 25 MG 24 hr tablet Take 25 mg by mouth Daily.     • nitroglycerin (NITROSTAT) 0.4 MG SL tablet  DISSOLVE 1 TAB UNDER TONGUE FOR CHEST PAIN - IF PAIN REMAINS AFTER 5 MIN, CALL 911 AND REPEAT DOSE. MAX 3 TABS IN 15 MINUTES 25 tablet 2   • omeprazole (priLOSEC) 40 MG capsule Take 1 capsule by mouth Daily. 30 capsule 5   • pravastatin (PRAVACHOL) 80 MG tablet Take 1 tablet by mouth Every Night. 30 tablet 11   • spironolactone (ALDACTONE) 25 MG tablet Take 1 tablet by mouth Daily. (Patient taking differently: Take 25 mg by mouth 2 (Two) Times a Day.) 30 tablet 11   • tiZANidine (ZANAFLEX) 4 MG tablet TAKE ONE TABLET BY MOUTH EVERY 8 HOURS AS NEEDED FOR MUSCLE SPASMS 90 tablet 4   • clonazePAM (KlonoPIN) 1 MG tablet Take 1 tablet by mouth 2 (Two) Times a Day As Needed for Anxiety. 60 tablet 2   • clopidogrel (PLAVIX) 75 MG tablet Take 1 tablet by mouth Daily. 30 tablet 5     No facility-administered medications prior to visit.        Review of Systems   Constitutional: Positive for fatigue. Negative for chills, fever and unexpected weight change.   HENT: Negative.  Negative for congestion, ear pain, hearing loss, nosebleeds, rhinorrhea, sneezing, sore throat and tinnitus.    Eyes: Negative.  Negative for discharge.   Respiratory: Negative for cough and wheezing.    Cardiovascular: Negative.  Negative for palpitations.   Gastrointestinal: Negative for abdominal pain, blood in stool, constipation, diarrhea, nausea and vomiting.   Endocrine: Negative.    Genitourinary: Negative.  Negative for dysuria, frequency and urgency.   Musculoskeletal: Negative.  Negative for arthralgias, back pain, joint swelling and neck pain.   Skin: Negative.  Negative for rash.   Allergic/Immunologic: Negative.    Neurological: Negative.  Negative for dizziness, weakness, numbness and headaches.   Hematological: Negative.  Negative for adenopathy.   Psychiatric/Behavioral: Negative.  Negative for dysphoric mood and sleep disturbance. The patient is not nervous/anxious.      Objective   Visit Vitals  /80 (BP Location: Left arm)   Pulse  "62   Ht 160 cm (63\")   Wt 66.2 kg (145 lb 14.4 oz)   SpO2 98%   BMI 25.85 kg/m²     Physical Exam   Constitutional: She is oriented to person, place, and time. She appears well-developed and well-nourished. No distress.   HENT:   Head: Normocephalic and atraumatic.   Nose: Nose normal.   Mouth/Throat: Oropharynx is clear and moist.   Eyes: Conjunctivae and EOM are normal. Pupils are equal, round, and reactive to light. Right eye exhibits no discharge. Left eye exhibits no discharge.   Neck: No thyromegaly present.   Cardiovascular: Normal rate, regular rhythm, normal heart sounds and intact distal pulses.   Pulmonary/Chest: Effort normal and breath sounds normal.   Abdominal: Soft. Normal appearance and bowel sounds are normal. She exhibits no distension. There is no tenderness.       Lymphadenopathy:     She has no cervical adenopathy.   Neurological: She is alert and oriented to person, place, and time.   Skin: Skin is warm and dry.   Psychiatric: She has a normal mood and affect.   Nursing note and vitals reviewed.    Assessment/Plan   Problem List Items Addressed This Visit        Cardiovascular and Mediastinum    Essential hypertension (Chronic)       Other    Generalized anxiety disorder (Chronic)    Relevant Medications    clonazePAM (KlonoPIN) 1 MG tablet      Other Visit Diagnoses     Major depressive disorder with single episode, in full remission (CMS/[facility])    -  Primary    Annual physical exam        Relevant Orders    CBC & Differential    Comprehensive Metabolic Panel    Lipid Panel    Urinalysis With Culture If Indicated -        Continue current treatment. Follow up with specialists as scheduled. Luis Angel reviewed and appropriate. Not recommended to drive or operate heavy equipment while taking potentially sedating meds.  Patient understands the risks associated with this controlled medication, including tolerance and addiction. They also agree to obtain this medication only from me, and not from a " another provider, unless that provider is covering for me in my absence. They also agree to be compliant in dosing, and not self adjust the dose of medication.  A signed controlled substance agreement is on file, and they have received a controlled substance education sheet at this or a previous visit. They have also signed a consent for treatment with a controlled substance as per [facility] policy.        New Medications Ordered This Visit   Medications   • clonazePAM (KlonoPIN) 1 MG tablet     Sig: Take 1 tablet by mouth 2 (Two) Times a Day As Needed for Anxiety.     Dispense:  60 tablet     Refill:  2   • clopidogrel (PLAVIX) 75 MG tablet     Sig: Take 1 tablet by mouth Daily.     Dispense:  30 tablet     Refill:  11     Return in about 3 months (around 11/13/2019) for Annual physical.

## 2019-08-15 DIAGNOSIS — Z78.0 POST-MENOPAUSAL: Primary | ICD-10-CM

## 2019-08-15 DIAGNOSIS — Z12.31 ENCOUNTER FOR SCREENING MAMMOGRAM FOR MALIGNANT NEOPLASM OF BREAST: ICD-10-CM

## 2019-08-27 ENCOUNTER — HOSPITAL ENCOUNTER (OUTPATIENT)
Dept: INFUSION THERAPY | Facility: HOSPITAL | Age: 59
Setting detail: RECURRING SERIES
Discharge: HOME | End: 2019-08-31
Attending: FAMILY MEDICINE

## 2019-09-01 ENCOUNTER — HOSPITAL ENCOUNTER (OUTPATIENT)
Dept: INFUSION THERAPY | Facility: HOSPITAL | Age: 59
Setting detail: RECURRING SERIES
Discharge: HOME | End: 2019-09-30
Attending: FAMILY MEDICINE

## 2019-09-03 ENCOUNTER — OFFICE VISIT CONVERTED (OUTPATIENT)
Dept: SURGERY | Facility: CLINIC | Age: 59
End: 2019-09-03
Attending: SURGERY

## 2019-09-13 ENCOUNTER — CONVERSION ENCOUNTER (OUTPATIENT)
Dept: SURGERY | Facility: CLINIC | Age: 59
End: 2019-09-13

## 2019-09-13 ENCOUNTER — OFFICE VISIT CONVERTED (OUTPATIENT)
Dept: SURGERY | Facility: CLINIC | Age: 59
End: 2019-09-13
Attending: SURGERY

## 2019-09-15 DIAGNOSIS — F41.1 GENERALIZED ANXIETY DISORDER: Chronic | ICD-10-CM

## 2019-09-15 DIAGNOSIS — F32.A DEPRESSIVE DISORDER: Chronic | ICD-10-CM

## 2019-09-16 RX ORDER — CYCLOBENZAPRINE HCL 10 MG
TABLET ORAL
Qty: 90 TABLET | Refills: 1 | Status: SHIPPED | OUTPATIENT
Start: 2019-09-16 | End: 2019-12-05 | Stop reason: SDUPTHER

## 2019-09-16 RX ORDER — CLONAZEPAM 1 MG/1
TABLET ORAL
Qty: 60 TABLET | Refills: 1 | OUTPATIENT
Start: 2019-09-16

## 2019-09-16 RX ORDER — FLUOXETINE HYDROCHLORIDE 20 MG/1
CAPSULE ORAL
Qty: 90 CAPSULE | Refills: 1 | Status: SHIPPED | OUTPATIENT
Start: 2019-09-16 | End: 2019-12-13 | Stop reason: SDUPTHER

## 2019-09-16 NOTE — TELEPHONE ENCOUNTER
Script for Clonazepam should be on file at the Pharmacy, Patient state she gave them the script that was written on 08/13/19

## 2019-09-23 ENCOUNTER — HOSPITAL ENCOUNTER (OUTPATIENT)
Dept: LAB | Facility: HOSPITAL | Age: 59
Discharge: HOME | End: 2019-09-23
Attending: INTERNAL MEDICINE

## 2019-09-23 LAB
25(OH)D3 SERPL-MCNC: 24.5 NG/ML (ref 30–100)
ALBUMIN SERPL-MCNC: 4.6 G/DL (ref 3.5–5)
ANION GAP SERPL CALC-SCNC: 23 MMOL/L (ref 8–19)
BASOPHILS # BLD AUTO: 0.03 10*3/UL (ref 0–0.2)
BASOPHILS NFR BLD AUTO: 0.4 % (ref 0–3)
BUN SERPL-MCNC: 18 MG/DL (ref 5–25)
BUN/CREAT SERPL: 12 {RATIO} (ref 6–20)
CALCIUM SERPL-MCNC: 9.3 MG/DL (ref 8.7–10.4)
CHLORIDE SERPL-SCNC: 100 MMOL/L (ref 99–111)
CONV ABS IMM GRAN: 0.02 10*3/UL (ref 0–0.2)
CONV CO2: 22 MMOL/L (ref 22–32)
CONV IMMATURE GRAN: 0.3 % (ref 0–1.8)
CREAT UR-MCNC: 1.45 MG/DL (ref 0.5–0.9)
DEPRECATED RDW RBC AUTO: 54 FL (ref 36.4–46.3)
EOSINOPHIL # BLD AUTO: 0.13 10*3/UL (ref 0–0.7)
EOSINOPHIL # BLD AUTO: 1.8 % (ref 0–7)
ERYTHROCYTE [DISTWIDTH] IN BLOOD BY AUTOMATED COUNT: 14.9 % (ref 11.7–14.4)
GFR SERPLBLD BASED ON 1.73 SQ M-ARVRAT: 39 ML/MIN/{1.73_M2}
GLUCOSE SERPL-MCNC: 95 MG/DL (ref 65–99)
HCT VFR BLD AUTO: 41.6 % (ref 37–47)
HGB BLD-MCNC: 12.9 G/DL (ref 12–16)
LYMPHOCYTES # BLD AUTO: 1.87 10*3/UL (ref 1–5)
LYMPHOCYTES NFR BLD AUTO: 26.3 % (ref 20–45)
MCH RBC QN AUTO: 30.4 PG (ref 27–31)
MCHC RBC AUTO-ENTMCNC: 31 G/DL (ref 33–37)
MCV RBC AUTO: 98.1 FL (ref 81–99)
MONOCYTES # BLD AUTO: 0.68 10*3/UL (ref 0.2–1.2)
MONOCYTES NFR BLD AUTO: 9.6 % (ref 3–10)
NEUTROPHILS # BLD AUTO: 4.37 10*3/UL (ref 2–8)
NEUTROPHILS NFR BLD AUTO: 61.6 % (ref 30–85)
NRBC CBCN: 0 % (ref 0–0.7)
PHOSPHATE SERPL-MCNC: 3.8 MG/DL (ref 2.4–4.5)
PLATELET # BLD AUTO: 215 10*3/UL (ref 130–400)
PMV BLD AUTO: 13 FL (ref 9.4–12.3)
POTASSIUM SERPL-SCNC: 3.6 MMOL/L (ref 3.5–5.3)
RBC # BLD AUTO: 4.24 10*6/UL (ref 4.2–5.4)
SODIUM SERPL-SCNC: 141 MMOL/L (ref 135–147)
WBC # BLD AUTO: 7.1 10*3/UL (ref 4.8–10.8)

## 2019-10-07 ENCOUNTER — HOSPITAL ENCOUNTER (OUTPATIENT)
Dept: LAB | Facility: HOSPITAL | Age: 59
Discharge: HOME | End: 2019-10-07
Attending: INTERNAL MEDICINE

## 2019-10-07 LAB
ALBUMIN SERPL-MCNC: 4.3 G/DL (ref 3.5–5)
ALBUMIN/GLOB SERPL: 1.3 {RATIO} (ref 1.4–2.6)
ALP SERPL-CCNC: 77 U/L (ref 43–160)
ALT SERPL-CCNC: 23 U/L (ref 10–40)
ANION GAP SERPL CALC-SCNC: 21 MMOL/L (ref 8–19)
AST SERPL-CCNC: 27 U/L (ref 15–50)
BILIRUB SERPL-MCNC: 0.34 MG/DL (ref 0.2–1.3)
BUN SERPL-MCNC: 18 MG/DL (ref 5–25)
BUN/CREAT SERPL: 12 {RATIO} (ref 6–20)
CALCIUM SERPL-MCNC: 9.2 MG/DL (ref 8.7–10.4)
CHLORIDE SERPL-SCNC: 102 MMOL/L (ref 99–111)
CHOLEST SERPL-MCNC: 148 MG/DL (ref 107–200)
CHOLEST/HDLC SERPL: 3.2 {RATIO} (ref 3–6)
CONV CO2: 24 MMOL/L (ref 22–32)
CONV TOTAL PROTEIN: 7.6 G/DL (ref 6.3–8.2)
CREAT UR-MCNC: 1.55 MG/DL (ref 0.5–0.9)
GFR SERPLBLD BASED ON 1.73 SQ M-ARVRAT: 36 ML/MIN/{1.73_M2}
GLOBULIN UR ELPH-MCNC: 3.3 G/DL (ref 2–3.5)
GLUCOSE SERPL-MCNC: 98 MG/DL (ref 65–99)
HDLC SERPL-MCNC: 46 MG/DL (ref 40–60)
LDLC SERPL CALC-MCNC: 79 MG/DL (ref 70–100)
OSMOLALITY SERPL CALC.SUM OF ELEC: 298 MOSM/KG (ref 273–304)
POTASSIUM SERPL-SCNC: 4.2 MMOL/L (ref 3.5–5.3)
SODIUM SERPL-SCNC: 143 MMOL/L (ref 135–147)
TRIGL SERPL-MCNC: 116 MG/DL (ref 40–150)
VLDLC SERPL-MCNC: 23 MG/DL (ref 5–37)

## 2019-11-12 ENCOUNTER — HOSPITAL ENCOUNTER (OUTPATIENT)
Dept: CT IMAGING | Facility: HOSPITAL | Age: 59
Discharge: HOME | End: 2019-11-12
Attending: NURSE PRACTITIONER

## 2019-11-12 LAB
ALBUMIN SERPL-MCNC: 3.5 G/DL (ref 3.5–5)
ALBUMIN/GLOB SERPL: 1 {RATIO} (ref 1.4–2.6)
ALP SERPL-CCNC: 94 U/L (ref 43–160)
ALT SERPL-CCNC: 57 U/L (ref 10–40)
ANION GAP SERPL CALC-SCNC: 15 MMOL/L (ref 8–19)
AST SERPL-CCNC: 57 U/L (ref 15–50)
BASOPHILS # BLD AUTO: 0.04 10*3/UL (ref 0–0.2)
BASOPHILS NFR BLD AUTO: 0.4 % (ref 0–3)
BILIRUB SERPL-MCNC: 0.42 MG/DL (ref 0.2–1.3)
BUN SERPL-MCNC: 16 MG/DL (ref 5–25)
BUN/CREAT SERPL: 13 {RATIO} (ref 6–20)
CALCIUM SERPL-MCNC: 9.1 MG/DL (ref 8.7–10.4)
CHLORIDE SERPL-SCNC: 97 MMOL/L (ref 99–111)
CONV ABS IMM GRAN: 0.04 10*3/UL (ref 0–0.2)
CONV CO2: 29 MMOL/L (ref 22–32)
CONV IMMATURE GRAN: 0.4 % (ref 0–1.8)
CONV TOTAL PROTEIN: 6.9 G/DL (ref 6.3–8.2)
CREAT BLD-MCNC: 1.4 MG/DL (ref 0.6–1.4)
CREAT UR-MCNC: 1.24 MG/DL (ref 0.5–0.9)
DEPRECATED RDW RBC AUTO: 50.9 FL (ref 36.4–46.3)
EOSINOPHIL # BLD AUTO: 0.08 10*3/UL (ref 0–0.7)
EOSINOPHIL # BLD AUTO: 0.8 % (ref 0–7)
ERYTHROCYTE [DISTWIDTH] IN BLOOD BY AUTOMATED COUNT: 13.9 % (ref 11.7–14.4)
GFR SERPLBLD BASED ON 1.73 SQ M-ARVRAT: 40 ML/MIN/{1.73_M2}
GFR SERPLBLD BASED ON 1.73 SQ M-ARVRAT: 47 ML/MIN/{1.73_M2}
GLOBULIN UR ELPH-MCNC: 3.4 G/DL (ref 2–3.5)
GLUCOSE SERPL-MCNC: 100 MG/DL (ref 65–99)
HCT VFR BLD AUTO: 34.8 % (ref 37–47)
HGB BLD-MCNC: 10.9 G/DL (ref 12–16)
LYMPHOCYTES # BLD AUTO: 1.27 10*3/UL (ref 1–5)
LYMPHOCYTES NFR BLD AUTO: 12.7 % (ref 20–45)
MCH RBC QN AUTO: 31.3 PG (ref 27–31)
MCHC RBC AUTO-ENTMCNC: 31.3 G/DL (ref 33–37)
MCV RBC AUTO: 100 FL (ref 81–99)
MONOCYTES # BLD AUTO: 0.95 10*3/UL (ref 0.2–1.2)
MONOCYTES NFR BLD AUTO: 9.5 % (ref 3–10)
NEUTROPHILS # BLD AUTO: 7.63 10*3/UL (ref 2–8)
NEUTROPHILS NFR BLD AUTO: 76.2 % (ref 30–85)
NRBC CBCN: 0 % (ref 0–0.7)
OSMOLALITY SERPL CALC.SUM OF ELEC: 285 MOSM/KG (ref 273–304)
PLATELET # BLD AUTO: 188 10*3/UL (ref 130–400)
PMV BLD AUTO: 12.7 FL (ref 9.4–12.3)
POTASSIUM SERPL-SCNC: 3.6 MMOL/L (ref 3.5–5.3)
RBC # BLD AUTO: 3.48 10*6/UL (ref 4.2–5.4)
SODIUM SERPL-SCNC: 137 MMOL/L (ref 135–147)
WBC # BLD AUTO: 10.01 10*3/UL (ref 4.8–10.8)

## 2019-11-14 ENCOUNTER — OFFICE VISIT CONVERTED (OUTPATIENT)
Dept: ONCOLOGY | Facility: HOSPITAL | Age: 59
End: 2019-11-14
Attending: INTERNAL MEDICINE

## 2019-11-14 ENCOUNTER — HOSPITAL ENCOUNTER (OUTPATIENT)
Dept: ONCOLOGY | Facility: HOSPITAL | Age: 59
Discharge: HOME | End: 2019-11-14
Attending: INTERNAL MEDICINE

## 2019-11-28 ENCOUNTER — HOSPITAL ENCOUNTER (OUTPATIENT)
Dept: INFUSION THERAPY | Facility: HOSPITAL | Age: 59
Setting detail: RECURRING SERIES
Discharge: HOME | End: 2019-11-30
Attending: NURSE PRACTITIONER

## 2019-12-01 ENCOUNTER — HOSPITAL ENCOUNTER (OUTPATIENT)
Dept: INFUSION THERAPY | Facility: HOSPITAL | Age: 59
Setting detail: RECURRING SERIES
Discharge: HOME | End: 2019-12-31
Attending: NURSE PRACTITIONER

## 2019-12-05 ENCOUNTER — OFFICE VISIT CONVERTED (OUTPATIENT)
Dept: SURGERY | Facility: CLINIC | Age: 59
End: 2019-12-05
Attending: SURGERY

## 2019-12-05 RX ORDER — CYCLOBENZAPRINE HCL 10 MG
TABLET ORAL
Qty: 90 TABLET | Refills: 0 | Status: SHIPPED | OUTPATIENT
Start: 2019-12-05 | End: 2019-12-13 | Stop reason: SDUPTHER

## 2019-12-13 ENCOUNTER — OFFICE VISIT (OUTPATIENT)
Dept: FAMILY MEDICINE CLINIC | Facility: CLINIC | Age: 59
End: 2019-12-13

## 2019-12-13 VITALS
OXYGEN SATURATION: 96 % | HEART RATE: 60 BPM | BODY MASS INDEX: 25.75 KG/M2 | WEIGHT: 145.3 LBS | HEIGHT: 63 IN | SYSTOLIC BLOOD PRESSURE: 140 MMHG | DIASTOLIC BLOOD PRESSURE: 80 MMHG

## 2019-12-13 DIAGNOSIS — F32.A DEPRESSIVE DISORDER: Chronic | ICD-10-CM

## 2019-12-13 DIAGNOSIS — Z01.419 ROUTINE GYNECOLOGICAL EXAMINATION: Primary | ICD-10-CM

## 2019-12-13 DIAGNOSIS — F41.1 GENERALIZED ANXIETY DISORDER: Chronic | ICD-10-CM

## 2019-12-13 PROCEDURE — G0123 SCREEN CERV/VAG THIN LAYER: HCPCS | Performed by: GENERAL PRACTICE

## 2019-12-13 PROCEDURE — 99396 PREV VISIT EST AGE 40-64: CPT | Performed by: GENERAL PRACTICE

## 2019-12-13 PROCEDURE — 87624 HPV HI-RISK TYP POOLED RSLT: CPT | Performed by: GENERAL PRACTICE

## 2019-12-13 RX ORDER — CLONAZEPAM 1 MG/1
1 TABLET ORAL 2 TIMES DAILY PRN
Qty: 60 TABLET | Refills: 2 | Status: SHIPPED | OUTPATIENT
Start: 2019-12-13 | End: 2020-03-24 | Stop reason: SDUPTHER

## 2019-12-13 RX ORDER — CYCLOBENZAPRINE HCL 10 MG
10 TABLET ORAL 3 TIMES DAILY PRN
Qty: 90 TABLET | Refills: 3 | Status: SHIPPED | OUTPATIENT
Start: 2019-12-13 | End: 2020-05-07 | Stop reason: SDUPTHER

## 2019-12-13 RX ORDER — FLUOXETINE HYDROCHLORIDE 20 MG/1
60 CAPSULE ORAL DAILY
Qty: 90 CAPSULE | Refills: 3 | Status: SHIPPED | OUTPATIENT
Start: 2019-12-13 | End: 2020-04-06

## 2019-12-17 LAB
GEN CATEG CVX/VAG CYTO-IMP: NORMAL
LAB AP CASE REPORT: NORMAL
LAB AP GYN ADDITIONAL INFORMATION: NORMAL
PATH INTERP SPEC-IMP: NORMAL
STAT OF ADQ CVX/VAG CYTO-IMP: NORMAL

## 2019-12-19 LAB — HPV I/H RISK 4 DNA CVX QL PROBE+SIG AMP: NEGATIVE

## 2019-12-27 ENCOUNTER — TELEPHONE (OUTPATIENT)
Dept: FAMILY MEDICINE CLINIC | Facility: CLINIC | Age: 59
End: 2019-12-27

## 2019-12-27 NOTE — TELEPHONE ENCOUNTER
Per Dr. Willis, Ms. Bowens has been called with recent DEXA Bone Density results & recommendations.  Continue current medications and follow-up as planned or sooner if any problems.      ----- Message from Nilam Willis MD sent at 12/23/2019  5:40 PM CST -----  Call and tell bone density shows some bone loss but not osteoporosis, make sure she is taking Ca + D 600mg daily and exercising regularly

## 2020-01-01 ENCOUNTER — HOSPITAL ENCOUNTER (OUTPATIENT)
Dept: INFUSION THERAPY | Facility: HOSPITAL | Age: 60
Setting detail: RECURRING SERIES
Discharge: HOME | End: 2020-01-31
Attending: NURSE PRACTITIONER

## 2020-01-06 ENCOUNTER — CONVERSION ENCOUNTER (OUTPATIENT)
Dept: SURGERY | Facility: CLINIC | Age: 60
End: 2020-01-06

## 2020-01-06 ENCOUNTER — OFFICE VISIT CONVERTED (OUTPATIENT)
Dept: SURGERY | Facility: CLINIC | Age: 60
End: 2020-01-06
Attending: SURGERY

## 2020-01-13 ENCOUNTER — OFFICE VISIT CONVERTED (OUTPATIENT)
Dept: SURGERY | Facility: CLINIC | Age: 60
End: 2020-01-13
Attending: NURSE PRACTITIONER

## 2020-01-20 ENCOUNTER — OFFICE VISIT CONVERTED (OUTPATIENT)
Dept: SURGERY | Facility: CLINIC | Age: 60
End: 2020-01-20
Attending: NURSE PRACTITIONER

## 2020-02-05 ENCOUNTER — CONVERSION ENCOUNTER (OUTPATIENT)
Dept: SURGERY | Facility: CLINIC | Age: 60
End: 2020-02-05

## 2020-02-05 ENCOUNTER — OFFICE VISIT CONVERTED (OUTPATIENT)
Dept: SURGERY | Facility: CLINIC | Age: 60
End: 2020-02-05
Attending: NURSE PRACTITIONER

## 2020-02-10 RX ORDER — TIZANIDINE 4 MG/1
TABLET ORAL
Qty: 90 TABLET | Refills: 1 | Status: SHIPPED | OUTPATIENT
Start: 2020-02-10 | End: 2020-04-06

## 2020-02-12 ENCOUNTER — OFFICE VISIT CONVERTED (OUTPATIENT)
Dept: SURGERY | Facility: CLINIC | Age: 60
End: 2020-02-12
Attending: SURGERY

## 2020-02-13 ENCOUNTER — HOSPITAL ENCOUNTER (OUTPATIENT)
Dept: GENERAL RADIOLOGY | Facility: HOSPITAL | Age: 60
Discharge: HOME | End: 2020-02-13
Attending: INTERNAL MEDICINE

## 2020-02-13 ENCOUNTER — HOSPITAL ENCOUNTER (OUTPATIENT)
Dept: LAB | Facility: HOSPITAL | Age: 60
Discharge: HOME | End: 2020-02-13
Attending: INTERNAL MEDICINE

## 2020-02-13 LAB
ALBUMIN SERPL-MCNC: 4.1 G/DL (ref 3.5–5)
ALBUMIN/GLOB SERPL: 1.2 {RATIO} (ref 1.4–2.6)
ALP SERPL-CCNC: 64 U/L (ref 43–160)
ALT SERPL-CCNC: 21 U/L (ref 10–40)
ANION GAP SERPL CALC-SCNC: 19 MMOL/L (ref 8–19)
AST SERPL-CCNC: 25 U/L (ref 15–50)
BASOPHILS # BLD AUTO: 0.05 10*3/UL (ref 0–0.2)
BASOPHILS NFR BLD AUTO: 0.8 % (ref 0–3)
BILIRUB SERPL-MCNC: 0.18 MG/DL (ref 0.2–1.3)
BUN SERPL-MCNC: 24 MG/DL (ref 5–25)
BUN/CREAT SERPL: 17 {RATIO} (ref 6–20)
CALCIUM SERPL-MCNC: 9.2 MG/DL (ref 8.7–10.4)
CEA SERPL-MCNC: 2.7 NG/ML (ref 0–5)
CHLORIDE SERPL-SCNC: 99 MMOL/L (ref 99–111)
CONV ABS IMM GRAN: 0.02 10*3/UL (ref 0–0.2)
CONV CO2: 23 MMOL/L (ref 22–32)
CONV IMMATURE GRAN: 0.3 % (ref 0–1.8)
CONV TOTAL PROTEIN: 7.4 G/DL (ref 6.3–8.2)
CREAT UR-MCNC: 1.41 MG/DL (ref 0.5–0.9)
DEPRECATED RDW RBC AUTO: 53.9 FL (ref 36.4–46.3)
EOSINOPHIL # BLD AUTO: 0.12 10*3/UL (ref 0–0.7)
EOSINOPHIL # BLD AUTO: 1.9 % (ref 0–7)
ERYTHROCYTE [DISTWIDTH] IN BLOOD BY AUTOMATED COUNT: 15.2 % (ref 11.7–14.4)
GFR SERPLBLD BASED ON 1.73 SQ M-ARVRAT: 40 ML/MIN/{1.73_M2}
GLOBULIN UR ELPH-MCNC: 3.3 G/DL (ref 2–3.5)
GLUCOSE SERPL-MCNC: 81 MG/DL (ref 65–99)
HCT VFR BLD AUTO: 43.3 % (ref 37–47)
HGB BLD-MCNC: 12.9 G/DL (ref 12–16)
LYMPHOCYTES # BLD AUTO: 1.32 10*3/UL (ref 1–5)
LYMPHOCYTES NFR BLD AUTO: 20.8 % (ref 20–45)
MCH RBC QN AUTO: 29 PG (ref 27–31)
MCHC RBC AUTO-ENTMCNC: 29.8 G/DL (ref 33–37)
MCV RBC AUTO: 97.3 FL (ref 81–99)
MONOCYTES # BLD AUTO: 0.63 10*3/UL (ref 0.2–1.2)
MONOCYTES NFR BLD AUTO: 9.9 % (ref 3–10)
NEUTROPHILS # BLD AUTO: 4.2 10*3/UL (ref 2–8)
NEUTROPHILS NFR BLD AUTO: 66.3 % (ref 30–85)
NRBC CBCN: 0 % (ref 0–0.7)
OSMOLALITY SERPL CALC.SUM OF ELEC: 287 MOSM/KG (ref 273–304)
PLATELET # BLD AUTO: 197 10*3/UL (ref 130–400)
PMV BLD AUTO: 12.8 FL (ref 9.4–12.3)
POTASSIUM SERPL-SCNC: 4 MMOL/L (ref 3.5–5.3)
RBC # BLD AUTO: 4.45 10*6/UL (ref 4.2–5.4)
SODIUM SERPL-SCNC: 137 MMOL/L (ref 135–147)
WBC # BLD AUTO: 6.34 10*3/UL (ref 4.8–10.8)

## 2020-02-17 ENCOUNTER — OFFICE VISIT CONVERTED (OUTPATIENT)
Dept: ONCOLOGY | Facility: HOSPITAL | Age: 60
End: 2020-02-17
Attending: INTERNAL MEDICINE

## 2020-02-17 ENCOUNTER — HOSPITAL ENCOUNTER (OUTPATIENT)
Dept: ONCOLOGY | Facility: HOSPITAL | Age: 60
Discharge: HOME | End: 2020-02-17
Attending: INTERNAL MEDICINE

## 2020-03-09 RX ORDER — OMEPRAZOLE 40 MG/1
CAPSULE, DELAYED RELEASE ORAL
Qty: 30 CAPSULE | Refills: 4 | Status: SHIPPED | OUTPATIENT
Start: 2020-03-09 | End: 2020-04-17 | Stop reason: SDUPTHER

## 2020-03-24 ENCOUNTER — TELEPHONE (OUTPATIENT)
Dept: FAMILY MEDICINE CLINIC | Facility: CLINIC | Age: 60
End: 2020-03-24

## 2020-03-24 DIAGNOSIS — F41.1 GENERALIZED ANXIETY DISORDER: Chronic | ICD-10-CM

## 2020-03-24 RX ORDER — CLONAZEPAM 1 MG/1
1 TABLET ORAL 2 TIMES DAILY PRN
Qty: 60 TABLET | Refills: 2 | Status: SHIPPED | OUTPATIENT
Start: 2020-03-24 | End: 2020-08-24

## 2020-04-05 DIAGNOSIS — F32.A DEPRESSIVE DISORDER: Chronic | ICD-10-CM

## 2020-04-06 RX ORDER — FLUOXETINE HYDROCHLORIDE 20 MG/1
CAPSULE ORAL
Qty: 90 CAPSULE | Refills: 2 | Status: SHIPPED | OUTPATIENT
Start: 2020-04-06 | End: 2020-07-17

## 2020-04-06 RX ORDER — TIZANIDINE 4 MG/1
TABLET ORAL
Qty: 90 TABLET | Refills: 0 | Status: SHIPPED | OUTPATIENT
Start: 2020-04-06 | End: 2020-05-07 | Stop reason: SDUPTHER

## 2020-04-17 DIAGNOSIS — F41.1 GENERALIZED ANXIETY DISORDER: Chronic | ICD-10-CM

## 2020-04-17 RX ORDER — OMEPRAZOLE 40 MG/1
40 CAPSULE, DELAYED RELEASE ORAL DAILY
Qty: 30 CAPSULE | Refills: 4 | Status: SHIPPED | OUTPATIENT
Start: 2020-04-17 | End: 2021-02-19 | Stop reason: SDUPTHER

## 2020-04-17 RX ORDER — CLONAZEPAM 1 MG/1
1 TABLET ORAL 2 TIMES DAILY PRN
Qty: 60 TABLET | Refills: 2 | OUTPATIENT
Start: 2020-04-17

## 2020-04-17 NOTE — TELEPHONE ENCOUNTER
PT CALLED REQUESTING A REFILL -clonazePAM (KlonoPIN) 1 MG tablet  -omeprazole (priLOSEC) 40 MG capsule    PHARMACY: [facility] [address] [place] [address] AT [facility] [address] - [phone]  - [phone] FX  [phone]    PT'S CALLBACK NUMBER: [phone]

## 2020-04-17 NOTE — TELEPHONE ENCOUNTER
Dr. Willis    Ms. Erika Bowens is requesting a refill on her Clonazepam 1 mg #60  Take 1 BID  No Refill is needed on the Clonazepam 1 mg.  Script sent 03/24/2020 for #60 with 2 refills  No Additional refill needed today

## 2020-05-07 RX ORDER — CYCLOBENZAPRINE HCL 10 MG
10 TABLET ORAL 3 TIMES DAILY PRN
Qty: 90 TABLET | Refills: 0 | Status: SHIPPED | OUTPATIENT
Start: 2020-05-07 | End: 2020-06-11

## 2020-05-07 RX ORDER — TIZANIDINE 4 MG/1
4 TABLET ORAL EVERY 8 HOURS PRN
Qty: 90 TABLET | Refills: 0 | Status: SHIPPED | OUTPATIENT
Start: 2020-05-07 | End: 2020-06-11

## 2020-05-07 NOTE — TELEPHONE ENCOUNTER
Patient called in requesting a refill on cyclobenzaprine (FLEXERIL) 10 MG tablet and tiZANidine (ZANAFLEX) 4 MG tablet.     Pharmacy confirmed.

## 2020-05-11 RX ORDER — CYCLOBENZAPRINE HCL 10 MG
TABLET ORAL
Qty: 90 TABLET | Refills: 2 | OUTPATIENT
Start: 2020-05-11

## 2020-05-11 RX ORDER — TIZANIDINE 4 MG/1
TABLET ORAL
Qty: 90 TABLET | Refills: 0 | OUTPATIENT
Start: 2020-05-11

## 2020-05-15 ENCOUNTER — CONVERSION ENCOUNTER (OUTPATIENT)
Dept: GASTROENTEROLOGY | Facility: CLINIC | Age: 60
End: 2020-05-15
Attending: INTERNAL MEDICINE

## 2020-05-15 ENCOUNTER — HOSPITAL ENCOUNTER (OUTPATIENT)
Dept: LAB | Facility: HOSPITAL | Age: 60
Discharge: HOME | End: 2020-05-15
Attending: INTERNAL MEDICINE

## 2020-05-15 LAB
ALBUMIN SERPL-MCNC: 4 G/DL (ref 3.5–5)
ALBUMIN/GLOB SERPL: 1.3 {RATIO} (ref 1.4–2.6)
ALP SERPL-CCNC: 71 U/L (ref 43–160)
ALT SERPL-CCNC: 22 U/L (ref 10–40)
ANION GAP SERPL CALC-SCNC: 19 MMOL/L (ref 8–19)
AST SERPL-CCNC: 25 U/L (ref 15–50)
BASOPHILS # BLD AUTO: 0.03 10*3/UL (ref 0–0.2)
BASOPHILS NFR BLD AUTO: 0.4 % (ref 0–3)
BILIRUB SERPL-MCNC: 0.26 MG/DL (ref 0.2–1.3)
BUN SERPL-MCNC: 22 MG/DL (ref 5–25)
BUN/CREAT SERPL: 13 {RATIO} (ref 6–20)
CALCIUM SERPL-MCNC: 9 MG/DL (ref 8.7–10.4)
CEA SERPL-MCNC: 2.3 NG/ML (ref 0–5)
CHLORIDE SERPL-SCNC: 99 MMOL/L (ref 99–111)
CONV ABS IMM GRAN: 0.04 10*3/UL (ref 0–0.2)
CONV CO2: 24 MMOL/L (ref 22–32)
CONV IMMATURE GRAN: 0.5 % (ref 0–1.8)
CONV TOTAL PROTEIN: 7.1 G/DL (ref 6.3–8.2)
CREAT UR-MCNC: 1.63 MG/DL (ref 0.5–0.9)
DEPRECATED RDW RBC AUTO: 58.6 FL (ref 36.4–46.3)
EOSINOPHIL # BLD AUTO: 0.19 10*3/UL (ref 0–0.7)
EOSINOPHIL # BLD AUTO: 2.3 % (ref 0–7)
ERYTHROCYTE [DISTWIDTH] IN BLOOD BY AUTOMATED COUNT: 16.1 % (ref 11.7–14.4)
GFR SERPLBLD BASED ON 1.73 SQ M-ARVRAT: 33 ML/MIN/{1.73_M2}
GLOBULIN UR ELPH-MCNC: 3.1 G/DL (ref 2–3.5)
GLUCOSE SERPL-MCNC: 89 MG/DL (ref 65–99)
HCT VFR BLD AUTO: 44.7 % (ref 37–47)
HGB BLD-MCNC: 13.6 G/DL (ref 12–16)
LYMPHOCYTES # BLD AUTO: 2.87 10*3/UL (ref 1–5)
LYMPHOCYTES NFR BLD AUTO: 34.3 % (ref 20–45)
MCH RBC QN AUTO: 30 PG (ref 27–31)
MCHC RBC AUTO-ENTMCNC: 30.4 G/DL (ref 33–37)
MCV RBC AUTO: 98.7 FL (ref 81–99)
MONOCYTES # BLD AUTO: 0.73 10*3/UL (ref 0.2–1.2)
MONOCYTES NFR BLD AUTO: 8.7 % (ref 3–10)
NEUTROPHILS # BLD AUTO: 4.51 10*3/UL (ref 2–8)
NEUTROPHILS NFR BLD AUTO: 53.8 % (ref 30–85)
NRBC CBCN: 0 % (ref 0–0.7)
OSMOLALITY SERPL CALC.SUM OF ELEC: 289 MOSM/KG (ref 273–304)
PLATELET # BLD AUTO: 201 10*3/UL (ref 130–400)
PMV BLD AUTO: 13.5 FL (ref 9.4–12.3)
POTASSIUM SERPL-SCNC: 3.8 MMOL/L (ref 3.5–5.3)
RBC # BLD AUTO: 4.53 10*6/UL (ref 4.2–5.4)
SODIUM SERPL-SCNC: 138 MMOL/L (ref 135–147)
WBC # BLD AUTO: 8.37 10*3/UL (ref 4.8–10.8)

## 2020-05-18 ENCOUNTER — HOSPITAL ENCOUNTER (OUTPATIENT)
Dept: ONCOLOGY | Facility: HOSPITAL | Age: 60
Discharge: HOME | End: 2020-05-18
Attending: INTERNAL MEDICINE

## 2020-05-18 ENCOUNTER — OFFICE VISIT CONVERTED (OUTPATIENT)
Dept: ONCOLOGY | Facility: HOSPITAL | Age: 60
End: 2020-05-18
Attending: INTERNAL MEDICINE

## 2020-06-11 RX ORDER — TIZANIDINE 4 MG/1
TABLET ORAL
Qty: 90 TABLET | Refills: 0 | Status: SHIPPED | OUTPATIENT
Start: 2020-06-11 | End: 2020-07-17

## 2020-06-11 RX ORDER — CYCLOBENZAPRINE HCL 10 MG
TABLET ORAL
Qty: 90 TABLET | Refills: 0 | Status: SHIPPED | OUTPATIENT
Start: 2020-06-11 | End: 2020-07-17

## 2020-06-12 RX ORDER — CYCLOBENZAPRINE HCL 10 MG
TABLET ORAL
Qty: 90 TABLET | Refills: 0 | OUTPATIENT
Start: 2020-06-12

## 2020-06-28 LAB — SARS-COV-2 RNA SPEC QL NAA+PROBE: NOT DETECTED

## 2020-06-29 ENCOUNTER — HOSPITAL ENCOUNTER (OUTPATIENT)
Dept: GASTROENTEROLOGY | Facility: HOSPITAL | Age: 60
Setting detail: HOSPITAL OUTPATIENT SURGERY
Discharge: HOME | End: 2020-06-29
Attending: INTERNAL MEDICINE

## 2020-07-17 DIAGNOSIS — F32.A DEPRESSIVE DISORDER: Chronic | ICD-10-CM

## 2020-07-17 RX ORDER — CYCLOBENZAPRINE HCL 10 MG
TABLET ORAL
Qty: 90 TABLET | Refills: 0 | Status: SHIPPED | OUTPATIENT
Start: 2020-07-17 | End: 2020-08-24

## 2020-07-17 RX ORDER — FLUOXETINE HYDROCHLORIDE 20 MG/1
CAPSULE ORAL
Qty: 90 CAPSULE | Refills: 0 | Status: SHIPPED | OUTPATIENT
Start: 2020-07-17 | End: 2020-07-20

## 2020-07-17 RX ORDER — TIZANIDINE 4 MG/1
TABLET ORAL
Qty: 90 TABLET | Refills: 0 | Status: SHIPPED | OUTPATIENT
Start: 2020-07-17 | End: 2020-07-20

## 2020-07-20 RX ORDER — FLUOXETINE HYDROCHLORIDE 20 MG/1
CAPSULE ORAL
Qty: 90 CAPSULE | Refills: 1 | Status: SHIPPED | OUTPATIENT
Start: 2020-07-20 | End: 2020-11-02

## 2020-07-20 RX ORDER — TIZANIDINE 4 MG/1
TABLET ORAL
Qty: 90 TABLET | Refills: 0 | Status: SHIPPED | OUTPATIENT
Start: 2020-07-20 | End: 2020-09-22

## 2020-07-23 ENCOUNTER — OFFICE VISIT CONVERTED (OUTPATIENT)
Dept: ORTHOPEDIC SURGERY | Facility: CLINIC | Age: 60
End: 2020-07-23
Attending: ORTHOPAEDIC SURGERY

## 2020-08-18 ENCOUNTER — HOSPITAL ENCOUNTER (OUTPATIENT)
Dept: CT IMAGING | Facility: HOSPITAL | Age: 60
Discharge: HOME | End: 2020-08-18
Attending: INTERNAL MEDICINE

## 2020-08-18 LAB
ALBUMIN SERPL-MCNC: 4 G/DL (ref 3.5–5)
ALBUMIN/GLOB SERPL: 1.3 {RATIO} (ref 1.4–2.6)
ALP SERPL-CCNC: 73 U/L (ref 43–160)
ALT SERPL-CCNC: 52 U/L (ref 10–40)
ANION GAP SERPL CALC-SCNC: 18 MMOL/L (ref 8–19)
AST SERPL-CCNC: 42 U/L (ref 15–50)
BASOPHILS # BLD AUTO: 0.03 10*3/UL (ref 0–0.2)
BASOPHILS NFR BLD AUTO: 0.4 % (ref 0–3)
BILIRUB SERPL-MCNC: 0.27 MG/DL (ref 0.2–1.3)
BUN SERPL-MCNC: 21 MG/DL (ref 5–25)
BUN/CREAT SERPL: 11 {RATIO} (ref 6–20)
CALCIUM SERPL-MCNC: 9.8 MG/DL (ref 8.7–10.4)
CHLORIDE SERPL-SCNC: 99 MMOL/L (ref 99–111)
CONV ABS IMM GRAN: 0.04 10*3/UL (ref 0–0.2)
CONV CO2: 27 MMOL/L (ref 22–32)
CONV IMMATURE GRAN: 0.5 % (ref 0–1.8)
CONV TOTAL PROTEIN: 7.2 G/DL (ref 6.3–8.2)
CREAT UR-MCNC: 1.88 MG/DL (ref 0.5–0.9)
DEPRECATED RDW RBC AUTO: 46.7 FL (ref 36.4–46.3)
EOSINOPHIL # BLD AUTO: 0.12 10*3/UL (ref 0–0.7)
EOSINOPHIL # BLD AUTO: 1.5 % (ref 0–7)
ERYTHROCYTE [DISTWIDTH] IN BLOOD BY AUTOMATED COUNT: 12.5 % (ref 11.7–14.4)
GFR SERPLBLD BASED ON 1.73 SQ M-ARVRAT: 28 ML/MIN/{1.73_M2}
GLOBULIN UR ELPH-MCNC: 3.2 G/DL (ref 2–3.5)
GLUCOSE SERPL-MCNC: 131 MG/DL (ref 65–99)
HCT VFR BLD AUTO: 47.3 % (ref 37–47)
HGB BLD-MCNC: 15.1 G/DL (ref 12–16)
LYMPHOCYTES # BLD AUTO: 2.37 10*3/UL (ref 1–5)
LYMPHOCYTES NFR BLD AUTO: 29.7 % (ref 20–45)
MCH RBC QN AUTO: 32.5 PG (ref 27–31)
MCHC RBC AUTO-ENTMCNC: 31.9 G/DL (ref 33–37)
MCV RBC AUTO: 101.7 FL (ref 81–99)
MONOCYTES # BLD AUTO: 0.69 10*3/UL (ref 0.2–1.2)
MONOCYTES NFR BLD AUTO: 8.6 % (ref 3–10)
NEUTROPHILS # BLD AUTO: 4.74 10*3/UL (ref 2–8)
NEUTROPHILS NFR BLD AUTO: 59.3 % (ref 30–85)
NRBC CBCN: 0 % (ref 0–0.7)
OSMOLALITY SERPL CALC.SUM OF ELEC: 295 MOSM/KG (ref 273–304)
PLATELET # BLD AUTO: 170 10*3/UL (ref 130–400)
PMV BLD AUTO: 13 FL (ref 9.4–12.3)
POTASSIUM SERPL-SCNC: 4 MMOL/L (ref 3.5–5.3)
RBC # BLD AUTO: 4.65 10*6/UL (ref 4.2–5.4)
SODIUM SERPL-SCNC: 140 MMOL/L (ref 135–147)
WBC # BLD AUTO: 7.99 10*3/UL (ref 4.8–10.8)

## 2020-08-22 DIAGNOSIS — F41.1 GENERALIZED ANXIETY DISORDER: Chronic | ICD-10-CM

## 2020-08-24 RX ORDER — CYCLOBENZAPRINE HCL 10 MG
TABLET ORAL
Qty: 90 TABLET | Refills: 0 | Status: SHIPPED | OUTPATIENT
Start: 2020-08-24 | End: 2020-09-22

## 2020-08-24 RX ORDER — CLONAZEPAM 1 MG/1
TABLET ORAL
Qty: 60 TABLET | Refills: 0 | Status: SHIPPED | OUTPATIENT
Start: 2020-08-24 | End: 2020-09-15 | Stop reason: SDUPTHER

## 2020-08-24 NOTE — TELEPHONE ENCOUNTER
Dr Willis,    Ms. Erika Bowens is requesting a refill on her Clonazepam 1 mg #60      Last OV    12/13/2019   (Canceled appts 03/13/2020 & 03/25/2020)    Next Pam OV    Visit date not found    Last Script Written  03/24/2020  #60 with 2 refills      Last Luis Agnel    03/24/2020    Please advise on refill    Thank you

## 2020-08-24 NOTE — TELEPHONE ENCOUNTER
Ms. Bowens has been called , Appointment schedule 09/15/2020 @ 1:30 for a doxy call for 9 month merle & refill meds

## 2020-09-15 ENCOUNTER — TELEMEDICINE (OUTPATIENT)
Dept: FAMILY MEDICINE CLINIC | Facility: CLINIC | Age: 60
End: 2020-09-15

## 2020-09-15 VITALS — HEIGHT: 63 IN | WEIGHT: 145 LBS | BODY MASS INDEX: 25.69 KG/M2

## 2020-09-15 DIAGNOSIS — I10 ESSENTIAL HYPERTENSION: Primary | Chronic | ICD-10-CM

## 2020-09-15 DIAGNOSIS — C18.9 MALIGNANT NEOPLASM OF COLON, UNSPECIFIED PART OF COLON (HCC): ICD-10-CM

## 2020-09-15 DIAGNOSIS — F41.1 GENERALIZED ANXIETY DISORDER: Chronic | ICD-10-CM

## 2020-09-15 DIAGNOSIS — I25.10 CORONARY ARTERIOSCLEROSIS: ICD-10-CM

## 2020-09-15 PROCEDURE — 99213 OFFICE O/P EST LOW 20 MIN: CPT | Performed by: GENERAL PRACTICE

## 2020-09-15 RX ORDER — CLONAZEPAM 1 MG/1
1 TABLET ORAL 2 TIMES DAILY PRN
Qty: 60 TABLET | Refills: 2 | Status: SHIPPED | OUTPATIENT
Start: 2020-09-15 | End: 2020-12-15 | Stop reason: SDUPTHER

## 2020-09-15 NOTE — PROGRESS NOTES
You have chosen to receive care through a telehealth visit.  Do you consent to use a video/audio connection for your medical care today? Yes    Subjective   Erika Bowens is a 60 y.o. female.   Chief Complaint   Patient presents with   • Hypertension   • Anxiety   • Depression     Has colon cancer and has new liver lesion, is scheduled for a biopsy. Heart disease is stable. No new medications. Depression and anxiety stable. Needs a refill on her clonazepam.   Hypertension  This is a chronic problem. The current episode started more than 1 year ago. The problem is unchanged. The problem is controlled. Pertinent negatives include no headaches, neck pain or palpitations. There are no associated agents to hypertension. Current antihypertension treatment includes beta blockers, diuretics and angiotensin blockers. The current treatment provides significant improvement. There are no compliance problems.  Hypertensive end-organ damage includes CAD/MI.      The following portions of the patient's history were reviewed and updated as appropriate: allergies, current medications, past social history and problem list.    Outpatient Medications Prior to Visit   Medication Sig Dispense Refill   • acetaminophen (TYLENOL) 500 MG tablet Take 500-1,000 mg by mouth every 4 (four) hours as needed for mild pain (1-3).     • aspirin 81 MG EC tablet Take 81 mg by mouth Daily.     • calcium carbonate-vitamin d 600-400 MG-UNIT per tablet Take 1 tablet by mouth every night.     • clopidogrel (PLAVIX) 75 MG tablet Take 1 tablet by mouth Daily. 30 tablet 11   • cyclobenzaprine (FLEXERIL) 10 MG tablet TAKE ONE TABLET BY MOUTH THREE TIMES A DAY AS NEEDED FOR MUSCLE SPASMS 90 tablet 0   • ferrous sulfate 325 (65 FE) MG tablet Take 1 tablet by mouth Daily With Breakfast. 50 tablet 0   • FLUoxetine (PROzac) 20 MG capsule TAKE 3 CAPSULES BY MOUTH DAILY 90 capsule 1   • furosemide (LASIX) 40 MG tablet TAKE ONE TABLET BY MOUTH TWICE A DAY 60  tablet 2   • losartan (COZAAR) 25 MG tablet Take 25 mg by mouth Daily.     • metoprolol succinate XL (TOPROL-XL) 25 MG 24 hr tablet Take 25 mg by mouth Daily.     • nitroglycerin (NITROSTAT) 0.4 MG SL tablet DISSOLVE 1 TAB UNDER TONGUE FOR CHEST PAIN - IF PAIN REMAINS AFTER 5 MIN, CALL 911 AND REPEAT DOSE. MAX 3 TABS IN 15 MINUTES 25 tablet 2   • omeprazole (priLOSEC) 40 MG capsule Take 1 capsule by mouth Daily. 30 capsule 4   • pravastatin (PRAVACHOL) 80 MG tablet Take 1 tablet by mouth Every Night. 30 tablet 11   • spironolactone (ALDACTONE) 25 MG tablet Take 1 tablet by mouth Daily. (Patient taking differently: Take 25 mg by mouth 2 (Two) Times a Day.) 30 tablet 11   • tiZANidine (ZANAFLEX) 4 MG tablet TAKE ONE TABLET BY MOUTH EVERY 8 HOURS AS NEEDED FOR MUSCLE SPASMS 90 tablet 0   • clonazePAM (KlonoPIN) 1 MG tablet TAKE ONE TABLET BY MOUTH TWICE A DAY AS NEEDED FOR ANXIETY 60 tablet 0     No facility-administered medications prior to visit.        Review of Systems   Constitutional: Negative.  Negative for chills, fatigue, fever and unexpected weight change.   HENT: Negative.  Negative for congestion, ear pain, hearing loss, nosebleeds, rhinorrhea, sneezing, sore throat and tinnitus.    Eyes: Negative.  Negative for discharge.   Respiratory: Negative for cough and wheezing.    Cardiovascular: Negative.  Negative for palpitations.   Gastrointestinal: Negative.  Negative for abdominal pain, constipation, diarrhea, nausea and vomiting.   Endocrine: Negative.    Genitourinary: Negative.  Negative for dysuria, frequency and urgency.   Musculoskeletal: Negative.  Negative for arthralgias, back pain, joint swelling and neck pain.   Skin: Negative.  Negative for rash.   Allergic/Immunologic: Negative.    Neurological: Negative.  Negative for dizziness, weakness, numbness and headaches.   Hematological: Negative.  Negative for adenopathy.   Psychiatric/Behavioral: Negative.  Negative for dysphoric mood and sleep  "disturbance. The patient is not nervous/anxious.        Objective   Visit Vitals  Ht 160 cm (63\")   Wt 65.8 kg (145 lb)   BMI 25.69 kg/m²     Physical Exam  Vitals signs reviewed.   Constitutional:       Appearance: She is well-developed.   HENT:      Head: Normocephalic and atraumatic.   Eyes:      Conjunctiva/sclera: Conjunctivae normal.      Pupils: Pupils are equal, round, and reactive to light.   Pulmonary:      Effort: Pulmonary effort is normal.   Neurological:      Mental Status: She is alert and oriented to person, place, and time.       Notes brought forward are reviewed and updated if indicated.    Assessment/Plan   Problem List Items Addressed This Visit        Cardiovascular and Mediastinum    Essential hypertension - Primary (Chronic)    Coronary arteriosclerosis       Digestive    Colon cancer (CMS/HCC)       Other    Generalized anxiety disorder (Chronic)    Relevant Medications    clonazePAM (KlonoPIN) 1 MG tablet           Patient Instructions   Continue current treatment.      Luis Angel reviewed and appropriate. Not recommended to drive or operate heavy equipment while taking potentially sedating meds.  Patient understands the risks associated with this controlled medication, including tolerance and addiction. They also agree to obtain this medication only from me, and not from a another provider, unless that provider is covering for me in my absence. They also agree to be compliant in dosing, and not self adjust the dose of medication.  A signed controlled substance agreement is on file, and they have received a controlled substance education sheet at this or a previous visit. They have also signed a consent for treatment with a controlled substance as per [facility] policy.      This was an audio and video enabled telemedicine encounter. The time that was spent in reviewing the patient's chart and addressing their symptoms, diagnosis and treatment was 15 mins.      New Medications Ordered This " Visit   Medications   • clonazePAM (KlonoPIN) 1 MG tablet     Sig: Take 1 tablet by mouth 2 (Two) Times a Day As Needed for Anxiety.     Dispense:  60 tablet     Refill:  2     Return in about 3 months (around 12/15/2020) for video recheck.

## 2020-09-22 RX ORDER — CYCLOBENZAPRINE HCL 10 MG
TABLET ORAL
Qty: 90 TABLET | Refills: 0 | Status: SHIPPED | OUTPATIENT
Start: 2020-09-22 | End: 2020-11-02

## 2020-09-22 RX ORDER — TIZANIDINE 4 MG/1
TABLET ORAL
Qty: 90 TABLET | Refills: 0 | Status: SHIPPED | OUTPATIENT
Start: 2020-09-22 | End: 2020-11-02

## 2020-10-09 ENCOUNTER — TELEPHONE (OUTPATIENT)
Dept: FAMILY MEDICINE CLINIC | Facility: CLINIC | Age: 60
End: 2020-10-09

## 2020-10-09 NOTE — TELEPHONE ENCOUNTER
PATIENT SISTER CALLED AND STATED THAT SHE HAD MAILED A DISABILITY FORM FOR THE STICKERS FOR HER CAR. SHE HAD SENT IT ABOUT TWO WEEKS AGO. SHE WOULD LIKE TO KNOW THE STATUS OF THIS PAPERWORK BEING SIGNED AND SENT BACK.     CALLBACK NUMBER: [phone]

## 2020-10-15 ENCOUNTER — HOSPITAL ENCOUNTER (OUTPATIENT)
Dept: URGENT CARE | Facility: CLINIC | Age: 60
Discharge: HOME | End: 2020-10-15

## 2020-10-15 ENCOUNTER — HOSPITAL ENCOUNTER (OUTPATIENT)
Dept: OTHER | Facility: HOSPITAL | Age: 60
Discharge: HOME | End: 2020-10-15

## 2020-10-15 LAB
ALBUMIN SERPL-MCNC: 4.4 G/DL (ref 3.5–5)
ALBUMIN/GLOB SERPL: 1.3 {RATIO} (ref 1.4–2.6)
ALP SERPL-CCNC: 78 U/L (ref 43–160)
ALT SERPL-CCNC: 25 U/L (ref 10–40)
ANION GAP SERPL CALC-SCNC: 17 MMOL/L (ref 8–19)
APTT BLD: 22.7 S (ref 22.2–34.2)
AST SERPL-CCNC: 26 U/L (ref 15–50)
BASOPHILS # BLD AUTO: 0.03 10*3/UL (ref 0–0.2)
BASOPHILS NFR BLD AUTO: 0.3 % (ref 0–3)
BILIRUB SERPL-MCNC: 0.48 MG/DL (ref 0.2–1.3)
BUN SERPL-MCNC: 38 MG/DL (ref 5–25)
BUN/CREAT SERPL: 18 {RATIO} (ref 6–20)
CALCIUM SERPL-MCNC: 9.5 MG/DL (ref 8.7–10.4)
CHLORIDE SERPL-SCNC: 100 MMOL/L (ref 99–111)
CONV ABS IMM GRAN: 0.07 10*3/UL (ref 0–0.2)
CONV CO2: 25 MMOL/L (ref 22–32)
CONV IMMATURE GRAN: 0.8 % (ref 0–1.8)
CONV TOTAL PROTEIN: 7.7 G/DL (ref 6.3–8.2)
CREAT UR-MCNC: 2.11 MG/DL (ref 0.5–0.9)
DEPRECATED RDW RBC AUTO: 45.3 FL (ref 36.4–46.3)
EOSINOPHIL # BLD AUTO: 0.08 10*3/UL (ref 0–0.7)
EOSINOPHIL # BLD AUTO: 0.9 % (ref 0–7)
ERYTHROCYTE [DISTWIDTH] IN BLOOD BY AUTOMATED COUNT: 12.4 % (ref 11.7–14.4)
GFR SERPLBLD BASED ON 1.73 SQ M-ARVRAT: 24 ML/MIN/{1.73_M2}
GLOBULIN UR ELPH-MCNC: 3.3 G/DL (ref 2–3.5)
GLUCOSE SERPL-MCNC: 89 MG/DL (ref 65–99)
HCT VFR BLD AUTO: 46.2 % (ref 37–47)
HGB BLD-MCNC: 14.9 G/DL (ref 12–16)
INR PPP: 0.92 (ref 2–3)
LYMPHOCYTES # BLD AUTO: 1.75 10*3/UL (ref 1–5)
LYMPHOCYTES NFR BLD AUTO: 18.8 % (ref 20–45)
MCH RBC QN AUTO: 32.3 PG (ref 27–31)
MCHC RBC AUTO-ENTMCNC: 32.3 G/DL (ref 33–37)
MCV RBC AUTO: 100 FL (ref 81–99)
MONOCYTES # BLD AUTO: 0.61 10*3/UL (ref 0.2–1.2)
MONOCYTES NFR BLD AUTO: 6.5 % (ref 3–10)
NEUTROPHILS # BLD AUTO: 6.78 10*3/UL (ref 2–8)
NEUTROPHILS NFR BLD AUTO: 72.7 % (ref 30–85)
NRBC CBCN: 0 % (ref 0–0.7)
OSMOLALITY SERPL CALC.SUM OF ELEC: 295 MOSM/KG (ref 273–304)
PLATELET # BLD AUTO: 201 10*3/UL (ref 130–400)
PMV BLD AUTO: 13.4 FL (ref 9.4–12.3)
POTASSIUM SERPL-SCNC: 4.3 MMOL/L (ref 3.5–5.3)
PROTHROMBIN TIME: 10.1 S (ref 9.4–12)
RBC # BLD AUTO: 4.62 10*6/UL (ref 4.2–5.4)
SODIUM SERPL-SCNC: 138 MMOL/L (ref 135–147)
WBC # BLD AUTO: 9.32 10*3/UL (ref 4.8–10.8)

## 2020-10-19 LAB — SARS-COV-2 RNA SPEC QL NAA+PROBE: NOT DETECTED

## 2020-10-22 ENCOUNTER — HOSPITAL ENCOUNTER (OUTPATIENT)
Dept: ONCOLOGY | Facility: HOSPITAL | Age: 60
Discharge: HOME | End: 2020-10-22
Attending: INTERNAL MEDICINE

## 2020-10-22 ENCOUNTER — OFFICE VISIT CONVERTED (OUTPATIENT)
Dept: ONCOLOGY | Facility: HOSPITAL | Age: 60
End: 2020-10-22
Attending: INTERNAL MEDICINE

## 2020-10-31 DIAGNOSIS — F32.A DEPRESSIVE DISORDER: Chronic | ICD-10-CM

## 2020-11-02 RX ORDER — TIZANIDINE 4 MG/1
TABLET ORAL
Qty: 90 TABLET | Refills: 0 | Status: SHIPPED | OUTPATIENT
Start: 2020-11-02 | End: 2020-12-11 | Stop reason: SDUPTHER

## 2020-11-02 RX ORDER — CYCLOBENZAPRINE HCL 10 MG
TABLET ORAL
Qty: 90 TABLET | Refills: 0 | Status: SHIPPED | OUTPATIENT
Start: 2020-11-02 | End: 2020-12-11 | Stop reason: SDUPTHER

## 2020-11-02 RX ORDER — FLUOXETINE HYDROCHLORIDE 20 MG/1
CAPSULE ORAL
Qty: 90 CAPSULE | Refills: 0 | Status: SHIPPED | OUTPATIENT
Start: 2020-11-02 | End: 2020-12-11 | Stop reason: SDUPTHER

## 2020-11-02 RX ORDER — TIZANIDINE 4 MG/1
TABLET ORAL
Qty: 90 TABLET | Refills: 0 | Status: CANCELLED | OUTPATIENT
Start: 2020-11-02

## 2020-11-02 RX ORDER — CYCLOBENZAPRINE HCL 10 MG
TABLET ORAL
Qty: 90 TABLET | Refills: 0 | Status: CANCELLED | OUTPATIENT
Start: 2020-11-02

## 2020-11-05 ENCOUNTER — HOSPITAL ENCOUNTER (OUTPATIENT)
Dept: CT IMAGING | Facility: HOSPITAL | Age: 60
Discharge: HOME | End: 2020-11-05
Attending: INTERNAL MEDICINE

## 2020-11-05 LAB
ALBUMIN SERPL-MCNC: 4.1 G/DL (ref 3.5–5)
ALBUMIN/GLOB SERPL: 1.1 {RATIO} (ref 1.4–2.6)
ALP SERPL-CCNC: 89 U/L (ref 43–160)
ALT SERPL-CCNC: 18 U/L (ref 10–40)
ANION GAP SERPL CALC-SCNC: 18 MMOL/L (ref 8–19)
AST SERPL-CCNC: 22 U/L (ref 15–50)
BASOPHILS # BLD AUTO: 0.07 10*3/UL (ref 0–0.2)
BASOPHILS NFR BLD AUTO: 0.7 % (ref 0–3)
BILIRUB SERPL-MCNC: 0.38 MG/DL (ref 0.2–1.3)
BUN SERPL-MCNC: 21 MG/DL (ref 5–25)
BUN/CREAT SERPL: 10 {RATIO} (ref 6–20)
CALCIUM SERPL-MCNC: 9.8 MG/DL (ref 8.7–10.4)
CEA SERPL-MCNC: 2.9 NG/ML (ref 0–5)
CHLORIDE SERPL-SCNC: 101 MMOL/L (ref 99–111)
CONV ABS IMM GRAN: 0.08 10*3/UL (ref 0–0.2)
CONV CO2: 25 MMOL/L (ref 22–32)
CONV IMMATURE GRAN: 0.7 % (ref 0–1.8)
CONV TOTAL PROTEIN: 7.7 G/DL (ref 6.3–8.2)
CREAT UR-MCNC: 2.14 MG/DL (ref 0.5–0.9)
DEPRECATED RDW RBC AUTO: 46.8 FL (ref 36.4–46.3)
EOSINOPHIL # BLD AUTO: 0.19 10*3/UL (ref 0–0.7)
EOSINOPHIL # BLD AUTO: 1.8 % (ref 0–7)
ERYTHROCYTE [DISTWIDTH] IN BLOOD BY AUTOMATED COUNT: 12.7 % (ref 11.7–14.4)
GFR SERPLBLD BASED ON 1.73 SQ M-ARVRAT: 24 ML/MIN/{1.73_M2}
GLOBULIN UR ELPH-MCNC: 3.6 G/DL (ref 2–3.5)
GLUCOSE SERPL-MCNC: 98 MG/DL (ref 65–99)
HCT VFR BLD AUTO: 45.5 % (ref 37–47)
HGB BLD-MCNC: 14.2 G/DL (ref 12–16)
LYMPHOCYTES # BLD AUTO: 1.52 10*3/UL (ref 1–5)
LYMPHOCYTES NFR BLD AUTO: 14.2 % (ref 20–45)
MCH RBC QN AUTO: 31.3 PG (ref 27–31)
MCHC RBC AUTO-ENTMCNC: 31.2 G/DL (ref 33–37)
MCV RBC AUTO: 100.4 FL (ref 81–99)
MONOCYTES # BLD AUTO: 0.74 10*3/UL (ref 0.2–1.2)
MONOCYTES NFR BLD AUTO: 6.9 % (ref 3–10)
NEUTROPHILS # BLD AUTO: 8.09 10*3/UL (ref 2–8)
NEUTROPHILS NFR BLD AUTO: 75.7 % (ref 30–85)
NRBC CBCN: 0 % (ref 0–0.7)
OSMOLALITY SERPL CALC.SUM OF ELEC: 293 MOSM/KG (ref 273–304)
PLATELET # BLD AUTO: 271 10*3/UL (ref 130–400)
PMV BLD AUTO: 12.1 FL (ref 9.4–12.3)
POTASSIUM SERPL-SCNC: 4.1 MMOL/L (ref 3.5–5.3)
RBC # BLD AUTO: 4.53 10*6/UL (ref 4.2–5.4)
SODIUM SERPL-SCNC: 140 MMOL/L (ref 135–147)
WBC # BLD AUTO: 10.69 10*3/UL (ref 4.8–10.8)

## 2020-11-09 ENCOUNTER — OFFICE VISIT CONVERTED (OUTPATIENT)
Dept: ONCOLOGY | Facility: HOSPITAL | Age: 60
End: 2020-11-09
Attending: INTERNAL MEDICINE

## 2020-11-09 ENCOUNTER — HOSPITAL ENCOUNTER (OUTPATIENT)
Dept: ONCOLOGY | Facility: HOSPITAL | Age: 60
Discharge: HOME | End: 2020-11-09
Attending: INTERNAL MEDICINE

## 2020-11-23 RX ORDER — PRAVASTATIN SODIUM 80 MG/1
80 TABLET ORAL NIGHTLY
Qty: 30 TABLET | Refills: 0 | Status: SHIPPED | OUTPATIENT
Start: 2020-11-23 | End: 2020-12-11 | Stop reason: SDUPTHER

## 2020-11-23 RX ORDER — METOPROLOL SUCCINATE 25 MG/1
25 TABLET, EXTENDED RELEASE ORAL DAILY
Qty: 30 TABLET | Refills: 0 | Status: SHIPPED | OUTPATIENT
Start: 2020-11-23 | End: 2020-12-11 | Stop reason: SDUPTHER

## 2020-11-23 RX ORDER — CLOPIDOGREL BISULFATE 75 MG/1
75 TABLET ORAL DAILY
Qty: 30 TABLET | Refills: 11 | Status: SHIPPED | OUTPATIENT
Start: 2020-11-23 | End: 2020-12-23

## 2020-11-23 NOTE — TELEPHONE ENCOUNTER
Caller: Erika Bowens    Relationship: Self    Best call back number: [phone]    Medication needed:   Requested Prescriptions     Pending Prescriptions Disp Refills   • metoprolol succinate XL (TOPROL-XL) 25 MG 24 hr tablet 30 tablet 0     Sig: Take 1 tablet by mouth Daily for 30 days.   • pravastatin (PRAVACHOL) 80 MG tablet 30 tablet 0     Sig: Take 1 tablet by mouth Every Night for 30 days.   • clopidogrel (PLAVIX) 75 MG tablet 30 tablet 11     Sig: Take 1 tablet by mouth Daily for 30 days.    PATIENT ALSO NEEDS VITAMIN D-2 1.25MG  (THERE IS VITAMIN D 600-400MG ON MED LIST BUT IT IS NOT THE CORRECT ONE THAT SHE NEEDS.)    When do you need the refill by: 11/24/2020    What details did the patient provide when requesting the medication:PATIENT IS COMPLETELY OUT OF PRAVASTATIN.    Does the patient have less than a 3 day supply:  [x] Yes  [] No    What is the patient's preferred pharmacy: [address] AT [facility] [address]  [phone] [facility] [phone] FX

## 2020-12-11 DIAGNOSIS — F32.A DEPRESSIVE DISORDER: Chronic | ICD-10-CM

## 2020-12-11 RX ORDER — FLUOXETINE HYDROCHLORIDE 20 MG/1
60 CAPSULE ORAL DAILY
Qty: 90 CAPSULE | Refills: 0 | Status: SHIPPED | OUTPATIENT
Start: 2020-12-11 | End: 2021-01-08 | Stop reason: SDUPTHER

## 2020-12-11 RX ORDER — PRAVASTATIN SODIUM 80 MG/1
80 TABLET ORAL NIGHTLY
Qty: 30 TABLET | Refills: 0 | Status: SHIPPED | OUTPATIENT
Start: 2020-12-11 | End: 2021-01-08 | Stop reason: SDUPTHER

## 2020-12-11 RX ORDER — METOPROLOL SUCCINATE 25 MG/1
25 TABLET, EXTENDED RELEASE ORAL DAILY
Qty: 30 TABLET | Refills: 0 | Status: SHIPPED | OUTPATIENT
Start: 2020-12-11 | End: 2021-01-08 | Stop reason: SDUPTHER

## 2020-12-11 RX ORDER — CYCLOBENZAPRINE HCL 10 MG
10 TABLET ORAL 3 TIMES DAILY PRN
Qty: 90 TABLET | Refills: 0 | Status: SHIPPED | OUTPATIENT
Start: 2020-12-11 | End: 2021-01-08 | Stop reason: SDUPTHER

## 2020-12-11 RX ORDER — TIZANIDINE 4 MG/1
4 TABLET ORAL EVERY 8 HOURS PRN
Qty: 90 TABLET | Refills: 0 | Status: SHIPPED | OUTPATIENT
Start: 2020-12-11 | End: 2021-01-08 | Stop reason: SDUPTHER

## 2020-12-11 NOTE — TELEPHONE ENCOUNTER
Caller: Erika Bowens    Relationship: Self    Best call back number: [phone]    Medication needed:   Requested Prescriptions     Pending Prescriptions Disp Refills   • metoprolol succinate XL (TOPROL-XL) 25 MG 24 hr tablet 30 tablet 0     Sig: Take 1 tablet by mouth Daily for 30 days.   • pravastatin (PRAVACHOL) 80 MG tablet 30 tablet 0     Sig: Take 1 tablet by mouth Every Night for 30 days.   • tiZANidine (ZANAFLEX) 4 MG tablet 90 tablet 0     Sig: Take 1 tablet by mouth Every 8 (Eight) Hours As Needed for Muscle Spasms.   • FLUoxetine (PROzac) 20 MG capsule 90 capsule 0     Sig: Take 3 capsules by mouth Daily.   • cyclobenzaprine (FLEXERIL) 10 MG tablet 90 tablet 0     Sig: Take 1 tablet by mouth 3 (Three) Times a Day As Needed for Muscle Spasms.       When do you need the refill by: TODAY      Does the patient have less than a 3 day supply:  [x] Yes  [] No    What is the patient's preferred pharmacy: [facility] [place]  [place] [facility] [address] [facility] [address] [address]  [phone] [facility] [phone] FX

## 2020-12-15 ENCOUNTER — TELEMEDICINE (OUTPATIENT)
Dept: FAMILY MEDICINE CLINIC | Facility: CLINIC | Age: 60
End: 2020-12-15

## 2020-12-15 VITALS — HEIGHT: 63 IN | BODY MASS INDEX: 24.8 KG/M2 | WEIGHT: 140 LBS

## 2020-12-15 DIAGNOSIS — F41.1 GENERALIZED ANXIETY DISORDER: Primary | Chronic | ICD-10-CM

## 2020-12-15 DIAGNOSIS — E78.2 MIXED HYPERLIPIDEMIA: ICD-10-CM

## 2020-12-15 DIAGNOSIS — I25.10 CORONARY ARTERIOSCLEROSIS: ICD-10-CM

## 2020-12-15 DIAGNOSIS — I10 ESSENTIAL HYPERTENSION: Chronic | ICD-10-CM

## 2020-12-15 DIAGNOSIS — C18.9 MALIGNANT NEOPLASM OF COLON, UNSPECIFIED PART OF COLON (HCC): ICD-10-CM

## 2020-12-15 PROCEDURE — 99214 OFFICE O/P EST MOD 30 MIN: CPT | Performed by: GENERAL PRACTICE

## 2020-12-15 RX ORDER — CLONAZEPAM 1 MG/1
1 TABLET ORAL 2 TIMES DAILY PRN
Qty: 60 TABLET | Refills: 2 | Status: SHIPPED | OUTPATIENT
Start: 2020-12-15 | End: 2021-01-08 | Stop reason: SDUPTHER

## 2020-12-15 NOTE — PROGRESS NOTES
You have chosen to receive care through a telehealth visit.  Do you consent to use a video/audio connection for your medical care today? Yes    Subjective   Erika Bowens is a 60 y.o. female.   Chief Complaint   Patient presents with   • Depression     For review and evaluation of management of chronic medical problems. Has had metastatic liver lesion ablated recently. Will have recheck in 3 months. May need chemo.   Hypertension  This is a chronic problem. The current episode started more than 1 year ago. The problem is unchanged. The problem is controlled. Associated symptoms include shortness of breath. Pertinent negatives include no chest pain, headaches, neck pain or palpitations. There are no associated agents to hypertension. Current antihypertension treatment includes beta blockers, diuretics and angiotensin blockers. The current treatment provides significant improvement. There are no compliance problems.  Hypertensive end-organ damage includes CAD/MI. Identifiable causes of hypertension include chronic renal disease.   Hyperlipidemia  This is a chronic problem. The current episode started more than 1 year ago. The problem is controlled. Recent lipid tests were reviewed and are normal. Exacerbating diseases include chronic renal disease. There are no known factors aggravating her hyperlipidemia. Associated symptoms include shortness of breath. Pertinent negatives include no chest pain or myalgias. Current antihyperlipidemic treatment includes statins. The current treatment provides significant improvement of lipids. There are no compliance problems.    Coronary Artery Disease  Presents for follow-up visit. Symptoms include shortness of breath. Pertinent negatives include no chest pain, chest pressure, dizziness, leg swelling or palpitations. Risk factors include hyperlipidemia. The symptoms have been stable. Compliance with diet is good. Compliance with exercise is variable. Compliance with medications  is good.      The following portions of the patient's history were reviewed and updated as appropriate: allergies, current medications, past social history and problem list.    Outpatient Medications Prior to Visit   Medication Sig Dispense Refill   • acetaminophen (TYLENOL) 500 MG tablet Take 500-1,000 mg by mouth every 4 (four) hours as needed for mild pain (1-3).     • aspirin 81 MG EC tablet Take 81 mg by mouth Daily.     • calcium carbonate-vitamin d 600-400 MG-UNIT per tablet Take 1 tablet by mouth every night.     • clopidogrel (PLAVIX) 75 MG tablet Take 1 tablet by mouth Daily for 30 days. 30 tablet 11   • cyclobenzaprine (FLEXERIL) 10 MG tablet Take 1 tablet by mouth 3 (Three) Times a Day As Needed for Muscle Spasms. 90 tablet 0   • ferrous sulfate 325 (65 FE) MG tablet Take 1 tablet by mouth Daily With Breakfast. 50 tablet 0   • FLUoxetine (PROzac) 20 MG capsule Take 3 capsules by mouth Daily. 90 capsule 0   • furosemide (LASIX) 40 MG tablet TAKE ONE TABLET BY MOUTH TWICE A DAY 60 tablet 2   • losartan (COZAAR) 25 MG tablet Take 25 mg by mouth Daily.     • metoprolol succinate XL (TOPROL-XL) 25 MG 24 hr tablet Take 1 tablet by mouth Daily for 30 days. 30 tablet 0   • nitroglycerin (NITROSTAT) 0.4 MG SL tablet DISSOLVE 1 TAB UNDER TONGUE FOR CHEST PAIN - IF PAIN REMAINS AFTER 5 MIN, CALL 911 AND REPEAT DOSE. MAX 3 TABS IN 15 MINUTES 25 tablet 2   • omeprazole (priLOSEC) 40 MG capsule Take 1 capsule by mouth Daily. 30 capsule 4   • pravastatin (PRAVACHOL) 80 MG tablet Take 1 tablet by mouth Every Night for 30 days. 30 tablet 0   • spironolactone (ALDACTONE) 25 MG tablet Take 1 tablet by mouth Daily. (Patient taking differently: Take 25 mg by mouth 2 (Two) Times a Day.) 30 tablet 11   • tiZANidine (ZANAFLEX) 4 MG tablet Take 1 tablet by mouth Every 8 (Eight) Hours As Needed for Muscle Spasms. 90 tablet 0   • clonazePAM (KlonoPIN) 1 MG tablet Take 1 tablet by mouth 2 (Two) Times a Day As Needed for Anxiety. 60  "tablet 2     No facility-administered medications prior to visit.        Review of Systems   Constitutional: Negative.  Negative for chills, fatigue, fever and unexpected weight change.   HENT: Negative.  Negative for congestion, ear pain, hearing loss, nosebleeds, rhinorrhea, sneezing, sore throat and tinnitus.    Eyes: Negative.  Negative for discharge.   Respiratory: Positive for shortness of breath. Negative for cough and wheezing.    Cardiovascular: Negative.  Negative for chest pain, palpitations and leg swelling.   Gastrointestinal: Negative.  Negative for abdominal pain, constipation, diarrhea, nausea and vomiting.   Endocrine: Negative.    Genitourinary: Negative.  Negative for dysuria, frequency and urgency.   Musculoskeletal: Negative.  Negative for arthralgias, back pain, joint swelling, myalgias and neck pain.   Skin: Negative.  Negative for rash.   Allergic/Immunologic: Negative.    Neurological: Negative.  Negative for dizziness, weakness, numbness and headaches.   Hematological: Negative.  Negative for adenopathy.   Psychiatric/Behavioral: Negative.  Negative for dysphoric mood and sleep disturbance. The patient is not nervous/anxious.      Objective   Visit Vitals  Ht 160 cm (63\")   Wt 63.5 kg (140 lb)   BMI 24.80 kg/m²     Physical Exam  Vitals signs reviewed.   Constitutional:       Appearance: She is well-developed.   HENT:      Head: Normocephalic and atraumatic.   Eyes:      Conjunctiva/sclera: Conjunctivae normal.      Pupils: Pupils are equal, round, and reactive to light.   Pulmonary:      Effort: Pulmonary effort is normal.   Neurological:      Mental Status: She is alert and oriented to person, place, and time.       Notes brought forward are reviewed and updated if indicated.    Assessment/Plan   Problems Addressed this Visit        Cardiovascular and Mediastinum    Essential hypertension (Chronic)    Relevant Orders    CBC & Differential    Comprehensive Metabolic Panel    Lipid Panel    " Urinalysis With Culture If Indicated -    Coronary arteriosclerosis    Relevant Orders    CBC & Differential    Comprehensive Metabolic Panel    Lipid Panel    Urinalysis With Culture If Indicated -    Mixed hyperlipidemia    Relevant Orders    CBC & Differential    Comprehensive Metabolic Panel    Lipid Panel    Urinalysis With Culture If Indicated -       Digestive    Colon cancer (CMS/HCC)       Other    Generalized anxiety disorder - Primary (Chronic)    Relevant Medications    clonazePAM (KlonoPIN) 1 MG tablet      Diagnoses       Codes Comments    Generalized anxiety disorder    -  Primary ICD-10-CM: F41.1  ICD-9-CM: 300.02     Essential hypertension     ICD-10-CM: I10  ICD-9-CM: 401.9     Coronary arteriosclerosis     ICD-10-CM: I25.10  ICD-9-CM: 414.00     Mixed hyperlipidemia     ICD-10-CM: E78.2  ICD-9-CM: 272.2     Malignant neoplasm of colon, unspecified part of colon (CMS/HCC)     ICD-10-CM: C18.9  ICD-9-CM: 153.9 with liver met           Patient Instructions   Continue current treatment.        This was an audio and video enabled telemedicine encounter. The time that was spent in reviewing the patient's chart and addressing their symptoms, diagnosis and treatment was 20  mins.      Unable to complete visit using a video connection to the patient. A phone visit was used to complete this visits. Total time of discussion was 20 minutes.    New Medications Ordered This Visit   Medications   • clonazePAM (KlonoPIN) 1 MG tablet     Sig: Take 1 tablet by mouth 2 (Two) Times a Day As Needed for Anxiety.     Dispense:  60 tablet     Refill:  2     Return in about 3 months (around 3/12/2021) for Annual physical.

## 2020-12-17 DIAGNOSIS — Z12.31 ENCOUNTER FOR SCREENING MAMMOGRAM FOR MALIGNANT NEOPLASM OF BREAST: Primary | ICD-10-CM

## 2021-01-04 ENCOUNTER — HOSPITAL ENCOUNTER (OUTPATIENT)
Dept: PREADMISSION TESTING | Facility: HOSPITAL | Age: 61
Discharge: HOME | End: 2021-01-04
Attending: INTERNAL MEDICINE

## 2021-01-04 ENCOUNTER — HOSPITAL ENCOUNTER (OUTPATIENT)
Dept: OTHER | Facility: HOSPITAL | Age: 61
Discharge: HOME | End: 2021-01-04
Attending: INTERNAL MEDICINE

## 2021-01-04 LAB
ANION GAP SERPL CALC-SCNC: 13 MMOL/L (ref 8–19)
BASOPHILS # BLD AUTO: 0.04 10*3/UL (ref 0–0.2)
BASOPHILS NFR BLD AUTO: 0.6 % (ref 0–3)
BUN SERPL-MCNC: 20 MG/DL (ref 5–25)
BUN/CREAT SERPL: 13 {RATIO} (ref 6–20)
CALCIUM SERPL-MCNC: 9.6 MG/DL (ref 8.7–10.4)
CHLORIDE SERPL-SCNC: 98 MMOL/L (ref 99–111)
CONV ABS IMM GRAN: 0.03 10*3/UL (ref 0–0.2)
CONV CO2: 29 MMOL/L (ref 22–32)
CONV IMMATURE GRAN: 0.5 % (ref 0–1.8)
CREAT UR-MCNC: 1.59 MG/DL (ref 0.5–0.9)
DEPRECATED RDW RBC AUTO: 49.1 FL (ref 36.4–46.3)
EOSINOPHIL # BLD AUTO: 0.1 10*3/UL (ref 0–0.7)
EOSINOPHIL # BLD AUTO: 1.6 % (ref 0–7)
ERYTHROCYTE [DISTWIDTH] IN BLOOD BY AUTOMATED COUNT: 13 % (ref 11.7–14.4)
GFR SERPLBLD BASED ON 1.73 SQ M-ARVRAT: 34 ML/MIN/{1.73_M2}
GLUCOSE SERPL-MCNC: 104 MG/DL (ref 65–99)
HCT VFR BLD AUTO: 44.7 % (ref 37–47)
HGB BLD-MCNC: 14.3 G/DL (ref 12–16)
INR PPP: 0.95 (ref 2–3)
LYMPHOCYTES # BLD AUTO: 1.25 10*3/UL (ref 1–5)
LYMPHOCYTES NFR BLD AUTO: 19.7 % (ref 20–45)
MCH RBC QN AUTO: 32.5 PG (ref 27–31)
MCHC RBC AUTO-ENTMCNC: 32 G/DL (ref 33–37)
MCV RBC AUTO: 101.6 FL (ref 81–99)
MONOCYTES # BLD AUTO: 0.58 10*3/UL (ref 0.2–1.2)
MONOCYTES NFR BLD AUTO: 9.1 % (ref 3–10)
NEUTROPHILS # BLD AUTO: 4.35 10*3/UL (ref 2–8)
NEUTROPHILS NFR BLD AUTO: 68.5 % (ref 30–85)
NRBC CBCN: 0 % (ref 0–0.7)
OSMOLALITY SERPL CALC.SUM OF ELEC: 285 MOSM/KG (ref 273–304)
PLATELET # BLD AUTO: 181 10*3/UL (ref 130–400)
PMV BLD AUTO: 12.4 FL (ref 9.4–12.3)
POTASSIUM SERPL-SCNC: 4 MMOL/L (ref 3.5–5.3)
PROTHROMBIN TIME: 10.3 S (ref 9.4–12)
RBC # BLD AUTO: 4.4 10*6/UL (ref 4.2–5.4)
SODIUM SERPL-SCNC: 136 MMOL/L (ref 135–147)
WBC # BLD AUTO: 6.35 10*3/UL (ref 4.8–10.8)

## 2021-01-05 LAB — SARS-COV-2 RNA SPEC QL NAA+PROBE: NOT DETECTED

## 2021-01-07 ENCOUNTER — TELEPHONE (OUTPATIENT)
Dept: FAMILY MEDICINE CLINIC | Facility: CLINIC | Age: 61
End: 2021-01-07

## 2021-01-07 DIAGNOSIS — F41.1 GENERALIZED ANXIETY DISORDER: Chronic | ICD-10-CM

## 2021-01-07 DIAGNOSIS — F32.A DEPRESSIVE DISORDER: Chronic | ICD-10-CM

## 2021-01-07 RX ORDER — FLUOXETINE HYDROCHLORIDE 20 MG/1
60 CAPSULE ORAL DAILY
Qty: 90 CAPSULE | Refills: 0 | Status: CANCELLED | OUTPATIENT
Start: 2021-01-07

## 2021-01-07 RX ORDER — CLONAZEPAM 1 MG/1
1 TABLET ORAL 2 TIMES DAILY PRN
Qty: 60 TABLET | Refills: 2 | Status: CANCELLED | OUTPATIENT
Start: 2021-01-07

## 2021-01-07 RX ORDER — METOPROLOL SUCCINATE 25 MG/1
25 TABLET, EXTENDED RELEASE ORAL DAILY
Qty: 30 TABLET | Refills: 0 | Status: CANCELLED | OUTPATIENT
Start: 2021-01-07 | End: 2021-02-06

## 2021-01-07 RX ORDER — PRAVASTATIN SODIUM 80 MG/1
80 TABLET ORAL NIGHTLY
Qty: 30 TABLET | Refills: 0 | Status: CANCELLED | OUTPATIENT
Start: 2021-01-07 | End: 2021-02-06

## 2021-01-07 RX ORDER — TIZANIDINE 4 MG/1
4 TABLET ORAL EVERY 8 HOURS PRN
Qty: 90 TABLET | Refills: 0 | Status: CANCELLED | OUTPATIENT
Start: 2021-01-07

## 2021-01-07 RX ORDER — CYCLOBENZAPRINE HCL 10 MG
10 TABLET ORAL 3 TIMES DAILY PRN
Qty: 90 TABLET | Refills: 0 | Status: CANCELLED | OUTPATIENT
Start: 2021-01-07

## 2021-01-07 RX ORDER — SPIRONOLACTONE 25 MG/1
25 TABLET ORAL DAILY
Qty: 30 TABLET | Refills: 11 | Status: CANCELLED | OUTPATIENT
Start: 2021-01-07

## 2021-01-07 NOTE — TELEPHONE ENCOUNTER
Caller: Erika Bowens    Relationship: Self    Best call back number: [phone]     Medication needed:   Requested Prescriptions     Pending Prescriptions Disp Refills   • clonazePAM (KlonoPIN) 1 MG tablet 60 tablet 2     Sig: Take 1 tablet by mouth 2 (Two) Times a Day As Needed for Anxiety.   • metoprolol succinate XL (TOPROL-XL) 25 MG 24 hr tablet 30 tablet 0     Sig: Take 1 tablet by mouth Daily for 30 days.   • pravastatin (PRAVACHOL) 80 MG tablet 30 tablet 0     Sig: Take 1 tablet by mouth Every Night for 30 days.   • FLUoxetine (PROzac) 20 MG capsule 90 capsule 0     Sig: Take 3 capsules by mouth Daily.   • tiZANidine (ZANAFLEX) 4 MG tablet 90 tablet 0     Sig: Take 1 tablet by mouth Every 8 (Eight) Hours As Needed for Muscle Spasms.   • cyclobenzaprine (FLEXERIL) 10 MG tablet 90 tablet 0     Sig: Take 1 tablet by mouth 3 (Three) Times a Day As Needed for Muscle Spasms.   • spironolactone (ALDACTONE) 25 MG tablet 30 tablet 11     Sig: Take 1 tablet by mouth Daily.       When do you need the refill by: 01/08/2021    What details did the patient provide when requesting the medication: COUPLE DAYS LEFT    Does the patient have less than a 3 day supply:  [x] Yes  [] No    What is the patient's preferred pharmacy: [facility] [place], [place] - [address] [place] [address] AT [facility] [address]  [phone] [facility] [phone] FX

## 2021-01-08 DIAGNOSIS — F41.1 GENERALIZED ANXIETY DISORDER: Chronic | ICD-10-CM

## 2021-01-08 DIAGNOSIS — F32.A DEPRESSIVE DISORDER: Chronic | ICD-10-CM

## 2021-01-08 RX ORDER — METOPROLOL SUCCINATE 25 MG/1
25 TABLET, EXTENDED RELEASE ORAL DAILY
Qty: 30 TABLET | Refills: 0 | Status: SHIPPED | OUTPATIENT
Start: 2021-01-08 | End: 2021-02-19 | Stop reason: SDUPTHER

## 2021-01-08 RX ORDER — SPIRONOLACTONE 25 MG/1
25 TABLET ORAL 2 TIMES DAILY
Qty: 90 TABLET | Refills: 0 | Status: SHIPPED | OUTPATIENT
Start: 2021-01-08 | End: 2021-03-16 | Stop reason: SDUPTHER

## 2021-01-08 RX ORDER — FLUOXETINE HYDROCHLORIDE 20 MG/1
60 CAPSULE ORAL DAILY
Qty: 90 CAPSULE | Refills: 0 | Status: SHIPPED | OUTPATIENT
Start: 2021-01-08 | End: 2021-02-19 | Stop reason: SDUPTHER

## 2021-01-08 RX ORDER — TIZANIDINE 4 MG/1
4 TABLET ORAL EVERY 8 HOURS PRN
Qty: 90 TABLET | Refills: 0 | Status: SHIPPED | OUTPATIENT
Start: 2021-01-08 | End: 2021-02-19 | Stop reason: SDUPTHER

## 2021-01-08 RX ORDER — CYCLOBENZAPRINE HCL 10 MG
10 TABLET ORAL 3 TIMES DAILY PRN
Qty: 90 TABLET | Refills: 0 | Status: SHIPPED | OUTPATIENT
Start: 2021-01-08 | End: 2021-02-19 | Stop reason: SDUPTHER

## 2021-01-08 RX ORDER — PRAVASTATIN SODIUM 80 MG/1
80 TABLET ORAL NIGHTLY
Qty: 30 TABLET | Refills: 0 | Status: SHIPPED | OUTPATIENT
Start: 2021-01-08 | End: 2021-02-19 | Stop reason: SDUPTHER

## 2021-01-08 RX ORDER — CLONAZEPAM 1 MG/1
1 TABLET ORAL 2 TIMES DAILY PRN
Qty: 60 TABLET | Refills: 2 | Status: SHIPPED | OUTPATIENT
Start: 2021-01-08 | End: 2021-03-16 | Stop reason: SDUPTHER

## 2021-01-13 ENCOUNTER — HOSPITAL ENCOUNTER (OUTPATIENT)
Dept: INFUSION THERAPY | Facility: HOSPITAL | Age: 61
Setting detail: HOSPITAL OUTPATIENT SURGERY
Discharge: HOME | End: 2021-01-14
Attending: INTERNAL MEDICINE

## 2021-01-13 LAB
ANION GAP SERPL CALC-SCNC: 17 MMOL/L (ref 8–19)
APTT BLD: 22.2 S (ref 22.2–34.2)
BASOPHILS # BLD AUTO: 0.05 10*3/UL (ref 0–0.2)
BASOPHILS NFR BLD AUTO: 0.6 % (ref 0–3)
BUN SERPL-MCNC: 23 MG/DL (ref 5–25)
BUN/CREAT SERPL: 15 {RATIO} (ref 6–20)
CALCIUM SERPL-MCNC: 9.2 MG/DL (ref 8.7–10.4)
CHLORIDE SERPL-SCNC: 98 MMOL/L (ref 99–111)
CONV ABS IMM GRAN: 0.04 10*3/UL (ref 0–0.2)
CONV CO2: 25 MMOL/L (ref 22–32)
CONV IMMATURE GRAN: 0.5 % (ref 0–1.8)
CREAT UR-MCNC: 1.58 MG/DL (ref 0.5–0.9)
DEPRECATED RDW RBC AUTO: 47.9 FL (ref 36.4–46.3)
EOSINOPHIL # BLD AUTO: 0.12 10*3/UL (ref 0–0.7)
EOSINOPHIL # BLD AUTO: 1.5 % (ref 0–7)
ERYTHROCYTE [DISTWIDTH] IN BLOOD BY AUTOMATED COUNT: 12.9 % (ref 11.7–14.4)
GFR SERPLBLD BASED ON 1.73 SQ M-ARVRAT: 34 ML/MIN/{1.73_M2}
GLUCOSE SERPL-MCNC: 94 MG/DL (ref 65–99)
HCT VFR BLD AUTO: 41.8 % (ref 37–47)
HGB BLD-MCNC: 13.6 G/DL (ref 12–16)
INR PPP: 0.94 (ref 2–3)
LYMPHOCYTES # BLD AUTO: 2.57 10*3/UL (ref 1–5)
LYMPHOCYTES NFR BLD AUTO: 33.1 % (ref 20–45)
MCH RBC QN AUTO: 32.8 PG (ref 27–31)
MCHC RBC AUTO-ENTMCNC: 32.5 G/DL (ref 33–37)
MCV RBC AUTO: 100.7 FL (ref 81–99)
MONOCYTES # BLD AUTO: 1.05 10*3/UL (ref 0.2–1.2)
MONOCYTES NFR BLD AUTO: 13.5 % (ref 3–10)
NEUTROPHILS # BLD AUTO: 3.93 10*3/UL (ref 2–8)
NEUTROPHILS NFR BLD AUTO: 50.8 % (ref 30–85)
NRBC CBCN: 0 % (ref 0–0.7)
OSMOLALITY SERPL CALC.SUM OF ELEC: 283 MOSM/KG (ref 273–304)
PLATELET # BLD AUTO: 157 10*3/UL (ref 130–400)
PMV BLD AUTO: 12.7 FL (ref 9.4–12.3)
POTASSIUM SERPL-SCNC: 4.6 MMOL/L (ref 3.5–5.3)
PROTHROMBIN TIME: 10.2 S (ref 9.4–12)
RBC # BLD AUTO: 4.15 10*6/UL (ref 4.2–5.4)
SODIUM SERPL-SCNC: 135 MMOL/L (ref 135–147)
WBC # BLD AUTO: 7.76 10*3/UL (ref 4.8–10.8)

## 2021-01-14 LAB
ANION GAP SERPL CALC-SCNC: 14 MMOL/L (ref 8–19)
BASOPHILS # BLD AUTO: 0.03 10*3/UL (ref 0–0.2)
BASOPHILS NFR BLD AUTO: 0.5 % (ref 0–3)
BUN SERPL-MCNC: 17 MG/DL (ref 5–25)
BUN/CREAT SERPL: 12 {RATIO} (ref 6–20)
CALCIUM SERPL-MCNC: 7.9 MG/DL (ref 8.7–10.4)
CHLORIDE SERPL-SCNC: 101 MMOL/L (ref 99–111)
CONV ABS IMM GRAN: 0.02 10*3/UL (ref 0–0.2)
CONV CO2: 25 MMOL/L (ref 22–32)
CONV IMMATURE GRAN: 0.3 % (ref 0–1.8)
CREAT UR-MCNC: 1.41 MG/DL (ref 0.5–0.9)
DEPRECATED RDW RBC AUTO: 48.1 FL (ref 36.4–46.3)
EOSINOPHIL # BLD AUTO: 0.09 10*3/UL (ref 0–0.7)
EOSINOPHIL # BLD AUTO: 1.5 % (ref 0–7)
ERYTHROCYTE [DISTWIDTH] IN BLOOD BY AUTOMATED COUNT: 13 % (ref 11.7–14.4)
GFR SERPLBLD BASED ON 1.73 SQ M-ARVRAT: 40 ML/MIN/{1.73_M2}
GLUCOSE SERPL-MCNC: 158 MG/DL (ref 65–99)
HCT VFR BLD AUTO: 35.2 % (ref 37–47)
HGB BLD-MCNC: 11.3 G/DL (ref 12–16)
LYMPHOCYTES # BLD AUTO: 1.08 10*3/UL (ref 1–5)
LYMPHOCYTES NFR BLD AUTO: 18.2 % (ref 20–45)
MCH RBC QN AUTO: 32.3 PG (ref 27–31)
MCHC RBC AUTO-ENTMCNC: 32.1 G/DL (ref 33–37)
MCV RBC AUTO: 100.6 FL (ref 81–99)
MONOCYTES # BLD AUTO: 0.56 10*3/UL (ref 0.2–1.2)
MONOCYTES NFR BLD AUTO: 9.4 % (ref 3–10)
NEUTROPHILS # BLD AUTO: 4.16 10*3/UL (ref 2–8)
NEUTROPHILS NFR BLD AUTO: 70.1 % (ref 30–85)
NRBC CBCN: 0 % (ref 0–0.7)
OSMOLALITY SERPL CALC.SUM OF ELEC: 289 MOSM/KG (ref 273–304)
PLATELET # BLD AUTO: 130 10*3/UL (ref 130–400)
PMV BLD AUTO: 13.1 FL (ref 9.4–12.3)
POTASSIUM SERPL-SCNC: 3.4 MMOL/L (ref 3.5–5.3)
RBC # BLD AUTO: 3.5 10*6/UL (ref 4.2–5.4)
SODIUM SERPL-SCNC: 137 MMOL/L (ref 135–147)
WBC # BLD AUTO: 5.94 10*3/UL (ref 4.8–10.8)

## 2021-02-09 ENCOUNTER — HOSPITAL ENCOUNTER (OUTPATIENT)
Dept: CT IMAGING | Facility: HOSPITAL | Age: 61
Discharge: HOME | End: 2021-02-09
Attending: INTERNAL MEDICINE

## 2021-02-09 LAB
ALBUMIN SERPL-MCNC: 4.5 G/DL (ref 3.5–5)
ALBUMIN/GLOB SERPL: 1.3 {RATIO} (ref 1.4–2.6)
ALP SERPL-CCNC: 80 U/L (ref 43–160)
ALT SERPL-CCNC: 25 U/L (ref 10–40)
ANION GAP SERPL CALC-SCNC: 17 MMOL/L (ref 8–19)
AST SERPL-CCNC: 24 U/L (ref 15–50)
BASOPHILS # BLD AUTO: 0.04 10*3/UL (ref 0–0.2)
BASOPHILS NFR BLD AUTO: 0.6 % (ref 0–3)
BILIRUB SERPL-MCNC: 0.37 MG/DL (ref 0.2–1.3)
BUN SERPL-MCNC: 26 MG/DL (ref 5–25)
BUN/CREAT SERPL: 12 {RATIO} (ref 6–20)
CALCIUM SERPL-MCNC: 9.1 MG/DL (ref 8.7–10.4)
CHLORIDE SERPL-SCNC: 97 MMOL/L (ref 99–111)
CONV ABS IMM GRAN: 0.04 10*3/UL (ref 0–0.2)
CONV CO2: 26 MMOL/L (ref 22–32)
CONV IMMATURE GRAN: 0.6 % (ref 0–1.8)
CONV TOTAL PROTEIN: 8 G/DL (ref 6.3–8.2)
CREAT UR-MCNC: 2.14 MG/DL (ref 0.5–0.9)
DEPRECATED RDW RBC AUTO: 46.1 FL (ref 36.4–46.3)
EOSINOPHIL # BLD AUTO: 0.17 10*3/UL (ref 0–0.7)
EOSINOPHIL # BLD AUTO: 2.3 % (ref 0–7)
ERYTHROCYTE [DISTWIDTH] IN BLOOD BY AUTOMATED COUNT: 12.4 % (ref 11.7–14.4)
GFR SERPLBLD BASED ON 1.73 SQ M-ARVRAT: 24 ML/MIN/{1.73_M2}
GLOBULIN UR ELPH-MCNC: 3.5 G/DL (ref 2–3.5)
GLUCOSE SERPL-MCNC: 94 MG/DL (ref 65–99)
HCT VFR BLD AUTO: 48.1 % (ref 37–47)
HGB BLD-MCNC: 15.4 G/DL (ref 12–16)
LYMPHOCYTES # BLD AUTO: 1.77 10*3/UL (ref 1–5)
LYMPHOCYTES NFR BLD AUTO: 24.4 % (ref 20–45)
MCH RBC QN AUTO: 32 PG (ref 27–31)
MCHC RBC AUTO-ENTMCNC: 32 G/DL (ref 33–37)
MCV RBC AUTO: 99.8 FL (ref 81–99)
MONOCYTES # BLD AUTO: 0.54 10*3/UL (ref 0.2–1.2)
MONOCYTES NFR BLD AUTO: 7.4 % (ref 3–10)
NEUTROPHILS # BLD AUTO: 4.69 10*3/UL (ref 2–8)
NEUTROPHILS NFR BLD AUTO: 64.7 % (ref 30–85)
NRBC CBCN: 0 % (ref 0–0.7)
OSMOLALITY SERPL CALC.SUM OF ELEC: 287 MOSM/KG (ref 273–304)
PLATELET # BLD AUTO: 171 10*3/UL (ref 130–400)
PMV BLD AUTO: 12.7 FL (ref 9.4–12.3)
POTASSIUM SERPL-SCNC: 4.1 MMOL/L (ref 3.5–5.3)
RBC # BLD AUTO: 4.82 10*6/UL (ref 4.2–5.4)
SODIUM SERPL-SCNC: 136 MMOL/L (ref 135–147)
WBC # BLD AUTO: 7.25 10*3/UL (ref 4.8–10.8)

## 2021-02-11 ENCOUNTER — OFFICE VISIT CONVERTED (OUTPATIENT)
Dept: ONCOLOGY | Facility: HOSPITAL | Age: 61
End: 2021-02-11
Attending: INTERNAL MEDICINE

## 2021-02-19 DIAGNOSIS — F32.A DEPRESSIVE DISORDER: Chronic | ICD-10-CM

## 2021-02-19 RX ORDER — MELATONIN
50000 WEEKLY
COMMUNITY
End: 2021-06-17 | Stop reason: SDUPTHER

## 2021-02-19 NOTE — TELEPHONE ENCOUNTER
Caller: GogoErika lucero    Relationship: Self    Best call back number: [phone]  Medication needed:   Requested Prescriptions     Pending Prescriptions Disp Refills   • furosemide (LASIX) 40 MG tablet 60 tablet 2     Sig: Take 1 tablet by mouth 2 (Two) Times a Day.   • metoprolol succinate XL (TOPROL-XL) 25 MG 24 hr tablet 30 tablet 0     Sig: Take 1 tablet by mouth Daily for 30 days.   • FLUoxetine (PROzac) 20 MG capsule 90 capsule 0     Sig: Take 3 capsules by mouth Daily.   • tiZANidine (ZANAFLEX) 4 MG tablet 90 tablet 0     Sig: Take 1 tablet by mouth Every 8 (Eight) Hours As Needed for Muscle Spasms.   • pravastatin (PRAVACHOL) 80 MG tablet 30 tablet 0     Sig: Take 1 tablet by mouth Every Night for 30 days.   • cyclobenzaprine (FLEXERIL) 10 MG tablet 90 tablet 0     Sig: Take 1 tablet by mouth 3 (Three) Times a Day As Needed for Muscle Spasms.       When do you need the refill by: 4 DAYS  What details did the patient provide when requesting the medication:MEDICATION REFILLS    Does the patient have less than a 3 day supply:  [] Yes  [x] No    What is the patient's preferred pharmacy: [facility] [address] [place], [place] [address] - [address] [place] [address] AT [facility] [address]  [phone] [facility] [phone] FX

## 2021-02-21 RX ORDER — TIZANIDINE 4 MG/1
4 TABLET ORAL EVERY 8 HOURS PRN
Qty: 90 TABLET | Refills: 0 | Status: SHIPPED | OUTPATIENT
Start: 2021-02-21 | End: 2021-03-16 | Stop reason: SDUPTHER

## 2021-02-21 RX ORDER — METOPROLOL SUCCINATE 25 MG/1
25 TABLET, EXTENDED RELEASE ORAL DAILY
Qty: 30 TABLET | Refills: 0 | Status: SHIPPED | OUTPATIENT
Start: 2021-02-21 | End: 2021-03-16 | Stop reason: SDUPTHER

## 2021-02-21 RX ORDER — FUROSEMIDE 40 MG/1
40 TABLET ORAL 2 TIMES DAILY
Qty: 60 TABLET | Refills: 2 | Status: SHIPPED | OUTPATIENT
Start: 2021-02-21 | End: 2023-01-20

## 2021-02-21 RX ORDER — MELATONIN
50000 WEEKLY
Qty: 4 TABLET | Refills: 3 | OUTPATIENT
Start: 2021-02-21

## 2021-02-21 RX ORDER — CYCLOBENZAPRINE HCL 10 MG
10 TABLET ORAL 3 TIMES DAILY PRN
Qty: 90 TABLET | Refills: 0 | Status: SHIPPED | OUTPATIENT
Start: 2021-02-21 | End: 2021-03-16 | Stop reason: SDUPTHER

## 2021-02-21 RX ORDER — OMEPRAZOLE 40 MG/1
40 CAPSULE, DELAYED RELEASE ORAL DAILY
Qty: 30 CAPSULE | Refills: 4 | Status: SHIPPED | OUTPATIENT
Start: 2021-02-21 | End: 2021-06-17 | Stop reason: SDUPTHER

## 2021-02-21 RX ORDER — FLUOXETINE HYDROCHLORIDE 20 MG/1
60 CAPSULE ORAL DAILY
Qty: 90 CAPSULE | Refills: 0 | Status: SHIPPED | OUTPATIENT
Start: 2021-02-21 | End: 2021-03-16 | Stop reason: SDUPTHER

## 2021-02-21 RX ORDER — PRAVASTATIN SODIUM 80 MG/1
80 TABLET ORAL NIGHTLY
Qty: 30 TABLET | Refills: 0 | Status: SHIPPED | OUTPATIENT
Start: 2021-02-21 | End: 2021-03-16 | Stop reason: SDUPTHER

## 2021-03-15 ENCOUNTER — HOSPITAL ENCOUNTER (OUTPATIENT)
Dept: LAB | Facility: HOSPITAL | Age: 61
Discharge: HOME | End: 2021-03-15
Attending: INTERNAL MEDICINE

## 2021-03-15 LAB
APPEARANCE UR: CLEAR
BASOPHILS # BLD AUTO: 0.04 10*3/UL (ref 0–0.2)
BASOPHILS NFR BLD AUTO: 0.5 % (ref 0–3)
BILIRUB UR QL: NEGATIVE
COLOR UR: YELLOW
CONV ABS IMM GRAN: 0.04 10*3/UL (ref 0–0.2)
CONV BACTERIA: NEGATIVE
CONV COLLECTION SOURCE (UA): ABNORMAL
CONV CREATININE URINE, RANDOM: 59.1 MG/DL (ref 10–300)
CONV IMMATURE GRAN: 0.5 % (ref 0–1.8)
CONV UROBILINOGEN IN URINE BY AUTOMATED TEST STRIP: 0.2 {EHRLICHU}/DL (ref 0.1–1)
DEPRECATED RDW RBC AUTO: 46.5 FL (ref 36.4–46.3)
EOSINOPHIL # BLD AUTO: 0.15 10*3/UL (ref 0–0.7)
EOSINOPHIL # BLD AUTO: 1.8 % (ref 0–7)
ERYTHROCYTE [DISTWIDTH] IN BLOOD BY AUTOMATED COUNT: 12.1 % (ref 11.7–14.4)
GLUCOSE UR QL: NEGATIVE MG/DL
HCT VFR BLD AUTO: 46.6 % (ref 37–47)
HGB BLD-MCNC: 14.8 G/DL (ref 12–16)
HGB UR QL STRIP: NEGATIVE
KETONES UR QL STRIP: NEGATIVE MG/DL
LEUKOCYTE ESTERASE UR QL STRIP: ABNORMAL
LYMPHOCYTES # BLD AUTO: 2.11 10*3/UL (ref 1–5)
LYMPHOCYTES NFR BLD AUTO: 25.1 % (ref 20–45)
MCH RBC QN AUTO: 33 PG (ref 27–31)
MCHC RBC AUTO-ENTMCNC: 31.8 G/DL (ref 33–37)
MCV RBC AUTO: 104 FL (ref 81–99)
MONOCYTES # BLD AUTO: 0.85 10*3/UL (ref 0.2–1.2)
MONOCYTES NFR BLD AUTO: 10.1 % (ref 3–10)
NEUTROPHILS # BLD AUTO: 5.2 10*3/UL (ref 2–8)
NEUTROPHILS NFR BLD AUTO: 62 % (ref 30–85)
NITRITE UR QL STRIP: NEGATIVE
NRBC CBCN: 0 % (ref 0–0.7)
PH UR STRIP.AUTO: 5.5 [PH] (ref 5–8)
PLATELET # BLD AUTO: 169 10*3/UL (ref 130–400)
PMV BLD AUTO: 13.4 FL (ref 9.4–12.3)
PROT UR QL: NEGATIVE MG/DL
PROT UR-MCNC: 4.3 MG/DL
PTH-INTACT SERPL-MCNC: 284.8 PG/ML (ref 11.1–79.5)
RBC # BLD AUTO: 4.48 10*6/UL (ref 4.2–5.4)
RBC #/AREA URNS HPF: ABNORMAL /[HPF]
SP GR UR: 1.01 (ref 1–1.03)
WBC # BLD AUTO: 8.39 10*3/UL (ref 4.8–10.8)
WBC #/AREA URNS HPF: ABNORMAL /[HPF]

## 2021-03-16 ENCOUNTER — LAB (OUTPATIENT)
Dept: LAB | Facility: HOSPITAL | Age: 61
End: 2021-03-16

## 2021-03-16 ENCOUNTER — OFFICE VISIT (OUTPATIENT)
Dept: FAMILY MEDICINE CLINIC | Facility: CLINIC | Age: 61
End: 2021-03-16

## 2021-03-16 VITALS
OXYGEN SATURATION: 98 % | SYSTOLIC BLOOD PRESSURE: 125 MMHG | HEART RATE: 49 BPM | DIASTOLIC BLOOD PRESSURE: 72 MMHG | HEIGHT: 63 IN | WEIGHT: 156 LBS | BODY MASS INDEX: 27.64 KG/M2

## 2021-03-16 DIAGNOSIS — E78.2 MIXED HYPERLIPIDEMIA: ICD-10-CM

## 2021-03-16 DIAGNOSIS — F41.1 GENERALIZED ANXIETY DISORDER: Chronic | ICD-10-CM

## 2021-03-16 DIAGNOSIS — I25.10 CORONARY ARTERIOSCLEROSIS: ICD-10-CM

## 2021-03-16 DIAGNOSIS — I10 ESSENTIAL HYPERTENSION: ICD-10-CM

## 2021-03-16 DIAGNOSIS — Z00.00 ANNUAL PHYSICAL EXAM: Primary | ICD-10-CM

## 2021-03-16 DIAGNOSIS — F32.5 MAJOR DEPRESSIVE DISORDER WITH SINGLE EPISODE, IN FULL REMISSION (HCC): ICD-10-CM

## 2021-03-16 DIAGNOSIS — F32.A DEPRESSIVE DISORDER: Chronic | ICD-10-CM

## 2021-03-16 DIAGNOSIS — M62.838 MUSCLE SPASM: ICD-10-CM

## 2021-03-16 DIAGNOSIS — E55.9 VITAMIN D DEFICIENCY: ICD-10-CM

## 2021-03-16 LAB
25(OH)D3 SERPL-MCNC: 22.2 NG/ML (ref 30–100)
ALBUMIN SERPL-MCNC: 4.2 G/DL (ref 3.5–5)
ALBUMIN SERPL-MCNC: 4.2 G/DL (ref 3.5–5.2)
ALBUMIN/GLOB SERPL: 1.4 G/DL
ALP SERPL-CCNC: 77 U/L (ref 39–117)
ALT SERPL W P-5'-P-CCNC: 21 U/L (ref 1–33)
ANION GAP SERPL CALC-SCNC: 16 MMOL/L (ref 8–19)
ANION GAP SERPL CALCULATED.3IONS-SCNC: 8.9 MMOL/L (ref 5–15)
AST SERPL-CCNC: 24 U/L (ref 1–32)
BACTERIA UR QL AUTO: NORMAL /HPF
BASOPHILS # BLD AUTO: 0.05 10*3/MM3 (ref 0–0.2)
BASOPHILS NFR BLD AUTO: 0.5 % (ref 0–1.5)
BILIRUB SERPL-MCNC: 0.3 MG/DL (ref 0–1.2)
BILIRUB UR QL STRIP: NEGATIVE
BUN SERPL-MCNC: 26 MG/DL (ref 5–25)
BUN SERPL-MCNC: 26 MG/DL (ref 8–23)
BUN/CREAT SERPL: 13 {RATIO} (ref 6–20)
BUN/CREAT SERPL: 13.6 (ref 7–25)
CALCIUM SERPL-MCNC: 9 MG/DL (ref 8.7–10.4)
CALCIUM SPEC-SCNC: 8.9 MG/DL (ref 8.6–10.5)
CHLORIDE SERPL-SCNC: 101 MMOL/L (ref 98–107)
CHLORIDE SERPL-SCNC: 98 MMOL/L (ref 99–111)
CHOLEST SERPL-MCNC: 140 MG/DL (ref 0–200)
CLARITY UR: CLEAR
CO2 SERPL-SCNC: 26.1 MMOL/L (ref 22–29)
COD CRY URNS QL: NORMAL /HPF
COLOR UR: YELLOW
CONV CO2: 26 MMOL/L (ref 22–32)
CREAT SERPL-MCNC: 1.91 MG/DL (ref 0.57–1)
CREAT UR-MCNC: 1.95 MG/DL (ref 0.5–0.9)
DEPRECATED RDW RBC AUTO: 43.1 FL (ref 37–54)
EOSINOPHIL # BLD AUTO: 0.15 10*3/MM3 (ref 0–0.4)
EOSINOPHIL NFR BLD AUTO: 1.5 % (ref 0.3–6.2)
ERYTHROCYTE [DISTWIDTH] IN BLOOD BY AUTOMATED COUNT: 12.1 % (ref 12.3–15.4)
GFR SERPL CREATININE-BSD FRML MDRD: 27 ML/MIN/1.73
GFR SERPLBLD BASED ON 1.73 SQ M-ARVRAT: 27 ML/MIN/{1.73_M2}
GLOBULIN UR ELPH-MCNC: 3 GM/DL
GLUCOSE SERPL-MCNC: 78 MG/DL (ref 65–99)
GLUCOSE SERPL-MCNC: 80 MG/DL (ref 65–99)
GLUCOSE UR STRIP-MCNC: NEGATIVE MG/DL
HCT VFR BLD AUTO: 41.7 % (ref 34–46.6)
HDLC SERPL-MCNC: 44 MG/DL (ref 40–60)
HGB BLD-MCNC: 14.1 G/DL (ref 12–15.9)
HGB UR QL STRIP.AUTO: NEGATIVE
HYALINE CASTS UR QL AUTO: NORMAL /LPF
KETONES UR QL STRIP: NEGATIVE
LDLC SERPL CALC-MCNC: 65 MG/DL (ref 0–100)
LDLC/HDLC SERPL: 1.33 {RATIO}
LEUKOCYTE ESTERASE UR QL STRIP.AUTO: ABNORMAL
LYMPHOCYTES # BLD AUTO: 2.62 10*3/MM3 (ref 0.7–3.1)
LYMPHOCYTES NFR BLD AUTO: 25.9 % (ref 19.6–45.3)
MAGNESIUM SERPL-MCNC: 2.16 MG/DL (ref 1.6–2.3)
MCH RBC QN AUTO: 33.1 PG (ref 26.6–33)
MCHC RBC AUTO-ENTMCNC: 33.8 G/DL (ref 31.5–35.7)
MCV RBC AUTO: 97.9 FL (ref 79–97)
MONOCYTES # BLD AUTO: 1.03 10*3/MM3 (ref 0.1–0.9)
MONOCYTES NFR BLD AUTO: 10.2 % (ref 5–12)
NEUTROPHILS NFR BLD AUTO: 6.18 10*3/MM3 (ref 1.7–7)
NEUTROPHILS NFR BLD AUTO: 61.1 % (ref 42.7–76)
NITRITE UR QL STRIP: NEGATIVE
PH UR STRIP.AUTO: 6 [PH] (ref 5–8)
PHOSPHATE SERPL-MCNC: 3.7 MG/DL (ref 2.4–4.5)
PLATELET # BLD AUTO: 169 10*3/MM3 (ref 140–450)
PMV BLD AUTO: 13.5 FL (ref 6–12)
POTASSIUM SERPL-SCNC: 4.8 MMOL/L (ref 3.5–5.2)
POTASSIUM SERPL-SCNC: 5.1 MMOL/L (ref 3.5–5.3)
PROT SERPL-MCNC: 7.2 G/DL (ref 6–8.5)
PROT UR QL STRIP: NEGATIVE
RBC # BLD AUTO: 4.26 10*6/MM3 (ref 3.77–5.28)
RBC # UR: NORMAL /HPF
REF LAB TEST METHOD: NORMAL
SODIUM SERPL-SCNC: 135 MMOL/L (ref 135–147)
SODIUM SERPL-SCNC: 136 MMOL/L (ref 136–145)
SP GR UR STRIP: 1.01 (ref 1–1.03)
SQUAMOUS #/AREA URNS HPF: NORMAL /HPF
TRIGL SERPL-MCNC: 188 MG/DL (ref 0–150)
UROBILINOGEN UR QL STRIP: ABNORMAL
VLDLC SERPL-MCNC: 31 MG/DL (ref 5–40)
WBC # BLD AUTO: 10.11 10*3/MM3 (ref 3.4–10.8)
WBC UR QL AUTO: NORMAL /HPF

## 2021-03-16 PROCEDURE — 99396 PREV VISIT EST AGE 40-64: CPT | Performed by: GENERAL PRACTICE

## 2021-03-16 PROCEDURE — 85025 COMPLETE CBC W/AUTO DIFF WBC: CPT

## 2021-03-16 PROCEDURE — 81001 URINALYSIS AUTO W/SCOPE: CPT

## 2021-03-16 PROCEDURE — 80053 COMPREHEN METABOLIC PANEL: CPT

## 2021-03-16 PROCEDURE — 36415 COLL VENOUS BLD VENIPUNCTURE: CPT

## 2021-03-16 PROCEDURE — 80061 LIPID PANEL: CPT

## 2021-03-16 RX ORDER — ERGOCALCIFEROL 1.25 MG/1
50000 CAPSULE ORAL WEEKLY
Qty: 4 CAPSULE | Refills: 11 | Status: SHIPPED | OUTPATIENT
Start: 2021-03-16 | End: 2021-06-17

## 2021-03-16 RX ORDER — CLOPIDOGREL BISULFATE 75 MG/1
75 TABLET ORAL DAILY
Qty: 30 TABLET | Refills: 11 | Status: SHIPPED | OUTPATIENT
Start: 2021-03-16 | End: 2021-12-17 | Stop reason: SDUPTHER

## 2021-03-16 RX ORDER — CLOPIDOGREL BISULFATE 75 MG/1
TABLET ORAL
COMMUNITY
Start: 2021-02-24 | End: 2021-03-16 | Stop reason: SDUPTHER

## 2021-03-16 RX ORDER — CLONAZEPAM 1 MG/1
1 TABLET ORAL 2 TIMES DAILY PRN
Qty: 60 TABLET | Refills: 2 | Status: SHIPPED | OUTPATIENT
Start: 2021-03-16 | End: 2021-06-17 | Stop reason: SDUPTHER

## 2021-03-16 RX ORDER — METOPROLOL SUCCINATE 25 MG/1
25 TABLET, EXTENDED RELEASE ORAL DAILY
Qty: 30 TABLET | Refills: 0 | Status: SHIPPED | OUTPATIENT
Start: 2021-03-16 | End: 2021-05-11

## 2021-03-16 RX ORDER — LOSARTAN POTASSIUM 25 MG/1
25 TABLET ORAL DAILY
Qty: 30 TABLET | Refills: 11 | Status: SHIPPED | OUTPATIENT
Start: 2021-03-16 | End: 2022-03-17 | Stop reason: SDUPTHER

## 2021-03-16 RX ORDER — FLUOXETINE HYDROCHLORIDE 20 MG/1
60 CAPSULE ORAL DAILY
Qty: 30 CAPSULE | Refills: 11 | Status: SHIPPED | OUTPATIENT
Start: 2021-03-16 | End: 2021-06-17 | Stop reason: SDUPTHER

## 2021-03-16 RX ORDER — CYCLOBENZAPRINE HCL 10 MG
10 TABLET ORAL 3 TIMES DAILY PRN
Qty: 30 TABLET | Refills: 11 | Status: SHIPPED | OUTPATIENT
Start: 2021-03-16 | End: 2021-06-17 | Stop reason: SDUPTHER

## 2021-03-16 RX ORDER — TIZANIDINE 4 MG/1
4 TABLET ORAL EVERY 8 HOURS PRN
Qty: 30 TABLET | Refills: 11 | Status: SHIPPED | OUTPATIENT
Start: 2021-03-16 | End: 2021-06-17 | Stop reason: SDUPTHER

## 2021-03-16 RX ORDER — PRAVASTATIN SODIUM 80 MG/1
80 TABLET ORAL NIGHTLY
Qty: 30 TABLET | Refills: 11 | Status: SHIPPED | OUTPATIENT
Start: 2021-03-16 | End: 2022-03-17 | Stop reason: SDUPTHER

## 2021-03-16 RX ORDER — SPIRONOLACTONE 25 MG/1
25 TABLET ORAL 2 TIMES DAILY
Qty: 30 TABLET | Refills: 11 | Status: SHIPPED | OUTPATIENT
Start: 2021-03-16 | End: 2021-06-17 | Stop reason: SDUPTHER

## 2021-03-16 NOTE — PROGRESS NOTES
Subjective   Erika Bowens is a 61 y.o. female.     Chief Complaint   Patient presents with   • Annual Exam       History of Present Illness     For annual wellness exam.  Labs reviewed. Due for mammogram but refuses.  Had another cardiac PTCA and stent placement.  Also being treated for metastatic colon cancer.  Had recent colon resection. Depression stable.      The following portions of the patient's history were reviewed and updated as appropriate: allergies, current medications, past family and social history and problem list.    Outpatient Medications Prior to Visit   Medication Sig Dispense Refill   • acetaminophen (TYLENOL) 500 MG tablet Take 500-1,000 mg by mouth every 4 (four) hours as needed for mild pain (1-3).     • aspirin 81 MG EC tablet Take 81 mg by mouth Daily.     • cholecalciferol (Vitamin D, Cholecalciferol,) 25 MCG (1000 UT) tablet Take 50,000 Units by mouth 1 (One) Time Per Week.     • furosemide (LASIX) 40 MG tablet Take 1 tablet by mouth 2 (Two) Times a Day. 60 tablet 2   • nitroglycerin (NITROSTAT) 0.4 MG SL tablet DISSOLVE 1 TAB UNDER TONGUE FOR CHEST PAIN - IF PAIN REMAINS AFTER 5 MIN, CALL 911 AND REPEAT DOSE. MAX 3 TABS IN 15 MINUTES 25 tablet 2   • omeprazole (priLOSEC) 40 MG capsule Take 1 capsule by mouth Daily. 30 capsule 4   • clonazePAM (KlonoPIN) 1 MG tablet Take 1 tablet by mouth 2 (Two) Times a Day As Needed for Anxiety. 60 tablet 2   • clopidogrel (PLAVIX) 75 MG tablet      • cyclobenzaprine (FLEXERIL) 10 MG tablet Take 1 tablet by mouth 3 (Three) Times a Day As Needed for Muscle Spasms. 90 tablet 0   • FLUoxetine (PROzac) 20 MG capsule Take 3 capsules by mouth Daily. 90 capsule 0   • losartan (COZAAR) 25 MG tablet Take 25 mg by mouth Daily.     • metoprolol succinate XL (TOPROL-XL) 25 MG 24 hr tablet Take 1 tablet by mouth Daily for 30 days. 30 tablet 0   • pravastatin (PRAVACHOL) 80 MG tablet Take 1 tablet by mouth Every Night for 30 days. 30 tablet 0   • spironolactone  "(ALDACTONE) 25 MG tablet Take 1 tablet by mouth 2 (Two) Times a Day. 90 tablet 0   • tiZANidine (ZANAFLEX) 4 MG tablet Take 1 tablet by mouth Every 8 (Eight) Hours As Needed for Muscle Spasms. 90 tablet 0   • calcium carbonate-vitamin d 600-400 MG-UNIT per tablet Take 1 tablet by mouth every night.     • ferrous sulfate 325 (65 FE) MG tablet Take 1 tablet by mouth Daily With Breakfast. 50 tablet 0     No facility-administered medications prior to visit.       Review of Systems   Constitutional: Negative.  Negative for chills, fatigue, fever and unexpected weight change.   HENT: Negative.  Negative for congestion, ear pain, hearing loss, nosebleeds, rhinorrhea, sneezing, sore throat and tinnitus.    Eyes: Negative.  Negative for discharge.   Respiratory: Negative.  Negative for cough, shortness of breath and wheezing.    Cardiovascular: Negative.  Negative for chest pain and palpitations.   Gastrointestinal: Negative.  Negative for abdominal pain, constipation, diarrhea, nausea and vomiting.   Endocrine: Negative.    Genitourinary: Negative.  Negative for dysuria, frequency and urgency.   Musculoskeletal: Negative.  Negative for arthralgias, back pain, joint swelling, myalgias and neck pain.   Skin: Negative.  Negative for rash.   Allergic/Immunologic: Negative.    Neurological: Negative.  Negative for dizziness, weakness, numbness and headaches.   Hematological: Negative.  Negative for adenopathy.   Psychiatric/Behavioral: Negative.  Negative for dysphoric mood and sleep disturbance. The patient is not nervous/anxious.        Objective     Visit Vitals  /72 (BP Location: Left arm)   Pulse (!) 49   Ht 160 cm (63\")   Wt 70.8 kg (156 lb)   SpO2 98%   BMI 27.63 kg/m²     Physical Exam  Vitals and nursing note reviewed.   Constitutional:       General: She is not in acute distress.     Appearance: She is well-developed.   HENT:      Head: Normocephalic and atraumatic.      Nose: Nose normal.   Eyes:      General:   "       Right eye: No discharge.         Left eye: No discharge.      Conjunctiva/sclera: Conjunctivae normal.      Pupils: Pupils are equal, round, and reactive to light.   Neck:      Thyroid: No thyromegaly.      Trachea: No tracheal deviation.   Cardiovascular:      Rate and Rhythm: Normal rate and regular rhythm.      Heart sounds: Normal heart sounds. No murmur.   Pulmonary:      Effort: Pulmonary effort is normal. No respiratory distress.      Breath sounds: Normal breath sounds. No wheezing or rales.   Chest:      Chest wall: No tenderness.      Breasts:         Right: No inverted nipple, mass, nipple discharge, skin change or tenderness.         Left: No inverted nipple, mass, nipple discharge, skin change or tenderness.   Abdominal:      General: Bowel sounds are normal. There is no distension.      Palpations: Abdomen is soft. There is no mass.      Tenderness: There is no abdominal tenderness.      Hernia: No hernia is present.       Musculoskeletal:         General: No deformity. Normal range of motion.   Lymphadenopathy:      Cervical: No cervical adenopathy.   Skin:     General: Skin is warm and dry.   Neurological:      Mental Status: She is alert and oriented to person, place, and time.      Deep Tendon Reflexes: Reflexes are normal and symmetric.   Psychiatric:         Behavior: Behavior normal.         Thought Content: Thought content normal.         Judgment: Judgment normal.       Notes brought forward are reviewed and updated if indicated.     Assessment/Plan   Problems Addressed this Visit        Cardiac and Vasculature    Essential hypertension (Chronic)    Relevant Medications    metoprolol succinate XL (TOPROL-XL) 25 MG 24 hr tablet    losartan (COZAAR) 25 MG tablet    spironolactone (ALDACTONE) 25 MG tablet    Coronary arteriosclerosis    Relevant Medications    clopidogrel (PLAVIX) 75 MG tablet    metoprolol succinate XL (TOPROL-XL) 25 MG 24 hr tablet    spironolactone (ALDACTONE) 25 MG  tablet    Mixed hyperlipidemia    Relevant Medications    pravastatin (PRAVACHOL) 80 MG tablet       Endocrine and Metabolic    Vitamin D deficiency    Relevant Medications    vitamin D (ERGOCALCIFEROL) 1.25 MG (24270 UT) capsule capsule       Mental Health    Depressive disorder (Chronic)    Relevant Medications    FLUoxetine (PROzac) 20 MG capsule    Generalized anxiety disorder (Chronic)    Relevant Medications    FLUoxetine (PROzac) 20 MG capsule    clonazePAM (KlonoPIN) 1 MG tablet      Other Visit Diagnoses     Annual physical exam    -  Primary    Muscle spasm        Relevant Medications    tiZANidine (ZANAFLEX) 4 MG tablet    cyclobenzaprine (FLEXERIL) 10 MG tablet    Major depressive disorder with single episode, in full remission (CMS/HCC)        Relevant Medications    FLUoxetine (PROzac) 20 MG capsule    clonazePAM (KlonoPIN) 1 MG tablet      Diagnoses       Codes Comments    Annual physical exam    -  Primary ICD-10-CM: Z00.00  ICD-9-CM: V70.0     Depressive disorder     ICD-10-CM: F32.9  ICD-9-CM: 311     Essential hypertension     ICD-10-CM: I10  ICD-9-CM: 401.9     Coronary arteriosclerosis     ICD-10-CM: I25.10  ICD-9-CM: 414.00     Mixed hyperlipidemia     ICD-10-CM: E78.2  ICD-9-CM: 272.2     Vitamin D deficiency     ICD-10-CM: E55.9  ICD-9-CM: 268.9     Muscle spasm     ICD-10-CM: M62.838  ICD-9-CM: 728.85     Generalized anxiety disorder     ICD-10-CM: F41.1  ICD-9-CM: 300.02     Major depressive disorder with single episode, in full remission (CMS/HCC)     ICD-10-CM: F32.5  ICD-9-CM: 296.26          Continue current treatment.  Follow-up with specialty care as scheduled.  Will monitor her bradycardia.  Encouraged to consider getting COVID-19 vaccine.  Luis Angel reviewed and appropriate. Not recommended to drive or operate heavy equipment while taking potentially sedating meds.  Patient understands the risks associated with this controlled medication, including tolerance and addiction. They also  agree to obtain this medication only from me, and not from a another provider, unless that provider is covering for me in my absence. They also agree to be compliant in dosing, and not self adjust the dose of medication.  A signed controlled substance agreement is on file, and they have received a controlled substance education sheet at this or a previous visit. They have also signed a consent for treatment with a controlled substance as per [facility] policy.      New Medications Ordered This Visit   Medications   • tiZANidine (ZANAFLEX) 4 MG tablet     Sig: Take 1 tablet by mouth Every 8 (Eight) Hours As Needed for Muscle Spasms.     Dispense:  30 tablet     Refill:  11   • clopidogrel (PLAVIX) 75 MG tablet     Sig: Take 1 tablet by mouth Daily.     Dispense:  30 tablet     Refill:  11   • FLUoxetine (PROzac) 20 MG capsule     Sig: Take 3 capsules by mouth Daily.     Dispense:  30 capsule     Refill:  11   • pravastatin (PRAVACHOL) 80 MG tablet     Sig: Take 1 tablet by mouth Every Night for 30 days.     Dispense:  30 tablet     Refill:  11   • cyclobenzaprine (FLEXERIL) 10 MG tablet     Sig: Take 1 tablet by mouth 3 (Three) Times a Day As Needed for Muscle Spasms.     Dispense:  30 tablet     Refill:  11   • metoprolol succinate XL (TOPROL-XL) 25 MG 24 hr tablet     Sig: Take 1 tablet by mouth Daily for 30 days.     Dispense:  30 tablet     Refill:  0   • losartan (COZAAR) 25 MG tablet     Sig: Take 1 tablet by mouth Daily.     Dispense:  30 tablet     Refill:  11   • vitamin D (ERGOCALCIFEROL) 1.25 MG (45322 UT) capsule capsule     Sig: Take 1 capsule by mouth 1 (One) Time Per Week.     Dispense:  4 capsule     Refill:  11   • spironolactone (ALDACTONE) 25 MG tablet     Sig: Take 1 tablet by mouth 2 (Two) Times a Day.     Dispense:  30 tablet     Refill:  11   • clonazePAM (KlonoPIN) 1 MG tablet     Sig: Take 1 tablet by mouth 2 (Two) Times a Day As Needed for Anxiety.     Dispense:  60 tablet     Refill:  2      Return in about 3 months (around 6/16/2021) for Recheck.

## 2021-03-16 NOTE — PATIENT INSTRUCTIONS
Check at pharmacy on Shingrix shingles vaccine  Check on tetanus/pertussis vaccine at pharmacy or with insurance.

## 2021-04-15 ENCOUNTER — HOSPITAL ENCOUNTER (OUTPATIENT)
Dept: LAB | Facility: HOSPITAL | Age: 61
Discharge: HOME | End: 2021-04-15
Attending: INTERNAL MEDICINE

## 2021-04-15 LAB
ALBUMIN SERPL-MCNC: 4.1 G/DL (ref 3.5–5)
ALBUMIN/GLOB SERPL: 1.2 {RATIO} (ref 1.4–2.6)
ALP SERPL-CCNC: 73 U/L (ref 43–160)
ALT SERPL-CCNC: 25 U/L (ref 10–40)
ANION GAP SERPL CALC-SCNC: 17 MMOL/L (ref 8–19)
AST SERPL-CCNC: 32 U/L (ref 15–50)
BILIRUB SERPL-MCNC: 0.28 MG/DL (ref 0.2–1.3)
BUN SERPL-MCNC: 22 MG/DL (ref 5–25)
BUN/CREAT SERPL: 14 {RATIO} (ref 6–20)
CALCIUM SERPL-MCNC: 9 MG/DL (ref 8.7–10.4)
CHLORIDE SERPL-SCNC: 102 MMOL/L (ref 99–111)
CHOLEST SERPL-MCNC: 150 MG/DL (ref 107–200)
CHOLEST/HDLC SERPL: 3.4 {RATIO} (ref 3–6)
CONV CO2: 24 MMOL/L (ref 22–32)
CONV TOTAL PROTEIN: 7.4 G/DL (ref 6.3–8.2)
CREAT UR-MCNC: 1.61 MG/DL (ref 0.5–0.9)
GFR SERPLBLD BASED ON 1.73 SQ M-ARVRAT: 34 ML/MIN/{1.73_M2}
GLOBULIN UR ELPH-MCNC: 3.3 G/DL (ref 2–3.5)
GLUCOSE SERPL-MCNC: 95 MG/DL (ref 65–99)
HDLC SERPL-MCNC: 44 MG/DL (ref 40–60)
LDLC SERPL CALC-MCNC: 79 MG/DL (ref 70–100)
OSMOLALITY SERPL CALC.SUM OF ELEC: 291 MOSM/KG (ref 273–304)
POTASSIUM SERPL-SCNC: 4.1 MMOL/L (ref 3.5–5.3)
SODIUM SERPL-SCNC: 139 MMOL/L (ref 135–147)
TRIGL SERPL-MCNC: 134 MG/DL (ref 40–150)
VLDLC SERPL-MCNC: 27 MG/DL (ref 5–37)

## 2021-05-07 ENCOUNTER — OFFICE VISIT CONVERTED (OUTPATIENT)
Dept: ORTHOPEDIC SURGERY | Facility: CLINIC | Age: 61
End: 2021-05-07
Attending: ORTHOPAEDIC SURGERY

## 2021-05-10 NOTE — H&P
History and Physical      Patient Name: Erika Bowens   Patient ID: [id number]   Sex: Female   YOB: 1960    Primary Care Provider: Nilam Willis MD    Visit Date: July 23, 2020    Provider: Yevgeniy Santiago MD   Location: [facility]   Location Address: [address] [place] [address]  [id number]   Location Phone: [phone]          Chief Complaint  · Right Knee Pain.      History Of Present Illness  Erika Bowens is a 60 year old /White female who presents today for evaluation of her right knee. She has been having increasing pain, she says her heart is doing some better. She sees Dr. Chong for that. She is curious about the gel injection. I told her that I am not sure insurance will pay. She has otherwise been doing pretty well.       Past Medical History  Anxiety; Arthritis; Cancer; Chest pain; CHF (congestive heart failure); Colon cancer; Congestive heart failure; Depression; Heart Attack; Heart Disease; High cholesterol; Hyperlipemia; Kidney Disease; Kidney disorder; Limb Swelling; Neurologic disorder; Reflux; Shortness of Breath         Past Surgical History  Artificial Joints/Limbs; bowel obstruction; CABG; Cardiac Catherization; cardiac stents; Colonoscopy; Eye Implant; heart surgery; Hip Replacement; Joint Surgery; Kidney; Knee Replacement; Pacemaker.         Medication List  aspirin 325 mg oral tablet,delayed release (DR/EC); clonazepam 1 mg oral tablet; cyclobenzaprine 10 mg oral tablet; fluoxetine 20 mg oral capsule; furosemide 40 mg oral tablet; metoprolol succinate 25 mg oral tablet extended release 24 hr; NITROSTAT 0.4 MG TABLET SL transdermal; omeprazole 20 mg oral capsule,delayed release(DR/EC); Plavix 75 mg oral tablet; pravastatin 80 mg oral tablet; Suprep Bowel Prep Kit 17.5-3.13-1.6 gram oral recon soln; tizanidine 4 mg oral tablet; Vitamin D2 1,250 mcg (50,000 unit) oral capsule         Allergy List  NO KNOWN DRUG ALLERGIES         Family Medical History  Colon  "Neoplasm, Malignant; Stroke; Heart Disease; Cancer, Unspecified; Family history of certain chronic disabling diseases; arthritis; Family history of stroke; Family history of heart disease         Social History  Alcohol (Never); Alcohol Use (Never); lives alone; Recreational Drug Use (Never); Retired; Retired.; Single; Single.; Tobacco (Never)         Review of Systems  · Constitutional  o Admits  o : weight loss  o Denies  o : fever, chills  · Cardiovascular  o Admits  o : chest pain  o Denies  o : shortness of breath  · Gastrointestinal  o Admits  o : heartburn, blood in stools  o Denies  o : liver disease, nausea  · Genitourinary  o Denies  o : painful urination, blood in urine  · Integument  o Denies  o : rash, itching  · Neurologic  o Admits  o : headache  o Denies  o : weakness, loss of consciousness  · Musculoskeletal  o Admits  o : painful, swollen joints  · Psychiatric  o Admits  o : anxiety  o Denies  o : drug/alcohol addiction, depression      Vitals  Date Time BP Position Site L\R Cuff Size HR RR TEMP (F) WT  HT  BMI kg/m2 BSA m2 O2 Sat        07/23/2020 03:11 PM      72 - R   141lbs 0oz 5'  3\" 24.98 1.69 90 %          Physical Examination  · Constitutional  o Appearance  o : well developed, well-nourished, no obvious deformities present  · Head and Face  o Head  o :   § Inspection  § : normocephalic  o Face  o :   § Inspection  § : no facial lesions  · Eyes  o Conjunctivae  o : conjunctivae normal  o Sclerae  o : sclerae white  · Ears, Nose, Mouth and Throat  o Ears  o :   § External Ears  § : appearance within normal limits  § Hearing  § : intact  o Nose  o :   § External Nose  § : appearance normal  · Neck  o Inspection/Palpation  o : normal appearance  o Range of Motion  o : full range of motion  · Respiratory  o Respiratory Effort  o : breathing unlabored  o Inspection of Chest  o : normal appearance  o Auscultation of Lungs  o : no audible wheezing or rales  · Cardiovascular  o Heart  o : regular " rate  · Gastrointestinal  o Abdominal Examination  o : soft and non-tender  · Skin and Subcutaneous Tissue  o General Inspection  o : intact, no rashes  · Psychiatric  o General  o : Alert and oriented x3  o Judgement and Insight  o : judgment and insight intact  o Mood and Affect  o : mood normal, affect appropriate  · Right Knee  o Inspection  o : Mild limp. Moderate medial joint line tenderness. Pain with movement. Positive crepitus. Neurovascularly intact. Pulses normal.   · In Office Procedures  o View  o : LAT/SUNRISE/STANDING   o Site  o : right knee  o Indication  o : Right knee pain  o Study  o : X-rays ordered, taken in the office, and reviewed today.  o Xray  o : Advanced bone-on-bone osteoarthritis.  o Comparative Data  o : No comparative data found          Assessment  · Primary osteoarthritis of right knee     715.16/M17.11  · Pain: Knee     719.46/M25.569      Plan  · Orders  o Knee (Right) [facility] Preferred View ([id number]) - 719.46/M25.569 - 07/23/2020  · Medications  o Medications have been Reconciled  o Transition of Care or Provider Policy  · Instructions  o Reviewed the patient's Past Medical, Social, and Family history as well as the ROS at today's visit, no changes.  o Call or return if worsening symptoms.  o Follow up as needed.  o This note was transcribed by Monik Perera mc.  o Request viscosupplementation approval.             Electronically Signed by: Monik Perera-, Other -Author on July 25, 2020 08:34:48 AM  Electronically Co-signed by: Yevgeniy Santiago MD -Reviewer on July 31, 2020 08:53:33 AM

## 2021-05-10 NOTE — H&P
History and Physical      Patient Name: Erika Bowens   Patient ID: [id number]   Sex: Female   YOB: 1960    Primary Care Provider: Nilam Willis MD    Visit Date: May 15, 2020    Provider: Coy Ordoñez MD   Location: [facility]   Location Address: [address] [place] [address]  [id number]   Location Phone: [phone]          Chief Complaint  · Surgical History and Physical  · Screening Colonoscopy      History Of Present Illness  NON-INPATIENT HISTORY AND PHYSICAL  Allergies: NO KNOWN DRUG ALLERGIES   Chief Complaint/History of Present Illness: Screening Colonoscopy, Personal History of Colon Cancer, Family History of Colon Cancer   Colon Recall: Yes How Lon years   Failed Outpatient Treatment/Contraindications: N/A   Current Medications: aspirin 325 mg oral tablet,delayed release (DR/EC), clonazepam 1 mg oral tablet, cyclobenzaprine 10 mg oral tablet, fluoxetine 20 mg oral capsule, furosemide 40 mg oral tablet, metoprolol succinate 25 mg oral tablet extended release 24 hr, NITROSTAT 0.4 MG TABLET SL transdermal, omeprazole 20 mg oral capsule,delayed release(DR/EC), Plavix 75 mg oral tablet, pravastatin 80 mg oral tablet, Suprep Bowel Prep Kit 17.5-3.13-1.6 gram oral recon soln, tizanidine 4 mg oral tablet, and Vitamin D2 1,250 mcg (50,000 unit) oral capsule   Significant Past Medical History: Anxiety, Arthritis, Chest pain, CHF (congestive heart failure), Colon cancer, Congestive heart failure, Depression, Heart Attack, Heart Disease, High cholesterol, Hyperlipemia, Kidney Disease, Kidney disorder, Limb Swelling, Neurologic disorder, Reflux, and Shortness of Breath   Significant Family Medical History: Family History of Colon Cancer- Paternal Uncle   Significant Past Surgical History: Artificial Joints/Limbs, bowel obstruction, CABG, Cardiac Catherization, cardiac stents, Colonoscopy, Eye Implant, Hip Replacement, Joint Surgery, Kidney, Knee Replacement, and Pacemaker.    Previous Colonoscopy: Yes YEAR: 2019 By Whom: Coy Ordoñez MD   Previous EGD: No   PHYSICAL EXAM:  Heart: Regular Rate and Rhythm   Lungs: Breathing Unlabored           Assessment  · Preoperative examination     V72.84/Z01.818  · Screening for colon cancer     V76.51/Z12.11  · History of colon cancer     V10.05/Z85.038  · Family history of colon cancer     V16.0/Z80.0      Plan  · Orders  o Consent for Colonoscopy Screening -Possible risk/complications, benefits, and alternatives to surgical or invasive procedure have been explained to patient and/or legal gaurdian. -Patient has been evaluated and can tolerate anethesia and/or sedation. Risk, benefits, and alternatives to anesthesia and sedation have been explained to patient or legal gaurdian. () - V72.84/Z01.818, V76.51/Z12.11, V10.05/Z85.038, V16.0/Z80.0 - 06/29/2020  · Medications  o Medications have been Reconciled  o Transition of Care or Provider Policy  · Instructions  o ****Surgical Orders****  o ***************  o Outpatient  o ***************  o RISK AND BENEFITS:  o Possible risks/complications, benefits and alternatives to surgical or invasive procedure have been explained to the patient and/or legal guardian.  o Patient has been evaluated and can tolerate anesthesia and/or sedation. Risks, benefits, and alternatives to anesthesia and sedation have been explained to the patient and/or legal guardian.  o ***************  o PREP: Per protocol  o IV: Per Anesthesia  o The above History and Physical Examination has been completed within 30 days of admission.  o This note has been transcribed by APPLE Fuentes MA. I have read and agree with the findings in this note.  o Electronically Identified Patient Education Materials Provided Electronically  · Disposition  o Call or Return if symptoms worsen or persist.            Electronically Signed by: Tera Fuentes, -Author on May 15, 2020 08:56:42 AM

## 2021-05-11 DIAGNOSIS — I25.10 CORONARY ARTERIOSCLEROSIS: ICD-10-CM

## 2021-05-11 DIAGNOSIS — I10 ESSENTIAL HYPERTENSION: ICD-10-CM

## 2021-05-11 RX ORDER — METOPROLOL SUCCINATE 25 MG/1
TABLET, EXTENDED RELEASE ORAL
Qty: 30 TABLET | Refills: 0 | Status: SHIPPED | OUTPATIENT
Start: 2021-05-11 | End: 2021-06-11

## 2021-05-15 VITALS — WEIGHT: 150 LBS | BODY MASS INDEX: 26.58 KG/M2 | HEIGHT: 63 IN | RESPIRATION RATE: 14 BRPM

## 2021-05-15 VITALS — RESPIRATION RATE: 16 BRPM | WEIGHT: 140 LBS | HEIGHT: 63 IN | BODY MASS INDEX: 24.8 KG/M2

## 2021-05-15 VITALS — HEIGHT: 63 IN | RESPIRATION RATE: 14 BRPM | WEIGHT: 150 LBS | BODY MASS INDEX: 26.58 KG/M2

## 2021-05-15 VITALS — RESPIRATION RATE: 12 BRPM | HEIGHT: 63 IN | BODY MASS INDEX: 24.85 KG/M2 | WEIGHT: 140.25 LBS

## 2021-05-15 VITALS — OXYGEN SATURATION: 99 % | WEIGHT: 146 LBS | HEART RATE: 50 BPM | BODY MASS INDEX: 25.87 KG/M2 | HEIGHT: 63 IN

## 2021-05-15 VITALS — RESPIRATION RATE: 14 BRPM | HEIGHT: 63 IN | BODY MASS INDEX: 26.58 KG/M2 | WEIGHT: 150 LBS

## 2021-05-15 VITALS — HEIGHT: 63 IN | WEIGHT: 141 LBS | BODY MASS INDEX: 24.98 KG/M2 | HEART RATE: 72 BPM | OXYGEN SATURATION: 90 %

## 2021-05-15 VITALS — HEART RATE: 71 BPM | WEIGHT: 144 LBS | BODY MASS INDEX: 25.52 KG/M2 | OXYGEN SATURATION: 98 % | HEIGHT: 63 IN

## 2021-05-15 VITALS — RESPIRATION RATE: 14 BRPM | BODY MASS INDEX: 26.58 KG/M2 | WEIGHT: 150 LBS | HEIGHT: 63 IN

## 2021-05-15 VITALS — WEIGHT: 150 LBS | HEIGHT: 63 IN | RESPIRATION RATE: 14 BRPM | BODY MASS INDEX: 26.58 KG/M2

## 2021-05-15 VITALS — RESPIRATION RATE: 16 BRPM | HEIGHT: 63 IN | WEIGHT: 152 LBS | BODY MASS INDEX: 26.93 KG/M2

## 2021-05-15 VITALS — HEIGHT: 63 IN | WEIGHT: 150 LBS | BODY MASS INDEX: 26.58 KG/M2 | RESPIRATION RATE: 16 BRPM

## 2021-05-15 VITALS — BODY MASS INDEX: 26.4 KG/M2 | WEIGHT: 149 LBS | RESPIRATION RATE: 16 BRPM | HEIGHT: 63 IN

## 2021-05-16 VITALS — BODY MASS INDEX: 27.31 KG/M2 | OXYGEN SATURATION: 94 % | HEART RATE: 81 BPM | HEIGHT: 63 IN | WEIGHT: 154.12 LBS

## 2021-05-16 VITALS — BODY MASS INDEX: 28.88 KG/M2 | OXYGEN SATURATION: 97 % | HEART RATE: 80 BPM | WEIGHT: 163 LBS | HEIGHT: 63 IN

## 2021-05-16 VITALS
SYSTOLIC BLOOD PRESSURE: 88 MMHG | BODY MASS INDEX: 27.33 KG/M2 | OXYGEN SATURATION: 97 % | HEART RATE: 61 BPM | HEIGHT: 63 IN | DIASTOLIC BLOOD PRESSURE: 55 MMHG | RESPIRATION RATE: 12 BRPM | WEIGHT: 154.25 LBS

## 2021-05-16 VITALS — OXYGEN SATURATION: 96 % | HEART RATE: 65 BPM | HEIGHT: 63 IN | BODY MASS INDEX: 27.11 KG/M2 | WEIGHT: 153 LBS

## 2021-05-17 ENCOUNTER — HOSPITAL ENCOUNTER (OUTPATIENT)
Dept: CT IMAGING | Facility: HOSPITAL | Age: 61
Discharge: HOME | End: 2021-05-17
Attending: INTERNAL MEDICINE

## 2021-05-17 LAB
ALBUMIN SERPL-MCNC: 4.4 G/DL (ref 3.5–5)
ALBUMIN/GLOB SERPL: 1.1 {RATIO} (ref 1.4–2.6)
ALP SERPL-CCNC: 83 U/L (ref 43–160)
ALT SERPL-CCNC: 25 U/L (ref 10–40)
ANION GAP SERPL CALC-SCNC: 17 MMOL/L (ref 8–19)
AST SERPL-CCNC: 26 U/L (ref 15–50)
BASOPHILS # BLD AUTO: 0.06 10*3/UL (ref 0–0.2)
BASOPHILS NFR BLD AUTO: 0.7 % (ref 0–3)
BILIRUB SERPL-MCNC: 0.33 MG/DL (ref 0.2–1.3)
BUN SERPL-MCNC: 21 MG/DL (ref 5–25)
BUN/CREAT SERPL: 13 {RATIO} (ref 6–20)
CALCIUM SERPL-MCNC: 9.3 MG/DL (ref 8.7–10.4)
CEA SERPL-MCNC: 3.2 NG/ML (ref 0–5)
CHLORIDE SERPL-SCNC: 97 MMOL/L (ref 99–111)
CONV ABS IMM GRAN: 0.05 10*3/UL (ref 0–0.2)
CONV CO2: 29 MMOL/L (ref 22–32)
CONV IMMATURE GRAN: 0.6 % (ref 0–1.8)
CONV TOTAL PROTEIN: 8.5 G/DL (ref 6.3–8.2)
CREAT BLD-MCNC: 1.6 MG/DL (ref 0.6–1.4)
CREAT UR-MCNC: 1.6 MG/DL (ref 0.5–0.9)
DEPRECATED RDW RBC AUTO: 47.1 FL (ref 36.4–46.3)
EOSINOPHIL # BLD AUTO: 0.07 10*3/UL (ref 0–0.7)
EOSINOPHIL # BLD AUTO: 0.8 % (ref 0–7)
ERYTHROCYTE [DISTWIDTH] IN BLOOD BY AUTOMATED COUNT: 12.6 % (ref 11.7–14.4)
GFR SERPLBLD BASED ON 1.73 SQ M-ARVRAT: 34 ML/MIN/{1.73_M2}
GFR SERPLBLD BASED ON 1.73 SQ M-ARVRAT: 34 ML/MIN/{1.73_M2}
GLOBULIN UR ELPH-MCNC: 4.1 G/DL (ref 2–3.5)
GLUCOSE SERPL-MCNC: 95 MG/DL (ref 65–99)
HCT VFR BLD AUTO: 48.6 % (ref 37–47)
HGB BLD-MCNC: 15.4 G/DL (ref 12–16)
LYMPHOCYTES # BLD AUTO: 2.09 10*3/UL (ref 1–5)
LYMPHOCYTES NFR BLD AUTO: 23.8 % (ref 20–45)
MCH RBC QN AUTO: 31.9 PG (ref 27–31)
MCHC RBC AUTO-ENTMCNC: 31.7 G/DL (ref 33–37)
MCV RBC AUTO: 100.6 FL (ref 81–99)
MONOCYTES # BLD AUTO: 0.78 10*3/UL (ref 0.2–1.2)
MONOCYTES NFR BLD AUTO: 8.9 % (ref 3–10)
NEUTROPHILS # BLD AUTO: 5.74 10*3/UL (ref 2–8)
NEUTROPHILS NFR BLD AUTO: 65.2 % (ref 30–85)
NRBC CBCN: 0 % (ref 0–0.7)
OSMOLALITY SERPL CALC.SUM OF ELEC: 291 MOSM/KG (ref 273–304)
PLATELET # BLD AUTO: 206 10*3/UL (ref 130–400)
PMV BLD AUTO: 12.7 FL (ref 9.4–12.3)
POTASSIUM SERPL-SCNC: 4.1 MMOL/L (ref 3.5–5.3)
RBC # BLD AUTO: 4.83 10*6/UL (ref 4.2–5.4)
SODIUM SERPL-SCNC: 139 MMOL/L (ref 135–147)
WBC # BLD AUTO: 8.79 10*3/UL (ref 4.8–10.8)

## 2021-05-18 ENCOUNTER — OFFICE VISIT CONVERTED (OUTPATIENT)
Dept: ONCOLOGY | Facility: HOSPITAL | Age: 61
End: 2021-05-18
Attending: INTERNAL MEDICINE

## 2021-05-18 ENCOUNTER — HOSPITAL ENCOUNTER (OUTPATIENT)
Dept: ONCOLOGY | Facility: HOSPITAL | Age: 61
Discharge: HOME | End: 2021-05-18
Attending: INTERNAL MEDICINE

## 2021-05-23 ENCOUNTER — TRANSCRIBE ORDERS (OUTPATIENT)
Dept: ONCOLOGY | Facility: HOSPITAL | Age: 61
End: 2021-05-23

## 2021-05-23 DIAGNOSIS — C18.9 MALIGNANT NEOPLASM OF COLON, UNSPECIFIED PART OF COLON (HCC): Primary | ICD-10-CM

## 2021-05-28 VITALS
DIASTOLIC BLOOD PRESSURE: 66 MMHG | HEART RATE: 57 BPM | SYSTOLIC BLOOD PRESSURE: 119 MMHG | RESPIRATION RATE: 18 BRPM | HEART RATE: 89 BPM | BODY MASS INDEX: 26.05 KG/M2 | TEMPERATURE: 98.4 F | HEIGHT: 63 IN | OXYGEN SATURATION: 100 % | DIASTOLIC BLOOD PRESSURE: 69 MMHG | OXYGEN SATURATION: 100 % | WEIGHT: 145.94 LBS | WEIGHT: 147.05 LBS | DIASTOLIC BLOOD PRESSURE: 70 MMHG | WEIGHT: 141.09 LBS | TEMPERATURE: 97.9 F | RESPIRATION RATE: 18 BRPM | BODY MASS INDEX: 24.99 KG/M2 | OXYGEN SATURATION: 100 % | SYSTOLIC BLOOD PRESSURE: 132 MMHG | OXYGEN SATURATION: 100 % | HEART RATE: 72 BPM | RESPIRATION RATE: 14 BRPM | DIASTOLIC BLOOD PRESSURE: 65 MMHG | WEIGHT: 145.5 LBS | TEMPERATURE: 98.5 F | HEIGHT: 63 IN | TEMPERATURE: 98.1 F | SYSTOLIC BLOOD PRESSURE: 96 MMHG | HEART RATE: 66 BPM | DIASTOLIC BLOOD PRESSURE: 71 MMHG | BODY MASS INDEX: 25.31 KG/M2 | RESPIRATION RATE: 16 BRPM | BODY MASS INDEX: 25.77 KG/M2 | HEART RATE: 49 BPM | RESPIRATION RATE: 20 BRPM | SYSTOLIC BLOOD PRESSURE: 113 MMHG | TEMPERATURE: 98.2 F | WEIGHT: 142.86 LBS | BODY MASS INDEX: 25.86 KG/M2 | OXYGEN SATURATION: 99 % | SYSTOLIC BLOOD PRESSURE: 94 MMHG

## 2021-05-28 VITALS
WEIGHT: 150.79 LBS | BODY MASS INDEX: 26.83 KG/M2 | RESPIRATION RATE: 18 BRPM | RESPIRATION RATE: 18 BRPM | TEMPERATURE: 97.7 F | TEMPERATURE: 98.8 F | BODY MASS INDEX: 26.24 KG/M2 | OXYGEN SATURATION: 99 % | HEART RATE: 66 BPM | DIASTOLIC BLOOD PRESSURE: 75 MMHG | OXYGEN SATURATION: 100 % | WEIGHT: 151.46 LBS | DIASTOLIC BLOOD PRESSURE: 53 MMHG | OXYGEN SATURATION: 99 % | TEMPERATURE: 98 F | DIASTOLIC BLOOD PRESSURE: 72 MMHG | BODY MASS INDEX: 26.71 KG/M2 | HEART RATE: 61 BPM | HEART RATE: 58 BPM | WEIGHT: 148.15 LBS | SYSTOLIC BLOOD PRESSURE: 106 MMHG | SYSTOLIC BLOOD PRESSURE: 108 MMHG | RESPIRATION RATE: 24 BRPM | SYSTOLIC BLOOD PRESSURE: 129 MMHG

## 2021-05-28 NOTE — PROGRESS NOTES
Patient: ROD CASTANEDA     Acct: [id number]     Report: #[id number]  UNIT #: [id number]     : 1960    Encounter Date:2019  PRIMARY CARE: THEO BROWN  ***Signed***  --------------------------------------------------------------------------------------------------------------------  NURSE INTAKE      Visit Type      Established Patient Visit            Chief Complaint      COLON CA      Intent of Therapy:  Curative            Referring Provider/Copies To      Referring Provider:  Coy Ordoñez      PCP Not Found in Lookup:  THEO HUSSEIN            History and Present Illness      Past Oncology Illness History      59-year-old white female with past medical history significant for coronary art    ros disease, chronic renal insufficiency who presents for evaluation of recently    diagnosed adenocarcinoma of the sigmoid colon.  Patient notes that she began     experiencing some blood per rectum around December of last year.  At the same     time she was hospitalized for a fourth heart attack requiring stenting.  Because    of the ongoing blood per rectum, she was referred to gastroenterology and     underwent colonoscopy on 3/25/19 which revealed a lesion in the sigmoid colon.      Biopsies were obtained and demonstrate adenocarcinoma.  She was seen by Dr. Danae Solis, surgery and anticipates resection on the  of this month.      She denies abdominal or pelvic pain.  She denies masses or lymphadenopathy.  She    is eating and drinking normally, her weight is maintained.  Her energy level is     lower than she would like but still adequate for all of her daily needs.  She     thinks a lot of this is related to her recent heart attack.  She had CT scans     ordered but they were done noncontrast secondary to her increased creatinine.      She states that the renal function has been fairly stable according to her     nephrologist.            HPI - Oncology Interim      Patient returns  today for routine follow-up of her colon cancer.  She states     that she is feeling well.  She denies any abdominal or pelvic pain.  She reports    normal bowel habits without blood per rectum, pain or melena.  She is eating and    drinking normally, her weight is maintained.  Her energy level is excellent.      She denies any new masses or lymphadenopathy.  No unusual aches or pains.            Cancer Details            Sigmoid colon--Moderately differentiated adenocarcinoma.            Clinical Staging      Stage II ([id number])            Clinical Trial Participant      No            ECOG Performance Status      0            PAST, FAMILY   Past Medical History      Past Medical History:  Arthritis, Depression, Heart Attack (4), High     Cholesterol, Kidney Disease (CKD stage III)      Hematology/Oncology (F):  Colorectal Cancer            Past Surgical History      Coronary Stent (8), Joint Replacement (LEFT HIP LEFT KNEE)            Family History      Family History:  Colorectal Cancer (PAT UNCLE), Melanoma (MOTHER)            Social History      Marital Status:  Single      Lives independently:  Yes      Number of Children:  0      Occupation:  RETIRED            Tobacco Use      Tobacco status:  Never smoker            Alcohol Use      Alcohol intake:  None            Substance Use      Substance use:  Denies use            REVIEW OF SYSTEMS      General:  Admits: Fatigue      Eye:  Admits: Corrective Lenses      Respiratory:  Denies: Productive Cough      Gastrointestinal:  Denies: Problem Swallowing      Genitourinary (female):  Denies: Painful Urination      Musculoskeletal:  Denies: Swollen Joints      Integumentary:  Denies: Skin Discoloration      Psychiatric:  Denies: Depression      Endocrine:  Denies: Excessive Urination      Reproductive:  Denies: Heavy Periods            VITAL SIGNS AND SCORES      Vitals      Height 5 ft 2.76 in / 159.4 cm      Weight 145 lbs 15.112 oz / 66.2 kg      BSA 1.69 m2       BMI 26.1 kg/m2      Temperature 98.5 F / 36.94 C - Temporal      Pulse 66      Respirations 18      Blood Pressure 113/70 Sitting, Left Arm      Pulse Oximetry 100%, RM AIR            Pain Score      Pain Scale Used:  Numerical      Pain Intensity:  0            Fatigue Score      Fatigue (0-10 scale):  10            EXAM      General Appearance:  Positive for: Alert, Oriented x3, Cooperative;          Negative for: Acute Distress      Neck:  Positive for: Supple;          Negative for: JVD, Masses      Respiratory:  Positive for: CTAB, Normal Respiratory Effort      Abdomen/Gastro:  Positive for: Normal Active Bowel Sounds, Soft;          Negative for: Distention, Hepatosplenomegaly, Tenderness      Other      Well-healed midline incision.  One small area near the navel looks to have a     stitch that is extravasating      Cardiovascular:  Positive for: RRR;          Negative for: Gallop, Murmur, Peripheral Edema, Rub      Psychiatric:  Positive for: Appropriate Affect, Intact Judgement      Lymphatic:  Negative for: Cervical, Infraclavicular, Supraclavicular            PREVENTION      Hx Influenza Vaccination:  No      2 or More Falls Past Year?:  No      Fall Past Year with Injury?:  No      Encouraged to follow-up with:  PCP regarding preventative exams.      Chart initiated by      GUIDO SOL MA            ALLERGY/MEDS      Allergies      Coded Allergies:             LISINOPRIL (Verified  Adverse Reaction, Severe, FALLS WITH, 8/12/19)            Medications      Last Reconciled on 5/13/19 14:55 by LUCILA GREENFIELD      Ampicillin (Ampicillin) 500 Mg Capsule      500 MG PO TID for 7 Days, #20 CAP 0 Refills         Prov: JEANETH BEE         5/7/19       Losartan Potassium (Losartan*) 25 Mg Tablet      25 MG PO QDAY, #60 TAB 0 Refills         Prov: JEANETH BEE         5/7/19       ACETAMINOPHEN/Diphenhydramine 500/25 MG (ACETAMINOPHEN PM) 1 Tab Tablet      2 TAB PO HS PRN for SLEEP, #60 TAB          Reported         5/3/19       Pravastatin Sodium (Pravastatin*) 80 Mg Tablet      80 MG PO HS, TAB         Reported         5/3/19       FLUoxetine HCl (PROzac) 20 Mg Capsule      20 MG PO TID, #30 CAP 0 Refills         Reported         5/3/19       tiZANidine HCl (tiZANidine HCl) 4 Mg Tablet      4 MG PO TID, TAB         Reported         5/3/19       Spironolactone (Spironolactone*) 25 Mg Tablet      25 MG PO BID, #60 TAB 0 Refills         Reported         5/3/19       Cyclobenzaprine Hcl (Cyclobenzaprine*) 10 Mg Tablet      10 MG PO TID PRN for MUSCLE SPASMS, TAB 0 Refills         Reported         5/3/19       ClonazePAM (ClonazePAM) 1 Mg Tablet      1 MG PO BID, #60 TAB 0 Refills         Reported         5/3/19       Clopidogrel Bisulfate (Plavix) 75 Mg Tablet      75 MG PO QDAY         Reported         5/3/19       Metoprolol Succinate (Metoprolol Succinate) 25 Mg Tab.er.24h      25 MG PO QDAY         Reported         5/3/19       Aspirin Chew (Aspirin Chew) 81 Mg Tab.chew      81 MG PO QDAY         Reported         5/3/19       Hydrocodone/Acetaminophen 5/325 MG (Norco 5/325 Mg) 1 Each Tablet      1 TAB PO Q4H         Reported         5/3/19       Furosemide (Furosemide) 40 Mg Tablet      40 MG PO BID, #30 TAB 0 Refills         Reported         4/15/16       Nitroglycerin (Nitroglycerin) 0.4 Mg Tablet      0.4 MG SL ASDIR, TAB         Reported         11/5/13      Medications Reviewed:  No Changes made to meds            IMPRESSION/PLAN      Diagnosis      Colon cancer         Malignant neoplasm of sigmoid colon - C18.7         Colon location: sigmoid            Notes      Pt is doing well. I see no evidence of disease recurrence by her history,     physical examination.  She will have lab work today.  She does have a stitch     that appears to be extravasating.  We discussed local wound care to that area.      Follow-up with surgery if no improvement.  RTC 3 months for ongoing surveillance    with labs  and scans prior.      New Diagnostics      * CMP Comp Metabolic Panel, Routine         Dx: Colon cancer - C18.9      * Cea/Carcinoembryonic, Routine         Dx: Colon cancer - C18.9      * CBC With Auto Diff, Routine         Dx: Colon cancer - C18.9      * CMP Comp Metabolic Panel, 3 Months         Dx: Colon cancer - C18.9      * CBC With Auto Diff, 3 Months         Dx: Colon cancer - C18.9      * CT Abd/Pelvis/Chest W/Contrast, 3 Months         Dx: Colon cancer - C18.9            Patient Education      Patient Education Provided:  Yes                 Disclaimer: Converted document may not contain table formatting or lab diagrams. Please see [employer] for the authenticated document.

## 2021-05-28 NOTE — PROGRESS NOTES
Patient: ROD CASTANEDA     Acct: [id number]     Report: #[id number]  UNIT #: [id number]     : 1960    Encounter Date:2021  PRIMARY CARE: THEO BROWN  ***Signed***  --------------------------------------------------------------------------------------------------------------------  NURSE INTAKE      Visit Type      Established Patient Visit            Chief Complaint      COLON CA F/U      Intent of Therapy:  Curative            Referring Provider/Copies To      Primary Care Provider:  THEO BROWN      Copies To:   THEO BROWN            History and Present Illness      Past Oncology Illness History      Patient is a 61-year-old white female with past medical history significant for     coronary artery disease, chronic renal insufficiency who presents for evaluation    of 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  Patient notes that     she began experiencing some blood per rectum around December of last year.  At     the same time she was hospitalized for a fourth heart attack requiring stenting.     Because of the ongoing blood per rectum, she was referred to gastroenterology     and underwent colonoscopy on 3/25/19 which revealed a lesion in the sigmoid     colon.  Biopsies were obtained and demonstrate adenocarcinoma.  She was seen by     Dr. Danae Solis, surgery and anticipates resection on 2019. .Patient     returns her for follow-up of her colon cancer.  She was resected previously but     more recently found to have an isolated recurrence in the dome of the liver.      She was referred to [facility] and evaluated by surgery. She did have biopsy     which confirmed that it was recurrent adenocarcinoma. In 2020, she     underwent radiofrequency Liver Ablation at [place] by Dr. Prieto. Patient is     accompanied by her sister who helps with the patients health history. 21     patient returns to the clinic to review surveillance CT scans.             HPI - Oncology Interim      Patient returns for follow-up of her colon cancer.  She is status post     treatments as outlined including directed therapy for an isolated liver lesion.     On return today, she states she is feeling well.  She denies any masses or     adenopathy.  She reports that her bowels are working home without blood     productive, pain or melena.  She is eating and drinking well, her weight is     maintained.  Her energy level is adequate for her daily needs but notes that she    would like.            Cancer Details            Sigmoid colon--Moderately differentiated adenocarcinoma. 2020 Liver      biopsy revealed metastatic colonic adenocarcinoma            Clinical Staging      Stage II ([id number]), recurrent            Treatments      Surgeries       .Liver Ablation at [place] by Dr. Prieto            Clinical Trial Participant      No            ECOG Performance Status      0            Most Recent Lab Findings            Item Value  Date Time             Total Protein 8.5 g/dL H 21 1218             Albumin 4.4 g/dL 21 1218             Globulin 4.1 g/dL H 21 1218             Albumin/Globulin Ratio 1.1 {ratio} L 21 1218             Carcinoembryonic Antigen 3.2 ng/mL 21 1218            Laboratory Tests      21 12:18            Most Recent Imaging Findings      Patient: ROD CASTANEDA   Acct: #[id number]   Report: #[id number]            UNIT #: [id number]    DOS: 21 1243      INSURANCE:[facility]   ORDER #:[id number]      LOCATION:CT     : 1960            PROVIDERS      ADMITTING:     ATTENDING: NORMA BAUTISTA      FAMILY:  THEO BROWN   ORDERING:  NORMA BAUTISTA         OTHER:    DICTATING:  Elder Howell MD            REQ #:[id number]   EXAM:CTAPCWOC - CT ABD PEL CHEST wo      REASON FOR EXAM:  MALIGNANT NEOPLASM OF COLON,R91.1      REASON FOR VISIT:  MALIGNANT NEOPLASM OF COLON,R91.1            *******Signed******          PROCEDURE:   CT ABDOMEN PELVIS CHEST WITHOUT             COMPARISON:   [facility], CT, CT CHEST ABD PEL WO CONTRAST,     2/09/2021, 15:31.             INDICATIONS:   MALIGNANT NEOPLASM OF COLON             TECHNIQUE:   CT images were obtained without the administration of intravenous     contrast material.             PROTOCOL:     Standard imaging protocol performed                RADIATION:     DLP: 1097mGy*cm          Automated exposure control was utilized to minimize radiation dose.              FINDINGS:         Chest:  There are postop changes of left-sided transvenous pacemaker placement.     The patient has       had a prior sternotomy and bypass surgery.  No new or enlarging pulmonary     nodules are present.        There is some linear scarring in the lingular segment of the left upper lobe.      There are small       stable pulmonary nodules.              Abdomen:  The hypodense area in the left lobe of the liver measures 3.3 x 1.9 cm    .  It has shown       further decrease in size.  There are no new liver lesions.  The gallbladder     appears normal.  Spleen       adrenal glands pancreas and kidneys are normal.  No dilated or thickened loops     of bowel are       present.               Pelvis:  No pelvic masses or fluid collections are seen.  The patient has un    dergone a left total       hip arthroplasty.  Arthritic changes are present within the right hip.             CONCLUSION:         1. No evidence of progressive metastatic disease in the chest abdomen or pelvis.      2. Stable tiny pulmonary nodules.      3. Further decrease in the low-density area within the right hepatic lobe, pr    esumably representing       post treatment change.              Elder Howell MD             Electronically Signed and Approved By: Elder Howell MD on 5/17/2021 at 13:20                                  Until signed, this is an unconfirmed preliminary report that may contain      errors and  is subject to change.                                              NATALIE:      D:05/17/21 1320            PAST, FAMILY   Past Medical History      Past Medical History:  Arthritis, Depression, Heart Attack, High Cholesterol,     Kidney Disease, Mental Disease      Other PMH:        CHF; 6/29/2020      Hematology/Oncology (F):  Colorectal Cancer            Past Surgical History      Biopsy, Coronary Stent, Joint Replacement            COLON RESECTION, 14 LYMPH NODES REMOVED, microwave ablasion            Family History      Family History:  Colorectal Cancer, Melanoma            Social History      Lives independently:  Yes      Number of Children:  0      Occupation:  RETIRED            Tobacco Use      Tobacco status:  Never smoker            Substance Use      Substance use:  Denies use            REVIEW OF SYSTEMS      General:  Admits: Fatigue      Eye:  Admits Corrective Lenses      ENT:  Denies Headache      Respiratory:  Admits: Shortness of Air      Gastrointestinal:  Denies Diarrhea, Denies Nausea/Vomiting      Musculoskeletal:  Admits Back Pain, Admits Muscle Pain      Integumentary:  Denies Itching      Psychiatric:  Admits Depression            VITAL SIGNS AND SCORES      Vitals      Weight 150 lbs 12.714 oz / 68.4 kg      Temperature 98.8 F / 37.11 C - Temporal      Pulse 58      Respirations 18      Blood Pressure 129/75 Sitting, Left Arm      Pulse Oximetry 99%, rm air            Pain Score      Pain Scale Used:  Numerical      Pain Intensity:  10            Fatigue Score      Fatigue (0-10 scale):  10            EXAM      General Appearance:  Positive for: Alert, Cooperative;          Negative for: Acute Distress      Eye:  Positive for: Anicteric Sclerae, Moist Conjunctiva      Neck:  Positive for: Supple;          Negative for: JVD, Masses      Respiratory:  Positive for: CTAB, Normal Respiratory Effort      Abdomen/Gastro:  Positive for: Normal Active Bowel Sounds, Soft;          Negative for:  Distention, Hepatosplenomegaly, Tenderness      Cardiovascular:  Positive for: RRR;          Negative for: Gallop, Murmur, Peripheral Edema, Rub      Psychiatric:  Positive for: Appropriate Affect, Intact Judgement      Lymphatic:  Negative for: Cervical, Infraclavicular, Supraclavicular            PREVENTION      Hx Influenza Vaccination:  No      Influenza Vaccine Declined:  Yes      2 or More Falls in Past Year?:  No      Fall Past Year with Injury?:  No      Encouraged to follow-up with:  PCP regarding preventative exams.      Chart initiated by      GUIDO SOL MA            ALLERGY/MEDS      Allergies      Coded Allergies:             LISINOPRIL (Verified  Adverse Reaction, Unknown, FALLS WITH, 5/18/21)            Medications      Last Reconciled on 5/18/21 16:40 by NORMA BAUTISTA      Pravastatin Sodium (Pravastatin Sodium) 80 Mg Tablet      80 MG PO HS for 30 Days, #30 TAB         Reported         6/26/20       Losartan Potassium (Losartan*) 25 Mg Tablet      25 MG PO QDAY, #60 TAB 0 Refills         Reported         11/26/19       tiZANidine HCl (tiZANidine HCl) 4 Mg Capsule      4 MG PO TID, CAP         Reported         11/26/19       Furosemide (Lasix) 40 Mg Tablet      40 MG PO BID@09,17, #60 TAB 0 Refills         Reported         11/26/19       Spironolactone (Spironolactone*) 25 Mg Tablet      25 MG PO BID, #60 TAB 0 Refills         Reported         11/26/19       Cyclobenzaprine Hcl (Cyclobenzaprine*) 10 Mg Tablet      10 MG PO TID PRN for MUSCLE SPASMS, TAB 0 Refills         Reported         11/26/19       Omeprazole (Omeprazole*) 40 Mg Capsule      MG PO QDAY         Reported         8/17/19       Ergocalciferol (Vitamin D2) 50,000 Unit Capsule      CAP PO QSUNDAY         Reported         8/17/19       ACETAMINOPHEN/Diphenhydramine 500/25 MG (ACETAMINOPHEN PM) 1 Tab Tablet      2 TAB PO HS PRN for SLEEP, #60 TAB         Reported         5/3/19       FLUoxetine HCl (PROzac) 20 Mg Capsule      20  MG PO TID, #30 CAP 0 Refills         Reported         5/3/19       clonazePAM (clonazePAM) 1 Mg Tablet      1 MG PO BID, #60 TAB 0 Refills         Reported         5/3/19       Clopidogrel Bisulfate (Plavix) 75 Mg Tablet      75 MG PO QDAY         Reported         5/3/19       Metoprolol Succinate (Metoprolol Succinate) 25 Mg Tab.er.24h      25 MG PO QDAY         Reported         5/3/19       Aspirin Chew (Aspirin Chew) 81 Mg Tab.chew      81 MG PO QDAY         Reported         5/3/19       Nitroglycerin (Nitroglycerin) 0.4 Mg Tablet      0.4 MG SL ASDIR, TAB         Reported         11/5/13      Medications Reviewed:  No Changes made to meds            IMPRESSION/PLAN      Diagnosis      Colon cancer         Malignant neoplasm of sigmoid colon         Colon location: sigmoid      Status post treatments as outlined.  Patient is doing well.  No evidence of     disease recurrence by history, physical examination, lab work or recent scans.      RTC 6 months for OV with labs and scans prior            Notes      New Diagnostics      * CBC With Auto Diff, 6 Months         Dx: Colon cancer - C18.9      * CMP Comp Metabolic Panel, 6 Months         Dx: Colon cancer - C18.9      * Cea/Carcinoembryonic, 6 Months         Dx: Colon cancer - C18.9      * CT Abd/Pelvis/Chest W/Contrast, 6 Months         Dx: Colon cancer - C18.9            Pain      Follow-up with PCP/Pain Management            Advanced Care Plan Discussion      Declines Discussion 1124F            Patient Education      Patient Education Provided:  Yes            Electronically signed by NORMA BAUTISTA  05/18/2021 16:40       Disclaimer: Converted document may not contain table formatting or lab diagrams. Please see [employer] for the authenticated document.

## 2021-05-28 NOTE — PROGRESS NOTES
Patient: ROD CASTANEDA     Acct: [id number]     Report: #[id number]  UNIT #: [id number]     : 1960    Encounter Date:2020  PRIMARY CARE: THEO BROWN  ***Signed***  --------------------------------------------------------------------------------------------------------------------  NURSE INTAKE      Visit Type      Established Patient Visit            Chief Complaint      COLON CA FOLLOW UP            Referring Provider/Copies To      Referring Provider:  Coy Ordoñez      Primary Care Provider:  THEO BROWN      Copies To:   THEO BROWN            History and Present Illness      Past Oncology Illness History      59-year-old white female with past medical history significant for coronary     artery disease, chronic renal insufficiency who presents for evaluation of     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  Patient notes that she     began experiencing some blood per rectum around December of last year.  At the     same time she was hospitalized for a fourth heart attack requiring stenting.      Because of the ongoing blood per rectum, she was referred to gastroenterology     and underwent colonoscopy on 3/25/19 which revealed a lesion in the sigmoid     colon.  Biopsies were obtained and demonstrate adenocarcinoma.  She was seen by     Dr. Danae Solis, surgery and anticipates resection on the  of this month.      She denies abdominal or pelvic pain.  She denies masses or lymphadenopathy.  She    is eating and drinking normally, her weight is maintained.  Her energy level is     lower than she would like but still adequate for all of her daily needs.  She     thinks a lot of this is related to her recent heart attack.  She had CT scans     ordered but they were done noncontrast secondary to her increased creatinine.      She states that the renal function has been fairly stable according to her     nephrologist.            HPI - Oncology Interim      Patient  returns today for follow-up.  She states that she has been doing very     well since her last appointment.  She denies any abdominal or pelvic pain.  She     reports normal bowel habits without blood per rectum, melena or pain.  She is     eating drinking well, her weight is maintained.  She denies any new masses in     the she notes that her energy level is adequate for all of her daily needs.  She    was due for colonoscopy however this was rescheduled secondary to the COVID     pandemic.            Cancer Details            Sigmoid colon--Moderately differentiated adenocarcinoma.            Clinical Staging      Stage II ([id number])            Clinical Trial Participant      No            ECOG Performance Status      0            Most Recent Lab Findings      Laboratory Tests      5/15/20 09:44            PAST, FAMILY   Past Medical History      Past Medical History:  Arthritis, Depression, Heart Attack (4), High     Cholesterol, Kidney Disease (CKD stage III), Mental Disease (BRAIN INJURY)      Other PMH:        CHF      Hematology/Oncology (F):  Colorectal Cancer            Past Surgical History      Biopsy, Coronary Stent (8), Joint Replacement (LEFT HIP LEFT KNEE)            COLON RESECTION, 14 LYMPH NODES REMOVED.            Family History      Family History:  Colorectal Cancer (PAT UNCLE), Melanoma (MOTHER)            Social History      Marital Status:  Single      Lives independently:  Yes      Number of Children:  0      Occupation:  RETIRED            Tobacco Use      Tobacco status:  Never smoker            Alcohol Use      Alcohol intake:  None            Substance Use      Substance use:  Denies use            REVIEW OF SYSTEMS      General:  Admits: Fatigue      Eye:  Admits Corrective Lenses      ENT:  Admits Headache      Cardiovascular:  Denies Chest Pain      Respiratory:  Denies: Productive Cough, Shortness of Air      Gastrointestinal:  Denies Diarrhea      Musculoskeletal:  Admits Back Pain,  Admits Muscle Pain, Admits Painful Joints      Psychiatric:  Admits Depression            VITAL SIGNS AND SCORES      Vitals      Weight 141 lbs 1.510 oz / 64 kg      Temperature 97.9 F / 36.61 C - Temporal      Pulse 49      Respirations 14      Blood Pressure 96/65 Sitting, Left Arm      Pulse Oximetry 100%      Comment: RM AIR            Pain Score      Pain Intensity:  10            EXAM      General Appearance:  Positive for: Alert, Cooperative;          Negative for: Acute Distress      Neck:  Positive for: Supple;          Negative for: JVD, Masses      Respiratory:  Positive for: CTAB, Normal Respiratory Effort      Abdomen/Gastro:  Positive for: Normal Active Bowel Sounds, Soft;          Negative for: Distention, Hepatosplenomegaly, Tenderness      Cardiovascular:  Positive for: RRR;          Negative for: Gallop, Murmur, Peripheral Edema, Rub      Psychiatric:  Positive for: Appropriate Affect, Intact Judgement      Lymphatic:  Negative for: Cervical, Infraclavicular, Supraclavicular            PREVENTION      Hx Influenza Vaccination:  No      Influenza Vaccine Declined:  Yes      2 or More Falls Past Year?:  No      Fall Past Year with Injury?:  No      Hx Pneumococcal Vaccination:  No      Encouraged to follow-up with:  PCP regarding preventative exams.      Chart initiated by      GUIDO SOL MA            ALLERGY/MEDS      Allergies      Coded Allergies:             LISINOPRIL (Verified  Adverse Reaction, Severe, FALLS WITH, 5/18/20)            Medications      Last Reconciled on 5/18/20 14:55 by NORMA BAUTISTA      HYDROcodone-Acetaminophen 5-325 Mg (HYDROcodone-Acetaminophen 5-325 Mg) 1 Each     Tablet      1 TAB PO Q4H PRN for PAIN, #18 TAB         Prov: FILBURN,RADHA         11/27/19       Losartan Potassium (Losartan*) 25 Mg Tablet      25 MG PO BID, #60 TAB 0 Refills         Reported         11/26/19       tiZANidine HCl (tiZANidine HCl) 4 Mg Capsule      4 MG PO TID, CAP         Reported          11/26/19       Furosemide* (Lasix*) 40 Mg Tablet      40 MG PO BID@09,17, #60 TAB 0 Refills         Reported         11/26/19       Spironolactone (Spironolactone*) 25 Mg Tablet      25 MG PO BID, #60 TAB 0 Refills         Reported         11/26/19       Cyclobenzaprine Hcl (Cyclobenzaprine*) 10 Mg Tablet      10 MG PO TID PRN for MUSCLE SPASMS, TAB 0 Refills         Reported         11/26/19       Omeprazole (Omeprazole*) 40 Mg Capsule      MG PO QDAY         Reported         8/17/19       Ergocalciferol (Vitamin D2) 50,000 Unit Capsule      CAP PO QSUNDAY         Reported         8/17/19       ACETAMINOPHEN/Diphenhydramine 500/25 MG (ACETAMINOPHEN PM) 1 Tab Tablet      2 TAB PO HS PRN for SLEEP, #60 TAB         Reported         5/3/19       FLUoxetine HCl (PROzac) 20 Mg Capsule      20 MG PO TID, #30 CAP 0 Refills         Reported         5/3/19       clonazePAM (clonazePAM) 1 Mg Tablet      1 MG PO BID, #60 TAB 0 Refills         Reported         5/3/19       Clopidogrel Bisulfate (Plavix) 75 Mg Tablet      75 MG PO QDAY         Reported         5/3/19       Metoprolol Succinate (Metoprolol Succinate) 25 Mg Tab.er.24h      25 MG PO QDAY         Reported         5/3/19       Aspirin Chew (Aspirin Chew) 81 Mg Tab.chew      81 MG PO QDAY         Reported         5/3/19       Nitroglycerin (Nitroglycerin) 0.4 Mg Tablet      0.4 MG SL ASDIR, TAB         Reported         11/5/13      Medications Reviewed:  No Changes made to meds            IMPRESSION/PLAN      Diagnosis      Colon cancer         Malignant neoplasm of sigmoid colon         Colon location: sigmoid      Stage II.  Status post resection.  Patient is doing well.  I see no evidence of     disease recurrence by history, physical examination or lab work.  Follow-up with    colonoscopy when able due to the COVID pandemic.  I will see her back in 3     months for ongoing surveillance with labs and CT scans prior.            Pulmonary nodule - R91.1       Noted on prior CT scan.  Radiology recommended repeat CT in 6 months.  Prior to     next visit.            Notes      New Diagnostics      * CBC With Auto Diff, 3 Months         Dx: Colon cancer - C18.9      * CMP Comp Metabolic Panel, 3 Months         Dx: Colon cancer - C18.9      * Cea/Carcinoembryonic, 3 Months         Dx: Colon cancer - C18.9      * CT ABD/Pelvis/Chest WO [place], 3 Months         Dx: Colon cancer - C18.9            Patient Education      Patient Education Provided:  Yes            Electronically signed by NORMA BAUTISTA  05/18/2020 14:56       Disclaimer: Converted document may not contain table formatting or lab diagrams. Please see [employer] for the authenticated document.

## 2021-05-28 NOTE — PROGRESS NOTES
Patient: ROD CASTANEDA     Acct: [id number]     Report: #[id number]  UNIT #: [id number]     : 1960    Encounter Date:2019  PRIMARY CARE: THEO BROWN  ***Signed***  --------------------------------------------------------------------------------------------------------------------  NURSE INTAKE      Visit Type      Established Patient Visit            Chief Complaint      RE CHECK. HAD LABS AND A SCAN.            Referring Provider/Copies To      Referring Provider:  Coy Ordoñez      Primary Care Provider:  THEO BROWN      Copies To:   THEO BROWN            History and Present Illness      Past Oncology Illness History      59-year-old white female with past medical history significant for coronary     artery disease, chronic renal insufficiency who presents for evaluation of     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  Patient notes that she     began experiencing some blood per rectum around December of last year.  At the     same time she was hospitalized for a fourth heart attack requiring stenting.      Because of the ongoing blood per rectum, she was referred to gastroenterology     and underwent colonoscopy on 3/25/19 which revealed a lesion in the sigmoid     colon.  Biopsies were obtained and demonstrate adenocarcinoma.  She was seen by     Dr. Danae Solis, surgery and anticipates resection on the  of this month.      She denies abdominal or pelvic pain.  She denies masses or lymphadenopathy.  She    is eating and drinking normally, her weight is maintained.  Her energy level is     lower than she would like but still adequate for all of her daily needs.  She     thinks a lot of this is related to her recent heart attack.  She had CT scans     ordered but they were done noncontrast secondary to her increased creatinine.      She states that the renal function has been fairly stable according to her     nephrologist.            HPI - Oncology Interim       Patient returns today for follow-up of her stage II colon cancer.  This was     resected earlier in the year .  She was hospitalized again in August and     underwent exploratory laparotomy with excision of adhesions.  She states that     she has been feeling fairly good lately.  Her incision is still slightly tender     especially recently because her dog jumped on it but she denies any redness,     purulent drainage or fever.  She is eating and drinking adequately, her weight     is maintained.  She reports that the bowels are working normally.  She denies     any masses lymphadenopathy.            Cancer Details            Sigmoid colon--Moderately differentiated adenocarcinoma.            Clinical Staging      Stage II ([id number])            Clinical Trial Participant      No            ECOG Performance Status      0            Most Recent Lab Findings      Laboratory Tests      19 10:49            Most Recent Imaging Findings      Patient: ROD CASTANEDA   Acct: #[id number]   Report: #[id number]            UNIT #: [id number]    DOS: 19 0945      INSURANCE:[facility]   ORDER #:[id number]      LOCATION:CT     : 1960            PROVIDERS      ADMITTING:     ATTENDING: PINKY BUSH      FAMILY:  THEO BROWN   ORDERING:  PINKY BUSH         OTHER:    DICTATING:  HUSAM LEE MD            REQ #:[id number]   EXAM:CTACPWC - CT ABD CHEST PEL w CONTR      REASON FOR EXAM:  COLON CA, C18.9      REASON FOR VISIT:  COLON CA, C18.9            *******Signed******         PROCEDURE:   CT ABDOMEN; CT CHEST; CT PELVIS WITH CONTRAST             COMPARISON:   [facility], CT, CHEST W/ CONTRAST, 2018,     13:08.  [facility], CT, CHEST W/O CONTRAST, 2019, 12:34.  [facility],     CT, ABDOMEN/PELVIS       WITH CONTRAST, 2019, 23:46.             INDICATIONS:   COLON CA, C18.9             TECHNIQUE:   After obtaining the  patient's consent, CT images were obtained with    intravenous contrast       material.      PROTOCOL:     Standard imaging protocol performed                RADIATION:     DLP: 1631mGy*cm          Automated exposure control was utilized to minimize radiation dose.       CONTRAST:   100cc Isovue 370 I.V.      LABS:     eGFR: 38ml/min/1.73m2             FINDINGS:         Chest:  Stable 4 mm nodule in the posterior right upper lobe (image 14).  No new    or enlarging       pulmonary nodule.             Small left pleural effusion.  A somewhat prominent right hilar lymph node     measures up to 1.8 cm is       more apparent than on the 12/3/2018 study.  No adenopathy is seen elsewhere in     the chest.        Cardiomegaly with previous CABG and pacer placement.  No aggressive appearing     bone change.             Abdomen:  Liver, spleen, kidneys, adrenal glands, pancreas, and gallbladder are     unremarkable.        Small hiatal hernia.  Distal sigmoid resection with anastomosis.  No abnormal     soft tissue at the       anastomosis.  Moderate colonic stool burden.  Appendix is normal.             There is a new area of irregular enhancing soft tissue in the anterior abdomen     and mid abdominal       wall at the level of the patient's incision.  This area measures 3.5 x 2.8 cm.      No abdominal       adenopathy.             Pelvis:  No pelvic mass, fluid, or adenopathy.  No aggressive appearing bone     change.               CONCLUSION:   New area of irregular enhancing soft tissue in the anterior     abdominal fat and mid       abdominal wall along the patient's incision.  Findings are suspicious for     malignancy.             A somewhat prominent right hilar lymph node is more apparent than on previous     chest CTs.        Metastatic disease in this region is difficult to exclude.             No evidence for recurrence at the patient's anastomosis.              HUSAM LEE MD             Electronically Signed  and Approved By: HUSAM LEE MD on 11/12/2019 at 11:32                        Until signed, this is an unconfirmed preliminary report that may contain      errors and is subject to change.                                              VELMA:      D:11/12/19 1132            PAST, FAMILY   Past Medical History      Past Medical History:  Arthritis, Depression, Heart Attack (4), High     Cholesterol, Kidney Disease (CKD stage III)      Other PMH:        CHF      Hematology/Oncology (F):  Colorectal Cancer            Past Surgical History      Biopsy, Coronary Stent (8), Joint Replacement (LEFT HIP LEFT KNEE)            COLON RESECTION, 14 LYMPH NODES REMOVED.            Family History      Family History:  Colorectal Cancer (PAT UNCLE), Melanoma (MOTHER)            Social History      Marital Status:  Single      Lives independently:  Yes      Number of Children:  0      Occupation:  RETIRED            Tobacco Use      Tobacco status:  Never smoker            Alcohol Use      Alcohol intake:  None            Substance Use      Substance use:  Denies use            REVIEW OF SYSTEMS      General:  Admits: Fatigue;          Denies: Weight Loss      Eye:  Admits Corrective Lenses      ENT:  Admits Sore Throat      Respiratory:  Admits: Shortness of Air;          Denies: Productive Cough      Gastrointestinal:  Denies Diarrhea, Denies Nausea/Vomiting      Neurologic:  Denies Numbness\Tingling      Psychiatric:  Admits Depression      Hematologic/Lymphatic:  Denies Bleeding            VITAL SIGNS AND SCORES      Vitals      Weight 147 lbs 0.749 oz / 66.7 kg      Temperature 98.4 F / 36.89 C - Temporal      Pulse 72      Respirations 20      Blood Pressure 119/66 Sitting, Left Arm      Pulse Oximetry 99%, RM AIR            Pain Score      Pain Scale Used:  Numerical      Pain Intensity:  8            Fatigue Score      Fatigue (0-10 scale):  10            EXAM      General Appearance:  Positive for: Alert, Oriented x3,  Cooperative;          Negative for: Acute Distress      Eye:  Positive for: Anicteric Sclerae, Moist Conjunctiva      Neck:  Positive for: Supple;          Negative for: JVD, Masses      Respiratory:  Positive for: CTAB, Normal Respiratory Effort      Abdomen/Gastro:  Positive for: Normal Active Bowel Sounds, Soft;          Negative for: Distention, Hepatosplenomegaly, Tenderness      Other      Well-healed midline incision without erythema, induration or drainage.  Mildly     tender to palpation      Cardiovascular:  Positive for: RRR;          Negative for: Gallop, Murmur, Peripheral Edema, Rub      Psychiatric:  Positive for: Appropriate Affect, Intact Judgement      Lymphatic:  Negative for: Cervical, Infraclavicular, Supraclavicular            PREVENTION      Hx Influenza Vaccination:  No      Influenza Vaccine Declined:  Yes      2 or More Falls Past Year?:  No      Fall Past Year with Injury?:  No      Encouraged to follow-up with:  PCP regarding preventative exams.      Chart initiated by      GUIDO SOL MA            ALLERGY/MEDS      Allergies      Coded Allergies:             LISINOPRIL (Verified  Adverse Reaction, Severe, FALLS WITH, 11/14/19)            Medications      Last Reconciled on 11/14/19 12:38 by NORMA BAUTISTA      tiZANidine HCl (tiZANidine HCl) 4 Mg Tablet      2 MG PO BID PRN for SEVERE MUSCLE SPASMS for 3 Days, #3 TAB         Prov: Ramon King         8/26/19       Cyclobenzaprine Hcl (Cyclobenzaprine*) 5 Mg Tablet      5 MG PO Q8H PRN for MUSCLE SPASMS for 3 Days, #9 TAB         Prov: Ramon King         8/26/19       Cefdinir (CEFDINIR) 300 Mg Cap      300 MG PO BID for 3 Days, #6 CAP         Prov: Ramon King         8/26/19       Furosemide (Furosemide) 40 Mg Tablet      40 MG PO QDAY, #30 TAB 0 Refills         Prov: Ramon King         8/26/19       Omeprazole (Omeprazole*) 40 Mg Capsule      MG PO QDAY         Reported         8/17/19       Ergocalciferol (Vitamin  D2) 50,000 Unit Capsule      CAP PO QSUNDAY         Reported         8/17/19       ACETAMINOPHEN/Diphenhydramine 500/25 MG (ACETAMINOPHEN PM) 1 Tab Tablet      2 TAB PO HS PRN for SLEEP, #60 TAB         Reported         5/3/19       Pravastatin Sodium (Pravastatin*) 80 Mg Tablet      80 MG PO HS, TAB         Reported         5/3/19       FLUoxetine HCl (PROzac) 20 Mg Capsule      20 MG PO TID, #30 CAP 0 Refills         Reported         5/3/19       ClonazePAM (ClonazePAM) 1 Mg Tablet      1 MG PO BID, #60 TAB 0 Refills         Reported         5/3/19       Clopidogrel Bisulfate (Plavix) 75 Mg Tablet      75 MG PO QDAY         Reported         5/3/19       Metoprolol Succinate (Metoprolol Succinate) 25 Mg Tab.er.24h      25 MG PO QDAY         Reported         5/3/19       Aspirin Chew (Aspirin Chew) 81 Mg Tab.chew      81 MG PO QDAY         Reported         5/3/19       Hydrocodone/Acetaminophen 5/325 MG (Norco 5/325 Mg) 1 Each Tablet      1 TAB PO Q4H PRN for PAIN         Reported         5/3/19       Nitroglycerin (Nitroglycerin) 0.4 Mg Tablet      0.4 MG SL ASDIR, TAB         Reported         11/5/13      Medications Reviewed:  No Changes made to meds            IMPRESSION/PLAN      Diagnosis      Colon cancer         Malignant neoplasm of sigmoid colon         Colon location: sigmoid      Status post resection.  Recent second operation for lysis of adhesions.      Clinically doing well.  RTC 3 months for ongoing surveillance with labs and     scans prior.            Pleural effusion - J90      Patient has history of CHF which is the likely cause of her small effusion.  A     symptomatic.  Monitor            Hilar lymphadenopathy - R59.0      Borderline lymph node seen on CT of the chest measuring 1.8 cm.  She does report    some recent cough such that it may be reactive I will reassess with CT in 3     months.            Notes      New Diagnostics      * CT Abd/Pelvis/Chest W/Contrast, 3 Months         Dx: Colon  cancer - C18.9      * CBC With Auto Diff, 3 Months         Dx: Colon cancer - C18.9      * CMP Comp Metabolic Panel, 3 Months         Dx: Colon cancer - C18.9      * Cea/Carcinoembryonic, 3 Months         Dx: Colon cancer - C18.9            Patient Education            Eat Well, Exercise Well, Be Well: Dietary and Fitness Guidelines      Patient Education Provided:  Yes            Electronically signed by NORMA BAUTISTA  11/14/2019 12:38       Disclaimer: Converted document may not contain table formatting or lab diagrams. Please see [employer] for the authenticated document.

## 2021-05-28 NOTE — PROGRESS NOTES
Patient: ROD CASTANEDA     Acct: [id number]     Report: #[id number]  UNIT #: [id number]     : 1960    Encounter Date:2020  PRIMARY CARE: THEO BROWN  ***Signed***  --------------------------------------------------------------------------------------------------------------------  NURSE INTAKE      Visit Type      Established Patient Visit            Chief Complaint      3 mo f/u            Referring Provider/Copies To      Referring Provider:  Coy Ordoñez      Primary Care Provider:  THEO BROWN      Copies To:   THEO BROWN            History and Present Illness      Past Oncology Illness History      59-year-old white female with past medical history significant for coronary     artery disease, chronic renal insufficiency who presents for evaluation of     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  Patient notes that she     began experiencing some blood per rectum around December of last year.  At the     same time she was hospitalized for a fourth heart attack requiring stenting.      Because of the ongoing blood per rectum, she was referred to gastroenterology     and underwent colonoscopy on 3/25/19 which revealed a lesion in the sigmoid     colon.  Biopsies were obtained and demonstrate adenocarcinoma.  She was seen by     Dr. Danae Solis, surgery and anticipates resection on the  of this month.      She denies abdominal or pelvic pain.  She denies masses or lymphadenopathy.  She    is eating and drinking normally, her weight is maintained.  Her energy level is     lower than she would like but still adequate for all of her daily needs.  She     thinks a lot of this is related to her recent heart attack.  She had CT scans     ordered but they were done noncontrast secondary to her increased creatinine.      She states that the renal function has been fairly stable according to her     nephrologist.            HPI - Oncology Interim      Patient returns for  follow-up of her colon cancer.  She is status post     resection.  She did not require adjuvant chemotherapy.  Since her last visit,     she reports she is had some ongoing wound healing issues with wound related     infections for the past 3 months.  She notes that the areas of finally closed.      She denies any further open wound or drainage.  She denies abdominal or belly     pain.  She is eating drinking well, her weight is maintained.  Her energy level     is good.  She denies any masses lymphadenopathy.  No unusual aches or pains.            Cancer Details            Sigmoid colon--Moderately differentiated adenocarcinoma.            Clinical Staging      Stage II ([id number])            Clinical Trial Participant      No            ECOG Performance Status      0            Most Recent Lab Findings      Laboratory Tests      20 11:13            Most Recent Imaging Findings      Patient: ROD CASTANEDA   Acct: #[id number]   Report: #[id number]            UNIT #: [id number]    DOS: 20 1015      INSURANCE:[facility] CR[facility]   ORDER #:[id number]      LOCATION:[facility]     : 1960            PROVIDERS      ADMITTING:     ATTENDING: NORMA BAUTISTA      FAMILY:  NONE,MD   ORDERING:  NORMA BAUTISTA         OTHER: THEO BROWN   DICTATING:  Srinivasa Mendoza MD            REQ #:[id number]   EXAM:CTACPWC - CT ABD CHEST PEL w CONTR      REASON FOR EXAM:        REASON FOR VISIT:  MALIGNANT NEOPLASM OF COLON            *******Signed******         PROCEDURE:   CT ABDOMEN; CT CHEST; CT PELVIS WITH CONTRAST             COMPARISON:   [facility], CT, CT CHEST ABD PEL W CONTRAST,     2019, 10:32.  [facility], CT, ABDOMEN/PELVIS WITH CONTRAST, 2019, 2:17.             INDICATIONS:   COLON CANCER, DX 2019, F/U, SOA, COUGHING, NON SMOKER, CREAT 1.52    20, GFR 36             TECHNIQUE:   CT images were obtained with intravenous contrast material.      PROTOCOL:      Standard imaging protocol performed                RADIATION:     DLP: 940mGy*cm          Automated exposure control was utilized to minimize radiation dose.       CONTRAST:   100cc Isovue 370 I.V.      LABS:     eGFR: 36ml/min/1.73m2             FINDINGS:         Lung window images reveal scattered sub cm noncalcified nodules, which appear     stable.             Mediastinal windows reveal no mediastinal, hilar, or axillary adenopathy.  Right    hilar lymph node       appears smaller in comparison to 11/12/2019.  Extensive coronary artery calcifi    cations are evident.             Dual lead AICD is in unchanged position.             The liver is of normal size.  The liver is of uniformly diminished density     consistent with mild       fatty infiltration.  The gallbladder is not abnormally distended.  No pancreatic    or adrenal mass is       evident.  The spleen is of normal size.  The kidneys enhance bilaterally.             Bowel loops are not abnormally dilated.  Sigmoid anastomosis is unremarkable.      The uterus has an       atrophic appearance.  No pelvic mass is evident.             CONTINUED ON NEXT PAGE...             CONCLUSION:         CT scan of the chest demonstrating scattered sub cm noncalcified nodules,     stable.             CT scan of the abdomen and pelvis demonstrating fatty liver.             Sigmoid anastomosis is unremarkable.              ARIANNE MARROQUIN MD             Electronically Signed and Approved By: ARIANNE MARROQUIN MD on 2/13/2020 at 14:40                           Until signed, this is an unconfirmed preliminary report that may contain      errors and is subject to change.                                              COUST:      D:02/13/20 1440            PAST, FAMILY   Past Medical History      Past Medical History:  Arthritis, Depression, Heart Attack (4), High     Cholesterol, Kidney Disease (CKD stage III)      Other PMH:        CHF      Hematology/Oncology (F):  Colorectal  Cancer            Past Surgical History      Biopsy, Coronary Stent (8), Joint Replacement (LEFT HIP LEFT KNEE)            COLON RESECTION, 14 LYMPH NODES REMOVED.            Family History      Family History:  Colorectal Cancer (PAT UNCLE), Melanoma (MOTHER)            Social History      Marital Status:  Single      Lives independently:  Yes      Number of Children:  0      Occupation:  RETIRED            Tobacco Use      Tobacco status:  Never smoker            Alcohol Use      Alcohol intake:  None            Substance Use      Substance use:  Denies use            REVIEW OF SYSTEMS      General:  Admits: Fatigue      Eye:  Admits Corrective Lenses      ENT:  Denies Hearing Loss      Cardiovascular:  Denies Chest Pain      Gastrointestinal:  Denies Constipation, Denies Diarrhea      Musculoskeletal:  Denies Back Pain, Denies Muscle Pain      Neurologic:  Denies Numbness\Tingling      Psychiatric:  Admits Depression            VITAL SIGNS AND SCORES      Vitals      Weight 145 lbs 8.058 oz / 66 kg      Temperature 98.2 F / 36.78 C - Temporal      Pulse 57      Respirations 18      Blood Pressure 94/69 Sitting, Left Arm      Pulse Oximetry 100%, RM AIR            Pain Score      Pain Scale Used:  Numerical      Pain Intensity:  0            Fatigue Score      Fatigue (0-10 scale):  8            EXAM      General Appearance:  Positive for: Alert, Oriented x3, Cooperative;          Negative for: Acute Distress      Eye:  Positive for: Anicteric Sclerae, Moist Conjunctiva      Neck:  Positive for: Supple;          Negative for: JVD, Masses      Respiratory:  Positive for: CTAB, Normal Respiratory Effort      Abdomen/Gastro:  Positive for: Normal Active Bowel Sounds, Soft;          Negative for: Distention, Hepatosplenomegaly, Tenderness      Other      Well-healed midline incision      Cardiovascular:  Positive for: RRR;          Negative for: Gallop, Murmur, Peripheral Edema, Rub      Psychiatric:  Positive for:  Appropriate Affect, Intact Judgement      Lymphatic:  Negative for: Cervical, Infraclavicular, Supraclavicular            PREVENTION      Hx Influenza Vaccination:  No      Influenza Vaccine Declined:  Yes      2 or More Falls Past Year?:  No      Fall Past Year with Injury?:  No      Encouraged to follow-up with:  PCP regarding preventative exams.      Chart initiated by      GUIDO SOL MA            ALLERGY/MEDS      Allergies      Coded Allergies:             LISINOPRIL (Verified  Adverse Reaction, Severe, FALLS WITH, 2/17/20)            Medications      Last Reconciled on 2/17/20 17:01 by NORMA BAUTISTA      HYDROcodone-Acetaminophen 5-325 Mg (HYDROcodone-Acetaminophen 5-325 Mg) 1 Each     Tablet      1 TAB PO Q4H PRN for PAIN, #18 TAB         Prov: FILBURNBIPINE         11/27/19       Losartan Potassium (Losartan*) 25 Mg Tablet      25 MG PO BID, #60 TAB 0 Refills         Reported         11/26/19       tiZANidine HCl (tiZANidine HCl) 4 Mg Capsule      4 MG PO TID, CAP         Reported         11/26/19       Furosemide* (Lasix*) 40 Mg Tablet      40 MG PO BID@09,17, #60 TAB 0 Refills         Reported         11/26/19       Spironolactone (Spironolactone*) 25 Mg Tablet      25 MG PO BID, #60 TAB 0 Refills         Reported         11/26/19       Cyclobenzaprine Hcl (Cyclobenzaprine*) 10 Mg Tablet      10 MG PO TID PRN for MUSCLE SPASMS, TAB 0 Refills         Reported         11/26/19       Omeprazole (Omeprazole*) 40 Mg Capsule      MG PO QDAY         Reported         8/17/19       Ergocalciferol (Vitamin D2) 50,000 Unit Capsule      CAP PO QSUNDAY         Reported         8/17/19       ACETAMINOPHEN/Diphenhydramine 500/25 MG (ACETAMINOPHEN PM) 1 Tab Tablet      2 TAB PO HS PRN for SLEEP, #60 TAB         Reported         5/3/19       FLUoxetine HCl (PROzac) 20 Mg Capsule      20 MG PO TID, #30 CAP 0 Refills         Reported         5/3/19       clonazePAM (clonazePAM) 1 Mg Tablet      1 MG PO BID, #60 TAB 0  Refills         Reported         5/3/19       Clopidogrel Bisulfate (Plavix) 75 Mg Tablet      75 MG PO QDAY         Reported         5/3/19       Metoprolol Succinate (Metoprolol Succinate) 25 Mg Tab.er.24h      25 MG PO QDAY         Reported         5/3/19       Aspirin Chew (Aspirin Chew) 81 Mg Tab.chew      81 MG PO QDAY         Reported         5/3/19       Nitroglycerin (Nitroglycerin) 0.4 Mg Tablet      0.4 MG SL ASDIR, TAB         Reported         11/5/13      Medications Reviewed:  No Changes made to meds            IMPRESSION/PLAN      Diagnosis      Colon cancer         Malignant neoplasm of sigmoid colon         Colon location: sigmoid      Clinically doing well.  Her wound is now healed.  I see no evidence of disease     recurrence by history, physical examination, lab work including CEA and recent     CT scans.  She is due for colonoscopy and will arrange that with     gastroenterology.  I will see her back in 3 months for ongoing surveillance labs    prior.            Pulmonary nodule - R91.1      Stable on recent CT scan.  I will repeat in 6 months to confirm ongoing     stability.            Notes      New Diagnostics      * CBC With Auto Diff, 3 Months         Dx: Colon cancer - C18.9      * CMP Comp Metabolic Panel, 3 Months         Dx: Colon cancer - C18.9      * Cea/Carcinoembryonic, 3 Months         Dx: Colon cancer - C18.9            Patient Education            Eat Well, Exercise Well, Be Well: Dietary and Fitness Guidelines      Patient Education Provided:  Yes            Electronically signed by NORMA BAUTISTA  02/17/2020 17:01       Disclaimer: Converted document may not contain table formatting or lab diagrams. Please see [employer] for the authenticated document.

## 2021-05-28 NOTE — PROGRESS NOTES
Patient: ROD CASTANEDA     Acct: [id number]     Report: #[id number]  UNIT #: [id number]     : 1960    Encounter Date:10/22/2020  PRIMARY CARE: THEO BROWN  ***Signed***  --------------------------------------------------------------------------------------------------------------------  NURSE INTAKE      Visit Type      Established Patient Visit            Chief Complaint      COLON CA F/U      Intent of Therapy:  Palliative            Referring Provider/Copies To      Primary Care Provider:  THEO BROWN      Copies To:   THEO BROWN; ROBER MATTHEWS            History and Present Illness      Past Oncology Illness History      60-year-old white female with past medical history significant for coronary     artery disease, chronic renal insufficiency who presents for evaluation of r    ecently diagnosed adenocarcinoma of the sigmoid colon.  Patient notes that she     began experiencing some blood per rectum around December of last year.  At the     same time she was hospitalized for a fourth heart attack requiring stenting.      Because of the ongoing blood per rectum, she was referred to gastroenterology     and underwent colonoscopy on 3/25/19 which revealed a lesion in the sigmoid     colon.  Biopsies were obtained and demonstrate adenocarcinoma.  She was seen by     Dr. Danae Solis, surgery and anticipates resection on the  of this month.            [facility] - Oncology Interim      Patient returns her for follow-up of her colon cancer.  She was resected     previously but more recently found to have an isolated recurrence in the dome of    the liver.  She was referred to [facility] and evaluated by surgery.  They felt     that the lesion would be amenable to liver directed therapy.  She did have     biopsy which confirmed that it was recurrent adenocarcinoma.  On Monday of this     week, she underwent radiofrequency ablation.  She states that the procedure went    well although she  still having considerable pain in the liver area referring up     to the right shoulder.  She is on pain medication and it does help when she     takes it.  When she is not taking it, she states that she has trouble taking de    ep breaths.  She denies any other new masses or lymphadenopathy.  She has not     been eating or drinking very well since the procedure but she does feel like she    is getting adequate nutrition and fluid intake.      She presents today for discussion of additional therapy.            Cancer Details            Sigmoid colon--Moderately differentiated adenocarcinoma. 9/21/2020 Liver      biopsy revealed metastatic colonic adenocarcinoma            Clinical Staging      Stage II ([id number]), recurrent            Treatments      Surgeries      Liver Ablation at [place] by Dr. Prieto            Clinical Trial Participant      No            ECOG Performance Status      0            Most Recent Lab Findings      Laboratory Tests      10/15/20 14:20            PAST, FAMILY   Past Medical History      Past Medical History:  Arthritis, Depression, Heart Attack, High Cholesterol,     Kidney Disease, Mental Disease      Other PMH:        CHF; 6/29/2020      Hematology/Oncology (F):  Colorectal Cancer            Past Surgical History      Biopsy, Coronary Stent, Joint Replacement            COLON RESECTION, 14 LYMPH NODES REMOVED, microwave ablasion            Family History      Family History:  Colorectal Cancer, Melanoma            Social History      Lives independently:  Yes      Number of Children:  0      Occupation:  RETIRED            Tobacco Use      Tobacco status:  Never smoker            Substance Use      Substance use:  Denies use            REVIEW OF SYSTEMS      General:  Admits: Fatigue      Eye:  Admits Corrective Lenses      Respiratory:  Admits: Shortness of Air;          Denies: Coughing Blood      Gastrointestinal:  Denies Bloody Stools      Genitourinary:  Denies Blood in Urine       Musculoskeletal:  Admits Back Pain, Admits Muscle Pain      Psychiatric:  Admits Depression      Hematologic/Lymphatic:  Denies Bleeding            VITAL SIGNS AND SCORES      Vitals      Weight 151 lbs 7.296 oz / 68.7 kg      Temperature 98 F / 36.67 C - Temporal      Pulse 61      Respirations 24      Blood Pressure 108/72 Sitting, Left Arm      Pulse Oximetry 99%, rm air            Pain Score      Pain Scale Used:  Numerical      Pain Intensity:  10            Fatigue Score      Fatigue (0-10 scale):  10            EXAM      General Appearance:  Positive for: Alert, Cooperative, Moderate Distress      Eye:  Positive for: Anicteric Sclerae, Moist Conjunctiva      Neck:  Positive for: Supple;          Negative for: JVD, Masses      Respiratory:  Positive for: CTAB, Normal Respiratory Effort      Abdomen/Gastro:  Positive for: Normal Active Bowel Sounds, Tenderness (Right     upper quadrant);          Negative for: Hepatosplenomegaly, Soft      Cardiovascular:  Positive for: RRR;          Negative for: Gallop, Murmur, Peripheral Edema, Rub      Psychiatric:  Positive for: Appropriate Affect, Intact Judgement      Lymphatic:  Negative for: Cervical, Infraclavicular, Supraclavicular            PREVENTION      Influenza Vaccine Declined:  Yes      2 or More Falls in Past Year?:  No      Fall Past Year with Injury?:  No      Encouraged to follow-up with:  PCP regarding preventative exams.      Chart initiated by      GUIDO SOL MA            ALLERGY/MEDS      Allergies      Coded Allergies:             LISINOPRIL (Verified  Adverse Reaction, Unknown, FALLS WITH, 10/22/20)            Medications      Last Reconciled on 10/23/20 10:05 by NORMA BAUTISTA      Pravastatin Sodium (Pravastatin Sodium) 80 Mg Tablet      80 MG PO HS for 30 Days, #30 TAB         Reported         6/26/20       Losartan Potassium (Losartan*) 25 Mg Tablet      25 MG PO QDAY, #60 TAB 0 Refills         Reported         11/26/19       tiZANidine  HCl (tiZANidine HCl) 4 Mg Capsule      4 MG PO TID, CAP         Reported         11/26/19       Furosemide* (Lasix*) 40 Mg Tablet      40 MG PO BID@09,17, #60 TAB 0 Refills         Reported         11/26/19       Spironolactone (Spironolactone*) 25 Mg Tablet      25 MG PO BID, #60 TAB 0 Refills         Reported         11/26/19       Cyclobenzaprine Hcl (Cyclobenzaprine*) 10 Mg Tablet      10 MG PO TID PRN for MUSCLE SPASMS, TAB 0 Refills         Reported         11/26/19       Omeprazole (Omeprazole*) 40 Mg Capsule      MG PO QDAY         Reported         8/17/19       Ergocalciferol (Vitamin D2) 50,000 Unit Capsule      CAP PO QSUNDAY         Reported         8/17/19       ACETAMINOPHEN/Diphenhydramine 500/25 MG (ACETAMINOPHEN PM) 1 Tab Tablet      2 TAB PO HS PRN for SLEEP, #60 TAB         Reported         5/3/19       FLUoxetine HCl (PROzac) 20 Mg Capsule      20 MG PO TID, #30 CAP 0 Refills         Reported         5/3/19       clonazePAM (clonazePAM) 1 Mg Tablet      1 MG PO BID, #60 TAB 0 Refills         Reported         5/3/19       Clopidogrel Bisulfate (Plavix) 75 Mg Tablet      75 MG PO QDAY         Reported         5/3/19       Metoprolol Succinate (Metoprolol Succinate) 25 Mg Tab.er.24h      25 MG PO QDAY         Reported         5/3/19       Aspirin Chew (Aspirin Chew) 81 Mg Tab.chew      81 MG PO QDAY         Reported         5/3/19       Nitroglycerin (Nitroglycerin) 0.4 Mg Tablet      0.4 MG SL ASDIR, TAB         Reported         11/5/13      Medications Reviewed:  No Changes made to meds            IMPRESSION/PLAN      Diagnosis      Colon cancer         Malignant neoplasm of colon, unspecified part of colon         Colon location: unspecified part of colon      Patient has biopsy-proven recurrence in the liver which was just treated de    finitively with radiofrequency ablation.  She is having appropriate post     procedure discomfort.  I would recommend complete restaging including lab work      and CT scans to assess for any other areas of disease.  These will need to be     done without contrast given her chronic renal disease.  We discussed that she is    at high risk for recurrence with no additional therapy.  Options could include     IV chemotherapy via port such as FOLFOX consisting of oxaliplatin, leucovorin,     5-fluorouracil.  She would likely require dose adjustments based on her renal     function although given her recent procedure, I do not think she is at her     baseline.  We discussed other alternatives such as single agent oral Xeloda wh    ich may be more tolerable but would not offer the same benefit in terms of risk     reduction of recurrence.  I will send her recent liver biopsy for Caris testing     which may offer additional options.  She still requires some time to recover     from her recent procedure.  I will plan to see her back in a couple of weeks     with scans prior.  She feels like she is inclined toward FOLFOX and she will be     referred to surgery for port placement.            Notes      New Diagnostics      * CCC CBC With Auto Diff, 2 Week         Dx: Colon cancer - C18.9      * CCC Comp Metabolic Panel, 2 Week         Dx: Colon cancer - C18.9      * Cea/Carcinoembryonic, 2 Week         Dx: Colon cancer - C18.9      * CT ABD/Pelvis/Chest WO Contras, 2 Week         Dx: Colon cancer - C18.9      New Referrals      * Surgery, Routine         JEFF CASTILLO MD         Reason for Referral: port flush         Dx: Colon cancer - C18.9            Patient Education            Capecitabine      Coping With Diarrhea Related to Chemotherapy      Coping With Fatigue From Chemotherapy      Coping With Pain Related to Cancer and Chemotherapy      Coping with Nausea and Vomiting From Chemotherapy      Oxaliplatin Injection      Patient Education Provided:  Yes            Electronically signed by NORMA BAUTISTA  10/23/2020 10:05       Disclaimer: Converted document may not  contain table formatting or lab diagrams. Please see [employer] for the authenticated document.

## 2021-05-28 NOTE — PROGRESS NOTES
Patient: ROD CASTANEDA     Acct: [id number]     Report: #[id number]  UNIT #: [id number]     : 1960    Encounter Date:2019  PRIMARY CARE: THEO BROWN  ***Signed***  --------------------------------------------------------------------------------------------------------------------  NURSE INTAKE      Visit Type      New Patient Visit            Chief Complaint      colon mass      Intent of Therapy:  Curative            Referring Provider/Copies To      Referring Provider:  Coy Ordoñez      PCP Not Found in Lookup:  THEO HUSSEIN            History and Present Illness      HPI - Oncology Interim      59-year-old white female with past medical history significant for coronary     artery disease, chronic renal insufficiency who presents for evaluation of     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  Patient notes that she     began experiencing some blood per rectum around December of last year.  At the     same time she was hospitalized for a fourth heart attack requiring stenting.      Because of the ongoing blood per rectum, she was referred to gastroenterology     and underwent colonoscopy on 3/25/19 which revealed a lesion in the sigmoid     colon.  Biopsies were obtained and demonstrate adenocarcinoma.  She was seen by     Dr. Danae Solis, surgery and anticipates resection on the  of this month.      She denies abdominal or pelvic pain.  She denies masses or lymphadenopathy.  She    is eating and drinking normally, her weight is maintained.  Her energy level is     lower than she would like but still adequate for all of her daily needs.  She     thinks a lot of this is related to her recent heart attack.  She had CT scans     ordered but they were done noncontrast secondary to her increased creatinine.      She states that the renal function has been fairly stable according to her     nephrologist.      Because of the cancer, she is referred for evaluation.             Cancer Details            Sigmoid colon.  Adenocarcinoma.            Clinical Trial Participant      No            ECOG Performance Status      0            Most Recent Imaging Findings      Patient: ROD CASTANEDA   Acct: #[id number]   Report: #[id number]            UNIT #: [id number]    DOS: 19       INSURANCE:[facility]   ORDER #:[id number]      LOCATION:CT     : 1960            PROVIDERS      ADMITTING:     ATTENDING: Coy Ordoñez      FAMILY:  THEO BROWN   ORDERING:  Coy Ordoñez         OTHER:    DICTATING:  Sera Bob MD            REQ #:[id number]   EXAM:ABDPELWO - CT ABDOMEN PELVIS wo CONTRAST      REASON FOR EXAM:  RECTAL BLEEDING      REASON FOR VISIT:  RECTAL BLEEDING            *******Signed******         PROCEDURE:   CT ABDOMEN PELVIS WITHOUT CONTRAST             COMPARISON:   [facility], CT, CHEST W/ CONTRAST, 2018, 13    :08.  [facility], US, US ABDOMEN LIMITED, 2018, 9:46.             INDICATIONS:   RECTAL BLEEDING/COLON LESION             TECHNIQUE:   CT images were created without intravenous contrast.               PROTOCOL:     Standard imaging protocol performed                RADIATION:     DLP: 755mGy*cm          Automated exposure control was utilized to minimize radiation dose.              FINDINGS:         CT was performed without contrast due to the patient's renal function (GFR 25).     Technologist       called Dr. Ordoñez's office, who requested that the scan be done without     contrast today, rather       than have the patient be hydrated and rescheduled for a future date.             The liver, gallbladder, pancreas, spleen, adrenal glands, and kidneys appear     unremarkable for       noncontrast CT.  There is probably a small hiatal hernia.  No abnormal small     bowel distention or       wall thickening is seen.  The appendix is nondilated.  There is a suspected     short segment of       sigmoid  colonic wall thickening.  There is an 8 x 7 mm lymph node adjacent to     the sigmoid colon on       axial image 182. No additional adenopathy is identified.  Urinary bladder,     uterus, and adnexal       regions appear unremarkable.  Atherosclerotic vascular calcifications are noted.     T12 vertebral       body height loss is stable.  No acute or worrisome osseous abnormality is     identified.             CONCLUSION:         1. CT was performed without contrast due to the patient's renal function.      2. Short segment of sigmoid colon wall thickening, which may correspond to the     history of colon       lesion.      3. 8 x 7 mm lymph node adjacent to the sigmoid colon.  No additional adenopathy     identified.      4. No definite liver mass is identified on limited non-contrast CT.  A 12 mm     hyperechoic right       hepatic lesion was described on 2018 ultrasound.  It would be helpful to     follow-up with       contrast enhanced CT, when the patient's renal condition permits.              MACK LARSEN MD             Electronically Signed and Approved By: MACK LARSEN MD on 3/28/2019 at 14:09                           Until signed, this is an unconfirmed preliminary report that may contain      errors and is subject to change.                                              BARLA:      D:19 1409            PAST, FAMILY   Past Medical History      Past Medical History:  Arthritis, Depression, Heart Attack (4), High     Cholesterol, Kidney Disease (CKD stage III)      Other PMH      CHF      Hematology/Oncology (F):  Colorectal Cancer            Past Surgical History      Coronary Stent (8), Joint Replacement (LEFT HIP LEFT KNEE)      HOLLY BYPASS, PACEMAKER            Family History      Family History:  Colorectal Cancer (PAT UNCLE), Melanoma (MOTHER)      FATHER PASSED OF HEART ATTACK. MOTHER  from complications of cirrhosis.     One sister with coronary artery disease.  No  children.            Social History      Marital Status:  Single      Lives independently:  Yes      Number of Children:  0      Occupation:  RETIRED            Tobacco Use      Tobacco status:  Never smoker            Alcohol Use      Alcohol intake:  None            Substance Use      Substance use:  Denies use            REVIEW OF SYSTEMS      General:  Admits: Fatigue;          Denies: Appetite Change, Fever, Night Sweats, Weight Gain, Weight Loss      Eye:  Denies: Blurred Vision, Corrective Lenses, Diplopia, Eye Irritation, Eye     Pain, Eye Redness, Spots in Vision, Vision Loss      ENT:  Denies: Headache, Hearing Loss, Hoarseness, Seizures, Sinus Congestion,     Sore Throat      Cardiovascular:  Denies: Chest Pain, Edema Ankles, Edema Legs, Irregular     Heartbeat, Palpitations      Respiratory:  Denies: Coughing Blood, Productive Cough, Shortness of Air,     Wheezing      Gastrointestinal:  Denies: Bloody Stools, Constipation, Diarrhea, Frequent H    e[place]burn, Nausea, Problem Swallowing, Tarry Stools, Unable to Control Bowels,     Vomiting      Genitourinary (female):  Denies: Blood in Urine, Decrease Urine Stream, Frequent    Urination, Incontinence, Painful Urination      Musculoskeletal:  Denies: Back Pain, Leg Cramps, Muscle Pain, Muscle Weakness,     Painful Joints, Swollen Joints      Integumentary:  Denies: Bleeds Easily, Bruises Easily, Hair Changes, Jaundice,     Lesions, Mole Changes, Nail Changes, Pigment Changes, Rash, Skin Discoloration      Neurologic:  Denies: Dizziness, Fainting, Numbness\Tingling, Paralysis, Seizures      Psychiatric:  Denies: Anxiety, Confused, Depression, Disoriented, Memory Loss      Endocrine:  Denies: Cold Intolerance, Diabetes, Excessive Sweating, Excessive     Thirst, Excessive Urination, Heat Intolerance, Flushing, Hyperthyroidism,     Hypothyroidism      Hematologic/Lymphatic:  Denies: Bruising, Bleeding, Enlarged Lymph Nodes,     Recurrent Infections, Transfusions             VITAL SIGNS AND SCORES      Vitals      Height 5 ft 2.76 in / 159.4 cm      Weight 150 lbs 9.187 oz / 68.3 kg      BSA 1.71 m2      BMI 26.9 kg/m2      Temperature 98.5 F / 36.94 C - Temporal      Pulse 54      Respirations 16      Blood Pressure 93/62 Sitting, Left Arm      Pulse Oximetry 97%, RM AIR            Pain Score      Pain Scale Used:  Numerical      Pain Intensity:  0            Fatigue Score      Fatigue (0-10 scale):  10            EXAM      General Appearance:  Positive for: Alert, Oriented x3, Cooperative;          Negative for: Acute Distress      Eye:  Positive for: Anicteric Sclerae, Moist Conjunctiva      Neck:  Positive for: Supple;          Negative for: JVD, Masses      Respiratory:  Positive for: CTAB, Normal Respiratory Effort      Other      Pacemaker in place      Abdomen/Gastro:  Positive for: Normal Active Bowel Sounds, Soft;          Negative for: Distention, Hepatosplenomegaly, Tenderness      Cardiovascular:  Positive for: RRR;          Negative for: Gallop, Murmur, Peripheral Edema, Rub      Psychiatric:  Positive for: Appropriate Affect, Intact Judgement      Lower Extremities:  Negative for: Edema      Upper Extremities:  Negative for: Clubbing, Digital Cyanosis, Digital Ischemia      Lymphatic:  Negative for: Axillary, Cervical, Infraclavicular, Supraclavicular            PREVENTION      Hx Influenza Vaccination:  No      2 or More Falls Past Year?:  No      Fall Past Year with Injury?:  No      Encouraged to follow-up with:  PCP regarding preventative exams.      Chart initiated by      GUIDO SOL MA            ALLERGY/MEDS      Allergies      Coded Allergies:             LISINOPRIL (Verified  Adverse Reaction, Severe, 4/4/19)            Medications            ClonazePAM (ClonazePAM) 1 Mg Tablet      2 MG PO HS, #30 TAB 0 Refills         Reported         3/21/19       Cyclobenzaprine Hcl (Cyclobenzaprine*) 10 Mg Tablet      20 MG PO HS, #120 TAB 0 Refills          Reported         3/21/19       tiZANidine HCl (tiZANidine HCl) 4 Mg Capsule      8 MG PO HS, CAP         Reported         3/21/19       Ergocalciferol (Vitamin D2) 50,000 Unit Capsule      19905 UNITS PO SA for 30 Days, #4 CAP         Reported         3/21/19       Clopidogrel Bisulfate (Plavix) 75 Mg Tablet      75 MG PO HS, #30 TAB 0 Refills         Reported         3/21/19       Aspirin Chew (Aspirin Baby) 81 Mg Tab.chew      81 MG PO HS, #30 TAB.CHEW 0 Refills         Reported         3/21/19       Atenolol (ATENOLOL) 25 Mg Tablet      25 MG PO HS, #30 TAB 0 Refills         Reported         3/21/19       Sacubitril/Valsartan (Entresto 24/26 mg) 1 Each Tablet      0.5 TAB PO BID for 60 Days, #60 TAB 6 Refills         Prov: Praveen Chong         12/18/18       ACETAMINOPHEN/Diphenhydramine 500/25 MG (ACETAMINOPHEN PM) 1 Tab Tablet      2 TAB PO HS, #60 TAB         Reported         12/12/18       Omeprazole (PriLOSEC*) 20 Mg Capsule.dr      40 MG PO HS, #60 CAP 0 Refills         Reported         10/26/16       Spironolactone (Spironolactone*) 25 Mg Tablet      25 MG PO BID, #30 TAB 0 Refills         Reported         4/15/16       Furosemide (Furosemide) 40 Mg Tablet      40 MG PO BID, #30 TAB 0 Refills         Reported         4/15/16       FLUoxetine HCl (FLUoxetine HCl) 20 Mg Capsule      40 MG PO HS, CAP         Reported         12/9/13       Nitroglycerin (Nitroglycerin) 0.4 Mg Tablet      0.4 MG SL ASDIR, TAB         Reported         11/5/13      Medications Reviewed:  No Changes made to meds            IMPRESSION/PLAN      Impression      Colon cancer.  Chronic kidney disease.  Coronary artery disease.            Diagnosis      Cancer of sigmoid colon - C18.7      Patient has biopsy-proven adenocarcinoma the sigmoid colon.  She will have     complete staging evaluation to include CT of the chest along with the abdomen     pelvis that she had recently.  She will also have CBC, CMP and CEA.  She has      been seen by surgery and the next step is resection.  I will see her back in the    postoperative setting to review her final pathology, complete her staging and     discuss potential adjuvant therapy based upon her final stage.            Chronic kidney disease (CKD)         Stage 3 chronic kidney disease - N18.3         Chronic kidney disease stage: stage 3 (moderate)      Patient reports that her creatinine has been relatively stable however when     checked for CT scans it had increased to from a baseline in the 1.2-1.5 range.      We will plan to repeat lab work.  She may need to see nephrology again.            Coronary artery disease         Coronary artery disease involving native heart, angina presence unspecified,        unspecified vessel or lesion type - I25.10         Coronary Disease-Associated Artery/Lesion type: unspecified vessel or lesion        type         Native vs. transplanted heart: native heart         Associated angina: angina presence unspecified      Patient recently hospitalized for an MI.  She had stenting placed in December.      She has congestive heart failure and a pacemaker.            Notes      New Diagnostics      * CBC With Auto Diff, Routine         Dx: Colon cancer - C18.9      * CMP Comp Metabolic Panel, Routine         Dx: Colon cancer - C18.9      * Cea/Carcinoembryonic, Routine         Dx: Colon cancer - C18.9      * Chest W/O Cont CT, SCHEDULED PROCEDURE         Dx: Colon cancer - C18.9            Patient Education      Patient Education Provided:  Yes                 Disclaimer: Converted document may not contain table formatting or lab diagrams. Please see [employer] for the authenticated document.

## 2021-05-28 NOTE — PROGRESS NOTES
Patient: ROD CASTANEDA     Acct: [id number]     Report: #[id number]  UNIT #: [id number]     : 1960    Encounter Date:2020  PRIMARY CARE: THEO BROWN  ***Signed***  --------------------------------------------------------------------------------------------------------------------  NURSE INTAKE      Visit Type      Established Patient Visit            Chief Complaint      COLON CA      Intent of Therapy:  Palliative            Referring Provider/Copies To      Primary Care Provider:  THEO BROWN      Copies To:   THEO BROWN            History and Present Illness      Past Oncology Illness History      60-year-old white female with past medical history significant for coronary     artery disease, chronic renal insufficiency who presents for evaluation of     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  Patient notes that she     began experiencing some blood per rectum around December of last year.  At the     same time she was hospitalized for a fourth heart attack requiring stenting.      Because of the ongoing blood per rectum, she was referred to gastroenterology     and underwent colonoscopy on 3/25/19 which revealed a lesion in the sigmoid     colon.  Biopsies were obtained and demonstrate adenocarcinoma.  She was seen by     Dr. Danae Solis, surgery and anticipates resection on the  of this month.            HPI - Oncology Interim      Patient returns for follow-up of her colon cancer.  She recently underwent RFA     to isolated recurrence of the liver.  At her last visit, we had discussed     restaging scans and the possibility of chemotherapy.  She returns today for     further discussion.  She states she is feeling better.  She does not have any     pain in the liver area.  She reports the bowels are working well without blood     per rectum, pain or melena.  She is eating and drinking adequately, her weight     is maintained.  She continues to take  medication for her coronary artery disease    and congestive heart failure.  She denies any masses lymphadenopathy.  Her     energy level is adequate for her daily needs.            Cancer Details            Sigmoid colon--Moderately differentiated adenocarcinoma. 2020 Liver      biopsy revealed metastatic colonic adenocarcinoma            Clinical Staging      Stage II ([id number]), recurrent            Treatments      Surgeries      Liver Ablation at [facility] by Dr. Prieto            Clinical Trial Participant      No            ECOG Performance Status      0            Most Recent Lab Findings      Laboratory Tests      20 11:54            Most Recent Imaging Findings      Patient: ROD CASTANEDA   Acct: #[id number]   Report: #[id number]            UNIT #: [id number]    DOS: 20 1108      INSURANCE:[employer] [facility]   ORDER #:[id number]      LOCATION:CT     : 1960            PROVIDERS      ADMITTING:     ATTENDING: NROMA BAUTISTA      FAMILY:  THEO BROWN   ORDERING:  NORMA BAUTISTA         OTHER:    DICTATING:  Napoleon Thompson MD, IV            REQ #:[id number]   EXAM:CTAPCWOC - CT ABD PEL CHEST wo      REASON FOR EXAM:  COLON CA      REASON FOR VISIT:  COLON CA            *******Signed******         PROCEDURE:   CT ABDOMEN PELVIS CHEST WITHOUT             COMPARISON:   [facility], CT, CT CHEST ABD PEL WO CONTRAST,     2020, 9:18.             INDICATIONS:   COLON CA             PROTOCOL:     Standard imaging protocol performed                RADIATION:     DLP: 1060mGy*cm          Automated exposure control was utilized to minimize radiation dose.              TECHNIQUE:   Axial images of the chest, abdomen and pelvis without contrast.             FINDINGS:             CHEST:  Multiple stable bilateral noncalcified pulmonary nodules measuring up to    5 mm.  Prior       median sternotomy.  There is a left-sided cardiac pacemaker/AICD.  There are no     enlarged        mediastinal, axillary or hilar lymph nodes.  No pleural effusions are     identified.  Degenerative       changes are present in the thoracic spine.  The thoracic aorta has a normal     caliber.                    ABDOMEN:  4.7 cm x 2.4 cm mass in the right hepatic lobe segment 8 is larger on     today's       examination.  There is also in adjacent 1.2 cm mass in segment 8. The mass is     hypodense with slight       increased attenuation centrally.  The unenhanced liver, spleen, pancreas and     adrenal glands are       normal.  There are no inflammatory changes around the gallbladder.  No evidence     of solid renal mass       or hydronephrosis.  No evidence of adenopathy or ascites.             PELVIS:  Prior left hip arthroplasty.  No evidence of bowel obstruction,     perforation or abscess.        No CT evidence of colitis.  Normal appendix and terminal ileum.  The abdominal     aorta has a normal       caliber.  The urinary bladder is decompressed.  The uterus and adnexa have a     normal unenhanced       appearance.  Degenerative changes are present in the lumbar spine.  There is a     surgical suture line       in the distal colon.             IMPRESSION:       1. Stable bilateral noncalcified pulmonary nodules measuring up to 5 mm.             2. 4.7 cm x 2.4 cm and 1.2 cm masses in the right hepatic lobe segment 8 young    spicious for a       metastatic lesions.               MARIA EUGENIA CHAPA MD             Electronically Signed and Approved By: MARIA EUGENIA CHAPA MD on 11/05/2020 at     12:17                               Until signed, this is an unconfirmed preliminary report that may contain      errors and is subject to change.                                              CATHERINE:      D:11/05/20 1217            PAST, FAMILY   Past Medical History      Past Medical History:  Arthritis, Depression, Heart Attack, High Cholesterol,     Kidney Disease, Mental Disease      Other PMH:        CHF; 6/29/2020       Hematology/Oncology (F):  Colorectal Cancer            Past Surgical History      Biopsy, Coronary Stent, Joint Replacement            COLON RESECTION, 14 LYMPH NODES REMOVED, microwave ablasion            Family History      Family History:  Colorectal Cancer, Melanoma            Social History      Lives independently:  Yes      Number of Children:  0      Occupation:  RETIRED            Tobacco Use      Tobacco status:  Never smoker            Substance Use      Substance use:  Denies use            REVIEW OF SYSTEMS      General:  Admits: Fatigue      Eye:  Admits Corrective Lenses      ENT:  Denies Headache      Respiratory:  Admits: Shortness of Air;          Denies: Cough      Gastrointestinal:  Denies Diarrhea, Denies Nausea/Vomiting      Neurologic:  Denies Dizziness, Denies Numbness\Tingling      Hematologic/Lymphatic:  Admits Bruising, Admits Bleeding            VITAL SIGNS AND SCORES      Vitals      Weight 148 lbs 2.386 oz / 67.2 kg      Temperature 97.7 F / 36.5 C - Temporal      Pulse 66      Respirations 18      Blood Pressure 106/53 Sitting, Left Arm      Pulse Oximetry 100%, RM AIR            Pain Score      Pain Scale Used:  Numerical      Pain Intensity:  0            Fatigue Score      Fatigue (0-10 scale):  10            EXAM      General Appearance:  Positive for: Alert, Cooperative;          Negative for: Acute Distress      Respiratory:  Positive for: CTAB, Normal Respiratory Effort      Abdomen/Gastro:  Positive for: Normal Active Bowel Sounds, Soft;          Negative for: Distention, Hepatosplenomegaly, Tenderness      Cardiovascular:  Positive for: RRR;          Negative for: Gallop, Murmur, Peripheral Edema, Rub      Other      AICD in place      Psychiatric:  Positive for: Appropriate Affect, Intact Judgement            PREVENTION      Hx Influenza Vaccination:  No      Influenza Vaccine Declined:  Yes      2 or More Falls in Past Year?:  No      Fall Past Year with Injury?:  No       Encouraged to follow-up with:  PCP regarding preventative exams.      Chart initiated by      KIRSTY STEIN MA            ALLERGY/MEDS      Allergies      Coded Allergies:             LISINOPRIL (Verified  Adverse Reaction, Unknown, FALLS WITH, 10/22/20)            Medications      Last Reconciled on 11/9/20 16:21 by NORMA BAUTISTA      Pravastatin Sodium (Pravastatin Sodium) 80 Mg Tablet      80 MG PO HS for 30 Days, #30 TAB         Reported         6/26/20       Losartan Potassium (Losartan*) 25 Mg Tablet      25 MG PO QDAY, #60 TAB 0 Refills         Reported         11/26/19       tiZANidine HCl (tiZANidine HCl) 4 Mg Capsule      4 MG PO TID, CAP         Reported         11/26/19       Furosemide* (Lasix*) 40 Mg Tablet      40 MG PO BID@09,17, #60 TAB 0 Refills         Reported         11/26/19       Spironolactone (Spironolactone*) 25 Mg Tablet      25 MG PO BID, #60 TAB 0 Refills         Reported         11/26/19       Cyclobenzaprine Hcl (Cyclobenzaprine*) 10 Mg Tablet      10 MG PO TID PRN for MUSCLE SPASMS, TAB 0 Refills         Reported         11/26/19       Omeprazole (Omeprazole*) 40 Mg Capsule      MG PO QDAY         Reported         8/17/19       Ergocalciferol (Vitamin D2) 50,000 Unit Capsule      CAP PO QSUNDAY         Reported         8/17/19       ACETAMINOPHEN/Diphenhydramine 500/25 MG (ACETAMINOPHEN PM) 1 Tab Tablet      2 TAB PO HS PRN for SLEEP, #60 TAB         Reported         5/3/19       FLUoxetine HCl (PROzac) 20 Mg Capsule      20 MG PO TID, #30 CAP 0 Refills         Reported         5/3/19       clonazePAM (clonazePAM) 1 Mg Tablet      1 MG PO BID, #60 TAB 0 Refills         Reported         5/3/19       Clopidogrel Bisulfate (Plavix) 75 Mg Tablet      75 MG PO QDAY         Reported         5/3/19       Metoprolol Succinate (Metoprolol Succinate) 25 Mg Tab.er.24h      25 MG PO QDAY         Reported         5/3/19       Aspirin Chew (Aspirin Chew) 81 Mg Tab.chew      81 MG PO QDAY          Reported         5/3/19       Nitroglycerin (Nitroglycerin) 0.4 Mg Tablet      0.4 MG SL ASDIR, TAB         Reported         11/5/13      Medications Reviewed:  No Changes made to meds            IMPRESSION/PLAN      Diagnosis      Colon cancer         Malignant neoplasm of colon, unspecified part of colon         Colon location: unspecified part of colon            Notes      Patient is status post RFA to an isolated liver recurrence.  I see no other     obvious areas of disease on restaging scans which were reviewed with patient.      At her last visit, we had discussed chemotherapy as she is at very high risk for    other recurrences.  Patient has been considering her options and given her other    comorbidities, especially her congestive heart failure, she does not feel the     chemotherapy is right for her.  She understands it that increases her risk of     recurrence or even death from colon cancer.  She is comfortable with her     decision making.  She does wish to continue with surveillance.  I will plan to     see her back in 3 months time for that purpose with labs and CT scans prior.      New Diagnostics      * CT ABD/Pelvis/Chest WO [place], 3 Months         Dx: Colon cancer - C18.9      * CCC CBC With Auto Diff, 3 Months         Dx: Colon cancer - C18.9      * [facility] Comp Metabolic Panel, 3 Months         Dx: Colon cancer - C18.9            Pain      Pain Zero Today            Advanced Care Plan Discussion      Declines Discussion 1124F            Patient Education            Eat Well, Exercise Well, Be Well: Dietary and Fitness Guidelines      Patient Education Provided:  Yes            Electronically signed by NORMA BAUTISTA  11/09/2020 16:21       Disclaimer: Converted document may not contain table formatting or lab diagrams. Please see [employer] for the authenticated document.

## 2021-05-28 NOTE — PROGRESS NOTES
Patient: ROD CASTANEDA     Acct: [id number]     Report: #[id number]  UNIT #: [id number]     : 1960    Encounter Date:2019  PRIMARY CARE: THEO BROWN  ***Signed***  --------------------------------------------------------------------------------------------------------------------  NURSE INTAKE      Visit Type      Established Patient Visit            Chief Complaint      COLON CA      Intent of Therapy:  Curative            Referring Provider/Copies To      Referring Provider:  Coy Ordoñez      PCP Not Found in Lookup:  THEO HUSSEIN            History and Present Illness      Past Oncology Illness History      59-year-old white female with past medical history significant for coronary     artery disease, chronic renal insufficiency who presents for evaluation of     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  Patient notes that she     began experiencing some blood per rectum around December of last year.  At the     same time she was hospitalized for a fourth heart attack requiring stenting.      Because of the ongoing blood per rectum, she was referred to gastroenterology a    nd underwent colonoscopy on 3/25/19 which revealed a lesion in the sigmoid     colon.  Biopsies were obtained and demonstrate adenocarcinoma.  She was seen by     Dr. Danae Solis, surgery and anticipates resection on the  of this month.      She denies abdominal or pelvic pain.  She denies masses or lymphadenopathy.  She    is eating and drinking normally, her weight is maintained.  Her energy level is     lower than she would like but still adequate for all of her daily needs.  She     thinks a lot of this is related to her recent heart attack.  She had CT scans     ordered but they were done noncontrast secondary to her increased creatinine.      She states that the renal function has been fairly stable according to her     nephrologist.            HPI - Oncology Interim      F/u colon  cancer-she is recovering from surgery-readmitted after sx discharge     d/t wound infection.  Midline dressing intact to abdomen-they will change and     pack today.  Reports drng is decreasing.  She denies significant pain at this t    lashae.  She is eating a good diet; stable weight noted.  She will see sx this     Wednesday.  Denies fever, SOA or distress at this time.            Cancer Details            Sigmoid colon--Moderately differentiated adenocarcinoma.            Clinical Staging      Stage II ([id number])            Clinical Trial Participant      No            ECOG Performance Status      0            Most Recent Lab Findings      Laboratory Tests      5/8/19 16:30            PAST, FAMILY   Past Medical History      Past Medical History:  Arthritis, Depression, Heart Attack (4), High     Cholesterol, Kidney Disease (CKD stage III)      Hematology/Oncology (F):  Colorectal Cancer            Past Surgical History      Coronary Stent (8), Joint Replacement (LEFT HIP LEFT KNEE)      MASS REMOVED FROM COLON            Family History      Family History:  Colorectal Cancer (PAT UNCLE), Melanoma (MOTHER)            Social History      Marital Status:  Single      Lives independently:  Yes      Number of Children:  0      Occupation:  RETIRED            Tobacco Use      Tobacco status:  Never smoker            Alcohol Use      Alcohol intake:  None            Substance Use      Substance use:  Denies use            REVIEW OF SYSTEMS      General:  Admits: Fatigue;          Denies: Appetite Change, Fever, Night Sweats, Weight Gain, Weight Loss      Eye:  Denies: Blurred Vision, Corrective Lenses, Diplopia, Eye Irritation, Eye     Pain, Eye Redness, Spots in Vision, Vision Loss      ENT:  Denies: Headache, Hearing Loss, Hoarseness, Seizures, Sinus Congestion,     Sore Throat      Cardiovascular:  Denies: Chest Pain, Edema Ankles, Edema Legs, Irregular     Heartbeat, Palpitations      Respiratory:  Denies: Coughing  Blood, Productive Cough, Shortness of Air,     Wheezing      Gastrointestinal:  Denies: Bloody Stools, Constipation, Diarrhea, Frequent     Heartburn, Nausea, Problem Swallowing, Tarry Stools, Unable to Control Bowels,     Vomiting      Genitourinary (female):  Denies: Blood in Urine, Decrease Urine Stream, Frequent    Urination, Incontinence, Painful Urination      Musculoskeletal:  Denies: Back Pain, Leg Cramps, Muscle Pain, Muscle Weakness,     Painful Joints, Swollen Joints      Integumentary:  Denies: Bleeds Easily, Bruises Easily, Hair Changes, Jaundice,     Lesions, Mole Changes, Nail Changes, Pigment Changes, Rash, Skin Discoloration      Neurologic:  Denies: Dizziness, Fainting, Numbness\Tingling, Paralysis, Seizures      Psychiatric:  Denies: Anxiety, Confused, Depression, Disoriented, Memory Loss      Endocrine:  Denies: Cold Intolerance, Diabetes, Excessive Sweating, Excessive     Thirst, Excessive Urination, Heat Intolerance, Flushing, Hyperthyroidism,     Hypothyroidism      Hematologic/Lymphatic:  Denies: Bruising, Bleeding, Enlarged Lymph Nodes,     Recurrent Infections, Transfusions            VITAL SIGNS AND SCORES      Vitals      Height 5 ft 2.76 in / 159.4 cm      Weight 142 lbs 13.729 oz / 64.8 kg      BSA 1.67 m2      BMI 25.5 kg/m2      Temperature 98.1 F / 36.72 C - Temporal      Pulse 89      Respirations 16      Blood Pressure 132/71 Sitting, Left Arm      Pulse Oximetry 100%, RM AIR            Pain Score      Pain Scale Used:  Numerical      Pain Intensity:  6            Fatigue Score      Fatigue (0-10 scale):  10            EXAM      General Appearance:  Positive for: Alert, Oriented x3, Cooperative;          Negative for: Acute Distress      Respiratory:  Positive for: CTAB, Normal Respiratory Effort      Abdomen/Gastro:  Positive for: Normal Active Bowel Sounds, Soft;          Negative for: Distention, Hepatosplenomegaly, Tenderness      Other      Midline incision with dressing in  place      Cardiovascular:  Positive for: RRR;          Negative for: Gallop, Murmur, Peripheral Edema, Rub      Psychiatric:  Positive for: Appropriate Affect, Intact Judgement            PREVENTION      Hx Influenza Vaccination:  No      2 or More Falls Past Year?:  No      Fall Past Year with Injury?:  No      Encouraged to follow-up with:  PCP regarding preventative exams.      Chart initiated by      GUIDO SOL MA            ALLERGY/MEDS      Allergies      Coded Allergies:             LISINOPRIL (Verified  Adverse Reaction, Severe, FALLS WITH, 5/13/19)            Medications      Last Reconciled on 5/13/19 14:55 by LUCILA GREENFIELD      Ampicillin (Ampicillin) 500 Mg Capsule      500 MG PO TID for 7 Days, #20 CAP 0 Refills         Prov: JEANETH BEE S         5/7/19       Losartan Potassium (Losartan*) 25 Mg Tablet      25 MG PO QDAY, #60 TAB 0 Refills         Prov: JEANETH BEE         5/7/19       ACETAMINOPHEN/Diphenhydramine 500/25 MG (ACETAMINOPHEN PM) 1 Tab Tablet      2 TAB PO HS PRN for SLEEP, #60 TAB         Reported         5/3/19       Pravastatin Sodium (Pravastatin*) 80 Mg Tablet      80 MG PO HS, TAB         Reported         5/3/19       FLUoxetine HCl (PROzac) 20 Mg Capsule      20 MG PO TID, #30 CAP 0 Refills         Reported         5/3/19       tiZANidine HCl (tiZANidine HCl) 4 Mg Tablet      4 MG PO TID, TAB         Reported         5/3/19       Spironolactone (Spironolactone*) 25 Mg Tablet      25 MG PO BID, #60 TAB 0 Refills         Reported         5/3/19       Cyclobenzaprine Hcl (Cyclobenzaprine*) 10 Mg Tablet      10 MG PO TID PRN for MUSCLE SPASMS, TAB 0 Refills         Reported         5/3/19       ClonazePAM (ClonazePAM) 1 Mg Tablet      1 MG PO BID, #60 TAB 0 Refills         Reported         5/3/19       Clopidogrel Bisulfate (Plavix) 75 Mg Tablet      75 MG PO QDAY         Reported         5/3/19       Metoprolol Succinate (Toprol XL*) 25 Mg Tab.er.24h      25 MG PO  QDAY         Reported         5/3/19       Aspirin Chew (Aspirin Chew) 81 Mg Tab.chew      81 MG PO QDAY         Reported         5/3/19       Hydrocodone/Acetaminophen 5/325 MG (Norco 5/325 Mg) 1 Each Tablet      1 TAB PO Q4H         Reported         5/3/19       Furosemide (Furosemide) 40 Mg Tablet      40 MG PO BID, #30 TAB 0 Refills         Reported         4/15/16       Nitroglycerin (Nitroglycerin) 0.4 Mg Tablet      0.4 MG SL ASDIR, TAB         Reported         11/5/13      Medications Reviewed:  No Changes made to meds            IMPRESSION/PLAN      Impression      Colon cancer, adenocarcinoma.  Stage IIa (T3, N0, M0)            Diagnosis      Colon cancer         Malignant neoplasm of sigmoid colon - C18.7         Colon location: sigmoid      Patient had complete surgical resection.  Margins are negative.  Based on the     NCCN guidelines, she does not require adjuvant chemotherapy.  Continue working     with surgery regarding wound healing.  RTC 3 months for OV with labs prior.      New Diagnostics      * CMP Comp Metabolic Panel, 3 Months      * CBC With Auto Diff, Routine            Patient Education            Bulking Up on Fiber      Patient Education Provided:  Yes                 Disclaimer: Converted document may not contain table formatting or lab diagrams. Please see [employer] [facility] for the authenticated document.

## 2021-05-28 NOTE — PROGRESS NOTES
Patient: ERIKA CASTANEDA     Acct: [id number]     Report: #[id number]  UNIT #: [id number]     : 1960    Encounter Date:2021  PRIMARY CARE: THEO BROWN  ***Signed***  --------------------------------------------------------------------------------------------------------------------  TELEHEALTH NOTE      Past Oncology Illness History      60-year-old white female with past medical history significant for coronary     artery disease, chronic renal insufficiency who presents for evaluation of     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  Patient notes that she     began experiencing some blood per rectum around December of last year.  At the     same time she was hospitalized for a fourth heart attack requiring stenting.      Because of the ongoing blood per rectum, she was referred to gastroenterology     and underwent colonoscopy on 3/25/19 which revealed a lesion in the sigmoid     colon.  Biopsies were obtained and demonstrate adenocarcinoma.  She was seen by     Dr. Danae Solis, surgery and anticipates resection on the  of this month.            History of Present Illness            Chief Complaint: (colon ca)            Erika Castaneda is presenting for evaluation via Telehealth visit by phone.     Verbal consent obtained before beginning visit.            Provider spent (12) minutes with the patient during telehealth visit.            The following staff were present during the visit: (None)                         Past Med History      Cancer Details            Sigmoid colon--Moderately differentiated adenocarcinoma. 2020 Liver      biopsy revealed metastatic colonic adenocarcinoma            Clinical Staging      Stage II ([id number]), recurrent            Treatments      Surgeries      Liver Ablation at [facility] by Dr. Prieto       Overview of Symptoms      Patient presents via telehealth for follow-up of her recurrent colon cancer.      She is status post RFA  to an isolated liver lesion last fall.  She states that     she is feeling well.  She denies any masses or adenopathy.  No unusual aches or     pains.  She reports good appetite and her weight is maintained.  Her energy     level is adequate for all of her daily needs.  She reports normal bowel habits     without blood per rectum, pain or melena.            Most Recent Lab Findings      Laboratory Tests      21 15:31            Patient: ROD CASTANEDA   Acct: #[id number]   Report: #[id number]            UNIT #: [id number]    DOS: 21       INSURANCE:BLUE CROSS - KEHP   ORDER #:[id number]      LOCATION:CT     : 1960            PROVIDERS      ADMITTING:     ATTENDING: NORMA BAUTISTA      FAMILY:  THEO BROWN   ORDERING:  NORMA BAUTISTA         OTHER:    DICTATING:  HUSAM LEE MD            REQ #:[id number]   EXAM:CTAPCWOC - CT ABD PEL CHEST wo      REASON FOR EXAM:  COLON CA  C18.9      REASON FOR VISIT:  COLON CA  C18.9            *******Signed******         PROCEDURE:   CT ABDOMEN PELVIS CHEST WITHOUT             COMPARISON:   [facility], CT, CT CHEST ABD PEL WO CONTRAST,     2020, 11:50.             INDICATIONS:   COLON CA             TECHNIQUE:   CT images were obtained without the administration of intravenous     contrast material.             PROTOCOL:     Standard imaging protocol performed                RADIATION:     DLP: 1326mGy*cm          Automated exposure control was utilized to minimize radiation dose.              FINDINGS:         Chest:  Small bilateral pulmonary nodules are unchanged.  No new or enlarging     pulmonary nodule.  No       adenopathy in the chest.             Interval decrease in size of a low-attenuation area in the right hepatic lobe.      It now measures 3.7       x 1.9 cm, previously 4.9 x 2.8 cm.  This area may have shown some post treatment     changes on the       previous study.  Correlate with treatment history.  No new  liver lesion is seen     on this unenhanced       study.             Spleen, kidneys, adrenal glands, pancreas gallbladder unremarkable.  Bowel loops     are nondilated.        Moderate colonic stool burden.  Postsurgical change in the rectosigmoid region     with no abnormal       soft tissue.             Pelvis:  No pelvic mass or fluid.  No aggressive appearing bone change.             CONCLUSION:   Stable small bilateral pulmonary nodules.  No convincing evidence     for active metastatic       disease in the chest.             Interval decrease in size of a low-attenuation area in the right hepatic lobe,     possibly showing       post treatment changes.  Correlate with treatment history.             No evidence for disease progression in the abdomen or pelvis.              HUSAM LEE MD             Electronically Signed and Approved By: HUSAM LEE MD on 2/09/2021 at 16:13                               Until signed, this is an unconfirmed preliminary report that may contain      errors and is subject to change.                                              BUFFYER:      D:02/09/21 1613            Allergies/Medications      Allergies:        Coded Allergies:             LISINOPRIL (Verified  Adverse Reaction, Unknown, FALLS WITH, 2/10/21)      Medications    Last Reconciled on 2/11/21 12:26 by NORMA BAUTISTA      Pravastatin Sodium (Pravastatin Sodium) 80 Mg Tablet      80 MG PO HS for 30 Days, #30 TAB         Reported         6/26/20       Losartan Potassium (Losartan*) 25 Mg Tablet      25 MG PO QDAY, #60 TAB 0 Refills         Reported         11/26/19       tiZANidine HCl (tiZANidine HCl) 4 Mg Capsule      4 MG PO TID, CAP         Reported         11/26/19       Furosemide (Lasix) 40 Mg Tablet      40 MG PO BID@09,17, #60 TAB 0 Refills         Reported         11/26/19       Spironolactone (Spironolactone*) 25 Mg Tablet      25 MG PO BID, #60 TAB 0 Refills         Reported         11/26/19        Cyclobenzaprine Hcl (Cyclobenzaprine*) 10 Mg Tablet      10 MG PO TID PRN for MUSCLE SPASMS, TAB 0 Refills         Reported         11/26/19       Omeprazole (Omeprazole*) 40 Mg Capsule      MG PO QDAY         Reported         8/17/19       Ergocalciferol (Vitamin D2) 50,000 Unit Capsule      CAP PO QSUNDAY         Reported         8/17/19       ACETAMINOPHEN/Diphenhydramine 500/25 MG (ACETAMINOPHEN PM) 1 Tab Tablet      2 TAB PO HS PRN for SLEEP, #60 TAB         Reported         5/3/19       FLUoxetine HCl (PROzac) 20 Mg Capsule      20 MG PO TID, #30 CAP 0 Refills         Reported         5/3/19       clonazePAM (clonazePAM) 1 Mg Tablet      1 MG PO BID, #60 TAB 0 Refills         Reported         5/3/19       Clopidogrel Bisulfate (Plavix) 75 Mg Tablet      75 MG PO QDAY         Reported         5/3/19       Metoprolol Succinate (Metoprolol Succinate) 25 Mg Tab.er.24h      25 MG PO QDAY         Reported         5/3/19       Aspirin Chew (Aspirin Chew) 81 Mg Tab.chew      81 MG PO QDAY         Reported         5/3/19       Nitroglycerin (Nitroglycerin) 0.4 Mg Tablet      0.4 MG SL ASDIR, TAB         Reported         11/5/13            Plan/Instructions      Ambulatory Assessment/Plan:        Colon cancer         Malignant neoplasm of sigmoid colon         Colon location: sigmoid            Pulmonary nodule - R91.1            Notes      Patient is status post treatments as outlined-most recently RFA to an isolated     liver lesion.  She is feeling well today.  I see no evidence of disease     recurrence by her history, lab work or CT scans.  The area in the liver is     consistent with posttreatment changes.  There are few subcentimeter nodules in     the lungs which are likely benign but will need to be followed.  I will plan to     see her back in 3 months time for ongoing surveillance with labs and scans     prior.  Per patient's request, I attempted to contact her Sister Danae but there     was no answer at  the number provided.      New Diagnostics      * CBC With Auto Diff, 3 Months         Dx: Colon cancer - C18.9      * CMP Comp Metabolic Panel, 3 Months         Dx: Colon cancer - C18.9      * Cea/Carcinoembryonic, 3 Months         Dx: Colon cancer - C18.9      * CT Abd/Pelvis/Chest W/Contrast, 3 Months         Dx: Colon cancer - C18.9      Plan/Instructions            * Plan Of Care: ()            * Chronic conditions reviewed and taken into consideration for today's treatment       plan.      * Patient instructed to seek medical attention urgently for new or worsening       symptoms.      * Patient was educated/instructed on their diagnosis, treatment and medications       prior to discharge from the clinic today.      Codes:  Phone Eval 11-20 mi [id number]            Electronically signed by NORMA BAUTISTA  02/11/2021 12:26       Disclaimer: Converted document may not contain table formatting or lab diagrams. Please see [employer] [facility] for the authenticated document.

## 2021-06-05 NOTE — H&P
History and Physical      Patient Name: Erika Bowens   Patient ID: [id number]   Sex: Female   YOB: 1960    Primary Care Provider: Nilam Willis MD    Visit Date: May 7, 2021    Provider: Yevgeniy Santiago MD   Location: [facility]   Location Address: [address] [place] [address]  [id number]   Location Phone: [phone]          Chief Complaint  · Right Knee Pain  · Left Thumb      History Of Present Illness  Erika Bowens is a 61 year old /White female who presents today to [facility].      Patient presents today for a follow-up of right knee and left thumb. Patient has a history of right knee osteoarthritis that has been treated conservatively. Her right knee has bone on bone osteoarthritis and has had a stent placed a few months ago. She states this is her 7th stent. She is currently on Plavix. Patient has failed steroid injections in the past. She is unable to take NSAIDs.   Patient also complains of thumb pain. She states pain at the base of her thumb. She states that it can be tender to touch at times. She denies any numbness and tingling.          Past Medical History  Anxiety; Arthritis; Cancer; Chest pain; CHF (congestive heart failure); Colon cancer; Congestive heart failure; Depression; Heart Attack; Heart Disease; High cholesterol; Hyperlipemia; Kidney Disease; Kidney disorder; Limb Swelling; Neurologic disorder; Reflux; Shortness of Breath         Past Surgical History  Artificial Joints/Limbs; bowel obstruction; CABG; Cardiac Catherization; cardiac stents; Colonoscopy; Eye Implant; heart surgery; Hip Replacement; Joint Surgery; Kidney; Knee Replacement; Pacemaker.         Medication List  aspirin 325 mg oral tablet,delayed release (DR/EC); clonazepam 1 mg oral tablet; cyclobenzaprine 10 mg oral tablet; fluoxetine 20 mg oral capsule; furosemide 40 mg oral tablet; metoprolol succinate 25 mg oral tablet extended release 24 hr; NITROSTAT 0.4 MG TABLET SL  "transdermal; omeprazole 20 mg oral capsule,delayed release(DR/EC); Plavix 75 mg oral tablet; pravastatin 80 mg oral tablet; tizanidine 4 mg oral tablet; Vitamin D2 1,250 mcg (50,000 unit) oral capsule         Allergy List  NO KNOWN DRUG ALLERGIES       Allergies Reconciled  Family Medical History  Colon Neoplasm, Malignant; Stroke; Heart Disease; Cancer, Unspecified; Family history of certain chronic disabling diseases; arthritis; Family history of stroke; Family history of heart disease         Social History  Alcohol (Never); Alcohol Use (Never); lives alone; Recreational Drug Use (Never); Retired; Retired.; Single; Single.; Tobacco (Never)         Review of Systems  · Constitutional  o Denies  o : fever, chills, weight loss  · Cardiovascular  o Denies  o : chest pain, shortness of breath  · Gastrointestinal  o Denies  o : liver disease, heartburn, nausea, blood in stools  · Genitourinary  o Denies  o : painful urination, blood in urine  · Integument  o Denies  o : rash, itching  · Neurologic  o Denies  o : headache, weakness, loss of consciousness  · Musculoskeletal  o Denies  o : painful, swollen joints  · Psychiatric  o Denies  o : drug/alcohol addiction, anxiety, depression      Vitals  Date Time BP Position Site L\R Cuff Size HR RR TEMP (F) WT  HT  BMI kg/m2 BSA m2 O2 Sat FR L/min FiO2        05/07/2021 10:54 AM      60 - R   140lbs 0oz 5'  3\" 24.8 1.68 98 %            Physical Examination  · Constitutional  o Appearance  o : well developed, well-nourished, no obvious deformities present  · Head and Face  o Head  o :   § Inspection  § : normocephalic  o Face  o :   § Inspection  § : no facial lesions  · Eyes  o Conjunctivae  o : conjunctivae normal  o Sclerae  o : sclerae white  · Ears, Nose, Mouth and Throat  o Ears  o :   § External Ears  § : appearance within normal limits  § Hearing  § : intact  o Nose  o :   § External Nose  § : appearance normal  · Neck  o Inspection/Palpation  o : normal " appearance  o Range of Motion  o : full range of motion  · Respiratory  o Respiratory Effort  o : breathing unlabored  o Inspection of Chest  o : normal appearance  o Auscultation of Lungs  o : no audible wheezing or rales  · Cardiovascular  o Heart  o : regular rate  · Gastrointestinal  o Abdominal Examination  o : soft and non-tender  · Skin and Subcutaneous Tissue  o General Inspection  o : intact, no rashes  · Psychiatric  o General  o : Alert and oriented x3  o Judgement and Insight  o : judgment and insight intact  o Mood and Affect  o : mood normal, affect appropriate  · Left Hand  o Inspection  o : Positive CMC grind test. Neurovascular intact. Sensation grossly intact. No swelling, atrophy, and skin discoloration. Skin intact. Full ROM. Patient able to wiggle fingers and make a fist. Full wrist extension, full wrist flexion, full , full thumb opposition, full PIP flexors, full DIP flexors, full PIP extensors, full finger adduction, full finger abduction. Radial pulse 2+, ulnar pulse 2+.   · Right Knee  o Inspection  o : Mild limp. Moderate medial joint line tenderness. Pain with movement. Positive crepitus. Neurovascularly intact. Pulses normal. No swelling, skin discoloration or atrophy. Sensation grossly intact. Calf supple, non-tender.   · Injection Note/Aspiration Note  o Site  o : Left thumb  o Procedure  o : Procedure: After educating the patient, patient gave consent for procedure. After using Chloraprep, the joint space was injected. The patient tolerated the procedure well.   o Medication  o : 80 mg of DepoMedrol with 1cc of 1% Lidocaine  · In Office Procedures  o View  o : LAT/SUNRISE/STANDING   o Site  o : right, knee  o Indication  o : Right knee pain  o Study  o : X-rays ordered, taken in the office, and reviewed today.  o Xray  o : Bone on bone osteoarthritis.           Assessment  · Primary osteoarthritis of right knee     715.16/M17.11  · Arthralgia of left  hand     719.44/M25.542  · Right knee pain, unspecified chronicity     719.46/M25.561  · Left thumb CMC arthritis     716.90/M19.90      Plan  · Orders  o Depo-Medrol injection 80mg () - - 05/07/2021   Lot [id number] Exp 10 2021 [employer] Administered by ABBY Santiago MD  o Arthrocentesis of minor joint (20600) - - 05/07/2021   Lot [id number] Exp 12 2022 [employer] Administered by ABBY Santiago MD  o Knee (Right) [facility] View (22761-UY) - 719.46/M25.561 - 05/07/2021  · Medications  o Medications have been Reconciled  o Transition of Care or Provider Policy  · Instructions  o Dr. Santiago saw and examined the patient and agrees with plan.   o X-rays reviewed by Dr. Santiago.  o Reviewed the patient's Past Medical, Social, and Family history as well as the ROS at today's visit, no changes.  o Call or return if worsening symptoms.  o Follow Up PRN.  o This note was transcribed by Destiny Vicente. [email or web address]  o Discussed treatment plans and diagnosis with the patient. We will work on [employer] approval for the right knee. We gave patient a left thumb CMC joint injection and she tolerated this well.             Electronically Signed by: Destiny Vicente-, Other -Author on May 10, 2021 10:36:03 AM  Electronically Co-signed by: Yevgeniy Santiago MD -Reviewer on May 10, 2021 08:08:10 PM

## 2021-06-11 DIAGNOSIS — I25.10 CORONARY ARTERIOSCLEROSIS: ICD-10-CM

## 2021-06-11 DIAGNOSIS — I10 ESSENTIAL HYPERTENSION: ICD-10-CM

## 2021-06-11 RX ORDER — METOPROLOL SUCCINATE 25 MG/1
TABLET, EXTENDED RELEASE ORAL
Qty: 30 TABLET | Refills: 0 | Status: SHIPPED | OUTPATIENT
Start: 2021-06-11 | End: 2021-06-17 | Stop reason: SDUPTHER

## 2021-06-17 ENCOUNTER — OFFICE VISIT (OUTPATIENT)
Dept: FAMILY MEDICINE CLINIC | Facility: CLINIC | Age: 61
End: 2021-06-17

## 2021-06-17 VITALS
HEIGHT: 63 IN | BODY MASS INDEX: 26.4 KG/M2 | DIASTOLIC BLOOD PRESSURE: 60 MMHG | WEIGHT: 149 LBS | SYSTOLIC BLOOD PRESSURE: 100 MMHG | OXYGEN SATURATION: 98 % | HEART RATE: 64 BPM

## 2021-06-17 DIAGNOSIS — E55.9 VITAMIN D DEFICIENCY: ICD-10-CM

## 2021-06-17 DIAGNOSIS — M62.838 MUSCLE SPASM: ICD-10-CM

## 2021-06-17 DIAGNOSIS — F32.5 MAJOR DEPRESSIVE DISORDER WITH SINGLE EPISODE, IN FULL REMISSION (HCC): ICD-10-CM

## 2021-06-17 DIAGNOSIS — F41.1 GENERALIZED ANXIETY DISORDER: Chronic | ICD-10-CM

## 2021-06-17 DIAGNOSIS — F32.A DEPRESSIVE DISORDER: Chronic | ICD-10-CM

## 2021-06-17 DIAGNOSIS — E78.2 MIXED HYPERLIPIDEMIA: ICD-10-CM

## 2021-06-17 DIAGNOSIS — I10 ESSENTIAL HYPERTENSION: Primary | ICD-10-CM

## 2021-06-17 DIAGNOSIS — I25.10 CORONARY ARTERIOSCLEROSIS: ICD-10-CM

## 2021-06-17 PROCEDURE — 99214 OFFICE O/P EST MOD 30 MIN: CPT | Performed by: GENERAL PRACTICE

## 2021-06-17 RX ORDER — TIZANIDINE 4 MG/1
4 TABLET ORAL EVERY 8 HOURS PRN
Qty: 90 TABLET | Refills: 5 | Status: SHIPPED | OUTPATIENT
Start: 2021-06-17 | End: 2022-03-17 | Stop reason: SDUPTHER

## 2021-06-17 RX ORDER — ERGOCALCIFEROL 1.25 MG/1
50000 CAPSULE ORAL WEEKLY
Start: 2021-06-17 | End: 2021-11-18

## 2021-06-17 RX ORDER — SPIRONOLACTONE 25 MG/1
25 TABLET ORAL 2 TIMES DAILY
Qty: 60 TABLET | Refills: 5 | Status: SHIPPED | OUTPATIENT
Start: 2021-06-17 | End: 2022-03-17 | Stop reason: SDUPTHER

## 2021-06-17 RX ORDER — OMEPRAZOLE 40 MG/1
40 CAPSULE, DELAYED RELEASE ORAL DAILY
Qty: 30 CAPSULE | Refills: 2 | Status: SHIPPED | OUTPATIENT
Start: 2021-06-17 | End: 2021-11-18

## 2021-06-17 RX ORDER — METOPROLOL SUCCINATE 25 MG/1
25 TABLET, EXTENDED RELEASE ORAL DAILY
Qty: 30 TABLET | Refills: 5 | Status: SHIPPED | OUTPATIENT
Start: 2021-06-17 | End: 2022-03-04 | Stop reason: SDUPTHER

## 2021-06-17 RX ORDER — CLONAZEPAM 1 MG/1
1 TABLET ORAL 2 TIMES DAILY
Qty: 60 TABLET | Refills: 2 | Status: SHIPPED | OUTPATIENT
Start: 2021-06-17 | End: 2021-09-17 | Stop reason: SDUPTHER

## 2021-06-17 RX ORDER — FLUOXETINE HYDROCHLORIDE 20 MG/1
60 CAPSULE ORAL DAILY
Qty: 90 CAPSULE | Refills: 5 | Status: SHIPPED | OUTPATIENT
Start: 2021-06-17 | End: 2022-03-02 | Stop reason: SDUPTHER

## 2021-06-17 RX ORDER — CYCLOBENZAPRINE HCL 10 MG
10 TABLET ORAL 3 TIMES DAILY
Qty: 90 TABLET | Refills: 2 | Status: SHIPPED | OUTPATIENT
Start: 2021-06-17 | End: 2021-11-18

## 2021-06-22 ENCOUNTER — TRANSCRIBE ORDERS (OUTPATIENT)
Dept: LAB | Facility: HOSPITAL | Age: 61
End: 2021-06-22

## 2021-06-22 ENCOUNTER — LAB (OUTPATIENT)
Dept: LAB | Facility: HOSPITAL | Age: 61
End: 2021-06-22

## 2021-06-22 DIAGNOSIS — N18.31 STAGE 3A CHRONIC KIDNEY DISEASE (HCC): Primary | ICD-10-CM

## 2021-06-22 DIAGNOSIS — N18.31 STAGE 3A CHRONIC KIDNEY DISEASE (HCC): ICD-10-CM

## 2021-06-22 LAB
ALBUMIN SERPL-MCNC: 4 G/DL (ref 3.5–5.2)
ANION GAP SERPL CALCULATED.3IONS-SCNC: 10.7 MMOL/L (ref 5–15)
BACTERIA UR QL AUTO: ABNORMAL /HPF
BASOPHILS # BLD AUTO: 0.04 10*3/MM3 (ref 0–0.2)
BASOPHILS NFR BLD AUTO: 0.6 % (ref 0–1.5)
BILIRUB UR QL STRIP: NEGATIVE
BUN SERPL-MCNC: 18 MG/DL (ref 8–23)
BUN/CREAT SERPL: 9 (ref 7–25)
CALCIUM SPEC-SCNC: 9.1 MG/DL (ref 8.6–10.5)
CHLORIDE SERPL-SCNC: 101 MMOL/L (ref 98–107)
CLARITY UR: ABNORMAL
CO2 SERPL-SCNC: 26.3 MMOL/L (ref 22–29)
COLOR UR: YELLOW
CREAT SERPL-MCNC: 1.99 MG/DL (ref 0.57–1)
DEPRECATED RDW RBC AUTO: 45.2 FL (ref 37–54)
EOSINOPHIL # BLD AUTO: 0.06 10*3/MM3 (ref 0–0.4)
EOSINOPHIL NFR BLD AUTO: 0.8 % (ref 0.3–6.2)
ERYTHROCYTE [DISTWIDTH] IN BLOOD BY AUTOMATED COUNT: 12.3 % (ref 12.3–15.4)
GFR SERPL CREATININE-BSD FRML MDRD: 25 ML/MIN/1.73
GLUCOSE SERPL-MCNC: 70 MG/DL (ref 65–99)
GLUCOSE UR STRIP-MCNC: NEGATIVE MG/DL
HCT VFR BLD AUTO: 43.1 % (ref 34–46.6)
HGB BLD-MCNC: 14.7 G/DL (ref 12–15.9)
HGB UR QL STRIP.AUTO: ABNORMAL
HYALINE CASTS UR QL AUTO: ABNORMAL /LPF
IMM GRANULOCYTES # BLD AUTO: 0.06 10*3/MM3 (ref 0–0.05)
IMM GRANULOCYTES NFR BLD AUTO: 0.8 % (ref 0–0.5)
KETONES UR QL STRIP: NEGATIVE
LEUKOCYTE ESTERASE UR QL STRIP.AUTO: ABNORMAL
LYMPHOCYTES # BLD AUTO: 2.21 10*3/MM3 (ref 0.7–3.1)
LYMPHOCYTES NFR BLD AUTO: 30.7 % (ref 19.6–45.3)
MCH RBC QN AUTO: 34 PG (ref 26.6–33)
MCHC RBC AUTO-ENTMCNC: 34.1 G/DL (ref 31.5–35.7)
MCV RBC AUTO: 99.8 FL (ref 79–97)
MONOCYTES # BLD AUTO: 0.6 10*3/MM3 (ref 0.1–0.9)
MONOCYTES NFR BLD AUTO: 8.3 % (ref 5–12)
NEUTROPHILS NFR BLD AUTO: 4.22 10*3/MM3 (ref 1.7–7)
NEUTROPHILS NFR BLD AUTO: 58.8 % (ref 42.7–76)
NITRITE UR QL STRIP: NEGATIVE
NRBC BLD AUTO-RTO: 0 /100 WBC (ref 0–0.2)
PH UR STRIP.AUTO: 6 [PH] (ref 5–8)
PHOSPHATE SERPL-MCNC: 3.7 MG/DL (ref 2.5–4.5)
PLATELET # BLD AUTO: 176 10*3/MM3 (ref 140–450)
PMV BLD AUTO: 13 FL (ref 6–12)
POTASSIUM SERPL-SCNC: 4 MMOL/L (ref 3.5–5.2)
PROT UR QL STRIP: ABNORMAL
RBC # BLD AUTO: 4.32 10*6/MM3 (ref 3.77–5.28)
RBC # UR: ABNORMAL /HPF
REF LAB TEST METHOD: ABNORMAL
SODIUM SERPL-SCNC: 138 MMOL/L (ref 136–145)
SP GR UR STRIP: 1.02 (ref 1–1.03)
SQUAMOUS #/AREA URNS HPF: ABNORMAL /HPF
UROBILINOGEN UR QL STRIP: ABNORMAL
WBC # BLD AUTO: 7.19 10*3/MM3 (ref 3.4–10.8)
WBC UR QL AUTO: ABNORMAL /HPF

## 2021-06-22 PROCEDURE — 81001 URINALYSIS AUTO W/SCOPE: CPT

## 2021-06-22 PROCEDURE — 80069 RENAL FUNCTION PANEL: CPT

## 2021-06-22 PROCEDURE — 36415 COLL VENOUS BLD VENIPUNCTURE: CPT

## 2021-06-22 PROCEDURE — 85025 COMPLETE CBC W/AUTO DIFF WBC: CPT

## 2021-06-23 ENCOUNTER — LAB (OUTPATIENT)
Dept: LAB | Facility: HOSPITAL | Age: 61
End: 2021-06-23

## 2021-06-23 ENCOUNTER — TRANSCRIBE ORDERS (OUTPATIENT)
Dept: LAB | Facility: HOSPITAL | Age: 61
End: 2021-06-23

## 2021-06-23 DIAGNOSIS — N18.31 STAGE 3A CHRONIC KIDNEY DISEASE (HCC): Primary | ICD-10-CM

## 2021-06-23 DIAGNOSIS — N18.31 STAGE 3A CHRONIC KIDNEY DISEASE (HCC): ICD-10-CM

## 2021-06-23 LAB
CREAT UR-MCNC: 45.1 MG/DL
PROT UR-MCNC: 7 MG/DL

## 2021-06-23 PROCEDURE — 84156 ASSAY OF PROTEIN URINE: CPT

## 2021-06-23 PROCEDURE — 82570 ASSAY OF URINE CREATININE: CPT

## 2021-07-15 VITALS — OXYGEN SATURATION: 98 % | HEART RATE: 62 BPM | BODY MASS INDEX: 24.8 KG/M2 | WEIGHT: 140 LBS | HEIGHT: 63 IN

## 2021-08-30 ENCOUNTER — CLINICAL SUPPORT (OUTPATIENT)
Dept: GASTROENTEROLOGY | Facility: CLINIC | Age: 61
End: 2021-08-30

## 2021-08-30 ENCOUNTER — PREP FOR SURGERY (OUTPATIENT)
Dept: OTHER | Facility: HOSPITAL | Age: 61
End: 2021-08-30

## 2021-08-30 DIAGNOSIS — Z85.038 PERSONAL HISTORY OF COLON CANCER: Primary | ICD-10-CM

## 2021-08-30 RX ORDER — SODIUM, POTASSIUM,MAG SULFATES 17.5-3.13G
1 SOLUTION, RECONSTITUTED, ORAL ORAL EVERY 12 HOURS
Qty: 254 ML | Refills: 0 | Status: SHIPPED | OUTPATIENT
Start: 2021-08-30 | End: 2022-03-17

## 2021-08-30 NOTE — PROGRESS NOTES
Erika Samreenbarrington Bowens     REASON FOR CALL-COLONOSCOPY, PERSONAL HISTORY OF COLON CANCER, LAST COLON 2018     SENT IN PREP-SUPREP  DOS-10/27/2021    Past Medical History:   Diagnosis Date   • Abdominal pain    • Colon cancer (CMS/HCC)    • Coronary arteriosclerosis    • Depressive disorder     with anxiety   • Encounter for routine adult health examination    • Essential hypertension    • Generalized anxiety disorder    • GERD (gastroesophageal reflux disease)    • Hip pain    • Hyperlipidemia    • Kidney stone    • Osteoarthritis    • Radial styloid tenosynovitis of left hand    • Urinary tract infectious disease    • Vitamin D deficiency      Allergies   Allergen Reactions   • Nsaids      Past Surgical History:   Procedure Laterality Date   • CARDIAC CATHETERIZATION  04/15/2016    Enlarged left ventricle with reduced contractility.Severe coronary artery disease as described above. [facility].   • COLONOSCOPY  2019    COLON CANCER [facility].   • CORONARY ANGIOPLASTY WITH STENT PLACEMENT  05/06/2012    PTCA implantation in the vein graft of the OM branch. Done at [facility] in [place], [place].   • CORONARY ARTERY BYPASS GRAFT  03/18/2003    Emergent CABG x 5 CAD   • DE QUERVAIN'S RELEASE Right 11/30/2009    Release of De Quervain's to the right thumb   • DEQUERVAIN RELEASE Left 11/18/2015    Release of de Quervain's to the left wrist.   • LIVER SURGERY     • PAP SMEAR  06/28/2012    normal   • PROCEDURE GENERIC CONVERTED  11/22/2015    MOST RECENT DIASTOLIC BP < 90 mmHg    • PROCEDURE GENERIC CONVERTED  11/22/2015    MOST RECENT SYSTOLIC BP > or = 140 mmHg    • PROCEDURE GENERIC CONVERTED  11/22/2015    TOBACCO NON-USER    • REPLACEMENT TOTAL HIP LATERAL POSITION     • SUBTOTAL COLECTOMY N/A 04/23/2019    sigmoid for colon cancer   • TOTAL KNEE ARTHROPLASTY       Social History     Socioeconomic History   • Marital status: Single     Spouse name: Not on file   • Number of children: Not on file   •  Years of education: Not on file   • Highest education level: Not on file   Tobacco Use   • Smoking status: Never Smoker   • Smokeless tobacco: Never Used   Substance and Sexual Activity   • Alcohol use: No     Family History   Problem Relation Age of Onset   • Diabetes Other    • Heart disease Other    • Hypertension Other    • Stroke Other    • Colon cancer Other    • Coronary artery disease Other    • Other Other         Heart surgery       Current Outpatient Medications:   •  acetaminophen (TYLENOL) 500 MG tablet, Take 500-1,000 mg by mouth every 4 (four) hours as needed for mild pain (1-3)., Disp: , Rfl:   •  aspirin 81 MG EC tablet, Take 81 mg by mouth Daily., Disp: , Rfl:   •  clonazePAM (KlonoPIN) 1 MG tablet, Take 1 tablet by mouth 2 (Two) Times a Day., Disp: 60 tablet, Rfl: 2  •  clopidogrel (PLAVIX) 75 MG tablet, Take 1 tablet by mouth Daily., Disp: 30 tablet, Rfl: 11  •  cyclobenzaprine (FLEXERIL) 10 MG tablet, Take 1 tablet by mouth 3 (Three) Times a Day., Disp: 90 tablet, Rfl: 2  •  FLUoxetine (PROzac) 20 MG capsule, Take 3 capsules by mouth Daily., Disp: 90 capsule, Rfl: 5  •  furosemide (LASIX) 40 MG tablet, Take 1 tablet by mouth 2 (Two) Times a Day., Disp: 60 tablet, Rfl: 2  •  losartan (COZAAR) 25 MG tablet, Take 1 tablet by mouth Daily., Disp: 30 tablet, Rfl: 11  •  metoprolol succinate XL (TOPROL-XL) 25 MG 24 hr tablet, Take 1 tablet by mouth Daily., Disp: 30 tablet, Rfl: 5  •  nitroglycerin (NITROSTAT) 0.4 MG SL tablet, DISSOLVE 1 TAB UNDER TONGUE FOR CHEST PAIN - IF PAIN REMAINS AFTER 5 MIN, CALL 911 AND REPEAT DOSE. MAX 3 TABS IN 15 MINUTES, Disp: 25 tablet, Rfl: 2  •  omeprazole (priLOSEC) 40 MG capsule, Take 1 capsule by mouth Daily., Disp: 30 capsule, Rfl: 2  •  spironolactone (ALDACTONE) 25 MG tablet, Take 1 tablet by mouth 2 (Two) Times a Day., Disp: 60 tablet, Rfl: 5  •  tiZANidine (ZANAFLEX) 4 MG tablet, Take 1 tablet by mouth Every 8 (Eight) Hours As Needed for Muscle Spasms., Disp: 90  tablet, Rfl: 5  •  vitamin D (ERGOCALCIFEROL) 1.25 MG (80387 UT) capsule capsule, Take 1 capsule by mouth 1 (One) Time Per Week. Taking 2 pills on Sunday, Disp: , Rfl:   •  pravastatin (PRAVACHOL) 80 MG tablet, Take 1 tablet by mouth Every Night for 30 days., Disp: 30 tablet, Rfl: 11

## 2021-09-07 ENCOUNTER — OFFICE VISIT (OUTPATIENT)
Dept: ORTHOPEDIC SURGERY | Facility: CLINIC | Age: 61
End: 2021-09-07

## 2021-09-07 VITALS — HEART RATE: 73 BPM | WEIGHT: 148 LBS | OXYGEN SATURATION: 96 % | BODY MASS INDEX: 26.22 KG/M2 | HEIGHT: 63 IN

## 2021-09-07 DIAGNOSIS — M18.12 ARTHRITIS OF CARPOMETACARPAL (CMC) JOINT OF LEFT THUMB: ICD-10-CM

## 2021-09-07 DIAGNOSIS — M17.11 PRIMARY OSTEOARTHRITIS OF RIGHT KNEE: Primary | ICD-10-CM

## 2021-09-07 PROCEDURE — 99213 OFFICE O/P EST LOW 20 MIN: CPT | Performed by: ORTHOPAEDIC SURGERY

## 2021-09-07 NOTE — PROGRESS NOTES
"Chief Complaint  Follow-up and Pain of the Right Knee     Subjective      Erika Bowens presents to [facility] for a follow-up of right knee. Patient has right knee bone on bone osteoarthritis that has been treated conservatively. [place] denied her getting the gel injections. She can't take NSAIDs because she is on Plavix. The pain in her right knee has progressively gotten worse. She denies any recent injury or trauma to her knee. She has had 4 heart attacks and wishes to avoid surgical intervention.     Patient has a history of left thumb CMC arthritis that has been treated conservatively. She has received injections in the past that have provided her with relief. Previous injections have worn off. She denies any numbness and tingling to her left hand. She denies any locking and catching to her left hand.     Allergies   Allergen Reactions   • Nsaids         Social History     Socioeconomic History   • Marital status: Single     Spouse name: Not on file   • Number of children: Not on file   • Years of education: Not on file   • Highest education level: Not on file   Tobacco Use   • Smoking status: Never Smoker   • Smokeless tobacco: Never Used   Substance and Sexual Activity   • Alcohol use: No        Review of Systems     Objective   Vital Signs:   Pulse 73   Ht 160 cm (63\")   Wt 67.1 kg (148 lb)   SpO2 96%   BMI 26.22 kg/m²       Physical Exam  Constitutional:       Appearance: Normal appearance. Patient is well-developed and normal weight.   HENT:      Head: Normocephalic.      Right Ear: Hearing and external ear normal.      Left Ear: Hearing and external ear normal.      Nose: Nose normal.   Eyes:      Conjunctiva/sclera: Conjunctivae normal.   Cardiovascular:      Rate and Rhythm: Normal rate.   Pulmonary:      Effort: Pulmonary effort is normal.      Breath sounds: No wheezing or rales.   Abdominal:      Palpations: Abdomen is soft.      Tenderness: There is no abdominal " tenderness.   Musculoskeletal:      Cervical back: Normal range of motion.   Skin:     Findings: No rash.   Neurological:      Mental Status: Patient is alert and oriented to person, place, and time.   Psychiatric:         Mood and Affect: Mood and affect normal.         Judgment: Judgment normal.       Ortho Exam      RIGHT KNEE: Tender medial and lateral joint line. Good strength to hamstrings, quadriceps, dorsiflexors and plantar flexors. Sensation grossly intact. Neurovascular intact. Dorsal Pedal Pulse 2+, posterior tibialis pulse 2+. Positive crepitus. Full extension. Flexion to 110 degrees. Full weight bearing. Antalgic gait. Skin intact. No swelling, skin discoloration or atrophy. Stable to varus/valgus stress. Negative Lachman.     LEFT HAND: Positive CMC grind test. Neurovascular intact. Sensation grossly intact. No swelling, atrophy, and skin discoloration. Skin intact. Full ROM. Patient able to wiggle fingers and make a fist. Full wrist extension, full wrist flexion, full , full thumb opposition, full PIP flexors, full DIP flexors, full PIP extensors, full finger adduction, full finger abduction. Radial pulse 2+, ulnar pulse 2+. Negative Phalen's. Negative Tinel's. Negative Finkelstein's.       Procedures      Imaging Results (Most Recent)     None           Result Review :       No results found.          Assessment and Plan     DX: Right knee osteoarthritis   Left CMC thumb arthritis     Patient recently had 4 heart attacks. She states she will not survive a knee replacement at this time. We will continue conservative management at this time. Patient opted for trying physical therapy. A prescription for PT for her right knee. She was prescribed Voltaren gel in office today.     Call or return if worsening symptoms.    Follow Up     4-6 weeks.       Patient was given instructions and counseling regarding her condition or for health maintenance advice. Please see specific information pulled into the  AVS if appropriate.     Scribed for Yevgeniy Santiago MD by Destiny Vicente.  09/07/21   14:57 EDT

## 2021-09-15 ENCOUNTER — TELEPHONE (OUTPATIENT)
Dept: GASTROENTEROLOGY | Facility: CLINIC | Age: 61
End: 2021-09-15

## 2021-09-15 NOTE — TELEPHONE ENCOUNTER
I spoke with patient and notified her that cardiology did not clear her for surgery. Stent was placed on 1/13/21. I advised patient that I would place a recall in the system for end of January, and speak with Addie ANAND on Monday when she returns. For now, we will cancel procedure. Patient agreed to this plan.

## 2021-09-17 ENCOUNTER — OFFICE VISIT (OUTPATIENT)
Dept: FAMILY MEDICINE CLINIC | Facility: CLINIC | Age: 61
End: 2021-09-17

## 2021-09-17 VITALS
DIASTOLIC BLOOD PRESSURE: 60 MMHG | OXYGEN SATURATION: 98 % | SYSTOLIC BLOOD PRESSURE: 120 MMHG | BODY MASS INDEX: 26.21 KG/M2 | WEIGHT: 147.9 LBS | HEIGHT: 63 IN | HEART RATE: 55 BPM

## 2021-09-17 DIAGNOSIS — I10 ESSENTIAL HYPERTENSION: Primary | Chronic | ICD-10-CM

## 2021-09-17 DIAGNOSIS — F32.5 MAJOR DEPRESSIVE DISORDER WITH SINGLE EPISODE, IN FULL REMISSION (HCC): ICD-10-CM

## 2021-09-17 DIAGNOSIS — I25.10 CORONARY ARTERIOSCLEROSIS: ICD-10-CM

## 2021-09-17 DIAGNOSIS — F41.1 GENERALIZED ANXIETY DISORDER: Chronic | ICD-10-CM

## 2021-09-17 DIAGNOSIS — M62.838 MUSCLE SPASM: ICD-10-CM

## 2021-09-17 PROCEDURE — 99214 OFFICE O/P EST MOD 30 MIN: CPT | Performed by: GENERAL PRACTICE

## 2021-09-17 RX ORDER — CLONAZEPAM 1 MG/1
1 TABLET ORAL 2 TIMES DAILY
Qty: 60 TABLET | Refills: 2 | Status: SHIPPED | OUTPATIENT
Start: 2021-09-17 | End: 2021-12-17 | Stop reason: SDUPTHER

## 2021-09-17 NOTE — PROGRESS NOTES
Subjective   Erika Bowens is a 61 y.o. female.   Chief Complaint   Patient presents with   • Hypertension     For review and evaluation of management of chronic medical problems. Records reviewed. Recent labs, xrays reviewed and medications reconciled. Depression and anxiety stable.    Hypertension  This is a chronic problem. The current episode started more than 1 year ago. The problem is unchanged. The problem is controlled. Pertinent negatives include no headaches, neck pain or palpitations. There are no associated agents to hypertension. Current antihypertension treatment includes beta blockers, diuretics and angiotensin blockers. The current treatment provides significant improvement. There are no compliance problems.  Hypertensive end-organ damage includes CAD/MI.   Coronary Artery Disease  Presents for follow-up visit. Pertinent negatives include no chest pressure, dizziness, leg swelling or palpitations. The symptoms have been stable. Compliance with diet is good. Compliance with exercise is variable. Compliance with medications is good.      The following portions of the patient's history were reviewed and updated as appropriate: allergies, current medications, past social history and problem list.    Outpatient Medications Prior to Visit   Medication Sig Dispense Refill   • acetaminophen (TYLENOL) 500 MG tablet Take 500-1,000 mg by mouth every 4 (four) hours as needed for mild pain (1-3).     • aspirin 81 MG EC tablet Take 81 mg by mouth Daily.     • clopidogrel (PLAVIX) 75 MG tablet Take 1 tablet by mouth Daily. 30 tablet 11   • cyclobenzaprine (FLEXERIL) 10 MG tablet Take 1 tablet by mouth 3 (Three) Times a Day. 90 tablet 2   • Diclofenac Sodium (VOLTAREN) 1 % gel gel Apply 4 g topically to the appropriate area as directed 4 (Four) Times a Day As Needed (AS NEEDED). 100 g 1   • FLUoxetine (PROzac) 20 MG capsule Take 3 capsules by mouth Daily. 90 capsule 5   • furosemide (LASIX) 40 MG tablet Take 1  "tablet by mouth 2 (Two) Times a Day. 60 tablet 2   • losartan (COZAAR) 25 MG tablet Take 1 tablet by mouth Daily. 30 tablet 11   • metoprolol succinate XL (TOPROL-XL) 25 MG 24 hr tablet Take 1 tablet by mouth Daily. 30 tablet 5   • nitroglycerin (NITROSTAT) 0.4 MG SL tablet DISSOLVE 1 TAB UNDER TONGUE FOR CHEST PAIN - IF PAIN REMAINS AFTER 5 MIN, CALL 911 AND REPEAT DOSE. MAX 3 TABS IN 15 MINUTES 25 tablet 2   • omeprazole (priLOSEC) 40 MG capsule Take 1 capsule by mouth Daily. 30 capsule 2   • sodium-potassium-magnesium sulfates (Suprep Bowel Prep Kit) 17.5-3.13-1.6 GM/177ML solution oral solution Take 1 bottle by mouth Every 12 (Twelve) Hours. 254 mL 0   • spironolactone (ALDACTONE) 25 MG tablet Take 1 tablet by mouth 2 (Two) Times a Day. 60 tablet 5   • tiZANidine (ZANAFLEX) 4 MG tablet Take 1 tablet by mouth Every 8 (Eight) Hours As Needed for Muscle Spasms. 90 tablet 5   • vitamin D (ERGOCALCIFEROL) 1.25 MG (38592 UT) capsule capsule Take 1 capsule by mouth 1 (One) Time Per Week. Taking 2 pills on Sunday     • clonazePAM (KlonoPIN) 1 MG tablet Take 1 tablet by mouth 2 (Two) Times a Day. 60 tablet 2   • pravastatin (PRAVACHOL) 80 MG tablet Take 1 tablet by mouth Every Night for 30 days. 30 tablet 11     No facility-administered medications prior to visit.       Review of Systems   Cardiovascular: Negative for palpitations and leg swelling.   Musculoskeletal: Negative for neck pain.   Neurological: Negative for dizziness and headaches.     I have reviewed 12 systems with patient. Findings were negative except what is noted below and/or in history of present illness.     Objective   Visit Vitals  /60   Pulse 55   Ht 160 cm (63\")   Wt 67.1 kg (147 lb 14.4 oz)   SpO2 98%   BMI 26.20 kg/m²     Physical Exam  Vitals and nursing note reviewed.   Constitutional:       General: She is not in acute distress.     Appearance: She is well-developed.   HENT:      Head: Normocephalic and atraumatic.      Nose: Nose " normal.   Eyes:      General:         Right eye: No discharge.         Left eye: No discharge.      Conjunctiva/sclera: Conjunctivae normal.      Pupils: Pupils are equal, round, and reactive to light.   Neck:      Thyroid: No thyromegaly.   Cardiovascular:      Rate and Rhythm: Normal rate and regular rhythm.      Heart sounds: Normal heart sounds.   Pulmonary:      Effort: Pulmonary effort is normal.      Breath sounds: Normal breath sounds.   Lymphadenopathy:      Cervical: No cervical adenopathy.   Skin:     General: Skin is warm and dry.   Neurological:      Mental Status: She is alert and oriented to person, place, and time.     Notes brought forward are reviewed and updated if indicated.     Assessment/Plan   Problems Addressed this Visit        Cardiac and Vasculature    Essential hypertension - Primary (Chronic)    Coronary arteriosclerosis       Mental Health    Generalized anxiety disorder (Chronic)    Relevant Medications    clonazePAM (KlonoPIN) 1 MG tablet      Other Visit Diagnoses     Major depressive disorder with single episode, in full remission (CMS/HCC)        Relevant Medications    clonazePAM (KlonoPIN) 1 MG tablet    Muscle spasm          Diagnoses       Codes Comments    Essential hypertension    -  Primary ICD-10-CM: I10  ICD-9-CM: 401.9     Generalized anxiety disorder     ICD-10-CM: F41.1  ICD-9-CM: 300.02     Major depressive disorder with single episode, in full remission (CMS/HCC)     ICD-10-CM: F32.5  ICD-9-CM: 296.26     Muscle spasm     ICD-10-CM: M62.838  ICD-9-CM: 728.85     Coronary arteriosclerosis     ICD-10-CM: I25.10  ICD-9-CM: 414.00           Continue current medications. Luis Angel reviewed and appropriate. Not recommended to drive or operate heavy equipment while taking potentially sedating meds.  Patient understands the risks associated with this controlled medication, including tolerance and addiction. They also agree to obtain this medication only from me, and not from a  another provider, unless that provider is covering for me in my absence. They also agree to be compliant in dosing, and not self adjust the dose of medication.  A signed controlled substance agreement is on file, and they have received a controlled substance education sheet at this or a previous visit. They have also signed a consent for treatment with a controlled substance as per [facility] policy.      New Medications Ordered This Visit   Medications   • clonazePAM (KlonoPIN) 1 MG tablet     Sig: Take 1 tablet by mouth 2 (Two) Times a Day.     Dispense:  60 tablet     Refill:  2     Return in about 3 months (around 12/17/2021) for Recheck.        This document has been electronically signed by Nilam Willis MD on September 29, 2021 18:12 CDT

## 2021-10-15 ENCOUNTER — TRANSCRIBE ORDERS (OUTPATIENT)
Dept: LAB | Facility: HOSPITAL | Age: 61
End: 2021-10-15

## 2021-10-15 ENCOUNTER — TRANSCRIBE ORDERS (OUTPATIENT)
Dept: CARDIOLOGY | Facility: HOSPITAL | Age: 61
End: 2021-10-15

## 2021-10-15 ENCOUNTER — LAB (OUTPATIENT)
Dept: LAB | Facility: HOSPITAL | Age: 61
End: 2021-10-15

## 2021-10-15 DIAGNOSIS — N18.30 STAGE 3 CHRONIC KIDNEY DISEASE, UNSPECIFIED WHETHER STAGE 3A OR 3B CKD (HCC): Primary | ICD-10-CM

## 2021-10-15 DIAGNOSIS — R07.9 CHEST PAIN, UNSPECIFIED TYPE: ICD-10-CM

## 2021-10-15 DIAGNOSIS — N18.30 STAGE 3 CHRONIC KIDNEY DISEASE, UNSPECIFIED WHETHER STAGE 3A OR 3B CKD (HCC): ICD-10-CM

## 2021-10-15 DIAGNOSIS — R07.9 CHEST PAIN, UNSPECIFIED TYPE: Primary | ICD-10-CM

## 2021-10-15 LAB
25(OH)D3 SERPL-MCNC: 47.8 NG/ML
ALBUMIN SERPL-MCNC: 4.3 G/DL (ref 3.5–5.2)
ALBUMIN/GLOB SERPL: 1.4 G/DL
ALP SERPL-CCNC: 68 U/L (ref 39–117)
ALT SERPL W P-5'-P-CCNC: 37 U/L (ref 1–33)
ANION GAP SERPL CALCULATED.3IONS-SCNC: 10.6 MMOL/L (ref 5–15)
AST SERPL-CCNC: 41 U/L (ref 1–32)
BACTERIA UR QL AUTO: NORMAL /HPF
BASOPHILS # BLD AUTO: 0.05 10*3/MM3 (ref 0–0.2)
BASOPHILS NFR BLD AUTO: 0.7 % (ref 0–1.5)
BILIRUB SERPL-MCNC: 0.4 MG/DL (ref 0–1.2)
BILIRUB UR QL STRIP: NEGATIVE
BUN SERPL-MCNC: 21 MG/DL (ref 8–23)
BUN/CREAT SERPL: 14 (ref 7–25)
CALCIUM SPEC-SCNC: 9.3 MG/DL (ref 8.6–10.5)
CHLORIDE SERPL-SCNC: 100 MMOL/L (ref 98–107)
CHOLEST SERPL-MCNC: 191 MG/DL (ref 0–200)
CLARITY UR: CLEAR
CO2 SERPL-SCNC: 26.4 MMOL/L (ref 22–29)
COLOR UR: YELLOW
CREAT SERPL-MCNC: 1.5 MG/DL (ref 0.57–1)
CREAT UR-MCNC: 196.8 MG/DL
DEPRECATED RDW RBC AUTO: 44.4 FL (ref 37–54)
EOSINOPHIL # BLD AUTO: 0.08 10*3/MM3 (ref 0–0.4)
EOSINOPHIL NFR BLD AUTO: 1.1 % (ref 0.3–6.2)
ERYTHROCYTE [DISTWIDTH] IN BLOOD BY AUTOMATED COUNT: 12.3 % (ref 12.3–15.4)
GFR SERPL CREATININE-BSD FRML MDRD: 35 ML/MIN/1.73
GLOBULIN UR ELPH-MCNC: 3.1 GM/DL
GLUCOSE SERPL-MCNC: 78 MG/DL (ref 65–99)
GLUCOSE UR STRIP-MCNC: NEGATIVE MG/DL
HCT VFR BLD AUTO: 40.4 % (ref 34–46.6)
HDLC SERPL-MCNC: 45 MG/DL (ref 40–60)
HGB BLD-MCNC: 13.9 G/DL (ref 12–15.9)
HGB UR QL STRIP.AUTO: NEGATIVE
HYALINE CASTS UR QL AUTO: NORMAL /LPF
IMM GRANULOCYTES # BLD AUTO: 0.04 10*3/MM3 (ref 0–0.05)
IMM GRANULOCYTES NFR BLD AUTO: 0.6 % (ref 0–0.5)
KETONES UR QL STRIP: ABNORMAL
LDLC SERPL CALC-MCNC: 125 MG/DL (ref 0–100)
LDLC/HDLC SERPL: 2.72 {RATIO}
LEUKOCYTE ESTERASE UR QL STRIP.AUTO: ABNORMAL
LYMPHOCYTES # BLD AUTO: 1.25 10*3/MM3 (ref 0.7–3.1)
LYMPHOCYTES NFR BLD AUTO: 17.6 % (ref 19.6–45.3)
MCH RBC QN AUTO: 34.2 PG (ref 26.6–33)
MCHC RBC AUTO-ENTMCNC: 34.4 G/DL (ref 31.5–35.7)
MCV RBC AUTO: 99.5 FL (ref 79–97)
MONOCYTES # BLD AUTO: 0.64 10*3/MM3 (ref 0.1–0.9)
MONOCYTES NFR BLD AUTO: 9 % (ref 5–12)
NEUTROPHILS NFR BLD AUTO: 5.06 10*3/MM3 (ref 1.7–7)
NEUTROPHILS NFR BLD AUTO: 71 % (ref 42.7–76)
NITRITE UR QL STRIP: NEGATIVE
NRBC BLD AUTO-RTO: 0 /100 WBC (ref 0–0.2)
PH UR STRIP.AUTO: 5.5 [PH] (ref 5–8)
PHOSPHATE SERPL-MCNC: 2.3 MG/DL (ref 2.5–4.5)
PLATELET # BLD AUTO: 191 10*3/MM3 (ref 140–450)
PMV BLD AUTO: 12.6 FL (ref 6–12)
POTASSIUM SERPL-SCNC: 3.9 MMOL/L (ref 3.5–5.2)
PROT SERPL-MCNC: 7.4 G/DL (ref 6–8.5)
PROT UR QL STRIP: NEGATIVE
PROT UR-MCNC: 22 MG/DL
PTH-INTACT SERPL-MCNC: 97.7 PG/ML (ref 15–65)
RBC # BLD AUTO: 4.06 10*6/MM3 (ref 3.77–5.28)
RBC # UR: NORMAL /HPF
REF LAB TEST METHOD: NORMAL
SODIUM SERPL-SCNC: 137 MMOL/L (ref 136–145)
SP GR UR STRIP: 1.02 (ref 1–1.03)
SQUAMOUS #/AREA URNS HPF: NORMAL /HPF
TRIGL SERPL-MCNC: 119 MG/DL (ref 0–150)
UROBILINOGEN UR QL STRIP: ABNORMAL
VLDLC SERPL-MCNC: 21 MG/DL (ref 5–40)
WBC # BLD AUTO: 7.12 10*3/MM3 (ref 3.4–10.8)
WBC UR QL AUTO: NORMAL /HPF

## 2021-10-15 PROCEDURE — 36415 COLL VENOUS BLD VENIPUNCTURE: CPT

## 2021-10-15 PROCEDURE — 82306 VITAMIN D 25 HYDROXY: CPT

## 2021-10-15 PROCEDURE — 82570 ASSAY OF URINE CREATININE: CPT

## 2021-10-15 PROCEDURE — 81001 URINALYSIS AUTO W/SCOPE: CPT

## 2021-10-15 PROCEDURE — 84100 ASSAY OF PHOSPHORUS: CPT

## 2021-10-15 PROCEDURE — 84156 ASSAY OF PROTEIN URINE: CPT

## 2021-10-15 PROCEDURE — 80061 LIPID PANEL: CPT

## 2021-10-15 PROCEDURE — 83970 ASSAY OF PARATHORMONE: CPT

## 2021-10-15 PROCEDURE — 85025 COMPLETE CBC W/AUTO DIFF WBC: CPT

## 2021-10-15 PROCEDURE — 80053 COMPREHEN METABOLIC PANEL: CPT

## 2021-10-19 ENCOUNTER — OFFICE VISIT (OUTPATIENT)
Dept: ORTHOPEDIC SURGERY | Facility: CLINIC | Age: 61
End: 2021-10-19

## 2021-10-19 VITALS — HEIGHT: 63 IN | WEIGHT: 147 LBS | BODY MASS INDEX: 26.05 KG/M2

## 2021-10-19 DIAGNOSIS — M17.11 PRIMARY OSTEOARTHRITIS OF RIGHT KNEE: Primary | ICD-10-CM

## 2021-10-19 PROCEDURE — 99213 OFFICE O/P EST LOW 20 MIN: CPT | Performed by: ORTHOPAEDIC SURGERY

## 2021-10-19 NOTE — PROGRESS NOTES
"Chief Complaint  Follow-up of the Right Knee     Subjective      Erika Bowens presents to [facility] for a follow-up of right knee. Patient has right knee osteoarthritis. Patient recently had 4 heart attacks; she won’t do well with a knee replacement at this time. She can’t take NSAIDs because she is on Plavix. She states the topical gel was providing her with relief until she googled side effects. She had been applying in 3 times a day. She saw that it can cause heart attacks so she discontinued it. She has attended physical therapy which helps with some motion and strength.     Allergies   Allergen Reactions   • Nsaids         Social History     Socioeconomic History   • Marital status: Single   Tobacco Use   • Smoking status: Never Smoker   • Smokeless tobacco: Never Used   Substance and Sexual Activity   • Alcohol use: No        Review of Systems     Objective   Vital Signs:   Ht 160 cm (63\")   Wt 66.7 kg (147 lb)   BMI 26.04 kg/m²       Physical Exam  Constitutional:       Appearance: Normal appearance. Patient is well-developed and normal weight.   HENT:      Head: Normocephalic.      Right Ear: Hearing and external ear normal.      Left Ear: Hearing and external ear normal.      Nose: Nose normal.   Eyes:      Conjunctiva/sclera: Conjunctivae normal.   Cardiovascular:      Rate and Rhythm: Normal rate.   Pulmonary:      Effort: Pulmonary effort is normal.      Breath sounds: No wheezing or rales.   Abdominal:      Palpations: Abdomen is soft.      Tenderness: There is no abdominal tenderness.   Musculoskeletal:      Cervical back: Normal range of motion.   Skin:     Findings: No rash.   Neurological:      Mental Status: Patient is alert and oriented to person, place, and time.   Psychiatric:         Mood and Affect: Mood and affect normal.         Judgment: Judgment normal.       Ortho Exam      RIGHT KNEE: Good strength to hamstrings, quadriceps, dorsiflexors and plantar " flexors. Tender medial and lateral joint line. Antalgic gait. Dorsal Pedal Pulse 2+, posterior tibialis pulse 2+. Calf supple, non-tender, no signs of DVT. Crepitus with motion. No swelling, skin discoloration or atrophy. Skin intact. Sensation grossly intact. Neurovascular intact.        Procedures      Imaging Results (Most Recent)     None           Result Review :       No results found.           Assessment and Plan     DX: Right knee osteoarthritis    Patient has had gel injections in the past that has given her relief in the past. She has bone on bone osteoarthritis. We will work on viscosupplementation approval. Steroid injections are ineffective. She has failed therapy and topical gel. She can't take NSAIDs as well. She will continue therapy to help strengthen her knee.     Call or return if worsening symptoms.    Follow Up     PRN.       Patient was given instructions and counseling regarding her condition or for health maintenance advice. Please see specific information pulled into the AVS if appropriate.     Scribed for Yevgeniy Santiago MD by Destiny Vicente.  10/19/21   15:56 EDT        I have personally performed the services described in this document as scribed by the above individual and it is both accurate and complete. Yevgeniy Santiago MD 10/20/21

## 2021-11-17 DIAGNOSIS — M62.838 MUSCLE SPASM: ICD-10-CM

## 2021-11-17 DIAGNOSIS — E55.9 VITAMIN D DEFICIENCY: ICD-10-CM

## 2021-11-18 RX ORDER — OMEPRAZOLE 40 MG/1
CAPSULE, DELAYED RELEASE ORAL
Qty: 30 CAPSULE | Refills: 2 | Status: SHIPPED | OUTPATIENT
Start: 2021-11-18 | End: 2022-03-02 | Stop reason: SDUPTHER

## 2021-11-18 RX ORDER — CYCLOBENZAPRINE HCL 10 MG
TABLET ORAL
Qty: 90 TABLET | Refills: 2 | Status: SHIPPED | OUTPATIENT
Start: 2021-11-18 | End: 2022-03-02 | Stop reason: SDUPTHER

## 2021-11-18 RX ORDER — ERGOCALCIFEROL 1.25 MG/1
CAPSULE ORAL
Qty: 9 CAPSULE | Refills: 1 | Status: SHIPPED | OUTPATIENT
Start: 2021-11-18 | End: 2023-01-17 | Stop reason: SDUPTHER

## 2021-11-19 PROBLEM — G98.8 NEUROLOGIC DISORDER: Status: ACTIVE | Noted: 2021-11-19

## 2021-11-19 PROBLEM — C18.9 METASTATIC COLON CANCER TO LIVER (HCC): Status: ACTIVE | Noted: 2020-10-19

## 2021-11-19 PROBLEM — R06.02 SHORTNESS OF BREATH: Status: ACTIVE | Noted: 2021-11-19

## 2021-11-19 PROBLEM — N28.9 KIDNEY DISORDER: Status: ACTIVE | Noted: 2021-11-19

## 2021-11-19 PROBLEM — N18.31 STAGE 3A CHRONIC KIDNEY DISEASE: Status: ACTIVE | Noted: 2021-10-21

## 2021-11-19 PROBLEM — F41.9 ANXIETY: Status: ACTIVE | Noted: 2021-11-19

## 2021-11-19 PROBLEM — I21.9 HEART ATTACK: Status: ACTIVE | Noted: 2021-11-19

## 2021-11-19 PROBLEM — M19.90 ARTHRITIS: Status: ACTIVE | Noted: 2021-11-19

## 2021-11-19 PROBLEM — E87.6 HYPOKALEMIA: Status: ACTIVE | Noted: 2021-10-21

## 2021-11-19 PROBLEM — M79.89 LIMB SWELLING: Status: ACTIVE | Noted: 2021-11-19

## 2021-11-19 PROBLEM — I51.9 HEART DISEASE: Status: ACTIVE | Noted: 2021-11-19

## 2021-11-19 PROBLEM — C78.7 METASTATIC COLON CANCER TO LIVER (HCC): Status: ACTIVE | Noted: 2020-10-19

## 2021-11-19 PROBLEM — I50.9 CONGESTIVE HEART FAILURE (HCC): Status: ACTIVE | Noted: 2021-11-19

## 2021-11-23 ENCOUNTER — TRANSCRIBE ORDERS (OUTPATIENT)
Dept: ADMINISTRATIVE | Facility: HOSPITAL | Age: 61
End: 2021-11-23

## 2021-11-23 ENCOUNTER — LAB (OUTPATIENT)
Dept: LAB | Facility: HOSPITAL | Age: 61
End: 2021-11-23

## 2021-11-23 ENCOUNTER — HOSPITAL ENCOUNTER (OUTPATIENT)
Dept: CT IMAGING | Facility: HOSPITAL | Age: 61
Discharge: HOME | End: 2021-11-23

## 2021-11-23 DIAGNOSIS — C18.9 MALIGNANT NEOPLASM OF COLON, UNSPECIFIED PART OF COLON (HCC): ICD-10-CM

## 2021-11-23 DIAGNOSIS — C18.9 MALIGNANT NEOPLASM OF COLON, UNSPECIFIED PART OF COLON (HCC): Primary | ICD-10-CM

## 2021-11-23 LAB
ALBUMIN SERPL-MCNC: 4.5 G/DL (ref 3.5–5.2)
ALBUMIN/GLOB SERPL: 1.4 G/DL
ALP SERPL-CCNC: 77 U/L (ref 39–117)
ALT SERPL W P-5'-P-CCNC: 31 U/L (ref 1–33)
ANION GAP SERPL CALCULATED.3IONS-SCNC: 10.2 MMOL/L (ref 5–15)
AST SERPL-CCNC: 37 U/L (ref 1–32)
BASOPHILS # BLD AUTO: 0.04 10*3/MM3 (ref 0–0.2)
BASOPHILS NFR BLD AUTO: 0.6 % (ref 0–1.5)
BILIRUB SERPL-MCNC: 0.3 MG/DL (ref 0–1.2)
BUN SERPL-MCNC: 15 MG/DL (ref 8–23)
BUN/CREAT SERPL: 9.7 (ref 7–25)
CALCIUM SPEC-SCNC: 10.1 MG/DL (ref 8.6–10.5)
CEA SERPL-MCNC: 4.47 NG/ML
CHLORIDE SERPL-SCNC: 100 MMOL/L (ref 98–107)
CO2 SERPL-SCNC: 27.8 MMOL/L (ref 22–29)
CREAT BLDA-MCNC: 1.8 MG/DL
CREAT SERPL-MCNC: 1.55 MG/DL (ref 0.57–1)
DEPRECATED RDW RBC AUTO: 45.2 FL (ref 37–54)
EOSINOPHIL # BLD AUTO: 0.13 10*3/MM3 (ref 0–0.4)
EOSINOPHIL NFR BLD AUTO: 1.9 % (ref 0.3–6.2)
ERYTHROCYTE [DISTWIDTH] IN BLOOD BY AUTOMATED COUNT: 12.6 % (ref 12.3–15.4)
GFR SERPL CREATININE-BSD FRML MDRD: 34 ML/MIN/1.73
GLOBULIN UR ELPH-MCNC: 3.2 GM/DL
GLUCOSE SERPL-MCNC: 90 MG/DL (ref 65–99)
HCT VFR BLD AUTO: 40.8 % (ref 34–46.6)
HGB BLD-MCNC: 14.2 G/DL (ref 12–15.9)
IMM GRANULOCYTES # BLD AUTO: 0.04 10*3/MM3 (ref 0–0.05)
IMM GRANULOCYTES NFR BLD AUTO: 0.6 % (ref 0–0.5)
LYMPHOCYTES # BLD AUTO: 1.54 10*3/MM3 (ref 0.7–3.1)
LYMPHOCYTES NFR BLD AUTO: 22.3 % (ref 19.6–45.3)
MCH RBC QN AUTO: 34.4 PG (ref 26.6–33)
MCHC RBC AUTO-ENTMCNC: 34.8 G/DL (ref 31.5–35.7)
MCV RBC AUTO: 98.8 FL (ref 79–97)
MONOCYTES # BLD AUTO: 0.74 10*3/MM3 (ref 0.1–0.9)
MONOCYTES NFR BLD AUTO: 10.7 % (ref 5–12)
NEUTROPHILS NFR BLD AUTO: 4.42 10*3/MM3 (ref 1.7–7)
NEUTROPHILS NFR BLD AUTO: 63.9 % (ref 42.7–76)
NRBC BLD AUTO-RTO: 0.1 /100 WBC (ref 0–0.2)
PLATELET # BLD AUTO: 194 10*3/MM3 (ref 140–450)
PMV BLD AUTO: 12.4 FL (ref 6–12)
POTASSIUM SERPL-SCNC: 3.7 MMOL/L (ref 3.5–5.2)
PROT SERPL-MCNC: 7.7 G/DL (ref 6–8.5)
RBC # BLD AUTO: 4.13 10*6/MM3 (ref 3.77–5.28)
SODIUM SERPL-SCNC: 138 MMOL/L (ref 136–145)
WBC NRBC COR # BLD: 6.91 10*3/MM3 (ref 3.4–10.8)

## 2021-11-23 PROCEDURE — 74176 CT ABD & PELVIS W/O CONTRAST: CPT

## 2021-11-23 PROCEDURE — 85025 COMPLETE CBC W/AUTO DIFF WBC: CPT

## 2021-11-23 PROCEDURE — 80053 COMPREHEN METABOLIC PANEL: CPT

## 2021-11-23 PROCEDURE — 71250 CT THORAX DX C-: CPT

## 2021-11-23 PROCEDURE — 82378 CARCINOEMBRYONIC ANTIGEN: CPT

## 2021-11-23 PROCEDURE — 82565 ASSAY OF CREATININE: CPT

## 2021-11-24 ENCOUNTER — OFFICE VISIT (OUTPATIENT)
Dept: ONCOLOGY | Facility: HOSPITAL | Age: 61
End: 2021-11-24

## 2021-11-24 VITALS
BODY MASS INDEX: 26.36 KG/M2 | WEIGHT: 148.81 LBS | TEMPERATURE: 99 F | OXYGEN SATURATION: 98 % | RESPIRATION RATE: 20 BRPM | SYSTOLIC BLOOD PRESSURE: 113 MMHG | DIASTOLIC BLOOD PRESSURE: 72 MMHG | HEART RATE: 55 BPM

## 2021-11-24 DIAGNOSIS — C18.7 CANCER OF SIGMOID COLON (HCC): Primary | ICD-10-CM

## 2021-11-24 DIAGNOSIS — C78.7 METASTATIC COLON CANCER TO LIVER (HCC): ICD-10-CM

## 2021-11-24 DIAGNOSIS — C18.9 METASTATIC COLON CANCER TO LIVER (HCC): ICD-10-CM

## 2021-11-24 PROCEDURE — 99213 OFFICE O/P EST LOW 20 MIN: CPT | Performed by: INTERNAL MEDICINE

## 2021-11-24 PROCEDURE — G0463 HOSPITAL OUTPT CLINIC VISIT: HCPCS | Performed by: INTERNAL MEDICINE

## 2021-11-24 NOTE — PROGRESS NOTES
Chief Complaint  Colon Cancer and Follow-up    Nilam Willis MD Goodale, Dianne L, MD Subjective          Erika Bowens presents to [facility] for follow-up of her colon cancer.  She is status post resection and liver directed therapy.  She returns today for follow-up.  She states he is feeling well.  She denies any masses or adenopathy.  No unusual aches or pains.  She notes normal bowel habits that blood per rectum, pain or melena.  She reports good appetite and energy level.    Oncology/Hematology History Overview Note   3/25/2019  Sigmoid colon--Moderately differentiated adenocarcinoma. 9/21/2020 Liver biopsy revealed metastatic colonic adenocarcinoma            Clinical Staging      Stage II ([id number]), recurrent            Treatments      Surgeries       4/19.Liver Ablation at [place] by Dr. Prieto         Cancer of sigmoid colon (HCC)   5/20/2019 Initial Diagnosis    Colon cancer (HCC)         Review of Systems   Constitutional: Negative for appetite change, diaphoresis, fatigue, fever, unexpected weight gain and unexpected weight loss.   HENT: Negative for hearing loss, mouth sores, sore throat, swollen glands, trouble swallowing and voice change.    Eyes: Negative for blurred vision.   Respiratory: Negative for cough, shortness of breath and wheezing.    Cardiovascular: Negative for chest pain and palpitations.   Gastrointestinal: Negative for abdominal pain, blood in stool, constipation, diarrhea, nausea and vomiting.   Endocrine: Negative for cold intolerance and heat intolerance.   Genitourinary: Negative for difficulty urinating, dysuria, frequency, hematuria and urinary incontinence.   Musculoskeletal: Negative for arthralgias, back pain and myalgias.   Skin: Negative for rash, skin lesions and bruise.   Neurological: Negative for dizziness, seizures, weakness, numbness and headache.   Hematological: Does not bruise/bleed easily.    Psychiatric/Behavioral: Negative for depressed mood. The patient is not nervous/anxious.      Current Outpatient Medications on File Prior to Visit   Medication Sig Dispense Refill   • acetaminophen (TYLENOL) 500 MG tablet Take 500-1,000 mg by mouth every 4 (four) hours as needed for mild pain (1-3).     • aspirin 81 MG EC tablet Take 81 mg by mouth Daily.     • clonazePAM (KlonoPIN) 1 MG tablet Take 1 tablet by mouth 2 (Two) Times a Day. 60 tablet 2   • clopidogrel (PLAVIX) 75 MG tablet Take 1 tablet by mouth Daily. 30 tablet 11   • cyclobenzaprine (FLEXERIL) 10 MG tablet TAKE ONE TABLET BY MOUTH THREE TIMES A DAY 90 tablet 2   • Diclofenac Sodium (VOLTAREN) 1 % gel gel Apply 4 g topically to the appropriate area as directed 4 (Four) Times a Day As Needed (AS NEEDED). 100 g 1   • FLUoxetine (PROzac) 20 MG capsule Take 3 capsules by mouth Daily. 90 capsule 5   • furosemide (LASIX) 40 MG tablet Take 1 tablet by mouth 2 (Two) Times a Day. 60 tablet 2   • losartan (COZAAR) 25 MG tablet Take 1 tablet by mouth Daily. 30 tablet 11   • metoprolol succinate XL (TOPROL-XL) 25 MG 24 hr tablet Take 1 tablet by mouth Daily. 30 tablet 5   • nitroglycerin (NITROSTAT) 0.4 MG SL tablet DISSOLVE 1 TAB UNDER TONGUE FOR CHEST PAIN - IF PAIN REMAINS AFTER 5 MIN, CALL 911 AND REPEAT DOSE. MAX 3 TABS IN 15 MINUTES 25 tablet 2   • omeprazole (priLOSEC) 40 MG capsule TAKE ONE CAPSULE BY MOUTH DAILY 30 capsule 2   • sodium-potassium-magnesium sulfates (Suprep Bowel Prep Kit) 17.5-3.13-1.6 GM/177ML solution oral solution Take 1 bottle by mouth Every 12 (Twelve) Hours. 254 mL 0   • spironolactone (ALDACTONE) 25 MG tablet Take 1 tablet by mouth 2 (Two) Times a Day. 60 tablet 5   • tiZANidine (ZANAFLEX) 4 MG tablet Take 1 tablet by mouth Every 8 (Eight) Hours As Needed for Muscle Spasms. 90 tablet 5   • vitamin D (ERGOCALCIFEROL) 1.25 MG (65381 UT) capsule capsule TAKE ONE CAPSULE BY MOUTH ONCE WEEKLY 9 capsule 1   • pravastatin (PRAVACHOL)  80 MG tablet Take 1 tablet by mouth Every Night for 30 days. 30 tablet 11     No current facility-administered medications on file prior to visit.       Allergies   Allergen Reactions   • Nsaids      Past Medical History:   Diagnosis Date   • Abdominal pain    • Colon cancer (HCC)    • Coronary arteriosclerosis    • Depressive disorder     with anxiety   • Encounter for routine adult health examination    • Essential hypertension    • Generalized anxiety disorder    • GERD (gastroesophageal reflux disease)    • Hip pain    • Hyperlipidemia    • Kidney stone    • Osteoarthritis    • Radial styloid tenosynovitis of left hand    • Urinary tract infectious disease    • Vitamin D deficiency      Past Surgical History:   Procedure Laterality Date   • CARDIAC CATHETERIZATION  04/15/2016    Enlarged left ventricle with reduced contractility.Severe coronary artery disease as described above. [facility].   • COLONOSCOPY  2019    COLON CANCER DOMINGO.   • CORONARY ANGIOPLASTY WITH STENT PLACEMENT  05/06/2012    PTCA implantation in the vein graft of the OM branch. Done at [facility] in [place], [place].   • CORONARY ARTERY BYPASS GRAFT  03/18/2003    Emergent CABG x 5 CAD   • DE QUERVAIN'S RELEASE Right 11/30/2009    Release of De Quervain's to the right thumb   • DEQUERVAIN RELEASE Left 11/18/2015    Release of de Quervain's to the left wrist.   • LIVER SURGERY     • PAP SMEAR  06/28/2012    normal   • PROCEDURE GENERIC CONVERTED  11/22/2015    MOST RECENT DIASTOLIC BP < 90 mmHg    • PROCEDURE GENERIC CONVERTED  11/22/2015    MOST RECENT SYSTOLIC BP > or = 140 mmHg    • PROCEDURE GENERIC CONVERTED  11/22/2015    TOBACCO NON-USER    • REPLACEMENT TOTAL HIP LATERAL POSITION     • SUBTOTAL COLECTOMY N/A 04/23/2019    sigmoid for colon cancer   • TOTAL KNEE ARTHROPLASTY       Social History     Socioeconomic History   • Marital status: Single   Tobacco Use   • Smoking status: Never Smoker   • Smokeless tobacco:  Never Used   Substance and Sexual Activity   • Alcohol use: No     Family History   Problem Relation Age of Onset   • Diabetes Other    • Heart disease Other    • Hypertension Other    • Stroke Other    • Colon cancer Other    • Coronary artery disease Other    • Other Other         Heart surgery       Objective   Physical Exam  Vitals reviewed. Exam conducted with a chaperone present.   Constitutional:       General: She is not in acute distress.     Appearance: Normal appearance.   HENT:      Head: Normocephalic and atraumatic.   Cardiovascular:      Rate and Rhythm: Normal rate and regular rhythm.      Heart sounds: Normal heart sounds. No murmur heard.  No gallop.    Pulmonary:      Effort: Pulmonary effort is normal.      Breath sounds: Normal breath sounds.   Abdominal:      General: Abdomen is flat. Bowel sounds are normal. There is no distension.      Palpations: Abdomen is soft.      Tenderness: There is no abdominal tenderness.      Comments: No HSM or masses.  Well-healed midline incision.   Musculoskeletal:      Cervical back: Neck supple.      Right lower leg: No edema.      Left lower leg: No edema.   Lymphadenopathy:      Cervical: No cervical adenopathy.   Neurological:      Mental Status: She is alert and oriented to person, place, and time.   Psychiatric:         Mood and Affect: Mood normal.         Behavior: Behavior normal.         Vitals:    11/24/21 1449   BP: 113/72   Pulse: 55   Resp: 20   Temp: 99 °F (37.2 °C)   SpO2: 98%   Weight: 67.5 kg (148 lb 13 oz)   PainSc: 0-No pain     ECOG score: 1         PHQ-9 Total Score:                    Result Review :   The following data was reviewed by: Luis Boyce MD on 11/24/2021:  Lab Results   Component Value Date    HGB 14.2 11/23/2021    HCT 40.8 11/23/2021    MCV 98.8 (H) 11/23/2021     11/23/2021    WBC 6.91 11/23/2021    NEUTROABS 4.42 11/23/2021    LYMPHSABS 1.54 11/23/2021    MONOSABS 0.74 11/23/2021    EOSABS 0.13 11/23/2021     BASOSABS 0.04 11/23/2021     Lab Results   Component Value Date    GLUCOSE 90 11/23/2021    BUN 15 11/23/2021    CREATININE 1.80 11/23/2021     11/23/2021    K 3.7 11/23/2021     11/23/2021    CO2 27.8 11/23/2021    CALCIUM 10.1 11/23/2021    PROTEINTOT 7.7 11/23/2021    ALBUMIN 4.50 11/23/2021    BILITOT 0.3 11/23/2021    ALKPHOS 77 11/23/2021    AST 37 (H) 11/23/2021    ALT 31 11/23/2021     CEA 4.4    Data reviewed: Radiologic studies CT of chest, abdomen, pelvis reviewed demonstrating small subcentimeter nodules on the left chest which are unchanged from prior.  Posttreatment changes noted to the colon and liver.  No evidence of new or progressive disease.      Assessment and Plan    Diagnoses and all orders for this visit:    1. Cancer of sigmoid colon (HCC) (Primary)    2. Metastatic colon cancer to liver (HCC)  -     CBC & Differential; Future  -     CEA; Future  -     Comprehensive Metabolic Panel; Future  -     CT chest w contrast; Future  -     CT abdomen pelvis w contrast; Future      Patient is status post resection of the sigmoid colon as well as ablation to isolate the liver lesion.  She opted for no chemotherapy.  On return she is doing well.  I see no evidence of disease recurrence by history, physical examination, lab work or CT scans.  She follows up with her surgeon next week.  I will continue with ongoing surveillance and we will see her back in 6 months for that purpose with labs and scans prior.        Patient Follow Up: 6 months    Patient was given instructions and counseling regarding her condition or for health maintenance advice. Please see specific information pulled into the AVS if appropriate.     Luis Boyce MD    11/24/2021

## 2021-12-01 ENCOUNTER — TELEPHONE (OUTPATIENT)
Dept: ONCOLOGY | Facility: HOSPITAL | Age: 61
End: 2021-12-01

## 2021-12-07 ENCOUNTER — TELEPHONE (OUTPATIENT)
Dept: ORTHOPEDIC SURGERY | Facility: CLINIC | Age: 61
End: 2021-12-07

## 2021-12-08 ENCOUNTER — TELEPHONE (OUTPATIENT)
Dept: ORTHOPEDIC SURGERY | Facility: CLINIC | Age: 61
End: 2021-12-08

## 2021-12-08 NOTE — TELEPHONE ENCOUNTER
I spoke with Stefanie HERNANDEZ At [facility] and she said that -Synvisc One is no pre-cert required.  I passed message on to Natty to schedule appointment.

## 2021-12-09 ENCOUNTER — TELEPHONE (OUTPATIENT)
Dept: GASTROENTEROLOGY | Facility: CLINIC | Age: 61
End: 2021-12-09

## 2021-12-09 NOTE — TELEPHONE ENCOUNTER
12/9/2021    Dear,     Patient: Erika Bowens   YOB: 1960        This patient is waiting to have a Colonoscopy and/or Esophagogastroduodenoscopy which I will perform at [facility] on 02/14/2022date .     Our records indicate this patient is currently taking Plavix med. This procedure requires the patient to suspend their Plavix prior to surgery.     Please respond to this request noting your recommendations. You may contact our office at [phone] Option 1 with any questions. I appreciate your prompt response in this matter.     Please return this form to our office as soon as possible to [phone]    ____ I approve my patient to stop taking their Plavix 7 days prior to the scheduled procedure.    ____ I do NOT approve my patient to stop taking their plavix at this time.    ____ I approve my patient from a cardiac standpoint.    ____ I do NOT approve my patient from a cardiac standpoint at this time.    Approving physician name (please print):     _____________________________________________    Approving physician signature:     ________________________________    Date:________________      Sincerely,  [facility]   Gastroenterology -

## 2021-12-17 ENCOUNTER — OFFICE VISIT (OUTPATIENT)
Dept: FAMILY MEDICINE CLINIC | Facility: CLINIC | Age: 61
End: 2021-12-17

## 2021-12-17 VITALS
DIASTOLIC BLOOD PRESSURE: 88 MMHG | SYSTOLIC BLOOD PRESSURE: 136 MMHG | BODY MASS INDEX: 26.52 KG/M2 | HEART RATE: 63 BPM | OXYGEN SATURATION: 97 % | HEIGHT: 63 IN | WEIGHT: 149.7 LBS

## 2021-12-17 DIAGNOSIS — F32.5 MAJOR DEPRESSIVE DISORDER WITH SINGLE EPISODE, IN FULL REMISSION (HCC): Primary | ICD-10-CM

## 2021-12-17 DIAGNOSIS — F41.1 GENERALIZED ANXIETY DISORDER: Chronic | ICD-10-CM

## 2021-12-17 DIAGNOSIS — I25.10 CORONARY ARTERIOSCLEROSIS: ICD-10-CM

## 2021-12-17 PROCEDURE — 99214 OFFICE O/P EST MOD 30 MIN: CPT | Performed by: GENERAL PRACTICE

## 2021-12-17 RX ORDER — CLONAZEPAM 1 MG/1
1 TABLET ORAL 2 TIMES DAILY
Qty: 60 TABLET | Refills: 2 | Status: SHIPPED | OUTPATIENT
Start: 2021-12-17 | End: 2022-03-17 | Stop reason: SDUPTHER

## 2021-12-17 RX ORDER — CLOPIDOGREL BISULFATE 75 MG/1
75 TABLET ORAL DAILY
Qty: 30 TABLET | Refills: 11 | Status: SHIPPED | OUTPATIENT
Start: 2021-12-17 | End: 2022-10-07 | Stop reason: SDUPTHER

## 2021-12-17 RX ORDER — BUPROPION HYDROCHLORIDE 100 MG/1
100 TABLET, EXTENDED RELEASE ORAL EVERY MORNING
Qty: 30 TABLET | Refills: 2 | Status: SHIPPED | OUTPATIENT
Start: 2021-12-17 | End: 2022-03-17

## 2021-12-17 NOTE — PROGRESS NOTES
Subjective   Erika Bowens is a 61 y.o. female.   Chief Complaint   Patient presents with   • Hypertension     For review and evaluation of management of chronic medical problems. Records reviewed. Recent labs, xrays reviewed and medications reconciled.  Depression is not as well controlled.  Her therapist retired and she has not found another one yet.  Thinks we need to adjust her medication.  No suicidal thoughts.  No chest pain.  Hypertension  This is a chronic problem. The current episode started more than 1 year ago. The problem is unchanged. The problem is controlled. Pertinent negatives include no headaches, neck pain or palpitations. There are no associated agents to hypertension. Current antihypertension treatment includes beta blockers, diuretics and angiotensin blockers. The current treatment provides significant improvement. There are no compliance problems.  Hypertensive end-organ damage includes CAD/MI.   Coronary Artery Disease  Presents for follow-up visit. Pertinent negatives include no chest pressure, dizziness, leg swelling or palpitations. The symptoms have been stable. Compliance with diet is good. Compliance with exercise is variable. Compliance with medications is good.      The following portions of the patient's history were reviewed and updated as appropriate: allergies, current medications, past social history and problem list.    Outpatient Medications Prior to Visit   Medication Sig Dispense Refill   • acetaminophen (TYLENOL) 500 MG tablet Take 500-1,000 mg by mouth every 4 (four) hours as needed for mild pain (1-3).     • aspirin 81 MG EC tablet Take 81 mg by mouth Daily.     • cyclobenzaprine (FLEXERIL) 10 MG tablet TAKE ONE TABLET BY MOUTH THREE TIMES A DAY 90 tablet 2   • Diclofenac Sodium (VOLTAREN) 1 % gel gel Apply 4 g topically to the appropriate area as directed 4 (Four) Times a Day As Needed (AS NEEDED). 100 g 1   • FLUoxetine (PROzac) 20 MG capsule Take 3 capsules by mouth  "Daily. 90 capsule 5   • furosemide (LASIX) 40 MG tablet Take 1 tablet by mouth 2 (Two) Times a Day. 60 tablet 2   • losartan (COZAAR) 25 MG tablet Take 1 tablet by mouth Daily. 30 tablet 11   • metoprolol succinate XL (TOPROL-XL) 25 MG 24 hr tablet Take 1 tablet by mouth Daily. 30 tablet 5   • nitroglycerin (NITROSTAT) 0.4 MG SL tablet DISSOLVE 1 TAB UNDER TONGUE FOR CHEST PAIN - IF PAIN REMAINS AFTER 5 MIN, CALL 911 AND REPEAT DOSE. MAX 3 TABS IN 15 MINUTES 25 tablet 2   • omeprazole (priLOSEC) 40 MG capsule TAKE ONE CAPSULE BY MOUTH DAILY 30 capsule 2   • sodium-potassium-magnesium sulfates (Suprep Bowel Prep Kit) 17.5-3.13-1.6 GM/177ML solution oral solution Take 1 bottle by mouth Every 12 (Twelve) Hours. 254 mL 0   • spironolactone (ALDACTONE) 25 MG tablet Take 1 tablet by mouth 2 (Two) Times a Day. 60 tablet 5   • tiZANidine (ZANAFLEX) 4 MG tablet Take 1 tablet by mouth Every 8 (Eight) Hours As Needed for Muscle Spasms. 90 tablet 5   • vitamin D (ERGOCALCIFEROL) 1.25 MG (69196 UT) capsule capsule TAKE ONE CAPSULE BY MOUTH ONCE WEEKLY 9 capsule 1   • clonazePAM (KlonoPIN) 1 MG tablet Take 1 tablet by mouth 2 (Two) Times a Day. 60 tablet 2   • clopidogrel (PLAVIX) 75 MG tablet Take 1 tablet by mouth Daily. 30 tablet 11   • pravastatin (PRAVACHOL) 80 MG tablet Take 1 tablet by mouth Every Night for 30 days. 30 tablet 11     No facility-administered medications prior to visit.       Review of Systems   Cardiovascular: Negative for palpitations and leg swelling.   Musculoskeletal: Negative for neck pain.   Neurological: Negative for dizziness and headaches.     I have reviewed 12 systems with patient. Findings were negative except what is noted below and/or in history of present illness.     Objective   Visit Vitals  /88 (BP Location: Left arm)   Pulse 63   Ht 160 cm (63\")   Wt 67.9 kg (149 lb 11.2 oz)   SpO2 97%   BMI 26.52 kg/m²     Physical Exam  Vitals and nursing note reviewed.   Constitutional:       " General: She is not in acute distress.     Appearance: She is well-developed.   HENT:      Head: Normocephalic and atraumatic.      Nose: Nose normal.   Eyes:      General:         Right eye: No discharge.         Left eye: No discharge.      Conjunctiva/sclera: Conjunctivae normal.      Pupils: Pupils are equal, round, and reactive to light.   Neck:      Thyroid: No thyromegaly.   Cardiovascular:      Rate and Rhythm: Normal rate and regular rhythm.      Heart sounds: Normal heart sounds.   Pulmonary:      Effort: Pulmonary effort is normal.      Breath sounds: Normal breath sounds.   Lymphadenopathy:      Cervical: No cervical adenopathy.   Skin:     General: Skin is warm and dry.   Neurological:      Mental Status: She is alert and oriented to person, place, and time.         Notes brought forward are reviewed and updated if indicated.     Assessment/Plan   Problems Addressed this Visit        Cardiac and Vasculature    Coronary arteriosclerosis    Relevant Medications    clopidogrel (PLAVIX) 75 MG tablet       Mental Health    Generalized anxiety disorder (Chronic)    Relevant Medications    clonazePAM (KlonoPIN) 1 MG tablet    buPROPion SR (Wellbutrin SR) 100 MG 12 hr tablet      Other Visit Diagnoses     Major depressive disorder with single episode, in full remission (HCC)    -  Primary    Relevant Medications    clonazePAM (KlonoPIN) 1 MG tablet    buPROPion SR (Wellbutrin SR) 100 MG 12 hr tablet      Diagnoses       Codes Comments    Major depressive disorder with single episode, in full remission (HCC)    -  Primary ICD-10-CM: F32.5  ICD-9-CM: 296.26     Generalized anxiety disorder     ICD-10-CM: F41.1  ICD-9-CM: 300.02     Coronary arteriosclerosis     ICD-10-CM: I25.10  ICD-9-CM: 414.00           Continue current medications.  Start bupropion.Instructions given regarding proper use and potential risks and side effects.  Luis Angel reviewed and appropriate. Not recommended to drive or operate heavy equipment  while taking potentially sedating meds.  Patient understands the risks associated with this controlled medication, including tolerance and addiction. They also agree to obtain this medication only from me, and not from a another provider, unless that provider is covering for me in my absence. They also agree to be compliant in dosing, and not self adjust the dose of medication.  A signed controlled substance agreement is on file, and they have received a controlled substance education sheet at this or a previous visit. They have also signed a consent for treatment with a controlled substance as per [facility] policy.      New Medications Ordered This Visit   Medications   • clonazePAM (KlonoPIN) 1 MG tablet     Sig: Take 1 tablet by mouth 2 (Two) Times a Day.     Dispense:  60 tablet     Refill:  2   • clopidogrel (PLAVIX) 75 MG tablet     Sig: Take 1 tablet by mouth Daily.     Dispense:  30 tablet     Refill:  11   • buPROPion SR (Wellbutrin SR) 100 MG 12 hr tablet     Sig: Take 1 tablet by mouth Every Morning.     Dispense:  30 tablet     Refill:  2     Return in about 3 months (around 3/17/2022).        This document has been electronically signed by Nilam Willis MD on December 17, 2021 16:52 CST

## 2021-12-20 DIAGNOSIS — Z12.31 ENCOUNTER FOR SCREENING MAMMOGRAM FOR MALIGNANT NEOPLASM OF BREAST: Primary | ICD-10-CM

## 2021-12-21 ENCOUNTER — OFFICE VISIT (OUTPATIENT)
Dept: ORTHOPEDIC SURGERY | Facility: CLINIC | Age: 61
End: 2021-12-21

## 2021-12-21 VITALS — HEIGHT: 63 IN | OXYGEN SATURATION: 100 % | HEART RATE: 54 BPM | BODY MASS INDEX: 26.82 KG/M2 | WEIGHT: 151.4 LBS

## 2021-12-21 DIAGNOSIS — M17.11 PRIMARY OSTEOARTHRITIS OF RIGHT KNEE: Primary | ICD-10-CM

## 2021-12-21 PROCEDURE — 20610 DRAIN/INJ JOINT/BURSA W/O US: CPT | Performed by: PHYSICIAN ASSISTANT

## 2021-12-21 NOTE — PATIENT INSTRUCTIONS
Recommend rest, ice and elevation following today's injection for any pain or swelling.     Call with any changes or concerns.  Follow up as needed.

## 2021-12-21 NOTE — PROGRESS NOTES
"Chief Complaint  Pain and Follow-up of the Right Knee    Subjective          Erika Bowens presents to [facility] for follow-up of right knee pain, right knee osteoarthritis.  Patient presents today to receive Synvisc injection of her knee.  She states she is unable to take NSAIDs, due to being on Plavix.  She also reports not wanting steroid injections, due to the side effects.  This is her first Synvisc injection.    Objective   Allergies   Allergen Reactions   • Nsaids        Vital Signs:   Pulse 54   Ht 160 cm (63\")   Wt 68.7 kg (151 lb 6.4 oz)   SpO2 100%   BMI 26.82 kg/m²       Physical Exam  Constitutional:       Appearance: Normal appearance. Patient is well-developed and normal weight.   HENT:      Head: Normocephalic.      Right Ear: Hearing and external ear normal.      Left Ear: Hearing and external ear normal.      Nose: Nose normal.   Eyes:      Conjunctiva/sclera: Conjunctivae normal.   Cardiovascular:      Rate and Rhythm: Normal rate.   Pulmonary:      Effort: Pulmonary effort is normal.      Breath sounds: No wheezing or rales.   Abdominal:      Palpations: Abdomen is soft.      Tenderness: There is no abdominal tenderness.   Musculoskeletal:      Cervical back: Normal range of motion.   Skin:     Findings: No rash.   Neurological:      Mental Status: Patient is alert and oriented to person, place, and time.   Psychiatric:         Mood and Affect: Mood and affect normal.         Judgment: Judgment normal.     Ortho Exam  Right knee: No swelling, atrophy, discoloration or deformity.  Tenderness along the medial lateral joint lines.  Full knee flexion and extension.  Good muscle tone the quadriceps, hamstrings and ankle flexors.  Fully weightbearing, gait is nonantalgic.  Sensation intact, neurovascular intact, posterior tibialis pulse 2+.    Result Review :   The following data was reviewed by: MARTHA Winston on 12/21/2021:         Imaging Results (Most " Recent)     None         Large Joint Arthrocentesis: R knee  Date/Time: 12/21/2021 2:41 PM  Consent given by: patient  Site marked: site marked  Timeout: Immediately prior to procedure a time out was called to verify the correct patient, procedure, equipment, support staff and site/side marked as required   Supporting Documentation  Indications: pain   Procedure Details  Location: knee - R knee  Preparation: Patient was prepped and draped in the usual sterile fashion  Needle gauge: 21 G.  Approach: lateral  Medications administered: 48 mg hylan 48 MG/6ML  Patient tolerance: patient tolerated the procedure well with no immediate complications            Assessment and Plan    Problem List Items Addressed This Visit        Musculoskeletal and Injuries    Primary osteoarthritis of right knee - Primary          Follow Up   Return if symptoms worsen or fail to improve.  Patient Instructions   Recommend rest, ice and elevation following today's injection for any pain or swelling.     Call with any changes or concerns.  Follow up as needed.     Patient was given instructions and counseling regarding her condition or for health maintenance advice. Please see specific information pulled into the AVS if appropriate.

## 2022-02-14 ENCOUNTER — ANESTHESIA (OUTPATIENT)
Dept: GASTROENTEROLOGY | Facility: HOSPITAL | Age: 62
End: 2022-02-14

## 2022-02-14 ENCOUNTER — ANESTHESIA EVENT (OUTPATIENT)
Dept: GASTROENTEROLOGY | Facility: HOSPITAL | Age: 62
End: 2022-02-14

## 2022-02-14 ENCOUNTER — HOSPITAL ENCOUNTER (OUTPATIENT)
Facility: HOSPITAL | Age: 62
Setting detail: HOSPITAL OUTPATIENT SURGERY
Discharge: HOME | End: 2022-02-14
Attending: INTERNAL MEDICINE | Admitting: INTERNAL MEDICINE

## 2022-02-14 VITALS
RESPIRATION RATE: 17 BRPM | TEMPERATURE: 97.2 F | BODY MASS INDEX: 27.26 KG/M2 | WEIGHT: 153.88 LBS | OXYGEN SATURATION: 94 % | DIASTOLIC BLOOD PRESSURE: 94 MMHG | HEART RATE: 57 BPM | SYSTOLIC BLOOD PRESSURE: 130 MMHG

## 2022-02-14 DIAGNOSIS — Z85.038 PERSONAL HISTORY OF COLON CANCER: ICD-10-CM

## 2022-02-14 PROCEDURE — 88305 TISSUE EXAM BY PATHOLOGIST: CPT | Performed by: INTERNAL MEDICINE

## 2022-02-14 PROCEDURE — 45385 COLONOSCOPY W/LESION REMOVAL: CPT | Performed by: INTERNAL MEDICINE

## 2022-02-14 PROCEDURE — 25010000002 PROPOFOL 10 MG/ML EMULSION: Performed by: NURSE ANESTHETIST, CERTIFIED REGISTERED

## 2022-02-14 RX ORDER — PROPOFOL 10 MG/ML
VIAL (ML) INTRAVENOUS AS NEEDED
Status: DISCONTINUED | OUTPATIENT
Start: 2022-02-14 | End: 2022-02-14 | Stop reason: SURG

## 2022-02-14 RX ORDER — SODIUM CHLORIDE, SODIUM LACTATE, POTASSIUM CHLORIDE, CALCIUM CHLORIDE 600; 310; 30; 20 MG/100ML; MG/100ML; MG/100ML; MG/100ML
INJECTION, SOLUTION INTRAVENOUS CONTINUOUS PRN
Status: DISCONTINUED | OUTPATIENT
Start: 2022-02-14 | End: 2022-02-14 | Stop reason: SURG

## 2022-02-14 RX ORDER — LIDOCAINE HYDROCHLORIDE 20 MG/ML
INJECTION, SOLUTION INFILTRATION; PERINEURAL AS NEEDED
Status: DISCONTINUED | OUTPATIENT
Start: 2022-02-14 | End: 2022-02-14 | Stop reason: SURG

## 2022-02-14 RX ORDER — GLYCOPYRROLATE 0.2 MG/ML
INJECTION INTRAMUSCULAR; INTRAVENOUS AS NEEDED
Status: DISCONTINUED | OUTPATIENT
Start: 2022-02-14 | End: 2022-02-14 | Stop reason: SURG

## 2022-02-14 RX ORDER — SODIUM CHLORIDE, SODIUM LACTATE, POTASSIUM CHLORIDE, CALCIUM CHLORIDE 600; 310; 30; 20 MG/100ML; MG/100ML; MG/100ML; MG/100ML
30 INJECTION, SOLUTION INTRAVENOUS CONTINUOUS
Status: DISCONTINUED | OUTPATIENT
Start: 2022-02-14 | End: 2022-02-14 | Stop reason: HOSPADM

## 2022-02-14 RX ADMIN — SODIUM CHLORIDE, POTASSIUM CHLORIDE, SODIUM LACTATE AND CALCIUM CHLORIDE: 600; 310; 30; 20 INJECTION, SOLUTION INTRAVENOUS at 07:06

## 2022-02-14 RX ADMIN — PROPOFOL 150 MCG/KG/MIN: 10 INJECTION, EMULSION INTRAVENOUS at 07:34

## 2022-02-14 RX ADMIN — LIDOCAINE HYDROCHLORIDE 100 MG: 20 INJECTION, SOLUTION INFILTRATION; PERINEURAL at 07:31

## 2022-02-14 RX ADMIN — GLYCOPYRROLATE 0.2 MG: 0.2 INJECTION INTRAMUSCULAR; INTRAVENOUS at 07:34

## 2022-02-14 RX ADMIN — GLYCOPYRROLATE 0.2 MG: 0.2 INJECTION INTRAMUSCULAR; INTRAVENOUS at 07:46

## 2022-02-14 RX ADMIN — PROPOFOL 100 MG: 10 INJECTION, EMULSION INTRAVENOUS at 07:32

## 2022-02-14 NOTE — H&P
ScreeningPre Procedure History & Physical    Chief Complaint:   Screening     Subjective     HPI:   Screening     Past Medical History:   Past Medical History:   Diagnosis Date   • Abdominal pain    • Colon cancer (HCC)    • Coronary arteriosclerosis    • Depressive disorder     with anxiety   • Encounter for routine adult health examination    • Essential hypertension    • Generalized anxiety disorder    • GERD (gastroesophageal reflux disease)    • Hip pain    • Hyperlipidemia    • Kidney stone    • Osteoarthritis    • Radial styloid tenosynovitis of left hand    • Urinary tract infectious disease    • Vitamin D deficiency        Past Surgical History:  Past Surgical History:   Procedure Laterality Date   • CARDIAC CATHETERIZATION  04/15/2016    Enlarged left ventricle with reduced contractility.Severe coronary artery disease as described above. [facility].   • COLONOSCOPY  2019    COLON CANCER DOMINGO.   • CORONARY ANGIOPLASTY WITH STENT PLACEMENT  05/06/2012    PTCA implantation in the vein graft of the OM branch. Done at [facility] in [place], [place].   • CORONARY ARTERY BYPASS GRAFT  03/18/2003    Emergent CABG x 5 CAD   • DE QUERVAIN'S RELEASE Right 11/30/2009    Release of De Quervain's to the right thumb   • DEQUERVAIN RELEASE Left 11/18/2015    Release of de Quervain's to the left wrist.   • LIVER SURGERY     • PAP SMEAR  06/28/2012    normal   • PROCEDURE GENERIC CONVERTED  11/22/2015    MOST RECENT DIASTOLIC BP < 90 mmHg    • PROCEDURE GENERIC CONVERTED  11/22/2015    MOST RECENT SYSTOLIC BP > or = 140 mmHg    • PROCEDURE GENERIC CONVERTED  11/22/2015    TOBACCO NON-USER    • REPLACEMENT TOTAL HIP LATERAL POSITION     • SUBTOTAL COLECTOMY N/A 04/23/2019    sigmoid for colon cancer   • TOTAL KNEE ARTHROPLASTY         Family History:  Family History   Problem Relation Age of Onset   • Diabetes Other    • Heart disease Other    • Hypertension Other    • Stroke Other    • Colon cancer Other     • Coronary artery disease Other    • Other Other         Heart surgery       Social History:   reports that she has never smoked. She has never used smokeless tobacco. She reports that she does not drink alcohol.    Medications:   Medications Prior to Admission   Medication Sig Dispense Refill Last Dose   • acetaminophen (TYLENOL) 500 MG tablet Take 500-1,000 mg by mouth every 4 (four) hours as needed for mild pain (1-3).   Past Week at Unknown time   • aspirin 81 MG EC tablet Take 81 mg by mouth Daily.   Past Week at Unknown time   • buPROPion SR (Wellbutrin SR) 100 MG 12 hr tablet Take 1 tablet by mouth Every Morning. 30 tablet 2 Past Week at Unknown time   • clonazePAM (KlonoPIN) 1 MG tablet Take 1 tablet by mouth 2 (Two) Times a Day. 60 tablet 2 2/13/2022 at Unknown time   • cyclobenzaprine (FLEXERIL) 10 MG tablet TAKE ONE TABLET BY MOUTH THREE TIMES A DAY 90 tablet 2 2/13/2022 at Unknown time   • FLUoxetine (PROzac) 20 MG capsule Take 3 capsules by mouth Daily. 90 capsule 5 2/13/2022 at Unknown time   • furosemide (LASIX) 40 MG tablet Take 1 tablet by mouth 2 (Two) Times a Day. 60 tablet 2 2/13/2022 at Unknown time   • losartan (COZAAR) 25 MG tablet Take 1 tablet by mouth Daily. 30 tablet 11 2/13/2022 at Unknown time   • metoprolol succinate XL (TOPROL-XL) 25 MG 24 hr tablet Take 1 tablet by mouth Daily. 30 tablet 5 2/13/2022 at Unknown time   • omeprazole (priLOSEC) 40 MG capsule TAKE ONE CAPSULE BY MOUTH DAILY 30 capsule 2 2/13/2022 at Unknown time   • sodium-potassium-magnesium sulfates (Suprep Bowel Prep Kit) 17.5-3.13-1.6 GM/177ML solution oral solution Take 1 bottle by mouth Every 12 (Twelve) Hours. 254 mL 0 2/14/2022 at 0200   • spironolactone (ALDACTONE) 25 MG tablet Take 1 tablet by mouth 2 (Two) Times a Day. 60 tablet 5 2/13/2022 at Unknown time   • tiZANidine (ZANAFLEX) 4 MG tablet Take 1 tablet by mouth Every 8 (Eight) Hours As Needed for Muscle Spasms. 90 tablet 5 2/13/2022 at Unknown time   •  vitamin D (ERGOCALCIFEROL) 1.25 MG (19016 UT) capsule capsule TAKE ONE CAPSULE BY MOUTH ONCE WEEKLY 9 capsule 1 Past Week at Unknown time   • clopidogrel (PLAVIX) 75 MG tablet Take 1 tablet by mouth Daily. 30 tablet 11 2/9/2022   • Diclofenac Sodium (VOLTAREN) 1 % gel gel Apply 4 g topically to the appropriate area as directed 4 (Four) Times a Day As Needed (AS NEEDED). 100 g 1 More than a month at Unknown time   • nitroglycerin (NITROSTAT) 0.4 MG SL tablet DISSOLVE 1 TAB UNDER TONGUE FOR CHEST PAIN - IF PAIN REMAINS AFTER 5 MIN, CALL 911 AND REPEAT DOSE. MAX 3 TABS IN 15 MINUTES 25 tablet 2 Unknown at Unknown time   • pravastatin (PRAVACHOL) 80 MG tablet Take 1 tablet by mouth Every Night for 30 days. 30 tablet 11        Allergies:  Patient has no known allergies.        Objective     Blood pressure 115/68, pulse 57, temperature 98.5 °F (36.9 °C), temperature source Temporal, resp. rate 18, weight 69.8 kg (153 lb 14.1 oz), SpO2 96 %.    Physical Exam   Constitutional: Pt is oriented to person, place, and time and well-developed, well-nourished, and in no distress.   Mouth/Throat: Oropharynx is clear and moist.   Neck: Normal range of motion.   Cardiovascular: Normal rate, regular rhythm and normal heart sounds.    Pulmonary/Chest: Effort normal and breath sounds normal.   Abdominal: Soft. Nontender  Skin: Skin is warm and dry.   Psychiatric: Mood, memory, affect and judgment normal.     Assessment/Plan     Diagnosis:  Screening colonoscopy  H/o colon cancer    Anticipated Surgical Procedure:  colonoscopy    The risks, benefits, and alternatives of this procedure have been discussed with the patient or the responsible party- the patient understands and agrees to proceed.

## 2022-02-14 NOTE — ANESTHESIA PREPROCEDURE EVALUATION
Anesthesia Evaluation     Patient summary reviewed and Nursing notes reviewed   no history of anesthetic complications:  NPO Solid Status: > 8 hours  NPO Liquid Status: > 2 hours           Airway   Mallampati: II  TM distance: >3 FB  Neck ROM: full  No difficulty expected  Dental      Pulmonary - normal exam    breath sounds clear to auscultation  (+) shortness of breath,   Cardiovascular - normal exam  Exercise tolerance: poor (<4 METS)    Rhythm: regular  Rate: normal    (+) pacemaker, hypertension, past MI , CAD, CABG, CHF Systolic <55%, hyperlipidemia,     ROS comment: Cath 2021 CONCLUSION:    1.  Enlarged left ventricle with reduced systolic function.  EF 25%  2.  Widely patent LIMA graft to the LAD, vein graft to the diagonal and        occluded vein graft to the right coronary artery and vein graft to the        marginal branch.        Neuro/Psych  (+) psychiatric history,    GI/Hepatic/Renal/Endo    (+)  GERD,  liver disease (mets), renal disease CRI,     Musculoskeletal     Abdominal    Substance History - negative use     OB/GYN          Other      history of cancer (colon with mets liver,)                    Anesthesia Plan    ASA 4     general   total IV anesthesia    Anesthetic plan, all risks, benefits, and alternatives have been provided, discussed and informed consent has been obtained with: patient.        CODE STATUS:

## 2022-02-14 NOTE — ANESTHESIA POSTPROCEDURE EVALUATION
Patient: Erika Bowens    Procedure Summary     Date: 02/14/22 Room / Location: [facility] ENDOSCOPY 2 / [facility] ENDOSCOPY    Anesthesia Start: 0731 Anesthesia Stop: 0751    Procedure: COLONOSCOPY WITH POLYPECTOMY (N/A ) Diagnosis:       Personal history of colon cancer      (Personal history of colon cancer [Z85.038])    Surgeons: Coy Ordoñez MD Provider: Kevin Pepper MD    Anesthesia Type: general ASA Status: 4          Anesthesia Type: general    Vitals  Vitals Value Taken Time   /98 02/14/22 0809   Temp 37.3 °C (99.1 °F) 02/14/22 0754   Pulse 59 02/14/22 0809   Resp 17 02/14/22 0809   SpO2 99 % 02/14/22 0809           Post Anesthesia Care and Evaluation    Patient location during evaluation: bedside  Patient participation: complete - patient participated  Level of consciousness: awake  Pain management: adequate  Airway patency: patent  Anesthetic complications: No anesthetic complications  PONV Status: none  Cardiovascular status: acceptable and stable  Respiratory status: acceptable  Hydration status: acceptable    Comments: An Anesthesiologist personally participated in the most demanding procedures (including induction and emergence if applicable) in the anesthesia plan, monitored the course of anesthesia administration at frequent intervals and remained physically present and available for immediate diagnosis and treatment of emergencies.

## 2022-02-15 LAB
CYTO UR: NORMAL
LAB AP CASE REPORT: NORMAL
LAB AP CLINICAL INFORMATION: NORMAL
PATH REPORT.FINAL DX SPEC: NORMAL
PATH REPORT.GROSS SPEC: NORMAL

## 2022-03-02 DIAGNOSIS — F32.A DEPRESSIVE DISORDER: Chronic | ICD-10-CM

## 2022-03-02 DIAGNOSIS — M62.838 MUSCLE SPASM: ICD-10-CM

## 2022-03-02 RX ORDER — FLUOXETINE HYDROCHLORIDE 20 MG/1
60 CAPSULE ORAL DAILY
Qty: 90 CAPSULE | Refills: 5 | Status: SHIPPED | OUTPATIENT
Start: 2022-03-02 | End: 2022-09-22 | Stop reason: SDUPTHER

## 2022-03-02 RX ORDER — CYCLOBENZAPRINE HCL 10 MG
10 TABLET ORAL 3 TIMES DAILY
Qty: 90 TABLET | Refills: 2 | Status: SHIPPED | OUTPATIENT
Start: 2022-03-02 | End: 2022-06-20 | Stop reason: SDUPTHER

## 2022-03-02 RX ORDER — OMEPRAZOLE 40 MG/1
40 CAPSULE, DELAYED RELEASE ORAL DAILY
Qty: 90 CAPSULE | Refills: 2 | Status: SHIPPED | OUTPATIENT
Start: 2022-03-02 | End: 2022-10-07 | Stop reason: SDUPTHER

## 2022-03-04 DIAGNOSIS — I25.10 CORONARY ARTERIOSCLEROSIS: ICD-10-CM

## 2022-03-04 DIAGNOSIS — I10 ESSENTIAL HYPERTENSION: ICD-10-CM

## 2022-03-04 RX ORDER — METOPROLOL SUCCINATE 25 MG/1
25 TABLET, EXTENDED RELEASE ORAL DAILY
Qty: 30 TABLET | Refills: 5 | Status: SHIPPED | OUTPATIENT
Start: 2022-03-04 | End: 2022-09-22 | Stop reason: SDUPTHER

## 2022-03-17 ENCOUNTER — OFFICE VISIT (OUTPATIENT)
Dept: FAMILY MEDICINE CLINIC | Facility: CLINIC | Age: 62
End: 2022-03-17

## 2022-03-17 VITALS
HEIGHT: 63 IN | SYSTOLIC BLOOD PRESSURE: 90 MMHG | HEART RATE: 51 BPM | DIASTOLIC BLOOD PRESSURE: 60 MMHG | WEIGHT: 150.4 LBS | OXYGEN SATURATION: 98 % | BODY MASS INDEX: 26.65 KG/M2

## 2022-03-17 DIAGNOSIS — F32.5 MAJOR DEPRESSIVE DISORDER WITH SINGLE EPISODE, IN FULL REMISSION: Chronic | ICD-10-CM

## 2022-03-17 DIAGNOSIS — M62.838 MUSCLE SPASM: ICD-10-CM

## 2022-03-17 DIAGNOSIS — E78.2 MIXED HYPERLIPIDEMIA: Chronic | ICD-10-CM

## 2022-03-17 DIAGNOSIS — F41.1 GENERALIZED ANXIETY DISORDER: Chronic | ICD-10-CM

## 2022-03-17 DIAGNOSIS — I10 ESSENTIAL HYPERTENSION: Primary | Chronic | ICD-10-CM

## 2022-03-17 DIAGNOSIS — F32.A DEPRESSIVE DISORDER: Chronic | ICD-10-CM

## 2022-03-17 DIAGNOSIS — E55.9 VITAMIN D DEFICIENCY: ICD-10-CM

## 2022-03-17 DIAGNOSIS — I25.10 CORONARY ARTERIOSCLEROSIS: Chronic | ICD-10-CM

## 2022-03-17 PROCEDURE — 99214 OFFICE O/P EST MOD 30 MIN: CPT | Performed by: GENERAL PRACTICE

## 2022-03-17 RX ORDER — TIZANIDINE 4 MG/1
4 TABLET ORAL EVERY 8 HOURS PRN
Qty: 90 TABLET | Refills: 5 | Status: SHIPPED | OUTPATIENT
Start: 2022-03-17 | End: 2022-10-07 | Stop reason: SDUPTHER

## 2022-03-17 RX ORDER — CLONAZEPAM 1 MG/1
1 TABLET ORAL 2 TIMES DAILY
Qty: 60 TABLET | Refills: 2 | Status: SHIPPED | OUTPATIENT
Start: 2022-03-17 | End: 2022-07-05 | Stop reason: SDUPTHER

## 2022-03-17 RX ORDER — PRAVASTATIN SODIUM 80 MG/1
80 TABLET ORAL NIGHTLY
Qty: 30 TABLET | Refills: 11 | Status: SHIPPED | OUTPATIENT
Start: 2022-03-17 | End: 2022-10-07 | Stop reason: SDUPTHER

## 2022-03-17 RX ORDER — LOSARTAN POTASSIUM 25 MG/1
25 TABLET ORAL DAILY
Qty: 30 TABLET | Refills: 11 | Status: SHIPPED | OUTPATIENT
Start: 2022-03-17 | End: 2022-10-07 | Stop reason: SDUPTHER

## 2022-03-17 RX ORDER — SPIRONOLACTONE 25 MG/1
25 TABLET ORAL 2 TIMES DAILY
Qty: 60 TABLET | Refills: 5 | Status: SHIPPED | OUTPATIENT
Start: 2022-03-17 | End: 2022-10-07 | Stop reason: SDUPTHER

## 2022-04-08 RX ORDER — BUPROPION HYDROCHLORIDE 100 MG/1
100 TABLET, EXTENDED RELEASE ORAL DAILY
Qty: 30 TABLET | Refills: 1 | Status: SHIPPED | OUTPATIENT
Start: 2022-04-08 | End: 2022-04-14 | Stop reason: SDUPTHER

## 2022-04-08 RX ORDER — BUPROPION HYDROCHLORIDE 100 MG/1
100 TABLET, EXTENDED RELEASE ORAL DAILY
Qty: 30 TABLET | Refills: 1 | Status: SHIPPED | OUTPATIENT
Start: 2022-04-08 | End: 2022-04-08 | Stop reason: SDUPTHER

## 2022-04-14 ENCOUNTER — LAB (OUTPATIENT)
Dept: LAB | Facility: HOSPITAL | Age: 62
End: 2022-04-14

## 2022-04-14 ENCOUNTER — TRANSCRIBE ORDERS (OUTPATIENT)
Dept: LAB | Facility: HOSPITAL | Age: 62
End: 2022-04-14

## 2022-04-14 DIAGNOSIS — N18.31 CHRONIC KIDNEY DISEASE (CKD) STAGE G3A/A1, MODERATELY DECREASED GLOMERULAR FILTRATION RATE (GFR) BETWEEN 45-59 ML/MIN/1.73 SQUARE METER AND ALBUMINURIA CREATININE RATIO LESS THAN 30 MG/G (CMS/H*: ICD-10-CM

## 2022-04-14 DIAGNOSIS — E78.2 MIXED HYPERLIPIDEMIA: ICD-10-CM

## 2022-04-14 DIAGNOSIS — N18.31 CHRONIC KIDNEY DISEASE (CKD) STAGE G3A/A1, MODERATELY DECREASED GLOMERULAR FILTRATION RATE (GFR) BETWEEN 45-59 ML/MIN/1.73 SQUARE METER AND ALBUMINURIA CREATININE RATIO LESS THAN 30 MG/G (CMS/H*: Primary | ICD-10-CM

## 2022-04-14 DIAGNOSIS — E55.9 VITAMIN D DEFICIENCY: ICD-10-CM

## 2022-04-14 LAB
BASOPHILS # BLD AUTO: 0.03 10*3/MM3 (ref 0–0.2)
BASOPHILS NFR BLD AUTO: 0.4 % (ref 0–1.5)
CREAT UR-MCNC: 53.7 MG/DL
DEPRECATED RDW RBC AUTO: 51 FL (ref 37–54)
EOSINOPHIL # BLD AUTO: 0.19 10*3/MM3 (ref 0–0.4)
EOSINOPHIL NFR BLD AUTO: 2.4 % (ref 0.3–6.2)
ERYTHROCYTE [DISTWIDTH] IN BLOOD BY AUTOMATED COUNT: 13.5 % (ref 12.3–15.4)
HCT VFR BLD AUTO: 44.7 % (ref 34–46.6)
HGB BLD-MCNC: 14.8 G/DL (ref 12–15.9)
IMM GRANULOCYTES # BLD AUTO: 0.04 10*3/MM3 (ref 0–0.05)
IMM GRANULOCYTES NFR BLD AUTO: 0.5 % (ref 0–0.5)
LYMPHOCYTES # BLD AUTO: 1.69 10*3/MM3 (ref 0.7–3.1)
LYMPHOCYTES NFR BLD AUTO: 21.3 % (ref 19.6–45.3)
MCH RBC QN AUTO: 34.2 PG (ref 26.6–33)
MCHC RBC AUTO-ENTMCNC: 33.1 G/DL (ref 31.5–35.7)
MCV RBC AUTO: 103.2 FL (ref 79–97)
MONOCYTES # BLD AUTO: 0.57 10*3/MM3 (ref 0.1–0.9)
MONOCYTES NFR BLD AUTO: 7.2 % (ref 5–12)
NEUTROPHILS NFR BLD AUTO: 5.41 10*3/MM3 (ref 1.7–7)
NEUTROPHILS NFR BLD AUTO: 68.2 % (ref 42.7–76)
NRBC BLD AUTO-RTO: 0 /100 WBC (ref 0–0.2)
PHOSPHATE SERPL-MCNC: 3.1 MG/DL (ref 2.5–4.5)
PLATELET # BLD AUTO: 217 10*3/MM3 (ref 140–450)
PMV BLD AUTO: 12.3 FL (ref 6–12)
PROT ?TM UR-MCNC: 5.7 MG/DL
PROT/CREAT UR: 0.11 MG/G{CREAT}
RBC # BLD AUTO: 4.33 10*6/MM3 (ref 3.77–5.28)
WBC NRBC COR # BLD: 7.93 10*3/MM3 (ref 3.4–10.8)

## 2022-04-14 PROCEDURE — 85025 COMPLETE CBC W/AUTO DIFF WBC: CPT | Performed by: GENERAL PRACTICE

## 2022-04-14 PROCEDURE — 36415 COLL VENOUS BLD VENIPUNCTURE: CPT | Performed by: GENERAL PRACTICE

## 2022-04-14 PROCEDURE — 84156 ASSAY OF PROTEIN URINE: CPT

## 2022-04-14 PROCEDURE — 82306 VITAMIN D 25 HYDROXY: CPT

## 2022-04-14 PROCEDURE — 83970 ASSAY OF PARATHORMONE: CPT

## 2022-04-14 PROCEDURE — 84100 ASSAY OF PHOSPHORUS: CPT

## 2022-04-14 PROCEDURE — 80061 LIPID PANEL: CPT | Performed by: GENERAL PRACTICE

## 2022-04-14 PROCEDURE — 82570 ASSAY OF URINE CREATININE: CPT

## 2022-04-14 PROCEDURE — 80053 COMPREHEN METABOLIC PANEL: CPT | Performed by: GENERAL PRACTICE

## 2022-04-14 PROCEDURE — 81001 URINALYSIS AUTO W/SCOPE: CPT

## 2022-04-14 RX ORDER — BUPROPION HYDROCHLORIDE 100 MG/1
100 TABLET, EXTENDED RELEASE ORAL DAILY
Qty: 30 TABLET | Refills: 1 | Status: SHIPPED | OUTPATIENT
Start: 2022-04-14 | End: 2022-07-05

## 2022-04-15 LAB
25(OH)D3 SERPL-MCNC: 55.3 NG/ML (ref 30–100)
ALBUMIN SERPL-MCNC: 4.2 G/DL (ref 3.5–5.2)
ALBUMIN/GLOB SERPL: 1.2 G/DL
ALP SERPL-CCNC: 76 U/L (ref 39–117)
ALT SERPL W P-5'-P-CCNC: 18 U/L (ref 1–33)
ANION GAP SERPL CALCULATED.3IONS-SCNC: 12.8 MMOL/L (ref 5–15)
AST SERPL-CCNC: 24 U/L (ref 1–32)
BACTERIA UR QL AUTO: ABNORMAL /HPF
BILIRUB SERPL-MCNC: 0.3 MG/DL (ref 0–1.2)
BILIRUB UR QL STRIP: NEGATIVE
BUN SERPL-MCNC: 17 MG/DL (ref 8–23)
BUN/CREAT SERPL: 10.8 (ref 7–25)
CALCIUM SPEC-SCNC: 9.6 MG/DL (ref 8.6–10.5)
CHLORIDE SERPL-SCNC: 103 MMOL/L (ref 98–107)
CHOLEST SERPL-MCNC: 154 MG/DL (ref 0–200)
CLARITY UR: ABNORMAL
CO2 SERPL-SCNC: 25.2 MMOL/L (ref 22–29)
COLOR UR: YELLOW
CREAT SERPL-MCNC: 1.58 MG/DL (ref 0.57–1)
EGFRCR SERPLBLD CKD-EPI 2021: 36.4 ML/MIN/1.73
GLOBULIN UR ELPH-MCNC: 3.4 GM/DL
GLUCOSE SERPL-MCNC: 88 MG/DL (ref 65–99)
GLUCOSE UR STRIP-MCNC: NEGATIVE MG/DL
HDLC SERPL-MCNC: 46 MG/DL (ref 40–60)
HGB UR QL STRIP.AUTO: NEGATIVE
HYALINE CASTS UR QL AUTO: ABNORMAL /LPF
KETONES UR QL STRIP: NEGATIVE
LDLC SERPL CALC-MCNC: 87 MG/DL (ref 0–100)
LDLC/HDLC SERPL: 1.83 {RATIO}
LEUKOCYTE ESTERASE UR QL STRIP.AUTO: ABNORMAL
NITRITE UR QL STRIP: POSITIVE
PH UR STRIP.AUTO: 6.5 [PH] (ref 5–8)
POTASSIUM SERPL-SCNC: 4.3 MMOL/L (ref 3.5–5.2)
PROT SERPL-MCNC: 7.6 G/DL (ref 6–8.5)
PROT UR QL STRIP: NEGATIVE
PTH-INTACT SERPL-MCNC: 219 PG/ML (ref 15–65)
RBC # UR STRIP: ABNORMAL /HPF
REF LAB TEST METHOD: ABNORMAL
SODIUM SERPL-SCNC: 141 MMOL/L (ref 136–145)
SP GR UR STRIP: 1.01 (ref 1–1.03)
SQUAMOUS #/AREA URNS HPF: ABNORMAL /HPF
TRIGL SERPL-MCNC: 118 MG/DL (ref 0–150)
UROBILINOGEN UR QL STRIP: ABNORMAL
VLDLC SERPL-MCNC: 21 MG/DL (ref 5–40)
WBC # UR STRIP: ABNORMAL /HPF

## 2022-04-20 DIAGNOSIS — D75.89 MACROCYTOSIS: Primary | ICD-10-CM

## 2022-05-23 ENCOUNTER — HOSPITAL ENCOUNTER (OUTPATIENT)
Dept: CT IMAGING | Facility: HOSPITAL | Age: 62
Discharge: HOME | End: 2022-05-23

## 2022-05-23 ENCOUNTER — LAB (OUTPATIENT)
Dept: LAB | Facility: HOSPITAL | Age: 62
End: 2022-05-23

## 2022-05-23 DIAGNOSIS — C18.9 METASTATIC COLON CANCER TO LIVER: ICD-10-CM

## 2022-05-23 DIAGNOSIS — D75.89 MACROCYTOSIS: ICD-10-CM

## 2022-05-23 DIAGNOSIS — C78.7 METASTATIC COLON CANCER TO LIVER: ICD-10-CM

## 2022-05-23 PROBLEM — R16.0 LIVER MASS: Status: ACTIVE | Noted: 2020-09-03

## 2022-05-23 LAB
ALBUMIN SERPL-MCNC: 4.4 G/DL (ref 3.5–5.2)
ALBUMIN/GLOB SERPL: 1.3 G/DL
ALP SERPL-CCNC: 95 U/L (ref 39–117)
ALT SERPL W P-5'-P-CCNC: 21 U/L (ref 1–33)
ANION GAP SERPL CALCULATED.3IONS-SCNC: 11.6 MMOL/L (ref 5–15)
AST SERPL-CCNC: 28 U/L (ref 1–32)
BASOPHILS # BLD AUTO: 0.06 10*3/MM3 (ref 0–0.2)
BASOPHILS NFR BLD AUTO: 0.8 % (ref 0–1.5)
BILIRUB SERPL-MCNC: 0.4 MG/DL (ref 0–1.2)
BUN SERPL-MCNC: 23 MG/DL (ref 8–23)
BUN/CREAT SERPL: 13.1 (ref 7–25)
CALCIUM SPEC-SCNC: 9.9 MG/DL (ref 8.6–10.5)
CEA SERPL-MCNC: 3.72 NG/ML
CHLORIDE SERPL-SCNC: 100 MMOL/L (ref 98–107)
CO2 SERPL-SCNC: 27.4 MMOL/L (ref 22–29)
CREAT BLDA-MCNC: 1.8 MG/DL
CREAT SERPL-MCNC: 1.76 MG/DL (ref 0.57–1)
DEPRECATED RDW RBC AUTO: 48.5 FL (ref 37–54)
EGFRCR SERPLBLD CKD-EPI 2021: 31.1 ML/MIN/1.73
EGFRCR SERPLBLD CKD-EPI 2021: 32 ML/MIN/1.73
EOSINOPHIL # BLD AUTO: 0.12 10*3/MM3 (ref 0–0.4)
EOSINOPHIL NFR BLD AUTO: 1.6 % (ref 0.3–6.2)
ERYTHROCYTE [DISTWIDTH] IN BLOOD BY AUTOMATED COUNT: 12.5 % (ref 12.3–15.4)
GLOBULIN UR ELPH-MCNC: 3.4 GM/DL
GLUCOSE SERPL-MCNC: 99 MG/DL (ref 65–99)
HCT VFR BLD AUTO: 46.7 % (ref 34–46.6)
HGB BLD-MCNC: 15.3 G/DL (ref 12–15.9)
IMM GRANULOCYTES # BLD AUTO: 0.03 10*3/MM3 (ref 0–0.05)
IMM GRANULOCYTES NFR BLD AUTO: 0.4 % (ref 0–0.5)
LYMPHOCYTES # BLD AUTO: 1.81 10*3/MM3 (ref 0.7–3.1)
LYMPHOCYTES NFR BLD AUTO: 23.9 % (ref 19.6–45.3)
MCH RBC QN AUTO: 34.3 PG (ref 26.6–33)
MCHC RBC AUTO-ENTMCNC: 32.8 G/DL (ref 31.5–35.7)
MCV RBC AUTO: 104.7 FL (ref 79–97)
MONOCYTES # BLD AUTO: 0.61 10*3/MM3 (ref 0.1–0.9)
MONOCYTES NFR BLD AUTO: 8 % (ref 5–12)
NEUTROPHILS NFR BLD AUTO: 4.95 10*3/MM3 (ref 1.7–7)
NEUTROPHILS NFR BLD AUTO: 65.3 % (ref 42.7–76)
NRBC BLD AUTO-RTO: 0 /100 WBC (ref 0–0.2)
PLATELET # BLD AUTO: 183 10*3/MM3 (ref 140–450)
PMV BLD AUTO: 13.6 FL (ref 6–12)
POTASSIUM SERPL-SCNC: 3.8 MMOL/L (ref 3.5–5.2)
PROT SERPL-MCNC: 7.8 G/DL (ref 6–8.5)
RBC # BLD AUTO: 4.46 10*6/MM3 (ref 3.77–5.28)
SODIUM SERPL-SCNC: 139 MMOL/L (ref 136–145)
VIT B12 BLD-MCNC: 201 PG/ML (ref 211–946)
WBC NRBC COR # BLD: 7.58 10*3/MM3 (ref 3.4–10.8)

## 2022-05-23 PROCEDURE — 82565 ASSAY OF CREATININE: CPT

## 2022-05-23 PROCEDURE — 82378 CARCINOEMBRYONIC ANTIGEN: CPT

## 2022-05-23 PROCEDURE — 82607 VITAMIN B-12: CPT

## 2022-05-23 PROCEDURE — 80053 COMPREHEN METABOLIC PANEL: CPT

## 2022-05-23 PROCEDURE — 71250 CT THORAX DX C-: CPT

## 2022-05-23 PROCEDURE — 85025 COMPLETE CBC W/AUTO DIFF WBC: CPT

## 2022-05-23 PROCEDURE — 36415 COLL VENOUS BLD VENIPUNCTURE: CPT

## 2022-05-23 PROCEDURE — 74176 CT ABD & PELVIS W/O CONTRAST: CPT

## 2022-05-24 ENCOUNTER — OFFICE VISIT (OUTPATIENT)
Dept: ONCOLOGY | Facility: HOSPITAL | Age: 62
End: 2022-05-24

## 2022-05-24 VITALS
WEIGHT: 147.71 LBS | SYSTOLIC BLOOD PRESSURE: 117 MMHG | TEMPERATURE: 98.5 F | DIASTOLIC BLOOD PRESSURE: 69 MMHG | OXYGEN SATURATION: 98 % | HEART RATE: 56 BPM | BODY MASS INDEX: 26.17 KG/M2 | RESPIRATION RATE: 18 BRPM

## 2022-05-24 DIAGNOSIS — C18.7 CANCER OF SIGMOID COLON: Primary | ICD-10-CM

## 2022-05-24 DIAGNOSIS — D75.89 MACROCYTOSIS: ICD-10-CM

## 2022-05-24 PROBLEM — D51.0 PERNICIOUS ANEMIA: Status: ACTIVE | Noted: 2022-05-24

## 2022-05-24 PROCEDURE — G0463 HOSPITAL OUTPT CLINIC VISIT: HCPCS

## 2022-05-24 PROCEDURE — 99214 OFFICE O/P EST MOD 30 MIN: CPT | Performed by: INTERNAL MEDICINE

## 2022-05-24 NOTE — ASSESSMENT & PLAN NOTE
Status post treatments as outlined.  Patient is currently on observation.  I see no evidence of disease recurrence by history, physical examination, lab work, recent scans or endoscopy.  Overall doing well.  I will see her back in 6 months for continued surveillance with lab work and scans prior.

## 2022-05-24 NOTE — PROGRESS NOTES
Chief Complaint  Colon Cancer    Nilam Willis MD Goodale, Dianne L, MD Subjective          Erika Samreen Bowens presents to [facility] for ongoing surveillance for colon cancer.  She is status post treatments as outlined.  On return today, she notes she is feeling well.  She reports normal bowel habits without blood productive, pain or melena.  She underwent colonoscopy in February of this year.  Report reviewed.  1 small polyp was removed and found to be a tubular adenoma, otherwise no abnormalities.  She notes good appetite and her weight is maintained.  Her energy level is low but stable and adequate for her ADLs.  She denies new masses or adenopathy.  No unusual aches or pains.    Oncology/Hematology History Overview Note   3/25/2019  Sigmoid colon--Moderately differentiated adenocarcinoma. 9/21/2020 Liver biopsy revealed metastatic colonic adenocarcinoma            Clinical Staging      Stage II ([id number]), recurrent            Treatments      Surgeries       4/19.Liver Ablation [place] by Dr. Prieto         Cancer of sigmoid colon (HCC)   5/20/2019 Initial Diagnosis    Colon cancer (HCC)         Review of Systems   Constitutional: Positive for fatigue. Negative for appetite change, diaphoresis, fever, unexpected weight gain and unexpected weight loss.   HENT: Negative for hearing loss, mouth sores, sore throat, swollen glands, trouble swallowing and voice change.    Eyes: Negative for blurred vision.   Respiratory: Negative for cough, shortness of breath and wheezing.    Cardiovascular: Negative for chest pain and palpitations.   Gastrointestinal: Negative for abdominal pain, blood in stool, constipation, diarrhea, nausea and vomiting.   Endocrine: Negative for cold intolerance and heat intolerance.   Genitourinary: Negative for difficulty urinating, dysuria, frequency, hematuria and urinary incontinence.   Musculoskeletal: Negative for arthralgias, back pain and  myalgias.   Skin: Negative for rash, skin lesions and wound.   Neurological: Negative for dizziness, seizures, weakness, numbness and headache.   Hematological: Does not bruise/bleed easily.   Psychiatric/Behavioral: Negative for depressed mood. The patient is not nervous/anxious.      Current Outpatient Medications on File Prior to Visit   Medication Sig Dispense Refill   • acetaminophen (TYLENOL) 500 MG tablet Take 500-1,000 mg by mouth every 4 (four) hours as needed for mild pain (1-3).     • aspirin 81 MG EC tablet Take 81 mg by mouth Daily.     • buPROPion SR (Wellbutrin SR) 100 MG 12 hr tablet Take 1 tablet by mouth Daily. 30 tablet 1   • clonazePAM (KlonoPIN) 1 MG tablet Take 1 tablet by mouth 2 (Two) Times a Day. 60 tablet 2   • clopidogrel (PLAVIX) 75 MG tablet Take 1 tablet by mouth Daily. 30 tablet 11   • cyclobenzaprine (FLEXERIL) 10 MG tablet Take 1 tablet by mouth 3 (Three) Times a Day. 90 tablet 2   • FLUoxetine (PROzac) 20 MG capsule Take 3 capsules by mouth Daily. 90 capsule 5   • furosemide (LASIX) 40 MG tablet Take 1 tablet by mouth 2 (Two) Times a Day. 60 tablet 2   • losartan (COZAAR) 25 MG tablet Take 1 tablet by mouth Daily. 30 tablet 11   • metoprolol succinate XL (TOPROL-XL) 25 MG 24 hr tablet Take 1 tablet by mouth Daily. 30 tablet 5   • nitroglycerin (NITROSTAT) 0.4 MG SL tablet DISSOLVE 1 TAB UNDER TONGUE FOR CHEST PAIN - IF PAIN REMAINS AFTER 5 MIN, CALL 911 AND REPEAT DOSE. MAX 3 TABS IN 15 MINUTES 25 tablet 2   • omeprazole (priLOSEC) 40 MG capsule Take 1 capsule by mouth Daily. 90 capsule 2   • spironolactone (ALDACTONE) 25 MG tablet Take 1 tablet by mouth 2 (Two) Times a Day. 60 tablet 5   • tiZANidine (ZANAFLEX) 4 MG tablet Take 1 tablet by mouth Every 8 (Eight) Hours As Needed for Muscle Spasms. 90 tablet 5   • vitamin D (ERGOCALCIFEROL) 1.25 MG (10691 UT) capsule capsule TAKE ONE CAPSULE BY MOUTH ONCE WEEKLY 9 capsule 1   • pravastatin (PRAVACHOL) 80 MG tablet Take 1 tablet by  mouth Every Night for 30 days. 30 tablet 11     No current facility-administered medications on file prior to visit.       No Known Allergies  Past Medical History:   Diagnosis Date   • Abdominal pain    • Colon cancer (HCC)    • Coronary arteriosclerosis    • Depressive disorder     with anxiety   • Encounter for routine adult health examination    • Essential hypertension    • Generalized anxiety disorder    • GERD (gastroesophageal reflux disease)    • Hip pain    • Hyperlipidemia    • Kidney stone    • Osteoarthritis    • Radial styloid tenosynovitis of left hand    • Urinary tract infectious disease    • Vitamin D deficiency      Past Surgical History:   Procedure Laterality Date   • CARDIAC CATHETERIZATION  04/15/2016    Enlarged left ventricle with reduced contractility.Severe coronary artery disease as described above. [facility].   • COLONOSCOPY  2019    COLON CANCER DOMINGO.   • COLONOSCOPY N/A 2/14/2022    Procedure: COLONOSCOPY WITH POLYPECTOMY;  Surgeon: Coy Ordoñez MD;  Location: [facility];  Service: Gastroenterology;  Laterality: N/A;  COLON POLYP, ANASTAMOSIS IN SIGMOID   • CORONARY ANGIOPLASTY WITH STENT PLACEMENT  05/06/2012    PTCA implantation in the vein graft of the  branch. Done at [facility] in [place], [place].   • CORONARY ARTERY BYPASS GRAFT  03/18/2003    Emergent CABG x 5 CAD   • DE QUERVAIN'S RELEASE Right 11/30/2009    Release of De Quervain's to the right thumb   • DEQUERVAIN RELEASE Left 11/18/2015    Release of de Quervain's to the left wrist.   • LIVER SURGERY     • PAP SMEAR  06/28/2012    normal   • PROCEDURE GENERIC CONVERTED  11/22/2015    MOST RECENT DIASTOLIC BP < 90 mmHg    • PROCEDURE GENERIC CONVERTED  11/22/2015    MOST RECENT SYSTOLIC BP > or = 140 mmHg    • PROCEDURE GENERIC CONVERTED  11/22/2015    TOBACCO NON-USER    • REPLACEMENT TOTAL HIP LATERAL POSITION     • SUBTOTAL COLECTOMY N/A 04/23/2019    sigmoid for colon cancer   •  TOTAL KNEE ARTHROPLASTY       Social History     Socioeconomic History   • Marital status: Single   Tobacco Use   • Smoking status: Never Smoker   • Smokeless tobacco: Never Used   Vaping Use   • Vaping Use: Never used   Substance and Sexual Activity   • Alcohol use: No     Family History   Problem Relation Age of Onset   • Diabetes Other    • Heart disease Other    • Hypertension Other    • Stroke Other    • Colon cancer Other    • Coronary artery disease Other    • Other Other         Heart surgery       Objective   Physical Exam  Vitals reviewed. Exam conducted with a chaperone present.   Constitutional:       General: She is not in acute distress.     Appearance: Normal appearance.   Cardiovascular:      Rate and Rhythm: Normal rate and regular rhythm.      Heart sounds: Normal heart sounds. No murmur heard.    No gallop.   Pulmonary:      Effort: Pulmonary effort is normal.      Breath sounds: Normal breath sounds.   Abdominal:      General: Abdomen is flat. Bowel sounds are normal. There is no distension.      Palpations: Abdomen is soft.      Tenderness: There is no abdominal tenderness.   Musculoskeletal:      Cervical back: Neck supple.      Right lower leg: No edema.      Left lower leg: No edema.   Lymphadenopathy:      Cervical: No cervical adenopathy.   Neurological:      Mental Status: She is alert and oriented to person, place, and time.   Psychiatric:         Mood and Affect: Mood normal.         Behavior: Behavior normal.         Vitals:    05/24/22 1512   BP: 117/69   Pulse: 56   Resp: 18   Temp: 98.5 °F (36.9 °C)   SpO2: 98%   Weight: 67 kg (147 lb 11.3 oz)   PainSc:   7   PainLoc: Generalized     ECOG score: 2         PHQ-9 Total Score:                    Result Review :   The following data was reviewed by: Luis Boyce MD on 05/24/2022:  Lab Results   Component Value Date    HGB 15.3 05/23/2022    HCT 46.7 (H) 05/23/2022    .7 (H) 05/23/2022     05/23/2022    WBC 7.58  05/23/2022    NEUTROABS 4.95 05/23/2022    LYMPHSABS 1.81 05/23/2022    MONOSABS 0.61 05/23/2022    EOSABS 0.12 05/23/2022    BASOSABS 0.06 05/23/2022     Lab Results   Component Value Date    GLUCOSE 99 05/23/2022    BUN 23 05/23/2022    CREATININE 1.76 (H) 05/23/2022     05/23/2022    K 3.8 05/23/2022     05/23/2022    CO2 27.4 05/23/2022    CALCIUM 9.9 05/23/2022    PROTEINTOT 7.8 05/23/2022    ALBUMIN 4.40 05/23/2022    BILITOT 0.4 05/23/2022    ALKPHOS 95 05/23/2022    AST 28 05/23/2022    ALT 21 05/23/2022     Lab Results   Component Value Date    MG 2.16 03/15/2021    PHOS 3.1 04/14/2022    TSH 1.54 05/10/2016       Data reviewed: CT chest, abdomen, pelvis reviewed demonstrating postoperative changes and stable changes.  Small nodules are unchanged.  Colonoscopy and pathology report reviewed.      Assessment and Plan    Diagnoses and all orders for this visit:    1. Cancer of sigmoid colon (HCC) (Primary)  Assessment & Plan:  Status post treatments as outlined.  Patient is currently on observation.  I see no evidence of disease recurrence by history, physical examination, lab work, recent scans or endoscopy.  Overall doing well.  I will see her back in 6 months for continued surveillance with lab work and scans prior.    Orders:  -     CBC & Differential; Future  -     CEA; Future  -     Comprehensive Metabolic Panel; Future  -     CT chest w contrast; Future  -     CT abdomen pelvis w contrast; Future    2. Macrocytosis  Assessment & Plan:  Patient has documented B12 deficiency.  We discussed oral B12 1000 mcg daily.  Repeat CBC and B12 level in 1 month.  If improved, continue with oral therapy.  If no improvement noted again parenteral B12 therapy.    Orders:  -     CBC & Differential; Future  -     Vitamin B12; Future          Patient Follow Up: 6 months    Patient was given instructions and counseling regarding her condition or for health maintenance advice. Please see specific information  pulled into the AVS if appropriate.     Luis Boyce MD    5/24/2022

## 2022-05-24 NOTE — ASSESSMENT & PLAN NOTE
Patient has documented B12 deficiency.  We discussed oral B12 1000 mcg daily.  Repeat CBC and B12 level in 1 month.  If improved, continue with oral therapy.  If no improvement noted again parenteral B12 therapy.

## 2022-06-20 DIAGNOSIS — M62.838 MUSCLE SPASM: ICD-10-CM

## 2022-06-20 RX ORDER — CYCLOBENZAPRINE HCL 10 MG
10 TABLET ORAL 3 TIMES DAILY
Qty: 90 TABLET | Refills: 0 | Status: SHIPPED | OUTPATIENT
Start: 2022-06-20 | End: 2022-07-05 | Stop reason: SDUPTHER

## 2022-07-05 ENCOUNTER — OFFICE VISIT (OUTPATIENT)
Dept: FAMILY MEDICINE CLINIC | Facility: CLINIC | Age: 62
End: 2022-07-05

## 2022-07-05 VITALS
RESPIRATION RATE: 18 BRPM | SYSTOLIC BLOOD PRESSURE: 106 MMHG | WEIGHT: 147.3 LBS | DIASTOLIC BLOOD PRESSURE: 64 MMHG | OXYGEN SATURATION: 98 % | HEIGHT: 63 IN | BODY MASS INDEX: 26.1 KG/M2 | HEART RATE: 58 BPM

## 2022-07-05 DIAGNOSIS — M62.838 MUSCLE SPASM: ICD-10-CM

## 2022-07-05 DIAGNOSIS — E53.8 VITAMIN B12 DEFICIENCY: ICD-10-CM

## 2022-07-05 DIAGNOSIS — Z00.00 ANNUAL PHYSICAL EXAM: ICD-10-CM

## 2022-07-05 DIAGNOSIS — F32.5 MAJOR DEPRESSIVE DISORDER WITH SINGLE EPISODE, IN FULL REMISSION: Chronic | ICD-10-CM

## 2022-07-05 DIAGNOSIS — F41.1 GENERALIZED ANXIETY DISORDER: Chronic | ICD-10-CM

## 2022-07-05 DIAGNOSIS — I10 ESSENTIAL HYPERTENSION: Primary | Chronic | ICD-10-CM

## 2022-07-05 DIAGNOSIS — I25.10 CORONARY ARTERIOSCLEROSIS: ICD-10-CM

## 2022-07-05 DIAGNOSIS — Z12.31 ENCOUNTER FOR SCREENING MAMMOGRAM FOR BREAST CANCER: ICD-10-CM

## 2022-07-05 PROCEDURE — 99214 OFFICE O/P EST MOD 30 MIN: CPT | Performed by: GENERAL PRACTICE

## 2022-07-05 RX ORDER — CYCLOBENZAPRINE HCL 10 MG
10 TABLET ORAL 3 TIMES DAILY
Qty: 90 TABLET | Refills: 5 | Status: SHIPPED | OUTPATIENT
Start: 2022-07-05 | End: 2023-01-20

## 2022-07-05 RX ORDER — CHOLECALCIFEROL (VITAMIN D3) 125 MCG
500 CAPSULE ORAL DAILY
Status: ON HOLD | COMMUNITY
End: 2022-12-22

## 2022-07-05 RX ORDER — CLONAZEPAM 1 MG/1
1 TABLET ORAL 2 TIMES DAILY
Qty: 60 TABLET | Refills: 2 | Status: SHIPPED | OUTPATIENT
Start: 2022-07-05 | End: 2022-11-18 | Stop reason: SDUPTHER

## 2022-07-05 NOTE — PROGRESS NOTES
Subjective   Erika Bowens is a 62 y.o. female.   Chief Complaint   Patient presents with   • Hypertension     For review and evaluation of management of chronic medical problems. Records reviewed. Recent labs, xrays reviewed and medications reconciled. Depression and anxiety stable.    Hypertension  This is a chronic problem. The current episode started more than 1 year ago. The problem is unchanged. The problem is controlled. Pertinent negatives include no headaches, neck pain or palpitations. There are no associated agents to hypertension. Current antihypertension treatment includes beta blockers, diuretics and angiotensin blockers. The current treatment provides significant improvement. There are no compliance problems.  Hypertensive end-organ damage includes CAD/MI.   Coronary Artery Disease  Presents for follow-up visit. Pertinent negatives include no chest pressure, dizziness, leg swelling or palpitations. The symptoms have been stable. Compliance with diet is good. Compliance with exercise is variable. Compliance with medications is good.      The following portions of the patient's history were reviewed and updated as appropriate: allergies, current medications, past social history and problem list.    Outpatient Medications Prior to Visit   Medication Sig Dispense Refill   • acetaminophen (TYLENOL) 500 MG tablet Take 500-1,000 mg by mouth every 4 (four) hours as needed for mild pain (1-3).     • aspirin 81 MG EC tablet Take 81 mg by mouth Daily.     • clopidogrel (PLAVIX) 75 MG tablet Take 1 tablet by mouth Daily. 30 tablet 11   • FLUoxetine (PROzac) 20 MG capsule Take 3 capsules by mouth Daily. 90 capsule 5   • furosemide (LASIX) 40 MG tablet Take 1 tablet by mouth 2 (Two) Times a Day. 60 tablet 2   • losartan (COZAAR) 25 MG tablet Take 1 tablet by mouth Daily. 30 tablet 11   • metoprolol succinate XL (TOPROL-XL) 25 MG 24 hr tablet Take 1 tablet by mouth Daily. 30 tablet 5   • omeprazole (priLOSEC) 40  "MG capsule Take 1 capsule by mouth Daily. 90 capsule 2   • spironolactone (ALDACTONE) 25 MG tablet Take 1 tablet by mouth 2 (Two) Times a Day. 60 tablet 5   • tiZANidine (ZANAFLEX) 4 MG tablet Take 1 tablet by mouth Every 8 (Eight) Hours As Needed for Muscle Spasms. 90 tablet 5   • vitamin B-12 (CYANOCOBALAMIN) 500 MCG tablet Take 500 mcg by mouth Daily.     • vitamin D (ERGOCALCIFEROL) 1.25 MG (12537 UT) capsule capsule TAKE ONE CAPSULE BY MOUTH ONCE WEEKLY 9 capsule 1   • clonazePAM (KlonoPIN) 1 MG tablet Take 1 tablet by mouth 2 (Two) Times a Day. 60 tablet 2   • cyclobenzaprine (FLEXERIL) 10 MG tablet Take 1 tablet by mouth 3 (Three) Times a Day. 90 tablet 0   • nitroglycerin (NITROSTAT) 0.4 MG SL tablet DISSOLVE 1 TAB UNDER TONGUE FOR CHEST PAIN - IF PAIN REMAINS AFTER 5 MIN, CALL 911 AND REPEAT DOSE. MAX 3 TABS IN 15 MINUTES 25 tablet 2   • pravastatin (PRAVACHOL) 80 MG tablet Take 1 tablet by mouth Every Night for 30 days. 30 tablet 11   • buPROPion SR (Wellbutrin SR) 100 MG 12 hr tablet Take 1 tablet by mouth Daily. 30 tablet 1     No facility-administered medications prior to visit.       Review of Systems   Cardiovascular: Negative for palpitations and leg swelling.   Musculoskeletal: Negative for neck pain.   Neurological: Negative for dizziness and headaches.     I have reviewed 12 systems with patient. Findings were negative except what is noted below and/or in history of present illness.     Objective   Visit Vitals  /64   Pulse 58   Resp 18   Ht 160 cm (63\")   Wt 66.8 kg (147 lb 4.8 oz)   SpO2 98%   BMI 26.09 kg/m²     Physical Exam  Vitals and nursing note reviewed.   Constitutional:       General: She is not in acute distress.     Appearance: She is well-developed.   HENT:      Head: Normocephalic and atraumatic.      Nose: Nose normal.   Eyes:      General:         Right eye: No discharge.         Left eye: No discharge.      Conjunctiva/sclera: Conjunctivae normal.      Pupils: Pupils are " equal, round, and reactive to light.   Neck:      Thyroid: No thyromegaly.   Cardiovascular:      Rate and Rhythm: Normal rate and regular rhythm.      Heart sounds: Normal heart sounds.   Pulmonary:      Effort: Pulmonary effort is normal.      Breath sounds: Normal breath sounds.   Lymphadenopathy:      Cervical: No cervical adenopathy.   Skin:     General: Skin is warm and dry.   Neurological:      Mental Status: She is alert and oriented to person, place, and time.         Notes brought forward are reviewed and updated if indicated.     Assessment & Plan   Problems Addressed this Visit        Cardiac and Vasculature    Essential hypertension - Primary (Chronic)    Coronary arteriosclerosis       Mental Health    Generalized anxiety disorder (Chronic)    Relevant Medications    clonazePAM (KlonoPIN) 1 MG tablet      Other Visit Diagnoses     Major depressive disorder with single episode, in full remission (HCC)  (Chronic)       Relevant Medications    clonazePAM (KlonoPIN) 1 MG tablet    Muscle spasm        Relevant Medications    cyclobenzaprine (FLEXERIL) 10 MG tablet    Encounter for screening mammogram for breast cancer        Relevant Orders    Mammo Screening Digital Tomosynthesis Bilateral With CAD    Annual physical exam        Relevant Orders    CBC & Differential    Comprehensive Metabolic Panel    Lipid Panel    Vitamin B12    Urinalysis With Culture If Indicated - Urine, Clean Catch    Vitamin B12 deficiency        Relevant Orders    Vitamin B12      Diagnoses       Codes Comments    Essential hypertension    -  Primary ICD-10-CM: I10  ICD-9-CM: 401.9     Generalized anxiety disorder     ICD-10-CM: F41.1  ICD-9-CM: 300.02     Major depressive disorder with single episode, in full remission (HCC)     ICD-10-CM: F32.5  ICD-9-CM: 296.26     Muscle spasm     ICD-10-CM: M62.838  ICD-9-CM: 728.85     Encounter for screening mammogram for breast cancer     ICD-10-CM: Z12.31  ICD-9-CM: V76.12     Annual  physical exam     ICD-10-CM: Z00.00  ICD-9-CM: V70.0     Vitamin B12 deficiency     ICD-10-CM: E53.8  ICD-9-CM: 266.2     Coronary arteriosclerosis     ICD-10-CM: I25.10  ICD-9-CM: 414.00           Continue current medications. Instructions given regarding proper use and potential risks and side effects. Advised to recheck if is having any issues with this medication.  Luis Angel reviewed and appropriate. Not recommended to drive or operate heavy equipment while taking potentially sedating meds.  Patient understands the risks associated with this controlled medication, including tolerance and addiction. They also agree to obtain this medication only from me, and not from a another provider, unless that provider is covering for me in my absence. They also agree to be compliant in dosing, and not self adjust the dose of medication.  A signed controlled substance agreement is on file, and they have received a controlled substance education sheet at this or a previous visit. They have also signed a consent for treatment with a controlled substance as per [facility] policy.      New Medications Ordered This Visit   Medications   • clonazePAM (KlonoPIN) 1 MG tablet     Sig: Take 1 tablet by mouth 2 (Two) Times a Day.     Dispense:  60 tablet     Refill:  2   • cyclobenzaprine (FLEXERIL) 10 MG tablet     Sig: Take 1 tablet by mouth 3 (Three) Times a Day.     Dispense:  90 tablet     Refill:  5     Return in about 3 months (around 10/5/2022) for Annual physical.        This document has been electronically signed by Nilam Willis MD on July 20, 2022 16:00 CDT

## 2022-07-11 ENCOUNTER — APPOINTMENT (OUTPATIENT)
Dept: GENERAL RADIOLOGY | Facility: HOSPITAL | Age: 62
End: 2022-07-11

## 2022-07-11 ENCOUNTER — HOSPITAL ENCOUNTER (EMERGENCY)
Facility: HOSPITAL | Age: 62
Discharge: HOME | End: 2022-07-12
Attending: EMERGENCY MEDICINE | Admitting: EMERGENCY MEDICINE

## 2022-07-11 VITALS
RESPIRATION RATE: 25 BRPM | WEIGHT: 148.81 LBS | HEART RATE: 66 BPM | BODY MASS INDEX: 26.37 KG/M2 | HEIGHT: 63 IN | SYSTOLIC BLOOD PRESSURE: 103 MMHG | TEMPERATURE: 97.8 F | DIASTOLIC BLOOD PRESSURE: 77 MMHG | OXYGEN SATURATION: 95 %

## 2022-07-11 DIAGNOSIS — J81.1 CHRONIC PULMONARY EDEMA: Primary | ICD-10-CM

## 2022-07-11 DIAGNOSIS — I50.9 ACUTE ON CHRONIC CONGESTIVE HEART FAILURE, UNSPECIFIED HEART FAILURE TYPE: ICD-10-CM

## 2022-07-11 LAB
ALBUMIN SERPL-MCNC: 4.1 G/DL (ref 3.5–5.2)
ALBUMIN/GLOB SERPL: 1.2 G/DL
ALP SERPL-CCNC: 96 U/L (ref 39–117)
ALT SERPL W P-5'-P-CCNC: 23 U/L (ref 1–33)
ANION GAP SERPL CALCULATED.3IONS-SCNC: 11.1 MMOL/L (ref 5–15)
AST SERPL-CCNC: 30 U/L (ref 1–32)
BASOPHILS # BLD AUTO: 0.04 10*3/MM3 (ref 0–0.2)
BASOPHILS NFR BLD AUTO: 0.4 % (ref 0–1.5)
BILIRUB SERPL-MCNC: 0.5 MG/DL (ref 0–1.2)
BUN SERPL-MCNC: 16 MG/DL (ref 8–23)
BUN/CREAT SERPL: 12.5 (ref 7–25)
CALCIUM SPEC-SCNC: 9.4 MG/DL (ref 8.6–10.5)
CHLORIDE SERPL-SCNC: 107 MMOL/L (ref 98–107)
CO2 SERPL-SCNC: 19.9 MMOL/L (ref 22–29)
CREAT SERPL-MCNC: 1.28 MG/DL (ref 0.57–1)
DEPRECATED RDW RBC AUTO: 48.5 FL (ref 37–54)
EGFRCR SERPLBLD CKD-EPI 2021: 46.9 ML/MIN/1.73
EOSINOPHIL # BLD AUTO: 0.09 10*3/MM3 (ref 0–0.4)
EOSINOPHIL NFR BLD AUTO: 0.9 % (ref 0.3–6.2)
ERYTHROCYTE [DISTWIDTH] IN BLOOD BY AUTOMATED COUNT: 13.1 % (ref 12.3–15.4)
FLUAV AG NPH QL: NEGATIVE
FLUBV AG NPH QL IA: NEGATIVE
GLOBULIN UR ELPH-MCNC: 3.4 GM/DL
GLUCOSE SERPL-MCNC: 136 MG/DL (ref 65–99)
HCT VFR BLD AUTO: 39.5 % (ref 34–46.6)
HGB BLD-MCNC: 12.6 G/DL (ref 12–15.9)
HOLD SPECIMEN: NORMAL
HOLD SPECIMEN: NORMAL
IMM GRANULOCYTES # BLD AUTO: 0.06 10*3/MM3 (ref 0–0.05)
IMM GRANULOCYTES NFR BLD AUTO: 0.6 % (ref 0–0.5)
LYMPHOCYTES # BLD AUTO: 1.45 10*3/MM3 (ref 0.7–3.1)
LYMPHOCYTES NFR BLD AUTO: 13.8 % (ref 19.6–45.3)
MCH RBC QN AUTO: 32.9 PG (ref 26.6–33)
MCHC RBC AUTO-ENTMCNC: 31.9 G/DL (ref 31.5–35.7)
MCV RBC AUTO: 103.1 FL (ref 79–97)
MONOCYTES # BLD AUTO: 0.55 10*3/MM3 (ref 0.1–0.9)
MONOCYTES NFR BLD AUTO: 5.2 % (ref 5–12)
NEUTROPHILS NFR BLD AUTO: 79.1 % (ref 42.7–76)
NEUTROPHILS NFR BLD AUTO: 8.3 10*3/MM3 (ref 1.7–7)
NRBC BLD AUTO-RTO: 0 /100 WBC (ref 0–0.2)
NT-PROBNP SERPL-MCNC: 5921 PG/ML (ref 0–900)
PLATELET # BLD AUTO: 191 10*3/MM3 (ref 140–450)
PMV BLD AUTO: 13 FL (ref 6–12)
POTASSIUM SERPL-SCNC: 4.1 MMOL/L (ref 3.5–5.2)
PROT SERPL-MCNC: 7.5 G/DL (ref 6–8.5)
RBC # BLD AUTO: 3.83 10*6/MM3 (ref 3.77–5.28)
SODIUM SERPL-SCNC: 138 MMOL/L (ref 136–145)
TROPONIN T SERPL-MCNC: <0.01 NG/ML (ref 0–0.03)
WBC NRBC COR # BLD: 10.49 10*3/MM3 (ref 3.4–10.8)
WHOLE BLOOD HOLD COAG: NORMAL
WHOLE BLOOD HOLD SPECIMEN: NORMAL

## 2022-07-11 PROCEDURE — U0004 COV-19 TEST NON-CDC HGH THRU: HCPCS | Performed by: EMERGENCY MEDICINE

## 2022-07-11 PROCEDURE — 93010 ELECTROCARDIOGRAM REPORT: CPT | Performed by: INTERNAL MEDICINE

## 2022-07-11 PROCEDURE — 99283 EMERGENCY DEPT VISIT LOW MDM: CPT

## 2022-07-11 PROCEDURE — 71045 X-RAY EXAM CHEST 1 VIEW: CPT

## 2022-07-11 PROCEDURE — 80053 COMPREHEN METABOLIC PANEL: CPT

## 2022-07-11 PROCEDURE — 84484 ASSAY OF TROPONIN QUANT: CPT

## 2022-07-11 PROCEDURE — 93005 ELECTROCARDIOGRAM TRACING: CPT | Performed by: EMERGENCY MEDICINE

## 2022-07-11 PROCEDURE — 96374 THER/PROPH/DIAG INJ IV PUSH: CPT

## 2022-07-11 PROCEDURE — 83880 ASSAY OF NATRIURETIC PEPTIDE: CPT

## 2022-07-11 PROCEDURE — 93005 ELECTROCARDIOGRAM TRACING: CPT

## 2022-07-11 PROCEDURE — 85025 COMPLETE CBC W/AUTO DIFF WBC: CPT

## 2022-07-11 PROCEDURE — 87804 INFLUENZA ASSAY W/OPTIC: CPT | Performed by: EMERGENCY MEDICINE

## 2022-07-11 PROCEDURE — 25010000002 FUROSEMIDE PER 20 MG: Performed by: EMERGENCY MEDICINE

## 2022-07-11 PROCEDURE — 36415 COLL VENOUS BLD VENIPUNCTURE: CPT

## 2022-07-11 PROCEDURE — C9803 HOPD COVID-19 SPEC COLLECT: HCPCS | Performed by: EMERGENCY MEDICINE

## 2022-07-11 RX ORDER — FUROSEMIDE 10 MG/ML
80 INJECTION INTRAMUSCULAR; INTRAVENOUS ONCE
Status: COMPLETED | OUTPATIENT
Start: 2022-07-11 | End: 2022-07-11

## 2022-07-11 RX ORDER — SODIUM CHLORIDE 0.9 % (FLUSH) 0.9 %
10 SYRINGE (ML) INJECTION AS NEEDED
Status: DISCONTINUED | OUTPATIENT
Start: 2022-07-11 | End: 2022-07-12 | Stop reason: HOSPADM

## 2022-07-11 RX ADMIN — FUROSEMIDE 80 MG: 10 INJECTION, SOLUTION INTRAMUSCULAR; INTRAVENOUS at 22:48

## 2022-07-12 LAB
QT INTERVAL: 521 MS
SARS-COV-2 RNA PNL SPEC NAA+PROBE: NOT DETECTED

## 2022-07-12 NOTE — DISCHARGE INSTRUCTIONS
Please continue your spironolactone and Lasix as previously prescribed.    Please follow-up with your doctor in 48 hours to review your Lasix and spironolactone dosing.  Discuss possible need to increase 1 of those medications due to the pulmonary edema seen on chest x-ray today    Your were tested for COVID-19 today.  Please stay quarantined at home until  you can review your COVID-19 results with your primary care physician and are released from quarantine    No strenuous activity until released by the cardiologist.  Please return to the emergency room for chest pain, radiating chest pain, worsening shortness of breath, near passing out, passing out, extreme fatigue, extreme sweating, nausea or vomiting or new or worrisome symptoms

## 2022-07-12 NOTE — ED PROVIDER NOTES
"Time: 10:04 PM EDT  Arrived by: private car  Chief Complaint: SOB  History provided by: Patient  History is limited by: N/A     History of Present Illness:  Patient is a 62 y.o. year old female that presents to the emergency department via car with increasing SOB onset three days ago. Pt reports SOB at rest, but worsens with exertion. Pt states she feels \"out of breath all the time\". Pt notes orthopnea. Pt denies chest pain, chest pressure cough, fever, new myalgias, vomiting, swelling in legs. Pt states a hx of stage three kidney disease. Pt was never a smoker.  Patient does note that she has a history of congestive heart failure and takes diuretics for it.  The patient denies any leg edema.  The patient denies any pain in 1 calf or the other.  The patient denies any unilateral leg swelling.  The patient denies any previous history of DVT or pulmonary embolism.  PCP is Dr. Cook.      History provided by:  Patient   used: No        Similar Symptoms Previously: N/A  Recently seen: N/A      Patient Care Team  Primary Care Provider: Nilam Willis MD    Past Medical History:     Allergies   Allergen Reactions   • Bupropion Other (See Comments)     Caused falls     Past Medical History:   Diagnosis Date   • Abdominal pain    • CHF (congestive heart failure) (HCC)    • Colon cancer (HCC)    • Coronary arteriosclerosis    • Depressive disorder     with anxiety   • Encounter for routine adult health examination    • Essential hypertension    • Generalized anxiety disorder    • GERD (gastroesophageal reflux disease)    • Hip pain    • Hyperlipidemia    • Kidney stone    • Osteoarthritis    • Radial styloid tenosynovitis of left hand    • Urinary tract infectious disease    • Vitamin D deficiency      Past Surgical History:   Procedure Laterality Date   • CARDIAC CATHETERIZATION  04/15/2016    Enlarged left ventricle with reduced contractility.Severe coronary artery disease as described above. " [facility].   • COLONOSCOPY  2019    COLON CANCER DOMINGO.   • COLONOSCOPY N/A 2/14/2022    Procedure: COLONOSCOPY WITH POLYPECTOMY;  Surgeon: Coy Ordoñez MD;  Location: [facility] ENDOSCOPY;  Service: Gastroenterology;  Laterality: N/A;  COLON POLYP, ANASTAMOSIS IN SIGMOID   • CORONARY ANGIOPLASTY WITH STENT PLACEMENT  05/06/2012    PTCA implantation in the vein graft of the OM branch. Done at [facility] in [place], [place].   • CORONARY ARTERY BYPASS GRAFT  03/18/2003    Emergent CABG x 5 CAD   • DE QUERVAIN'S RELEASE Right 11/30/2009    Release of De Quervain's to the right thumb   • DEQUERVAIN RELEASE Left 11/18/2015    Release of de Quervain's to the left wrist.   • LIVER SURGERY     • PAP SMEAR  06/28/2012    normal   • PROCEDURE GENERIC CONVERTED  11/22/2015    MOST RECENT DIASTOLIC BP < 90 mmHg    • PROCEDURE GENERIC CONVERTED  11/22/2015    MOST RECENT SYSTOLIC BP > or = 140 mmHg    • PROCEDURE GENERIC CONVERTED  11/22/2015    TOBACCO NON-USER    • REPLACEMENT TOTAL HIP LATERAL POSITION     • SUBTOTAL COLECTOMY N/A 04/23/2019    sigmoid for colon cancer   • TOTAL KNEE ARTHROPLASTY       Family History   Problem Relation Age of Onset   • Diabetes Other    • Heart disease Other    • Hypertension Other    • Stroke Other    • Colon cancer Other    • Coronary artery disease Other    • Other Other         Heart surgery       Home Medications:  Prior to Admission medications    Medication Sig Start Date End Date Taking? Authorizing Provider   acetaminophen (TYLENOL) 500 MG tablet Take 500-1,000 mg by mouth every 4 (four) hours as needed for mild pain (1-3).    Provider, MD Chandler   aspirin 81 MG EC tablet Take 81 mg by mouth Daily.    Provider, MD Chandler   clonazePAM (KlonoPIN) 1 MG tablet Take 1 tablet by mouth 2 (Two) Times a Day. 7/5/22   Nilam Willis MD   clopidogrel (PLAVIX) 75 MG tablet Take 1 tablet by mouth Daily. 12/17/21   Nilam Willis MD   cyclobenzaprine  (FLEXERIL) 10 MG tablet Take 1 tablet by mouth 3 (Three) Times a Day. 7/5/22   Nilam Willis MD   FLUoxetine (PROzac) 20 MG capsule Take 3 capsules by mouth Daily. 3/2/22   Nilam Willis MD   furosemide (LASIX) 40 MG tablet Take 1 tablet by mouth 2 (Two) Times a Day. 2/21/21   Nilam Willis MD   losartan (COZAAR) 25 MG tablet Take 1 tablet by mouth Daily. 3/17/22   Nilam Willis MD   metoprolol succinate XL (TOPROL-XL) 25 MG 24 hr tablet Take 1 tablet by mouth Daily. 3/4/22   Nilam Willis MD   nitroglycerin (NITROSTAT) 0.4 MG SL tablet DISSOLVE 1 TAB UNDER TONGUE FOR CHEST PAIN - IF PAIN REMAINS AFTER 5 MIN, CALL 911 AND REPEAT DOSE. MAX 3 TABS IN 15 MINUTES 1/10/18   Nilam Willis MD   omeprazole (priLOSEC) 40 MG capsule Take 1 capsule by mouth Daily. 3/2/22   Nilam Willis MD   pravastatin (PRAVACHOL) 80 MG tablet Take 1 tablet by mouth Every Night for 30 days. 3/17/22 4/16/22  Nilam Willis MD   spironolactone (ALDACTONE) 25 MG tablet Take 1 tablet by mouth 2 (Two) Times a Day. 3/17/22   Nilam Willis MD   tiZANidine (ZANAFLEX) 4 MG tablet Take 1 tablet by mouth Every 8 (Eight) Hours As Needed for Muscle Spasms. 3/17/22   Nilam Willis MD   vitamin B-12 (CYANOCOBALAMIN) 500 MCG tablet Take 500 mcg by mouth Daily.    ProviderChandler MD   vitamin D (ERGOCALCIFEROL) 1.25 MG (27874 UT) capsule capsule TAKE ONE CAPSULE BY MOUTH ONCE WEEKLY 11/18/21   Nilam Willis MD        Social History:   Social History     Tobacco Use   • Smoking status: Never Smoker   • Smokeless tobacco: Never Used   Vaping Use   • Vaping Use: Never used   Substance Use Topics   • Alcohol use: No         Review of Systems:  Review of Systems   Constitutional: Negative for chills, diaphoresis and fever.   HENT: Negative for congestion, postnasal drip, rhinorrhea and sore throat.    Eyes: Negative for photophobia.   Respiratory: Positive for shortness of breath. Negative for cough and  "chest tightness.    Cardiovascular: Negative for chest pain, palpitations and leg swelling.   Gastrointestinal: Negative for abdominal pain, diarrhea, nausea and vomiting.   Genitourinary: Negative for difficulty urinating, dysuria, flank pain, frequency, hematuria and urgency.   Musculoskeletal: Negative for myalgias, neck pain and neck stiffness.   Skin: Negative for pallor and rash.   Neurological: Negative for dizziness, syncope, weakness, numbness and headaches.   Hematological: Negative for adenopathy. Does not bruise/bleed easily.   Psychiatric/Behavioral: Negative.         Physical Exam:  /77   Pulse 66   Temp 97.8 °F (36.6 °C) (Oral)   Resp 25   Ht 160 cm (63\")   Wt 67.5 kg (148 lb 13 oz)   SpO2 95%   BMI 26.36 kg/m²     Physical Exam  Vitals and nursing note reviewed.   Constitutional:       General: She is not in acute distress.     Appearance: Normal appearance. She is not ill-appearing, toxic-appearing or diaphoretic.   HENT:      Head: Normocephalic and atraumatic.      Mouth/Throat:      Mouth: Mucous membranes are moist.   Eyes:      Pupils: Pupils are equal, round, and reactive to light.   Cardiovascular:      Rate and Rhythm: Normal rate and regular rhythm.      Pulses: Normal pulses.           Carotid pulses are 2+ on the right side and 2+ on the left side.       Radial pulses are 2+ on the right side and 2+ on the left side.        Femoral pulses are 2+ on the right side and 2+ on the left side.       Popliteal pulses are 2+ on the right side and 2+ on the left side.        Dorsalis pedis pulses are 2+ on the right side and 2+ on the left side.        Posterior tibial pulses are 2+ on the right side and 2+ on the left side.      Heart sounds: Normal heart sounds. No murmur heard.  Pulmonary:      Effort: Pulmonary effort is normal. No accessory muscle usage, respiratory distress or retractions.      Breath sounds: Examination of the right-lower field reveals wheezing and rales. " Examination of the left-lower field reveals wheezing and rales. Decreased breath sounds, wheezing (expiratory) and rales present. No rhonchi.   Chest:      Chest wall: No mass, deformity or tenderness.   Abdominal:      General: Abdomen is flat. There is no distension.      Palpations: Abdomen is soft. There is no mass.      Tenderness: There is no abdominal tenderness. There is no right CVA tenderness, left CVA tenderness, guarding or rebound.      Comments: No rigidity  Large surgical scars on abdomen   Musculoskeletal:         General: No swelling, tenderness or deformity.      Cervical back: Neck supple. No tenderness.      Right lower leg: No tenderness. No edema.      Left lower leg: No tenderness. No edema.   Skin:     General: Skin is warm and dry.      Capillary Refill: Capillary refill takes less than 2 seconds.      Coloration: Skin is not jaundiced or pale.      Findings: No erythema.   Neurological:      General: No focal deficit present.      Mental Status: She is alert and oriented to person, place, and time. Mental status is at baseline.      Cranial Nerves: No cranial nerve deficit.      Sensory: No sensory deficit.      Motor: No weakness.   Psychiatric:         Mood and Affect: Mood normal.         Behavior: Behavior normal.                Medications in the Emergency Department:  Medications   sodium chloride 0.9 % flush 10 mL (has no administration in time range)   furosemide (LASIX) injection 80 mg (80 mg Intravenous Given 7/11/22 2248)        Labs  Lab Results (last 24 hours)     Procedure Component Value Units Date/Time    CBC & Differential [[id number]]  (Abnormal) Collected: 07/11/22 1703    Specimen: Blood Updated: 07/11/22 1724    Narrative:      The following orders were created for panel order CBC & Differential.  Procedure                               Abnormality         Status                     ---------                               -----------         ------                      CBC Auto Differential[[id number]]        Abnormal            Final result                 Please view results for these tests on the individual orders.    Comprehensive Metabolic Panel [[id number]]  (Abnormal) Collected: 07/11/22 1703    Specimen: Blood Updated: 07/11/22 1753     Glucose 136 mg/dL      BUN 16 mg/dL      Creatinine 1.28 mg/dL      Sodium 138 mmol/L      Potassium 4.1 mmol/L      Chloride 107 mmol/L      CO2 19.9 mmol/L      Calcium 9.4 mg/dL      Total Protein 7.5 g/dL      Albumin 4.10 g/dL      ALT (SGPT) 23 U/L      AST (SGOT) 30 U/L      Alkaline Phosphatase 96 U/L      Total Bilirubin 0.5 mg/dL      Globulin 3.4 gm/dL      A/G Ratio 1.2 g/dL      BUN/Creatinine Ratio 12.5     Anion Gap 11.1 mmol/L      eGFR 46.9 mL/min/1.73      Comment: National Kidney Foundation and American Society of Nephrology (ASN) Task Force recommended calculation based on the Chronic Kidney Disease Epidemiology Collaboration (CKD-EPI) equation refit without adjustment for race.       Narrative:      GFR Normal >60  Chronic Kidney Disease <60  Kidney Failure <15      BNP [[id number]]  (Abnormal) Collected: 07/11/22 1703    Specimen: Blood Updated: 07/11/22 1747     proBNP 5,921.0 pg/mL     Narrative:      Among patients with dyspnea, NT-proBNP is highly sensitive for the detection of acute congestive heart failure. In addition NT-proBNP of <300 pg/ml effectively rules out acute congestive heart failure with 99% negative predictive value.    Results may be falsely decreased if patient taking Biotin.      Troponin [[id number]]  (Normal) Collected: 07/11/22 1703    Specimen: Blood Updated: 07/11/22 1750     Troponin T <0.010 ng/mL     Narrative:      Troponin T Reference Range:  <= 0.03 ng/mL-   Negative for AMI  >0.03 ng/mL-     Abnormal for myocardial necrosis.  Clinicians would have to utilize clinical acumen, EKG, Troponin and serial changes to determine if it is an Acute Myocardial Infarction or myocardial injury due to  an underlying chronic condition.       Results may be falsely decreased if patient taking Biotin.      CBC Auto Differential [[id number]]  (Abnormal) Collected: 07/11/22 1703    Specimen: Blood Updated: 07/11/22 1724     WBC 10.49 10*3/mm3      RBC 3.83 10*6/mm3      Hemoglobin 12.6 g/dL      Hematocrit 39.5 %      .1 fL      MCH 32.9 pg      MCHC 31.9 g/dL      RDW 13.1 %      RDW-SD 48.5 fl      MPV 13.0 fL      Platelets 191 10*3/mm3      Neutrophil % 79.1 %      Lymphocyte % 13.8 %      Monocyte % 5.2 %      Eosinophil % 0.9 %      Basophil % 0.4 %      Immature Grans % 0.6 %      Neutrophils, Absolute 8.30 10*3/mm3      Lymphocytes, Absolute 1.45 10*3/mm3      Monocytes, Absolute 0.55 10*3/mm3      Eosinophils, Absolute 0.09 10*3/mm3      Basophils, Absolute 0.04 10*3/mm3      Immature Grans, Absolute 0.06 10*3/mm3      nRBC 0.0 /100 WBC     COVID-19,APTIMA PANTHER(GITA),[facility], NP/OP SWAB IN UTM/VTM/SALINE TRANSPORT MEDIA,24 HR TAT - Swab, Nasal Cavity [[id number]] Collected: 07/11/22 2248    Specimen: Swab from Nasal Cavity Updated: 07/11/22 2253    Influenza Antigen, Rapid - Swab, Nasopharynx [[id number]]  (Normal) Collected: 07/11/22 2248    Specimen: Swab from Nasopharynx Updated: 07/11/22 2330     Influenza A Ag, EIA Negative     Influenza B Ag, EIA Negative           Imaging:  XR Chest 1 View    Result Date: 7/11/2022  PROCEDURE: XR CHEST 1 VW  COMPARISON: [facility], CR, CHEST PA/AP & LAT 2V, 1/04/2021, 16:18.  INDICATIONS: SHORT OF BREATH, TODAY  FINDINGS:   Mild cardiomegaly is present.  There is mild patchy airspace opacity in the lung fields.  Prior median sternotomy.  There is a left-sided cardiac pacemaker/AICD.  IMPRESSION: Mild cardiomegaly.  Mild patchy airspace opacity in the lung fields may be secondary to edema or multifocal pneumonia.    MARIA EUGENIA CHAPA MD       Electronically Signed and Approved By: MARIA EUGENIA CHAAP MD on 7/11/2022 at 20:02                Procedures:  Procedures    Progress  ED Course as of 07/11/22 2348   Mon Jul 11, 2022   2229 EKG:    Rhythm: Sinus bradycardia  Rate: 57  Intervals: Normal OH and prolonged QT interval  T-wave: T wave inversion in V2, nonspecific T wave flattening  ST Segment: J-point elevation III, nonspecific ST segments V4, V5, V6, no obvious pathological ST elevation or ST depression consistent with STEMI    EKG Comparison: Over the all the QRS and ST segments are probably unchanged from EKG performed January 14, 2021.  The patient had a borderline QT interval on that EKG.    Interpreted by me   [SD]      ED Course User Index  [SD] Kevin Whitehead DO                            Medical Decision Making:  MDM  Number of Diagnoses or Management Options  Acute on chronic congestive heart failure, unspecified heart failure type (HCC)  Chronic pulmonary edema  Diagnosis management comments:         At the time of discharge, the patient appeared well, no distress and nontoxic.  The patient was in no respiratory distress including no tachypnea, intercostal retractions or accessory muscle use.  The patient had a normal room air pulse ox of 95%.  The patient's flu was negative.  The patient had a normal troponin.  The patient's BNP was elevated at 5900.  The patient's chest x-ray demonstrated most likely pulmonary edema.  The patient had a normal white blood cell count.  The patient had a normal hemoglobin hematocrit.  The patient's chemistry essentially was unremarkable.  The patient's EKG demonstrated no evidence of acute injury.  The patient was given 80 mg of Lasix.  Subjectively, the patient does state that she feels better.  The patient states that she feels comfortable go home.  The patient will follow up with her doctor in 48 hours to discuss her spironolactone and Lasix dosing.  The patient will stay quarantined at home until she can review the COVID-19 results with her primary care physician and are released from quarantine.   The patient was given very specific instructions on when and why to return to the emergency room.  The patient voiced understanding and felt comfortable with the discharge instructions.  They would return to the emergency room if necessary.  The patient appears appropriate for discharge and outpatient follow-up.         Amount and/or Complexity of Data Reviewed  Clinical lab tests: reviewed  Tests in the radiology section of CPT®: reviewed  Tests in the medicine section of CPT®: reviewed                 Final diagnoses:   Chronic pulmonary edema   Acute on chronic congestive heart failure, unspecified heart failure type (HCC)        Disposition:  ED Disposition     ED Disposition   Discharge    Condition   Stable    Comment   --             Documentation assistance provided by Kasandra Baltazar acting as scribe for Kevin Whitehead DO. Information recorded by the scribe was done at my direction and has been verified and validated by me.        Kasandra Baltazar  07/11/22 2217       Kevin Whitehead DO  07/13/22 0136

## 2022-07-14 ENCOUNTER — LAB (OUTPATIENT)
Dept: LAB | Facility: HOSPITAL | Age: 62
End: 2022-07-14

## 2022-07-14 ENCOUNTER — TRANSCRIBE ORDERS (OUTPATIENT)
Dept: LAB | Facility: HOSPITAL | Age: 62
End: 2022-07-14

## 2022-07-14 DIAGNOSIS — R07.9 CHEST PAIN, UNSPECIFIED TYPE: Primary | ICD-10-CM

## 2022-07-14 DIAGNOSIS — R07.9 CHEST PAIN, UNSPECIFIED TYPE: ICD-10-CM

## 2022-07-14 LAB
ALBUMIN SERPL-MCNC: 4.1 G/DL (ref 3.5–5.2)
ALBUMIN/GLOB SERPL: 1.2 G/DL
ALP SERPL-CCNC: 96 U/L (ref 39–117)
ALT SERPL W P-5'-P-CCNC: 24 U/L (ref 1–33)
ANION GAP SERPL CALCULATED.3IONS-SCNC: 17 MMOL/L (ref 5–15)
AST SERPL-CCNC: 26 U/L (ref 1–32)
BILIRUB SERPL-MCNC: 0.4 MG/DL (ref 0–1.2)
BUN SERPL-MCNC: 18 MG/DL (ref 8–23)
BUN/CREAT SERPL: 12.8 (ref 7–25)
CALCIUM SPEC-SCNC: 9.2 MG/DL (ref 8.6–10.5)
CHLORIDE SERPL-SCNC: 103 MMOL/L (ref 98–107)
CHOLEST SERPL-MCNC: 130 MG/DL (ref 0–200)
CO2 SERPL-SCNC: 19 MMOL/L (ref 22–29)
CREAT SERPL-MCNC: 1.41 MG/DL (ref 0.57–1)
EGFRCR SERPLBLD CKD-EPI 2021: 41.7 ML/MIN/1.73
GLOBULIN UR ELPH-MCNC: 3.4 GM/DL
GLUCOSE SERPL-MCNC: 95 MG/DL (ref 65–99)
HDLC SERPL-MCNC: 33 MG/DL (ref 40–60)
LDLC SERPL CALC-MCNC: 73 MG/DL (ref 0–100)
LDLC/HDLC SERPL: 2.12 {RATIO}
POTASSIUM SERPL-SCNC: 4 MMOL/L (ref 3.5–5.2)
PROT SERPL-MCNC: 7.5 G/DL (ref 6–8.5)
SODIUM SERPL-SCNC: 139 MMOL/L (ref 136–145)
TRIGL SERPL-MCNC: 135 MG/DL (ref 0–150)
VLDLC SERPL-MCNC: 24 MG/DL (ref 5–40)

## 2022-07-14 PROCEDURE — 36415 COLL VENOUS BLD VENIPUNCTURE: CPT

## 2022-07-14 PROCEDURE — 80061 LIPID PANEL: CPT

## 2022-07-14 PROCEDURE — 80053 COMPREHEN METABOLIC PANEL: CPT

## 2022-07-29 DIAGNOSIS — Z12.31 ENCOUNTER FOR SCREENING MAMMOGRAM FOR BREAST CANCER: Primary | ICD-10-CM

## 2022-09-22 DIAGNOSIS — I10 ESSENTIAL HYPERTENSION: ICD-10-CM

## 2022-09-22 DIAGNOSIS — F32.A DEPRESSIVE DISORDER: Chronic | ICD-10-CM

## 2022-09-22 DIAGNOSIS — I25.10 CORONARY ARTERIOSCLEROSIS: ICD-10-CM

## 2022-09-22 RX ORDER — FLUOXETINE HYDROCHLORIDE 20 MG/1
60 CAPSULE ORAL DAILY
Qty: 90 CAPSULE | Refills: 1 | Status: SHIPPED | OUTPATIENT
Start: 2022-09-22 | End: 2023-01-20

## 2022-09-22 RX ORDER — METOPROLOL SUCCINATE 25 MG/1
25 TABLET, EXTENDED RELEASE ORAL DAILY
Qty: 90 TABLET | Refills: 1 | Status: SHIPPED | OUTPATIENT
Start: 2022-09-22 | End: 2022-10-07 | Stop reason: SDUPTHER

## 2022-09-30 ENCOUNTER — LAB (OUTPATIENT)
Dept: LAB | Facility: HOSPITAL | Age: 62
End: 2022-09-30

## 2022-09-30 DIAGNOSIS — Z00.00 ANNUAL PHYSICAL EXAM: ICD-10-CM

## 2022-09-30 DIAGNOSIS — E53.8 VITAMIN B12 DEFICIENCY: ICD-10-CM

## 2022-09-30 LAB
ALBUMIN SERPL-MCNC: 3.9 G/DL (ref 3.5–5.2)
ALBUMIN/GLOB SERPL: 1.6 G/DL
ALP SERPL-CCNC: 74 U/L (ref 39–117)
ALT SERPL W P-5'-P-CCNC: 15 U/L (ref 1–33)
ANION GAP SERPL CALCULATED.3IONS-SCNC: 10 MMOL/L (ref 5–15)
AST SERPL-CCNC: 27 U/L (ref 1–32)
BACTERIA UR QL AUTO: NORMAL /HPF
BASOPHILS # BLD AUTO: 0.03 10*3/MM3 (ref 0–0.2)
BASOPHILS NFR BLD AUTO: 0.5 % (ref 0–1.5)
BILIRUB SERPL-MCNC: 0.4 MG/DL (ref 0–1.2)
BILIRUB UR QL STRIP: NEGATIVE
BUN SERPL-MCNC: 19 MG/DL (ref 8–23)
BUN/CREAT SERPL: 11.4 (ref 7–25)
CALCIUM SPEC-SCNC: 9 MG/DL (ref 8.6–10.5)
CHLORIDE SERPL-SCNC: 101 MMOL/L (ref 98–107)
CHOLEST SERPL-MCNC: 153 MG/DL (ref 0–200)
CLARITY UR: CLEAR
CO2 SERPL-SCNC: 28 MMOL/L (ref 22–29)
COLOR UR: YELLOW
CREAT SERPL-MCNC: 1.66 MG/DL (ref 0.57–1)
DEPRECATED RDW RBC AUTO: 47.4 FL (ref 37–54)
EGFRCR SERPLBLD CKD-EPI 2021: 34.3 ML/MIN/1.73
EOSINOPHIL # BLD AUTO: 0.12 10*3/MM3 (ref 0–0.4)
EOSINOPHIL NFR BLD AUTO: 1.8 % (ref 0.3–6.2)
ERYTHROCYTE [DISTWIDTH] IN BLOOD BY AUTOMATED COUNT: 12.5 % (ref 12.3–15.4)
GLOBULIN UR ELPH-MCNC: 2.5 GM/DL
GLUCOSE SERPL-MCNC: 93 MG/DL (ref 65–99)
GLUCOSE UR STRIP-MCNC: NEGATIVE MG/DL
HCT VFR BLD AUTO: 40.8 % (ref 34–46.6)
HDLC SERPL-MCNC: 43 MG/DL (ref 40–60)
HGB BLD-MCNC: 13 G/DL (ref 12–15.9)
HGB UR QL STRIP.AUTO: NEGATIVE
HYALINE CASTS UR QL AUTO: NORMAL /LPF
IMM GRANULOCYTES # BLD AUTO: 0.04 10*3/MM3 (ref 0–0.05)
IMM GRANULOCYTES NFR BLD AUTO: 0.6 % (ref 0–0.5)
KETONES UR QL STRIP: NEGATIVE
LDLC SERPL CALC-MCNC: 88 MG/DL (ref 0–100)
LDLC/HDLC SERPL: 1.99 {RATIO}
LEUKOCYTE ESTERASE UR QL STRIP.AUTO: ABNORMAL
LYMPHOCYTES # BLD AUTO: 1.66 10*3/MM3 (ref 0.7–3.1)
LYMPHOCYTES NFR BLD AUTO: 25.3 % (ref 19.6–45.3)
MCH RBC QN AUTO: 32.3 PG (ref 26.6–33)
MCHC RBC AUTO-ENTMCNC: 31.9 G/DL (ref 31.5–35.7)
MCV RBC AUTO: 101.2 FL (ref 79–97)
MONOCYTES # BLD AUTO: 0.65 10*3/MM3 (ref 0.1–0.9)
MONOCYTES NFR BLD AUTO: 9.9 % (ref 5–12)
NEUTROPHILS NFR BLD AUTO: 4.07 10*3/MM3 (ref 1.7–7)
NEUTROPHILS NFR BLD AUTO: 61.9 % (ref 42.7–76)
NITRITE UR QL STRIP: NEGATIVE
NRBC BLD AUTO-RTO: 0 /100 WBC (ref 0–0.2)
PH UR STRIP.AUTO: 6 [PH] (ref 5–8)
PLATELET # BLD AUTO: 173 10*3/MM3 (ref 140–450)
PMV BLD AUTO: 12.9 FL (ref 6–12)
POTASSIUM SERPL-SCNC: 3.5 MMOL/L (ref 3.5–5.2)
PROT SERPL-MCNC: 6.4 G/DL (ref 6–8.5)
PROT UR QL STRIP: NEGATIVE
RBC # BLD AUTO: 4.03 10*6/MM3 (ref 3.77–5.28)
RBC # UR STRIP: NORMAL /HPF
REF LAB TEST METHOD: NORMAL
SODIUM SERPL-SCNC: 139 MMOL/L (ref 136–145)
SP GR UR STRIP: 1.02 (ref 1–1.03)
SQUAMOUS #/AREA URNS HPF: NORMAL /HPF
TRIGL SERPL-MCNC: 123 MG/DL (ref 0–150)
UROBILINOGEN UR QL STRIP: ABNORMAL
VLDLC SERPL-MCNC: 22 MG/DL (ref 5–40)
WBC # UR STRIP: NORMAL /HPF
WBC NRBC COR # BLD: 6.57 10*3/MM3 (ref 3.4–10.8)

## 2022-09-30 PROCEDURE — 85025 COMPLETE CBC W/AUTO DIFF WBC: CPT

## 2022-09-30 PROCEDURE — 80053 COMPREHEN METABOLIC PANEL: CPT

## 2022-09-30 PROCEDURE — 81001 URINALYSIS AUTO W/SCOPE: CPT

## 2022-09-30 PROCEDURE — 36415 COLL VENOUS BLD VENIPUNCTURE: CPT

## 2022-09-30 PROCEDURE — 82607 VITAMIN B-12: CPT

## 2022-09-30 PROCEDURE — 80061 LIPID PANEL: CPT

## 2022-10-01 LAB — VIT B12 BLD-MCNC: 1701 PG/ML (ref 211–946)

## 2022-10-07 ENCOUNTER — OFFICE VISIT (OUTPATIENT)
Dept: FAMILY MEDICINE CLINIC | Facility: CLINIC | Age: 62
End: 2022-10-07

## 2022-10-07 VITALS
HEART RATE: 61 BPM | WEIGHT: 150 LBS | SYSTOLIC BLOOD PRESSURE: 118 MMHG | BODY MASS INDEX: 26.58 KG/M2 | DIASTOLIC BLOOD PRESSURE: 62 MMHG | RESPIRATION RATE: 18 BRPM | OXYGEN SATURATION: 99 % | HEIGHT: 63 IN

## 2022-10-07 DIAGNOSIS — I10 ESSENTIAL HYPERTENSION: Chronic | ICD-10-CM

## 2022-10-07 DIAGNOSIS — Z00.00 ANNUAL PHYSICAL EXAM: Primary | ICD-10-CM

## 2022-10-07 DIAGNOSIS — E78.2 MIXED HYPERLIPIDEMIA: Chronic | ICD-10-CM

## 2022-10-07 DIAGNOSIS — I25.10 CORONARY ARTERIOSCLEROSIS: ICD-10-CM

## 2022-10-07 DIAGNOSIS — M62.838 MUSCLE SPASM: ICD-10-CM

## 2022-10-07 PROCEDURE — 99396 PREV VISIT EST AGE 40-64: CPT | Performed by: GENERAL PRACTICE

## 2022-10-07 RX ORDER — METOPROLOL SUCCINATE 25 MG/1
25 TABLET, EXTENDED RELEASE ORAL DAILY
Qty: 30 TABLET | Refills: 11 | Status: SHIPPED | OUTPATIENT
Start: 2022-10-07 | End: 2023-01-20

## 2022-10-07 RX ORDER — AMOXICILLIN 500 MG/1
CAPSULE ORAL
COMMUNITY
Start: 2022-09-19 | End: 2022-11-23 | Stop reason: SDUPTHER

## 2022-10-07 RX ORDER — OMEPRAZOLE 40 MG/1
40 CAPSULE, DELAYED RELEASE ORAL DAILY
Qty: 30 CAPSULE | Refills: 5 | Status: SHIPPED | OUTPATIENT
Start: 2022-10-07

## 2022-10-07 RX ORDER — LOSARTAN POTASSIUM 25 MG/1
25 TABLET ORAL DAILY
Qty: 30 TABLET | Refills: 11 | Status: SHIPPED | OUTPATIENT
Start: 2022-10-07 | End: 2023-01-20

## 2022-10-07 RX ORDER — SPIRONOLACTONE 25 MG/1
25 TABLET ORAL 2 TIMES DAILY
Qty: 60 TABLET | Refills: 5 | Status: SHIPPED | OUTPATIENT
Start: 2022-10-07 | End: 2022-12-13 | Stop reason: SDUPTHER

## 2022-10-07 RX ORDER — CLOPIDOGREL BISULFATE 75 MG/1
75 TABLET ORAL DAILY
Qty: 30 TABLET | Refills: 11 | Status: SHIPPED | OUTPATIENT
Start: 2022-10-07 | End: 2023-01-20

## 2022-10-07 RX ORDER — PRAVASTATIN SODIUM 80 MG/1
80 TABLET ORAL NIGHTLY
Qty: 30 TABLET | Refills: 11 | Status: SHIPPED | OUTPATIENT
Start: 2022-10-07 | End: 2023-01-20

## 2022-10-07 RX ORDER — TIZANIDINE 4 MG/1
4 TABLET ORAL EVERY 8 HOURS PRN
Qty: 90 TABLET | Refills: 5 | Status: SHIPPED | OUTPATIENT
Start: 2022-10-07 | End: 2023-01-20

## 2022-10-07 RX ORDER — BUPROPION HYDROCHLORIDE 100 MG/1
100 TABLET, EXTENDED RELEASE ORAL DAILY
Status: ON HOLD | COMMUNITY
Start: 2022-07-13 | End: 2022-12-22

## 2022-10-07 NOTE — PROGRESS NOTES
Subjective   Erika Bowens is a 62 y.o. female.     Chief Complaint   Patient presents with   • Annual Exam     M today        History of Present Illness     For annual wellness exam. Records reviewed. Recent labs, xrays reviewed and medications reconciled.  Due for mammogram.  Is feeling frustrated over the fact that she has been disabled since her head injury in her family and friends enjoying the shelter she has not had that lecturing.  Apparently is now somewhat estranged from her family members apart from her twin sister.  The therapist she had for 30 years is retired and she has not found her as yet.    The following portions of the patient's history were reviewed and updated as appropriate: allergies, current medications, past family and social history and problem list.    Outpatient Medications Prior to Visit   Medication Sig Dispense Refill   • acetaminophen (TYLENOL) 500 MG tablet Take 500-1,000 mg by mouth every 4 (four) hours as needed for mild pain (1-3).     • aspirin 81 MG EC tablet Take 81 mg by mouth Daily.     • buPROPion SR (WELLBUTRIN SR) 100 MG 12 hr tablet      • clonazePAM (KlonoPIN) 1 MG tablet Take 1 tablet by mouth 2 (Two) Times a Day. 60 tablet 2   • cyclobenzaprine (FLEXERIL) 10 MG tablet Take 1 tablet by mouth 3 (Three) Times a Day. 90 tablet 5   • FLUoxetine (PROzac) 20 MG capsule Take 3 capsules by mouth Daily. 90 capsule 1   • furosemide (LASIX) 40 MG tablet Take 1 tablet by mouth 2 (Two) Times a Day. 60 tablet 2   • nitroglycerin (NITROSTAT) 0.4 MG SL tablet DISSOLVE 1 TAB UNDER TONGUE FOR CHEST PAIN - IF PAIN REMAINS AFTER 5 MIN, CALL 911 AND REPEAT DOSE. MAX 3 TABS IN 15 MINUTES 25 tablet 2   • vitamin B-12 (CYANOCOBALAMIN) 500 MCG tablet Take 500 mcg by mouth Daily.     • vitamin D (ERGOCALCIFEROL) 1.25 MG (61281 UT) capsule capsule TAKE ONE CAPSULE BY MOUTH ONCE WEEKLY 9 capsule 1   • clopidogrel (PLAVIX) 75 MG tablet Take 1 tablet by mouth Daily. 30 tablet 11   • losartan  "(COZAAR) 25 MG tablet Take 1 tablet by mouth Daily. 30 tablet 11   • metoprolol succinate XL (TOPROL-XL) 25 MG 24 hr tablet Take 1 tablet by mouth Daily. 90 tablet 1   • omeprazole (priLOSEC) 40 MG capsule Take 1 capsule by mouth Daily. 90 capsule 2   • spironolactone (ALDACTONE) 25 MG tablet Take 1 tablet by mouth 2 (Two) Times a Day. 60 tablet 5   • tiZANidine (ZANAFLEX) 4 MG tablet Take 1 tablet by mouth Every 8 (Eight) Hours As Needed for Muscle Spasms. 90 tablet 5   • amoxicillin (AMOXIL) 500 MG capsule      • pravastatin (PRAVACHOL) 80 MG tablet Take 1 tablet by mouth Every Night for 30 days. 30 tablet 11     No facility-administered medications prior to visit.       Review of Systems  I have reviewed 12 systems with patient. Findings were negative except what is noted below and/or in history of present illness.    Objective     Visit Vitals  /62   Pulse 61   Resp 18   Ht 160 cm (63\")   Wt 68 kg (150 lb)   SpO2 99%   BMI 26.57 kg/m²     Physical Exam  Constitutional:       General: She is not in acute distress.     Appearance: She is well-developed.   HENT:      Head: Normocephalic and atraumatic.      Nose: Nose normal.   Eyes:      General:         Right eye: No discharge.         Left eye: No discharge.      Conjunctiva/sclera: Conjunctivae normal.      Pupils: Pupils are equal, round, and reactive to light.   Neck:      Thyroid: No thyromegaly.   Cardiovascular:      Rate and Rhythm: Normal rate and regular rhythm.      Heart sounds: Normal heart sounds. No murmur heard.  Pulmonary:      Effort: Pulmonary effort is normal. No respiratory distress.      Breath sounds: Normal breath sounds. No wheezing or rales.   Chest:      Chest wall: No tenderness.   Abdominal:      General: Bowel sounds are normal. There is no distension.      Palpations: Abdomen is soft. There is no mass.      Tenderness: There is no abdominal tenderness.      Hernia: No hernia is present.   Musculoskeletal:         General: No " deformity. Normal range of motion.      Cervical back: Normal range of motion.   Lymphadenopathy:      Cervical: No cervical adenopathy.   Skin:     General: Skin is warm and dry.      Coloration: Skin is not pale.      Findings: No rash.   Neurological:      Mental Status: She is alert and oriented to person, place, and time.      Deep Tendon Reflexes: Reflexes are normal and symmetric.   Psychiatric:         Behavior: Behavior normal.         Thought Content: Thought content normal.         Judgment: Judgment normal.       Results for orders placed or performed in visit on 09/30/22   Comprehensive Metabolic Panel    Specimen: Blood   Result Value Ref Range    Glucose 93 65 - 99 mg/dL    BUN 19 8 - 23 mg/dL    Creatinine 1.66 (H) 0.57 - 1.00 mg/dL    Sodium 139 136 - 145 mmol/L    Potassium 3.5 3.5 - 5.2 mmol/L    Chloride 101 98 - 107 mmol/L    CO2 28.0 22.0 - 29.0 mmol/L    Calcium 9.0 8.6 - 10.5 mg/dL    Total Protein 6.4 6.0 - 8.5 g/dL    Albumin 3.90 3.50 - 5.20 g/dL    ALT (SGPT) 15 1 - 33 U/L    AST (SGOT) 27 1 - 32 U/L    Alkaline Phosphatase 74 39 - 117 U/L    Total Bilirubin 0.4 0.0 - 1.2 mg/dL    Globulin 2.5 gm/dL    A/G Ratio 1.6 g/dL    BUN/Creatinine Ratio 11.4 7.0 - 25.0    Anion Gap 10.0 5.0 - 15.0 mmol/L    eGFR 34.3 (L) >60.0 mL/min/1.73   Lipid Panel    Specimen: Blood   Result Value Ref Range    Total Cholesterol 153 0 - 200 mg/dL    Triglycerides 123 0 - 150 mg/dL    HDL Cholesterol 43 40 - 60 mg/dL    LDL Cholesterol  88 0 - 100 mg/dL    VLDL Cholesterol 22 5 - 40 mg/dL    LDL/HDL Ratio 1.99    Vitamin B12    Specimen: Blood   Result Value Ref Range    Vitamin B-12 1,701 (H) 211 - 946 pg/mL   CBC Auto Differential    Specimen: Blood   Result Value Ref Range    WBC 6.57 3.40 - 10.80 10*3/mm3    RBC 4.03 3.77 - 5.28 10*6/mm3    Hemoglobin 13.0 12.0 - 15.9 g/dL    Hematocrit 40.8 34.0 - 46.6 %    .2 (H) 79.0 - 97.0 fL    MCH 32.3 26.6 - 33.0 pg    MCHC 31.9 31.5 - 35.7 g/dL    RDW 12.5  12.3 - 15.4 %    RDW-SD 47.4 37.0 - 54.0 fl    MPV 12.9 (H) 6.0 - 12.0 fL    Platelets 173 140 - 450 10*3/mm3    Neutrophil % 61.9 42.7 - 76.0 %    Lymphocyte % 25.3 19.6 - 45.3 %    Monocyte % 9.9 5.0 - 12.0 %    Eosinophil % 1.8 0.3 - 6.2 %    Basophil % 0.5 0.0 - 1.5 %    Immature Grans % 0.6 (H) 0.0 - 0.5 %    Neutrophils, Absolute 4.07 1.70 - 7.00 10*3/mm3    Lymphocytes, Absolute 1.66 0.70 - 3.10 10*3/mm3    Monocytes, Absolute 0.65 0.10 - 0.90 10*3/mm3    Eosinophils, Absolute 0.12 0.00 - 0.40 10*3/mm3    Basophils, Absolute 0.03 0.00 - 0.20 10*3/mm3    Immature Grans, Absolute 0.04 0.00 - 0.05 10*3/mm3    nRBC 0.0 0.0 - 0.2 /100 WBC   Urinalysis With Culture If Indicated - Urine, Clean Catch    Specimen: Urine, Clean Catch   Result Value Ref Range    Color, UA Yellow Yellow, Straw    Appearance, UA Clear Clear    pH, UA 6.0 5.0 - 8.0    Specific Gravity, UA 1.016 1.005 - 1.030    Glucose, UA Negative Negative    Ketones, UA Negative Negative    Bilirubin, UA Negative Negative    Blood, UA Negative Negative    Protein, UA Negative Negative    Leuk Esterase, UA Trace (A) Negative    Nitrite, UA Negative Negative    Urobilinogen, UA 0.2 E.U./dL 0.2 - 1.0 E.U./dL   Urinalysis, Microscopic Only - Urine, Clean Catch    Specimen: Urine, Clean Catch   Result Value Ref Range    RBC, UA 0-2 None Seen, 0-2 /HPF    WBC, UA 0-2 None Seen, 0-2 /HPF    Bacteria, UA None Seen None Seen /HPF    Squamous Epithelial Cells, UA 0-2 None Seen, 0-2 /HPF    Hyaline Casts, UA 0-2 None Seen /LPF    Methodology Automated Microscopy       Notes brought forward are reviewed and updated if indicated.     Assessment & Plan   Problems Addressed this Visit        Cardiac and Vasculature    Essential hypertension (Chronic)    Relevant Medications    losartan (COZAAR) 25 MG tablet    spironolactone (ALDACTONE) 25 MG tablet    metoprolol succinate XL (TOPROL-XL) 25 MG 24 hr tablet    Coronary arteriosclerosis    Relevant Medications     clopidogrel (PLAVIX) 75 MG tablet    spironolactone (ALDACTONE) 25 MG tablet    metoprolol succinate XL (TOPROL-XL) 25 MG 24 hr tablet    Mixed hyperlipidemia    Relevant Medications    pravastatin (PRAVACHOL) 80 MG tablet      Other Visit Diagnoses     Muscle spasm        Relevant Medications    tiZANidine (ZANAFLEX) 4 MG tablet      Diagnoses       Codes Comments    Coronary arteriosclerosis     ICD-10-CM: I25.10  ICD-9-CM: 414.00     Essential hypertension     ICD-10-CM: I10  ICD-9-CM: 401.9     Muscle spasm     ICD-10-CM: M62.838  ICD-9-CM: 728.85     Mixed hyperlipidemia     ICD-10-CM: E78.2  ICD-9-CM: 272.2           Will notify regarding results. Continue current medications. Encouraged to find another therapist.    Age-appropriate counseling is provided.   New Medications Ordered This Visit   Medications   • clopidogrel (PLAVIX) 75 MG tablet     Sig: Take 1 tablet by mouth Daily.     Dispense:  30 tablet     Refill:  11   • losartan (COZAAR) 25 MG tablet     Sig: Take 1 tablet by mouth Daily.     Dispense:  30 tablet     Refill:  11   • spironolactone (ALDACTONE) 25 MG tablet     Sig: Take 1 tablet by mouth 2 (Two) Times a Day.     Dispense:  60 tablet     Refill:  5     Fill monthly   • tiZANidine (ZANAFLEX) 4 MG tablet     Sig: Take 1 tablet by mouth Every 8 (Eight) Hours As Needed for Muscle Spasms.     Dispense:  90 tablet     Refill:  5   • omeprazole (priLOSEC) 40 MG capsule     Sig: Take 1 capsule by mouth Daily.     Dispense:  30 capsule     Refill:  5   • metoprolol succinate XL (TOPROL-XL) 25 MG 24 hr tablet     Sig: Take 1 tablet by mouth Daily.     Dispense:  30 tablet     Refill:  11   • pravastatin (PRAVACHOL) 80 MG tablet     Sig: Take 1 tablet by mouth Every Night for 30 days.     Dispense:  30 tablet     Refill:  11     Return in about 3 months (around 1/7/2023) for Recheck.        This document has been electronically signed by Nilam Willis MD on October 7, 2022 16:35 CDT

## 2022-11-18 DIAGNOSIS — F32.5 MAJOR DEPRESSIVE DISORDER WITH SINGLE EPISODE, IN FULL REMISSION: Chronic | ICD-10-CM

## 2022-11-18 DIAGNOSIS — F41.1 GENERALIZED ANXIETY DISORDER: Chronic | ICD-10-CM

## 2022-11-18 RX ORDER — NITROGLYCERIN 0.4 MG/1
0.4 TABLET SUBLINGUAL AS NEEDED
Qty: 25 TABLET | Refills: 2 | Status: SHIPPED | OUTPATIENT
Start: 2022-11-18 | End: 2023-01-20

## 2022-11-18 NOTE — TELEPHONE ENCOUNTER
Incoming Refill Request      Medication requested (name and dose): KLONOPIN 1MG  NITROGLYCERIN 0.4MG (IT'S BEEN A LONG TIME SINCE THIS WAS FILLED BUT SHE'S ALMOST OUT).    Pharmacy where request should be sent: [facility] [place].    Additional details provided by patient:     Best call back number: [phone]    Does the patient have less than a 3 day supply:  [x] Yes  [] No    Yamila Shannon Rep  11/18/22, 08:54 CST

## 2022-11-19 NOTE — TELEPHONE ENCOUNTER
Last Rx 07/05/2022  #60, Nr    Next Appt  With Family Medicine (Nilam Willis MD)  01/09/2023 at 2:30 PM    Last OV 10/07/2022

## 2022-11-20 RX ORDER — CLONAZEPAM 1 MG/1
1 TABLET ORAL 2 TIMES DAILY
Qty: 60 TABLET | Refills: 2 | Status: SHIPPED | OUTPATIENT
Start: 2022-11-20 | End: 2022-11-21 | Stop reason: SDUPTHER

## 2022-11-21 ENCOUNTER — HOSPITAL ENCOUNTER (OUTPATIENT)
Dept: CT IMAGING | Facility: HOSPITAL | Age: 62
Discharge: HOME | End: 2022-11-21

## 2022-11-21 ENCOUNTER — LAB (OUTPATIENT)
Dept: LAB | Facility: HOSPITAL | Age: 62
End: 2022-11-21

## 2022-11-21 DIAGNOSIS — F41.1 GENERALIZED ANXIETY DISORDER: Chronic | ICD-10-CM

## 2022-11-21 DIAGNOSIS — F32.5 MAJOR DEPRESSIVE DISORDER WITH SINGLE EPISODE, IN FULL REMISSION: Chronic | ICD-10-CM

## 2022-11-21 DIAGNOSIS — C18.7 CANCER OF SIGMOID COLON: ICD-10-CM

## 2022-11-21 DIAGNOSIS — D75.89 MACROCYTOSIS: ICD-10-CM

## 2022-11-21 LAB
ALBUMIN SERPL-MCNC: 4 G/DL (ref 3.5–5.2)
ALBUMIN/GLOB SERPL: 1.3 G/DL
ALP SERPL-CCNC: 131 U/L (ref 39–117)
ALT SERPL W P-5'-P-CCNC: 26 U/L (ref 1–33)
ANION GAP SERPL CALCULATED.3IONS-SCNC: 12.7 MMOL/L (ref 5–15)
AST SERPL-CCNC: 32 U/L (ref 1–32)
BASOPHILS # BLD AUTO: 0.05 10*3/MM3 (ref 0–0.2)
BASOPHILS NFR BLD AUTO: 0.5 % (ref 0–1.5)
BILIRUB SERPL-MCNC: 0.4 MG/DL (ref 0–1.2)
BUN SERPL-MCNC: 13 MG/DL (ref 8–23)
BUN/CREAT SERPL: 10.1 (ref 7–25)
CALCIUM SPEC-SCNC: 8.9 MG/DL (ref 8.6–10.5)
CHLORIDE SERPL-SCNC: 102 MMOL/L (ref 98–107)
CO2 SERPL-SCNC: 23.3 MMOL/L (ref 22–29)
CREAT BLDA-MCNC: 1.4 MG/DL
CREAT SERPL-MCNC: 1.29 MG/DL (ref 0.57–1)
DEPRECATED RDW RBC AUTO: 44.2 FL (ref 37–54)
EGFRCR SERPLBLD CKD-EPI 2021: 42.1 ML/MIN/1.73
EGFRCR SERPLBLD CKD-EPI 2021: 46.4 ML/MIN/1.73
EOSINOPHIL # BLD AUTO: 0.14 10*3/MM3 (ref 0–0.4)
EOSINOPHIL NFR BLD AUTO: 1.4 % (ref 0.3–6.2)
ERYTHROCYTE [DISTWIDTH] IN BLOOD BY AUTOMATED COUNT: 12.5 % (ref 12.3–15.4)
GLOBULIN UR ELPH-MCNC: 3 GM/DL
GLUCOSE SERPL-MCNC: 102 MG/DL (ref 65–99)
HCT VFR BLD AUTO: 37 % (ref 34–46.6)
HGB BLD-MCNC: 12.5 G/DL (ref 12–15.9)
IMM GRANULOCYTES # BLD AUTO: 0.09 10*3/MM3 (ref 0–0.05)
IMM GRANULOCYTES NFR BLD AUTO: 0.9 % (ref 0–0.5)
LYMPHOCYTES # BLD AUTO: 1.61 10*3/MM3 (ref 0.7–3.1)
LYMPHOCYTES NFR BLD AUTO: 16 % (ref 19.6–45.3)
MCH RBC QN AUTO: 32.9 PG (ref 26.6–33)
MCHC RBC AUTO-ENTMCNC: 33.8 G/DL (ref 31.5–35.7)
MCV RBC AUTO: 97.4 FL (ref 79–97)
MONOCYTES # BLD AUTO: 0.69 10*3/MM3 (ref 0.1–0.9)
MONOCYTES NFR BLD AUTO: 6.9 % (ref 5–12)
NEUTROPHILS NFR BLD AUTO: 7.48 10*3/MM3 (ref 1.7–7)
NEUTROPHILS NFR BLD AUTO: 74.3 % (ref 42.7–76)
NRBC BLD AUTO-RTO: 0 /100 WBC (ref 0–0.2)
PLATELET # BLD AUTO: 202 10*3/MM3 (ref 140–450)
PMV BLD AUTO: 12.8 FL (ref 6–12)
POTASSIUM SERPL-SCNC: 3.4 MMOL/L (ref 3.5–5.2)
PROT SERPL-MCNC: 7 G/DL (ref 6–8.5)
RBC # BLD AUTO: 3.8 10*6/MM3 (ref 3.77–5.28)
SODIUM SERPL-SCNC: 138 MMOL/L (ref 136–145)
WBC NRBC COR # BLD: 10.06 10*3/MM3 (ref 3.4–10.8)

## 2022-11-21 PROCEDURE — 0 IOPAMIDOL PER 1 ML: Performed by: INTERNAL MEDICINE

## 2022-11-21 PROCEDURE — 36415 COLL VENOUS BLD VENIPUNCTURE: CPT

## 2022-11-21 PROCEDURE — 82565 ASSAY OF CREATININE: CPT

## 2022-11-21 PROCEDURE — 74177 CT ABD & PELVIS W/CONTRAST: CPT

## 2022-11-21 PROCEDURE — 80053 COMPREHEN METABOLIC PANEL: CPT

## 2022-11-21 PROCEDURE — 85025 COMPLETE CBC W/AUTO DIFF WBC: CPT

## 2022-11-21 PROCEDURE — 82607 VITAMIN B-12: CPT

## 2022-11-21 PROCEDURE — 82378 CARCINOEMBRYONIC ANTIGEN: CPT

## 2022-11-21 PROCEDURE — 71260 CT THORAX DX C+: CPT

## 2022-11-21 RX ORDER — CLONAZEPAM 1 MG/1
1 TABLET ORAL 2 TIMES DAILY
Qty: 60 TABLET | Refills: 2 | Status: SHIPPED | OUTPATIENT
Start: 2022-11-21 | End: 2023-01-20

## 2022-11-21 RX ORDER — CLONAZEPAM 1 MG/1
1 TABLET ORAL 2 TIMES DAILY
Qty: 60 TABLET | Refills: 2 | Status: CANCELLED | OUTPATIENT
Start: 2022-11-21

## 2022-11-21 RX ADMIN — IOPAMIDOL 100 ML: 755 INJECTION, SOLUTION INTRAVENOUS at 15:20

## 2022-11-21 NOTE — TELEPHONE ENCOUNTER
Incoming Refill Request      Medication requested (name and dose): CLONAZEPAM     Pharmacy where request should be sent: [facility]     Additional details provided by patient:     Best call back number:     Does the patient have less than a 3 day supply:  [] Yes  [] No    Yamila Hart Rep  11/21/22, 09:13 CST

## 2022-11-22 LAB
CEA SERPL-MCNC: 6.26 NG/ML
VIT B12 BLD-MCNC: 1526 PG/ML (ref 211–946)

## 2022-11-23 ENCOUNTER — OFFICE VISIT (OUTPATIENT)
Dept: ONCOLOGY | Facility: HOSPITAL | Age: 62
End: 2022-11-23

## 2022-11-23 VITALS
DIASTOLIC BLOOD PRESSURE: 88 MMHG | OXYGEN SATURATION: 98 % | RESPIRATION RATE: 18 BRPM | SYSTOLIC BLOOD PRESSURE: 119 MMHG | TEMPERATURE: 98.4 F | HEART RATE: 65 BPM | WEIGHT: 152.12 LBS | BODY MASS INDEX: 26.95 KG/M2

## 2022-11-23 DIAGNOSIS — C78.7 HEPATIC METASTASIS: ICD-10-CM

## 2022-11-23 DIAGNOSIS — C18.7 CANCER OF SIGMOID COLON: Primary | ICD-10-CM

## 2022-11-23 PROCEDURE — 99214 OFFICE O/P EST MOD 30 MIN: CPT | Performed by: INTERNAL MEDICINE

## 2022-11-23 PROCEDURE — G0463 HOSPITAL OUTPT CLINIC VISIT: HCPCS

## 2022-11-23 NOTE — PROGRESS NOTES
Chief Complaint  Colon Cancer    Nilam Willis MD Goodale, Dianne L, MD Subjective          Erika Bowens presents to [facility] for follow-up of colon cancer.  She is status post treatments as outlined, most recently RFA to an isolated liver lesion in 4/19.  On return today, she states she is feeling well.  Her energy level is lower than she would like in part related to her broken foot so that she cannot be as active.  She is currently in a boot.  She reports that the bowels are working normally without blood productive, pain or melena.  She denies any masses or adenopathy, no unusual aches or pains.  She notes good appetite and her weight is maintained.    Oncology/Hematology History Overview Note   3/25/2019  Sigmoid colon--Moderately differentiated adenocarcinoma. 9/21/2020 Liver biopsy revealed metastatic colonic adenocarcinoma            Clinical Staging      Stage II ([id number]), recurrent            Treatments      Surgeries       4/19.Liver Ablation at [place] by Dr. Prieto         Cancer of sigmoid colon (HCC)   5/20/2019 Initial Diagnosis    Colon cancer (HCC)         Review of Systems   Constitutional: Positive for fatigue. Negative for appetite change, diaphoresis, fever, unexpected weight gain and unexpected weight loss.   HENT: Negative for hearing loss, mouth sores, sore throat, swollen glands, trouble swallowing and voice change.    Eyes: Negative for blurred vision.   Respiratory: Negative for cough, shortness of breath and wheezing.    Cardiovascular: Negative for chest pain and palpitations.   Gastrointestinal: Negative for abdominal pain, blood in stool, constipation, diarrhea, nausea and vomiting.   Endocrine: Negative for cold intolerance and heat intolerance.   Genitourinary: Negative for difficulty urinating, dysuria, frequency, hematuria and urinary incontinence.   Musculoskeletal: Positive for back pain. Negative for arthralgias and  myalgias.   Skin: Negative for rash, skin lesions and wound.   Neurological: Negative for dizziness, seizures, weakness, numbness and headache.   Hematological: Does not bruise/bleed easily.   Psychiatric/Behavioral: Negative for depressed mood. The patient is not nervous/anxious.      Current Outpatient Medications on File Prior to Visit   Medication Sig Dispense Refill   • acetaminophen (TYLENOL) 500 MG tablet Take 500-1,000 mg by mouth every 4 (four) hours as needed for mild pain (1-3).     • aspirin 81 MG EC tablet Take 81 mg by mouth Daily.     • buPROPion SR (WELLBUTRIN SR) 100 MG 12 hr tablet      • clonazePAM (KlonoPIN) 1 MG tablet Take 1 tablet by mouth 2 (Two) Times a Day. 60 tablet 2   • clopidogrel (PLAVIX) 75 MG tablet Take 1 tablet by mouth Daily. 30 tablet 11   • cyanocobalamin (VITAMIN B-12) 500 MCG tablet Take 1 tablet by mouth Daily.     • cyclobenzaprine (FLEXERIL) 10 MG tablet Take 1 tablet by mouth 3 (Three) Times a Day. 90 tablet 5   • FLUoxetine (PROzac) 20 MG capsule Take 3 capsules by mouth Daily. 90 capsule 1   • furosemide (LASIX) 40 MG tablet Take 1 tablet by mouth 2 (Two) Times a Day. 60 tablet 2   • losartan (COZAAR) 25 MG tablet Take 1 tablet by mouth Daily. 30 tablet 11   • metoprolol succinate XL (TOPROL-XL) 25 MG 24 hr tablet Take 1 tablet by mouth Daily. 30 tablet 11   • nitroglycerin (NITROSTAT) 0.4 MG SL tablet Place 1 tablet under the tongue As Needed for Chest Pain.  - IF PAIN REMAINS AFTER 5 MIN CALL 911 AND REPEAT DOSE MAX 3 TABS IN 15 MINUTES 25 tablet 2   • omeprazole (priLOSEC) 40 MG capsule Take 1 capsule by mouth Daily. 30 capsule 5   • spironolactone (ALDACTONE) 25 MG tablet Take 1 tablet by mouth 2 (Two) Times a Day. 60 tablet 5   • tiZANidine (ZANAFLEX) 4 MG tablet Take 1 tablet by mouth Every 8 (Eight) Hours As Needed for Muscle Spasms. 90 tablet 5   • vitamin B-12 (CYANOCOBALAMIN) 500 MCG tablet Take 500 mcg by mouth Daily.     • vitamin D (ERGOCALCIFEROL) 1.25 MG  (08101 UT) capsule capsule TAKE ONE CAPSULE BY MOUTH ONCE WEEKLY 9 capsule 1   • pravastatin (PRAVACHOL) 80 MG tablet Take 1 tablet by mouth Every Night for 30 days. 30 tablet 11   • [DISCONTINUED] amoxicillin (AMOXIL) 500 MG capsule        No current facility-administered medications on file prior to visit.       Allergies   Allergen Reactions   • Bupropion Other (See Comments)     Caused falls     Past Medical History:   Diagnosis Date   • Abdominal pain    • CHF (congestive heart failure) (HCC)    • Colon cancer (HCC)    • Coronary arteriosclerosis    • Depressive disorder     with anxiety   • Encounter for routine adult health examination    • Essential hypertension    • Generalized anxiety disorder    • GERD (gastroesophageal reflux disease)    • Hip pain    • Hyperlipidemia    • Kidney stone    • Osteoarthritis    • Radial styloid tenosynovitis of left hand    • Urinary tract infectious disease    • Vitamin D deficiency      Past Surgical History:   Procedure Laterality Date   • CARDIAC CATHETERIZATION  04/15/2016    Enlarged left ventricle with reduced contractility.Severe coronary artery disease as described above. [facility].   • COLONOSCOPY  2019    COLON CANCER DOMINGO.   • COLONOSCOPY N/A 2/14/2022    Procedure: COLONOSCOPY WITH POLYPECTOMY;  Surgeon: Coy Ordoñez MD;  Location: [facility] ENDOSCOPY;  Service: Gastroenterology;  Laterality: N/A;  COLON POLYP, ANASTAMOSIS IN SIGMOID   • CORONARY ANGIOPLASTY WITH STENT PLACEMENT  05/06/2012    PTCA implantation in the vein graft of the  branch. Done at [facility] in [place], [place].   • CORONARY ARTERY BYPASS GRAFT  03/18/2003    Emergent CABG x 5 CAD   • DE QUERVAIN'S RELEASE Right 11/30/2009    Release of De Quervain's to the right thumb   • DEQUERVAIN RELEASE Left 11/18/2015    Release of de Quervain's to the left wrist.   • LIVER SURGERY     • PAP SMEAR  06/28/2012    normal   • PROCEDURE GENERIC CONVERTED  11/22/2015    MOST  RECENT DIASTOLIC BP < 90 mmHg    • PROCEDURE GENERIC CONVERTED  11/22/2015    MOST RECENT SYSTOLIC BP > or = 140 mmHg    • PROCEDURE GENERIC CONVERTED  11/22/2015    TOBACCO NON-USER    • REPLACEMENT TOTAL HIP LATERAL POSITION     • SUBTOTAL COLECTOMY N/A 04/23/2019    sigmoid for colon cancer   • TOTAL KNEE ARTHROPLASTY       Social History     Socioeconomic History   • Marital status: Single   Tobacco Use   • Smoking status: Never   • Smokeless tobacco: Never   Vaping Use   • Vaping Use: Never used   Substance and Sexual Activity   • Alcohol use: No   • Sexual activity: Never     Family History   Problem Relation Age of Onset   • Diabetes Other    • Heart disease Other    • Hypertension Other    • Stroke Other    • Colon cancer Other    • Coronary artery disease Other    • Other Other         Heart surgery       Objective   Physical Exam  Vitals reviewed. Exam conducted with a chaperone present.   Constitutional:       General: She is not in acute distress.     Appearance: Normal appearance.   Cardiovascular:      Rate and Rhythm: Normal rate and regular rhythm.      Heart sounds: Normal heart sounds. No murmur heard.    No gallop.   Pulmonary:      Effort: Pulmonary effort is normal.      Breath sounds: Normal breath sounds.   Abdominal:      General: Abdomen is flat. Bowel sounds are normal. There is no distension.      Palpations: Abdomen is soft.      Tenderness: There is no abdominal tenderness.   Musculoskeletal:      Cervical back: Neck supple.      Right lower leg: No edema.      Left lower leg: No edema.      Comments: Left lower extremity boot   Lymphadenopathy:      Cervical: No cervical adenopathy.   Neurological:      Mental Status: She is alert and oriented to person, place, and time.   Psychiatric:         Mood and Affect: Mood normal.         Behavior: Behavior normal.         Vitals:    11/23/22 1438   BP: 119/88   Pulse: 65   Resp: 18   Temp: 98.4 °F (36.9 °C)   SpO2: 98%   Weight: 69 kg (152 lb  1.9 oz)   PainSc: 9  Comment: has broken foot.   PainLoc: Generalized     ECOG score: 1         PHQ-9 Total Score:                    Result Review :   The following data was reviewed by: Luis Boyce MD on 11/23/2022:  Lab Results   Component Value Date    HGB 12.5 11/21/2022    HCT 37.0 11/21/2022    MCV 97.4 (H) 11/21/2022     11/21/2022    WBC 10.06 11/21/2022    NEUTROABS 7.48 (H) 11/21/2022    LYMPHSABS 1.61 11/21/2022    MONOSABS 0.69 11/21/2022    EOSABS 0.14 11/21/2022    BASOSABS 0.05 11/21/2022     Lab Results   Component Value Date    GLUCOSE 102 (H) 11/21/2022    BUN 13 11/21/2022    CREATININE 1.29 (H) 11/21/2022     11/21/2022    K 3.4 (L) 11/21/2022     11/21/2022    CO2 23.3 11/21/2022    CALCIUM 8.9 11/21/2022    PROTEINTOT 7.0 11/21/2022    ALBUMIN 4.00 11/21/2022    BILITOT 0.4 11/21/2022    ALKPHOS 131 (H) 11/21/2022    AST 32 11/21/2022    ALT 26 11/21/2022     Lab Results   Component Value Date    MG 2.16 03/15/2021    PHOS 3.1 04/14/2022    TSH 1.54 05/10/2016     CEA 6.26    Data reviewed: Radiologic studies CT chest, abdomen, pelvis reviewed demonstrating a new 8 mm lesion in the liver concerning for metastatic disease.  Prior area of RFA treatment unchanged.  Several small pulmonary nodules are stable.      Assessment and Plan    Diagnoses and all orders for this visit:    1. Cancer of sigmoid colon (HCC) (Primary)  -     CBC & Differential; Future  -     CEA; Future  -     Comprehensive Metabolic Panel; Future  -     CT chest w contrast; Future  -     CT abdomen pelvis w contrast; Future    2. Hepatic metastasis (HCC)    Patient is status post treatments as outlined including RFA to an isolated liver lesion.  CEA is slightly elevated and CT imaging demonstrates a new 8 mm lesion in the liver concerning for oligometastatic recurrence.  She has no other obvious disease.  I believe that this would be amenable to definitive directed therapy such as RFA as she had  previously.  I will refer her back to Dr. Mcguire, surgical oncology in [place] for evaluation.  I will go ahead and put her in for 6-month follow-up with labs and scans prior at this appointment can be adjusted based on her upcoming surgical oncology evaluation.        Patient Follow Up:     Patient was given instructions and counseling regarding her condition or for health maintenance advice. Please see specific information pulled into the AVS if appropriate.     Luis Boyce MD    11/23/2022

## 2022-12-13 ENCOUNTER — TRANSCRIBE ORDERS (OUTPATIENT)
Dept: ADMINISTRATIVE | Facility: HOSPITAL | Age: 62
End: 2022-12-13

## 2022-12-13 DIAGNOSIS — I10 ESSENTIAL HYPERTENSION: Chronic | ICD-10-CM

## 2022-12-13 DIAGNOSIS — I25.10 CORONARY ARTERIOSCLEROSIS: ICD-10-CM

## 2022-12-13 DIAGNOSIS — C78.7 SECONDARY MALIGNANT NEOPLASM OF LIVER: Primary | ICD-10-CM

## 2022-12-13 RX ORDER — SPIRONOLACTONE 25 MG/1
25 TABLET ORAL 2 TIMES DAILY
Qty: 60 TABLET | Refills: 5 | Status: SHIPPED | OUTPATIENT
Start: 2022-12-13 | End: 2023-01-20

## 2022-12-21 ENCOUNTER — APPOINTMENT (OUTPATIENT)
Dept: GENERAL RADIOLOGY | Facility: HOSPITAL | Age: 62
End: 2022-12-21

## 2022-12-21 LAB
ALBUMIN SERPL-MCNC: 4.2 G/DL (ref 3.5–5.2)
ALBUMIN/GLOB SERPL: 1.3 G/DL
ALP SERPL-CCNC: 153 U/L (ref 39–117)
ALT SERPL W P-5'-P-CCNC: 36 U/L (ref 1–33)
ANION GAP SERPL CALCULATED.3IONS-SCNC: 14 MMOL/L (ref 5–15)
AST SERPL-CCNC: 47 U/L (ref 1–32)
BASOPHILS # BLD AUTO: 0.04 10*3/MM3 (ref 0–0.2)
BASOPHILS NFR BLD AUTO: 0.4 % (ref 0–1.5)
BILIRUB SERPL-MCNC: 0.5 MG/DL (ref 0–1.2)
BUN SERPL-MCNC: 11 MG/DL (ref 8–23)
BUN/CREAT SERPL: 7.6 (ref 7–25)
CALCIUM SPEC-SCNC: 9.3 MG/DL (ref 8.6–10.5)
CHLORIDE SERPL-SCNC: 104 MMOL/L (ref 98–107)
CO2 SERPL-SCNC: 25 MMOL/L (ref 22–29)
CREAT SERPL-MCNC: 1.44 MG/DL (ref 0.57–1)
DEPRECATED RDW RBC AUTO: 49.1 FL (ref 37–54)
EGFRCR SERPLBLD CKD-EPI 2021: 40.7 ML/MIN/1.73
EOSINOPHIL # BLD AUTO: 0.29 10*3/MM3 (ref 0–0.4)
EOSINOPHIL NFR BLD AUTO: 2.7 % (ref 0.3–6.2)
ERYTHROCYTE [DISTWIDTH] IN BLOOD BY AUTOMATED COUNT: 13.2 % (ref 12.3–15.4)
GLOBULIN UR ELPH-MCNC: 3.2 GM/DL
GLUCOSE SERPL-MCNC: 129 MG/DL (ref 65–99)
HCT VFR BLD AUTO: 44.6 % (ref 34–46.6)
HGB BLD-MCNC: 14.5 G/DL (ref 12–15.9)
HOLD SPECIMEN: NORMAL
IMM GRANULOCYTES # BLD AUTO: 0.04 10*3/MM3 (ref 0–0.05)
IMM GRANULOCYTES NFR BLD AUTO: 0.4 % (ref 0–0.5)
LIPASE SERPL-CCNC: 24 U/L (ref 13–60)
LYMPHOCYTES # BLD AUTO: 1.86 10*3/MM3 (ref 0.7–3.1)
LYMPHOCYTES NFR BLD AUTO: 17.3 % (ref 19.6–45.3)
MAGNESIUM SERPL-MCNC: 2 MG/DL (ref 1.6–2.4)
MCH RBC QN AUTO: 32.4 PG (ref 26.6–33)
MCHC RBC AUTO-ENTMCNC: 32.5 G/DL (ref 31.5–35.7)
MCV RBC AUTO: 99.8 FL (ref 79–97)
MONOCYTES # BLD AUTO: 0.78 10*3/MM3 (ref 0.1–0.9)
MONOCYTES NFR BLD AUTO: 7.2 % (ref 5–12)
NEUTROPHILS NFR BLD AUTO: 7.76 10*3/MM3 (ref 1.7–7)
NEUTROPHILS NFR BLD AUTO: 72 % (ref 42.7–76)
NRBC BLD AUTO-RTO: 0 /100 WBC (ref 0–0.2)
NT-PROBNP SERPL-MCNC: 1622 PG/ML (ref 0–900)
PLATELET # BLD AUTO: 246 10*3/MM3 (ref 140–450)
PMV BLD AUTO: 12.4 FL (ref 6–12)
POTASSIUM SERPL-SCNC: 3.5 MMOL/L (ref 3.5–5.2)
PROT SERPL-MCNC: 7.4 G/DL (ref 6–8.5)
RBC # BLD AUTO: 4.47 10*6/MM3 (ref 3.77–5.28)
SODIUM SERPL-SCNC: 143 MMOL/L (ref 136–145)
TROPONIN I SERPL-MCNC: 0 NG/ML (ref 0–0.08)
WBC NRBC COR # BLD: 10.77 10*3/MM3 (ref 3.4–10.8)
WHOLE BLOOD HOLD COAG: NORMAL
WHOLE BLOOD HOLD SPECIMEN: NORMAL

## 2022-12-21 PROCEDURE — 83735 ASSAY OF MAGNESIUM: CPT

## 2022-12-21 PROCEDURE — 80053 COMPREHEN METABOLIC PANEL: CPT

## 2022-12-21 PROCEDURE — 96374 THER/PROPH/DIAG INJ IV PUSH: CPT

## 2022-12-21 PROCEDURE — 84484 ASSAY OF TROPONIN QUANT: CPT

## 2022-12-21 PROCEDURE — 93005 ELECTROCARDIOGRAM TRACING: CPT | Performed by: EMERGENCY MEDICINE

## 2022-12-21 PROCEDURE — 93005 ELECTROCARDIOGRAM TRACING: CPT

## 2022-12-21 PROCEDURE — 93010 ELECTROCARDIOGRAM REPORT: CPT | Performed by: INTERNAL MEDICINE

## 2022-12-21 PROCEDURE — 83880 ASSAY OF NATRIURETIC PEPTIDE: CPT

## 2022-12-21 PROCEDURE — 36415 COLL VENOUS BLD VENIPUNCTURE: CPT

## 2022-12-21 PROCEDURE — 96375 TX/PRO/DX INJ NEW DRUG ADDON: CPT

## 2022-12-21 PROCEDURE — 83690 ASSAY OF LIPASE: CPT

## 2022-12-21 PROCEDURE — 71045 X-RAY EXAM CHEST 1 VIEW: CPT

## 2022-12-21 PROCEDURE — 85025 COMPLETE CBC W/AUTO DIFF WBC: CPT

## 2022-12-21 PROCEDURE — 99285 EMERGENCY DEPT VISIT HI MDM: CPT

## 2022-12-21 RX ORDER — ASPIRIN 81 MG/1
324 TABLET, CHEWABLE ORAL ONCE
Status: DISCONTINUED | OUTPATIENT
Start: 2022-12-21 | End: 2022-12-22 | Stop reason: HOSPADM

## 2022-12-21 RX ORDER — SODIUM CHLORIDE 0.9 % (FLUSH) 0.9 %
10 SYRINGE (ML) INJECTION AS NEEDED
Status: DISCONTINUED | OUTPATIENT
Start: 2022-12-21 | End: 2022-12-22 | Stop reason: HOSPADM

## 2022-12-22 ENCOUNTER — APPOINTMENT (OUTPATIENT)
Dept: CARDIOLOGY | Facility: HOSPITAL | Age: 62
End: 2022-12-22

## 2022-12-22 ENCOUNTER — APPOINTMENT (OUTPATIENT)
Dept: NUCLEAR MEDICINE | Facility: HOSPITAL | Age: 62
End: 2022-12-22

## 2022-12-22 ENCOUNTER — HOSPITAL ENCOUNTER (OUTPATIENT)
Facility: HOSPITAL | Age: 62
Setting detail: OBSERVATION
Discharge: HOME | End: 2022-12-22
Attending: EMERGENCY MEDICINE | Admitting: INTERNAL MEDICINE

## 2022-12-22 ENCOUNTER — READMISSION MANAGEMENT (OUTPATIENT)
Dept: CALL CENTER | Facility: HOSPITAL | Age: 62
End: 2022-12-22

## 2022-12-22 VITALS
HEART RATE: 64 BPM | OXYGEN SATURATION: 97 % | TEMPERATURE: 97.7 F | HEIGHT: 63 IN | DIASTOLIC BLOOD PRESSURE: 64 MMHG | BODY MASS INDEX: 25.2 KG/M2 | WEIGHT: 142.2 LBS | SYSTOLIC BLOOD PRESSURE: 121 MMHG | RESPIRATION RATE: 16 BRPM

## 2022-12-22 DIAGNOSIS — R07.9 CHEST PAIN, UNSPECIFIED TYPE: Primary | ICD-10-CM

## 2022-12-22 DIAGNOSIS — I20.0 UNSTABLE ANGINA: ICD-10-CM

## 2022-12-22 PROBLEM — N18.32 STAGE 3B CHRONIC KIDNEY DISEASE (HCC): Status: ACTIVE | Noted: 2022-12-22

## 2022-12-22 LAB
BH CV IMMEDIATE POST TECH DATA BLOOD PRESSURE: NORMAL MMHG
BH CV IMMEDIATE POST TECH DATA HEART RATE: 65 BPM
BH CV REST NUCLEAR ISOTOPE DOSE: 9.2 MCI
BH CV SIX MINUTE RECOVERY TECH DATA BLOOD PRESSURE: NORMAL
BH CV SIX MINUTE RECOVERY TECH DATA HEART RATE: 63 BPM
BH CV STRESS BP STAGE 1: NORMAL
BH CV STRESS COMMENTS STAGE 1: NORMAL
BH CV STRESS DOSE REGADENOSON STAGE 1: 0.4
BH CV STRESS DURATION MIN STAGE 1: 0
BH CV STRESS DURATION SEC STAGE 1: 10
BH CV STRESS HR STAGE 1: 63
BH CV STRESS NUCLEAR ISOTOPE DOSE: 36 MCI
BH CV STRESS O2 STAGE 1: 97
BH CV STRESS PROTOCOL 1: NORMAL
BH CV STRESS RECOVERY BP: NORMAL MMHG
BH CV STRESS RECOVERY HR: 63 BPM
BH CV STRESS STAGE 1: 1
BH CV THREE MINUTE POST TECH DATA BLOOD PRESSURE: NORMAL MMHG
BH CV THREE MINUTE POST TECH DATA HEART RATE: 66 BPM
HOLD SPECIMEN: NORMAL
LV EF NUC BP: 23 %
MAXIMAL PREDICTED HEART RATE: 156 BPM
PERCENT MAX PREDICTED HR: 41.67 %
STRESS BASELINE BP: NORMAL MMHG
STRESS BASELINE HR: 62 BPM
STRESS O2 SAT REST: 96 %
STRESS PERCENT HR: 49 %
STRESS POST O2 SAT PEAK: 97 %
STRESS POST PEAK BP: NORMAL MMHG
STRESS POST PEAK HR: 65 BPM
STRESS TARGET HR: 133 BPM
TROPONIN I SERPL-MCNC: 0 NG/ML (ref 0–0.08)
TROPONIN T SERPL-MCNC: <0.01 NG/ML (ref 0–0.03)
TROPONIN T SERPL-MCNC: <0.01 NG/ML (ref 0–0.03)

## 2022-12-22 PROCEDURE — G0378 HOSPITAL OBSERVATION PER HR: HCPCS

## 2022-12-22 PROCEDURE — A9502 TC99M TETROFOSMIN: HCPCS | Performed by: INTERNAL MEDICINE

## 2022-12-22 PROCEDURE — 93306 TTE W/DOPPLER COMPLETE: CPT

## 2022-12-22 PROCEDURE — 96372 THER/PROPH/DIAG INJ SC/IM: CPT

## 2022-12-22 PROCEDURE — 25010000002 ENOXAPARIN PER 10 MG: Performed by: INTERNAL MEDICINE

## 2022-12-22 PROCEDURE — 84484 ASSAY OF TROPONIN QUANT: CPT | Performed by: INTERNAL MEDICINE

## 2022-12-22 PROCEDURE — 99235 HOSP IP/OBS SAME DATE MOD 70: CPT | Performed by: INTERNAL MEDICINE

## 2022-12-22 PROCEDURE — 25010000002 MORPHINE PER 10 MG: Performed by: EMERGENCY MEDICINE

## 2022-12-22 PROCEDURE — 96361 HYDRATE IV INFUSION ADD-ON: CPT

## 2022-12-22 PROCEDURE — 0 TECHNETIUM TETROFOSMIN KIT: Performed by: INTERNAL MEDICINE

## 2022-12-22 PROCEDURE — 93005 ELECTROCARDIOGRAM TRACING: CPT | Performed by: EMERGENCY MEDICINE

## 2022-12-22 PROCEDURE — 93017 CV STRESS TEST TRACING ONLY: CPT

## 2022-12-22 PROCEDURE — 25010000002 REGADENOSON 0.4 MG/5ML SOLUTION: Performed by: INTERNAL MEDICINE

## 2022-12-22 PROCEDURE — 25010000002 ONDANSETRON PER 1 MG

## 2022-12-22 PROCEDURE — 78452 HT MUSCLE IMAGE SPECT MULT: CPT

## 2022-12-22 PROCEDURE — 93010 ELECTROCARDIOGRAM REPORT: CPT | Performed by: INTERNAL MEDICINE

## 2022-12-22 PROCEDURE — 84484 ASSAY OF TROPONIN QUANT: CPT

## 2022-12-22 PROCEDURE — 94799 UNLISTED PULMONARY SVC/PX: CPT

## 2022-12-22 RX ORDER — CYCLOBENZAPRINE HCL 10 MG
10 TABLET ORAL 3 TIMES DAILY PRN
Status: DISCONTINUED | OUTPATIENT
Start: 2022-12-22 | End: 2022-12-22 | Stop reason: HOSPADM

## 2022-12-22 RX ORDER — ACETAMINOPHEN 650 MG/1
650 SUPPOSITORY RECTAL EVERY 4 HOURS PRN
Status: DISCONTINUED | OUTPATIENT
Start: 2022-12-22 | End: 2022-12-22 | Stop reason: HOSPADM

## 2022-12-22 RX ORDER — PANTOPRAZOLE SODIUM 40 MG/1
40 TABLET, DELAYED RELEASE ORAL EVERY MORNING
Status: DISCONTINUED | OUTPATIENT
Start: 2022-12-22 | End: 2022-12-22 | Stop reason: HOSPADM

## 2022-12-22 RX ORDER — PRAVASTATIN SODIUM 40 MG
80 TABLET ORAL NIGHTLY
Status: DISCONTINUED | OUTPATIENT
Start: 2022-12-22 | End: 2022-12-22 | Stop reason: HOSPADM

## 2022-12-22 RX ORDER — SODIUM CHLORIDE, SODIUM LACTATE, POTASSIUM CHLORIDE, CALCIUM CHLORIDE 600; 310; 30; 20 MG/100ML; MG/100ML; MG/100ML; MG/100ML
75 INJECTION, SOLUTION INTRAVENOUS CONTINUOUS
Status: DISCONTINUED | OUTPATIENT
Start: 2022-12-22 | End: 2022-12-22

## 2022-12-22 RX ORDER — ISOSORBIDE MONONITRATE 30 MG/1
30 TABLET, EXTENDED RELEASE ORAL DAILY
Qty: 30 TABLET | Refills: 0 | Status: SHIPPED | OUTPATIENT
Start: 2022-12-22 | End: 2023-01-09 | Stop reason: SDUPTHER

## 2022-12-22 RX ORDER — ONDANSETRON 2 MG/ML
INJECTION INTRAMUSCULAR; INTRAVENOUS
Status: COMPLETED
Start: 2022-12-22 | End: 2022-12-22

## 2022-12-22 RX ORDER — ACETAMINOPHEN 325 MG/1
650 TABLET ORAL EVERY 4 HOURS PRN
Status: DISCONTINUED | OUTPATIENT
Start: 2022-12-22 | End: 2022-12-22 | Stop reason: SDUPTHER

## 2022-12-22 RX ORDER — ONDANSETRON 2 MG/ML
4 INJECTION INTRAMUSCULAR; INTRAVENOUS ONCE
Status: COMPLETED | OUTPATIENT
Start: 2022-12-22 | End: 2022-12-22

## 2022-12-22 RX ORDER — NITROGLYCERIN 0.4 MG/1
0.4 TABLET SUBLINGUAL
Status: DISCONTINUED | OUTPATIENT
Start: 2022-12-22 | End: 2022-12-22 | Stop reason: HOSPADM

## 2022-12-22 RX ORDER — CLONAZEPAM 0.5 MG/1
0.5 TABLET ORAL 2 TIMES DAILY PRN
Status: DISCONTINUED | OUTPATIENT
Start: 2022-12-22 | End: 2022-12-22 | Stop reason: HOSPADM

## 2022-12-22 RX ORDER — ASPIRIN 81 MG/1
81 TABLET ORAL DAILY
Status: DISCONTINUED | OUTPATIENT
Start: 2022-12-22 | End: 2022-12-22 | Stop reason: HOSPADM

## 2022-12-22 RX ORDER — ACETAMINOPHEN 500 MG
500 TABLET ORAL EVERY 4 HOURS PRN
Status: DISCONTINUED | OUTPATIENT
Start: 2022-12-22 | End: 2022-12-22 | Stop reason: HOSPADM

## 2022-12-22 RX ORDER — ACETAMINOPHEN 160 MG/5ML
650 SOLUTION ORAL EVERY 4 HOURS PRN
Status: DISCONTINUED | OUTPATIENT
Start: 2022-12-22 | End: 2022-12-22 | Stop reason: SDUPTHER

## 2022-12-22 RX ORDER — SODIUM CHLORIDE 0.9 % (FLUSH) 0.9 %
10 SYRINGE (ML) INJECTION EVERY 12 HOURS SCHEDULED
Status: DISCONTINUED | OUTPATIENT
Start: 2022-12-22 | End: 2022-12-22 | Stop reason: HOSPADM

## 2022-12-22 RX ORDER — METOPROLOL SUCCINATE 25 MG/1
25 TABLET, EXTENDED RELEASE ORAL DAILY
Status: DISCONTINUED | OUTPATIENT
Start: 2022-12-22 | End: 2022-12-22 | Stop reason: HOSPADM

## 2022-12-22 RX ORDER — NALOXONE HCL 0.4 MG/ML
0.4 VIAL (ML) INJECTION
Status: CANCELLED | OUTPATIENT
Start: 2022-12-22

## 2022-12-22 RX ORDER — SODIUM CHLORIDE 0.9 % (FLUSH) 0.9 %
10 SYRINGE (ML) INJECTION AS NEEDED
Status: DISCONTINUED | OUTPATIENT
Start: 2022-12-22 | End: 2022-12-22 | Stop reason: HOSPADM

## 2022-12-22 RX ORDER — CHOLECALCIFEROL (VITAMIN D3) 125 MCG
500 CAPSULE ORAL DAILY
Status: DISCONTINUED | OUTPATIENT
Start: 2022-12-22 | End: 2022-12-22 | Stop reason: HOSPADM

## 2022-12-22 RX ORDER — MORPHINE SULFATE 2 MG/ML
1 INJECTION, SOLUTION INTRAMUSCULAR; INTRAVENOUS EVERY 4 HOURS PRN
Status: CANCELLED | OUTPATIENT
Start: 2022-12-22 | End: 2022-12-29

## 2022-12-22 RX ORDER — ENOXAPARIN SODIUM 100 MG/ML
40 INJECTION SUBCUTANEOUS DAILY
Status: DISCONTINUED | OUTPATIENT
Start: 2022-12-22 | End: 2022-12-22 | Stop reason: HOSPADM

## 2022-12-22 RX ORDER — CLOPIDOGREL BISULFATE 75 MG/1
75 TABLET ORAL DAILY
Status: DISCONTINUED | OUTPATIENT
Start: 2022-12-22 | End: 2022-12-22 | Stop reason: HOSPADM

## 2022-12-22 RX ORDER — SODIUM CHLORIDE 9 MG/ML
40 INJECTION, SOLUTION INTRAVENOUS AS NEEDED
Status: DISCONTINUED | OUTPATIENT
Start: 2022-12-22 | End: 2022-12-22 | Stop reason: HOSPADM

## 2022-12-22 RX ADMIN — TETROFOSMIN 1 DOSE: 1.38 INJECTION, POWDER, LYOPHILIZED, FOR SOLUTION INTRAVENOUS at 11:30

## 2022-12-22 RX ADMIN — METOPROLOL SUCCINATE 25 MG: 25 TABLET, EXTENDED RELEASE ORAL at 09:03

## 2022-12-22 RX ADMIN — ONDANSETRON 4 MG: 2 INJECTION INTRAMUSCULAR; INTRAVENOUS at 01:22

## 2022-12-22 RX ADMIN — REGADENOSON 0.4 MG: 0.08 INJECTION, SOLUTION INTRAVENOUS at 11:30

## 2022-12-22 RX ADMIN — SODIUM CHLORIDE 500 ML: 9 INJECTION, SOLUTION INTRAVENOUS at 02:11

## 2022-12-22 RX ADMIN — MORPHINE SULFATE 4 MG: 4 INJECTION, SOLUTION INTRAMUSCULAR; INTRAVENOUS at 01:25

## 2022-12-22 RX ADMIN — SODIUM CHLORIDE, POTASSIUM CHLORIDE, SODIUM LACTATE AND CALCIUM CHLORIDE 75 ML/HR: 600; 310; 30; 20 INJECTION, SOLUTION INTRAVENOUS at 05:11

## 2022-12-22 RX ADMIN — NITROGLYCERIN 1 INCH: 20 OINTMENT TOPICAL at 01:20

## 2022-12-22 RX ADMIN — ENOXAPARIN SODIUM 40 MG: 100 INJECTION SUBCUTANEOUS at 09:03

## 2022-12-22 RX ADMIN — TETROFOSMIN 1 DOSE: 1.38 INJECTION, POWDER, LYOPHILIZED, FOR SOLUTION INTRAVENOUS at 11:12

## 2022-12-22 RX ADMIN — CYANOCOBALAMIN TAB 500 MCG 500 MCG: 500 TAB at 09:24

## 2022-12-22 RX ADMIN — PANTOPRAZOLE SODIUM 40 MG: 40 TABLET, DELAYED RELEASE ORAL at 09:06

## 2022-12-22 RX ADMIN — CLOPIDOGREL BISULFATE 75 MG: 75 TABLET ORAL at 09:03

## 2022-12-22 RX ADMIN — ASPIRIN 81 MG: 81 TABLET, COATED ORAL at 09:03

## 2022-12-22 RX ADMIN — Medication 10 ML: at 09:03

## 2022-12-22 NOTE — DISCHARGE SUMMARY
[facility]        HOSPITALIST  DISCHARGE SUMMARY    Patient Name: Erika Bowens  : 1960  MRN: [id number]    Date of Admission: 2022  Date of Discharge:  2022  Primary Care Physician: Nilam Willis MD    Consults     Date and Time Order Name Status Description    2022  4:46 AM Inpatient Cardiology Consult      2022  2:04 AM Hospitalist (on-call MD unless specified)            Active and Resolved Hospital Problems:  Active Hospital Problems    Diagnosis POA   • Stage 3b chronic kidney disease (HCC) [N18.32] Yes      Resolved Hospital Problems    Diagnosis POA   • **Chest pain, unspecified type [R07.9] Yes     Chronic kidney disease stage IIIb.  Stable.  Systolic CHF.  Stable EF of 29%.  S/p permanent pacemaker placement/AICD.  Prolonged QT interval.  History of coronary artery disease  Hospital Course     Hospital Course:  Erika Bowens is a 62 y.o. female past medical history of coronary artery disease that presents to the emergency department for evaluation of chest pain since yesterday evening.  She was unable to qualify but stated it was substernal, radiates to the back, constant, no known aggravating alleviating factors.  She had some associated nausea but denies any fevers, chills, sweats, shortness of breath, palpitations, vomiting, abdominal pain, diarrhea constipation, dysuria, weakness, rash.  Work-up thus far in the emergency department but due to significant risk factors patient will be admitted for ongoing monitoring management and further cardiology evaluation    Interval follow-up;  Blood pressure soft.  Denies dizziness.  Remains on room air.  No more chest pain.  Patient had 2D echo and stress test.  Official report is pending.  Discussed with cardiology mild reversible ischemia.  No indication for cardiac cath.  Imdur recommended.  Patient and her sister has been insisting for cardiac cath since her admission and are not happy because of  not getting cardiac cath..  I offered patient to get second opinion by different cardiologist during this admission but they opted to go home.  They will go to ER in [place] if she gets recurrent chest pain per patient sister.    DISCHARGE Follow Up Recommendations for labs and diagnostics: Follow-up with PCP and cardiology      Day of Discharge     Vital Signs:  Temp:  [97.5 °F (36.4 °C)-98.3 °F (36.8 °C)] 97.7 °F (36.5 °C)  Heart Rate:  [55-94] 64  Resp:  [16-24] 16  BP: ()/(49-85) 121/64    Physical Exam:   Constitutional: Awake, alert, no acute distress              Eyes: Pupils equal, sclerae anicteric, no conjunctival injection              HENT: NCAT, mucous membranes moist              Neck: Supple, no thyromegaly, no lymphadenopathy, trachea midline              Respiratory: Clear to auscultation bilaterally, nonlabored respirations               Cardiovascular: RRR, no murmurs, rubs, or gallops, palpable pedal pulses bilaterally              Gastrointestinal: Positive bowel sounds, soft, nontender, nondistended              Musculoskeletal: No bilateral ankle edema, no clubbing or cyanosis to extremities              Psychiatric: Appropriate affect, cooperative              Neurologic: Oriented x 3, strength symmetric in all extremities, Cranial Nerves grossly intact to confrontation, speech clear              Skin: No rashes     Discharge Details        Discharge Medications      New Medications      Instructions Start Date   isosorbide mononitrate 30 MG 24 hr tablet  Commonly known as: IMDUR   30 mg, Oral, Daily         Continue These Medications      Instructions Start Date   acetaminophen 500 MG tablet  Commonly known as: TYLENOL   500-1,000 mg, Oral, Every 4 Hours PRN      aspirin 81 MG EC tablet   81 mg, Oral, Daily      clonazePAM 1 MG tablet  Commonly known as: KlonoPIN   1 mg, Oral, 2 Times Daily      clopidogrel 75 MG tablet  Commonly known as: PLAVIX   75 mg, Oral, Daily       cyanocobalamin 500 MCG tablet  Commonly known as: VITAMIN B-12   1 tablet, Oral, Daily      cyclobenzaprine 10 MG tablet  Commonly known as: FLEXERIL   10 mg, Oral, 3 Times Daily      FLUoxetine 20 MG capsule  Commonly known as: PROzac   60 mg, Oral, Daily      furosemide 40 MG tablet  Commonly known as: LASIX   40 mg, Oral, 2 Times Daily      losartan 25 MG tablet  Commonly known as: COZAAR   25 mg, Oral, Daily      metoprolol succinate XL 25 MG 24 hr tablet  Commonly known as: TOPROL-XL   25 mg, Oral, Daily      nitroglycerin 0.4 MG SL tablet  Commonly known as: NITROSTAT   0.4 mg, Sublingual, As Needed,  - IF PAIN REMAINS AFTER 5 MIN CALL 911 AND REPEAT DOSE MAX 3 TABS IN 15 MINUTES      omeprazole 40 MG capsule  Commonly known as: priLOSEC   40 mg, Oral, Daily      pravastatin 80 MG tablet  Commonly known as: PRAVACHOL   80 mg, Oral, Nightly      spironolactone 25 MG tablet  Commonly known as: ALDACTONE   25 mg, Oral, 2 Times Daily      tiZANidine 4 MG tablet  Commonly known as: ZANAFLEX   4 mg, Oral, Every 8 Hours PRN      vitamin D 1.25 MG (80481 UT) capsule capsule  Commonly known as: ERGOCALCIFEROL   TAKE ONE CAPSULE BY MOUTH ONCE WEEKLY             Allergies   Allergen Reactions   • Bupropion Other (See Comments)     Caused falls       Discharge Disposition:  Home or Self Care.  In private vehicle with sister    Diet:  Diet Instructions     Diet: Cardiac      Discharge Diet: Cardiac          Discharge Activity:   Activity Instructions     Gradually Increase Activity Until at Pre-Hospitalization Level            CODE STATUS:  Code Status and Medical Interventions:   Ordered at: 12/22/22 0224     Code Status (Patient has no pulse and is not breathing):    CPR (Attempt to Resuscitate)     Medical Interventions (Patient has pulse or is breathing):    Full Support         Future Appointments   Date Time Provider Department Center   1/9/2023  2:30 PM Nilam Willis MD [facility]   5/18/2023  3:00 PM  [facility]   5/22/2023  2:45 PM Luis Boyce MD [facility] ONC [id number] [facility]       Additional Instructions for the Follow-ups that You Need to Schedule     Discharge Follow-up with PCP   As directed       Currently Documented PCP:    Nilam Willis MD    PCP Phone Number:    [phone]     Follow Up Details: 2 weeks         Discharge Follow-up with Specified Provider: Dr. Chong; 1 Week   As directed      To: Dr. Chong    Follow Up: 1 Week               Pertinent  and/or Most Recent Results     PROCEDURES:   * Cannot find OR case *     LAB RESULTS:      Lab 12/21/22  2303   WBC 10.77   HEMOGLOBIN 14.5   HEMATOCRIT 44.6   PLATELETS 246   NEUTROS ABS 7.76*   IMMATURE GRANS (ABS) 0.04   LYMPHS ABS 1.86   MONOS ABS 0.78   EOS ABS 0.29   MCV 99.8*         Lab 12/21/22  2303   SODIUM 143   POTASSIUM 3.5   CHLORIDE 104   CO2 25.0   ANION GAP 14.0   BUN 11   CREATININE 1.44*   EGFR 40.7*   GLUCOSE 129*   CALCIUM 9.3   MAGNESIUM 2.0         Lab 12/21/22  2303   TOTAL PROTEIN 7.4   ALBUMIN 4.20   GLOBULIN 3.2   ALT (SGPT) 36*   AST (SGOT) 47*   BILIRUBIN 0.5   ALK PHOS 153*   LIPASE 24         Lab 12/22/22  0723 12/22/22  0124 12/21/22  2303   PROBNP  --   --  1,622.0*   TROPONIN T <0.010 <0.010  --                  Brief Urine Lab Results  (Last result in the past 365 days)      Color   Clarity   Blood   Leuk Est   Nitrite   Protein   CREAT   Urine HCG        09/30/22 1451 Yellow   Clear   Negative   Trace   Negative   Negative               Microbiology Results (last 10 days)     ** No results found for the last 240 hours. **          XR Chest 1 View    Result Date: 12/22/2022  Impression:  No acute infiltrate is appreciated.      Please note that portions of this note were completed with a voice recognition program.  VEGA FERREIRA JR, MD       Electronically Signed and Approved By: VEGA FERREIRA JR, MD on 12/22/2022 at 0:52                        Results for orders placed in visit on  04/17/19    SCANNED - ECHOCARDIOGRAM      Imaging Results (All)     Procedure Component Value Units Date/Time    XR Chest 1 View [[id number]] Collected: 12/22/22 0052     Updated: 12/22/22 0055    Narrative:      PROCEDURE: XR CHEST 1 VW     COMPARISON: 7/11/2022.     INDICATIONS: CHEST PAIN.     FINDINGS: A single frontal (AP or PA upright portable) chest radiograph reveals borderline cardiac   enlargement and no acute infiltrate. No pneumothorax is seen. There is a left-sided cardiac   implantable electronic device (CIED) in place, seen previously, and unchanged in position.  The   thoracic aorta is atherosclerotic and mildly ectatic.  The patient has undergone median sternotomy   and suspected CABG surgery. Chronic calcified granulomatous disease may involve the chest.  The   bilateral airspace opacities seen on the prior study are thought to have cleared.  There may be   minimal subsegmental atelectasis in the lung bases.       Impression:       No acute infiltrate is appreciated.                 Please note that portions of this note were completed with a voice recognition program.     VEGA FERREIRA JR, MD         Electronically Signed and Approved By: VEGA FERREIRA JR, MD on 12/22/2022 at 0:52                               Labs Pending at Discharge:          Time spent on Discharge including face to face service: Less than 30 minutes  Part of this note may be an electronic transcription/translation of spoken language to printed text using the Dragon Dictation System.     TElectronically signed by Clyde Diaz MD, 12/22/22, 5:18 PM EST.

## 2022-12-22 NOTE — H&P
[facility] HISTORY AND PHYSICAL  Date: 2022   Patient Name: Erika Bowens  : 1960  MRN: [id number]  Primary Care Physician:  Nilam Willis MD  Date of admission: 2022    Subjective   Subjective     Chief Complaint: Chest    HPI:    Erika Bowens is a 62 y.o. female past medical history of coronary artery disease that presents to the emergency department for evaluation of chest pain since yesterday evening.  She was unable to qualify but stated it was substernal, radiates to the back, constant, no known aggravating alleviating factors.  She had some associated nausea but denies any fevers, chills, sweats, shortness of breath, palpitations, vomiting, abdominal pain, diarrhea constipation, dysuria, weakness, rash.  Work-up thus far in the emergency department but due to significant risk factors patient will be admitted for ongoing monitoring management and further cardiology evaluation      Personal History     Past Medical History:  Past Medical History:   Diagnosis Date   • Abdominal pain    • CHF (congestive heart failure) (HCC)    • Colon cancer (HCC)    • Coronary arteriosclerosis    • Depressive disorder     with anxiety   • Encounter for routine adult health examination    • Essential hypertension    • Generalized anxiety disorder    • GERD (gastroesophageal reflux disease)    • Hip pain    • Hyperlipidemia    • Kidney stone    • Osteoarthritis    • Radial styloid tenosynovitis of left hand    • Urinary tract infectious disease    • Vitamin D deficiency          Past Surgical History:  Past Surgical History:   Procedure Laterality Date   • CARDIAC CATHETERIZATION  04/15/2016    Enlarged left ventricle with reduced contractility.Severe coronary artery disease as described above. [facility].   • COLONOSCOPY  2019    COLON CANCER DOMINGO.   • COLONOSCOPY N/A 2022    Procedure: COLONOSCOPY WITH POLYPECTOMY;  Surgeon: Coy Ordoñez MD;  Location:   [facility];  Service: Gastroenterology;  Laterality: N/A;  COLON POLYP, ANASTAMOSIS IN SIGMOID   • CORONARY ANGIOPLASTY WITH STENT PLACEMENT  05/06/2012    PTCA implantation in the vein graft of the OM branch. Done at [facility] in [place], [place].   • CORONARY ARTERY BYPASS GRAFT  03/18/2003    Emergent CABG x 5 CAD   • DE QUERVAIN'S RELEASE Right 11/30/2009    Release of De Quervain's to the right thumb   • DEQUERVAIN RELEASE Left 11/18/2015    Release of de Quervain's to the left wrist.   • LIVER SURGERY     • PACEMAKER IMPLANTATION      DEFIBRILLATOR   • PAP SMEAR  06/28/2012    normal   • PROCEDURE GENERIC CONVERTED  11/22/2015    MOST RECENT DIASTOLIC BP < 90 mmHg    • PROCEDURE GENERIC CONVERTED  11/22/2015    MOST RECENT SYSTOLIC BP > or = 140 mmHg    • PROCEDURE GENERIC CONVERTED  11/22/2015    TOBACCO NON-USER    • REPLACEMENT TOTAL HIP LATERAL POSITION     • SUBTOTAL COLECTOMY N/A 04/23/2019    sigmoid for colon cancer   • TOTAL KNEE ARTHROPLASTY           Family History:   Family History   Problem Relation Age of Onset   • Diabetes Other    • Heart disease Other    • Hypertension Other    • Stroke Other    • Colon cancer Other    • Coronary artery disease Other    • Other Other         Heart surgery         Social History:   Social History     Tobacco Use   • Smoking status: Never   • Smokeless tobacco: Never   Vaping Use   • Vaping Use: Never used   Substance Use Topics   • Alcohol use: No   • Drug use: Never         Home Medications:  FLUoxetine, acetaminophen, aspirin, buPROPion SR, clonazePAM, clopidogrel, cyanocobalamin, cyclobenzaprine, furosemide, losartan, metoprolol succinate XL, nitroglycerin, omeprazole, pravastatin, spironolactone, tiZANidine, vitamin B-12, and vitamin D    Allergies:  Allergies   Allergen Reactions   • Bupropion Other (See Comments)     Caused falls       Review of Systems   All systems were reviewed and negative except for: Chest pain, nausea    Objective    Objective     Vitals:   Temp:  [98.3 °F (36.8 °C)] 98.3 °F (36.8 °C)  Heart Rate:  [55-94] 57  Resp:  [18-24] 18  BP: ()/(55-85) 94/61    Physical Exam    Constitutional: Awake, alert, no acute distress   Eyes: Pupils equal, sclerae anicteric, no conjunctival injection   HENT: NCAT, mucous membranes moist   Neck: Supple, no thyromegaly, no lymphadenopathy, trachea midline   Respiratory: Clear to auscultation bilaterally, nonlabored respirations    Cardiovascular: RRR, no murmurs, rubs, or gallops, palpable pedal pulses bilaterally   Gastrointestinal: Positive bowel sounds, soft, nontender, nondistended   Musculoskeletal: No bilateral ankle edema, no clubbing or cyanosis to extremities   Psychiatric: Appropriate affect, cooperative   Neurologic: Oriented x 3, strength symmetric in all extremities, Cranial Nerves grossly intact to confrontation, speech clear   Skin: No rashes     Result Review    Result Review:  I have personally reviewed the results from the time of this admission to 12/22/2022 03:00 EST and agree with these findings:  [x]  Laboratory  []  Microbiology  [x]  Radiology  []  EKG/Telemetry   []  Cardiology/Vascular   []  Pathology  []  Old records  []  Other:      Assessment & Plan   Assessment / Plan     Assessment/Plan:   • Chest pain: Admit continue to monitor.  Monitor on telemetry and trend troponin.  Due to significant risk factors we will consult cardiology and appreciate their recommendations moving forward.  Keep patient n.p.o. for now.  • Prolonged QT: Avoid QT prolonging agents.  Monitor on telemetry.  Replete electrolytes  • Hypertension: Continue home regimen      DVT prophylaxis:  Lovenox    CODE STATUS:    Code Status (Patient has no pulse and is not breathing): CPR (Attempt to Resuscitate)  Medical Interventions (Patient has pulse or is breathing): Full Support      Admission Status:  I believe this patient meets observation status.    Electronically signed by Harpal Sams Jr,  MD, 12/22/22, 3:00 AM EST.

## 2022-12-22 NOTE — PLAN OF CARE
Goal Outcome Evaluation:      New admission to [facility] this morning. No current c/o chest pain. NPO for cardiac evaluation. Corinne Pat RN

## 2022-12-22 NOTE — ED PROVIDER NOTES
Time: 12:21 AM EST    Chief Complaint: Chest Pain and Back pain    History of Present Illness:      Patient is a 62 y.o. year old female that presents to the emergency department with mid chest pain. Pain began 3 hours ago. Friend also reports back pain. Pt notes SOB, nauseous, and vomitting. Friend notes a history of 4 heart attacks. Pt reports a history of CHF and states she has a pacemaker.      History provided by:  Patient and friend   used: No        Similar Symptoms Previously: n/a  Recently seen: n/a      Patient Care Team  Primary Care Provider: Nilam Willis MD    Past Medical History:     Allergies   Allergen Reactions   • Bupropion Other (See Comments)     Caused falls     Past Medical History:   Diagnosis Date   • Abdominal pain    • CHF (congestive heart failure) (HCC)    • Colon cancer (HCC)    • Coronary arteriosclerosis    • Depressive disorder     with anxiety   • Encounter for routine adult health examination    • Essential hypertension    • Generalized anxiety disorder    • GERD (gastroesophageal reflux disease)    • Hip pain    • Hyperlipidemia    • Kidney stone    • Osteoarthritis    • Radial styloid tenosynovitis of left hand    • Urinary tract infectious disease    • Vitamin D deficiency      Past Surgical History:   Procedure Laterality Date   • CARDIAC CATHETERIZATION  04/15/2016    Enlarged left ventricle with reduced contractility.Severe coronary artery disease as described above. [facility].   • COLONOSCOPY  2019    COLON CANCER DOMINGO.   • COLONOSCOPY N/A 02/14/2022    Procedure: COLONOSCOPY WITH POLYPECTOMY;  Surgeon: Coy Ordoñez MD;  Location: [facility] ENDOSCOPY;  Service: Gastroenterology;  Laterality: N/A;  COLON POLYP, ANASTAMOSIS IN SIGMOID   • CORONARY ANGIOPLASTY WITH STENT PLACEMENT  05/06/2012    PTCA implantation in the vein graft of the OM branch. Done at [facility] in [place], [place].   • CORONARY ARTERY BYPASS GRAFT   03/18/2003    Emergent CABG x 5 CAD   • DE QUERVAIN'S RELEASE Right 11/30/2009    Release of De Quervain's to the right thumb   • DEQUERVAIN RELEASE Left 11/18/2015    Release of de Quervain's to the left wrist.   • LIVER SURGERY     • PACEMAKER IMPLANTATION      DEFIBRILLATOR   • PAP SMEAR  06/28/2012    normal   • PROCEDURE GENERIC CONVERTED  11/22/2015    MOST RECENT DIASTOLIC BP < 90 mmHg    • PROCEDURE GENERIC CONVERTED  11/22/2015    MOST RECENT SYSTOLIC BP > or = 140 mmHg    • PROCEDURE GENERIC CONVERTED  11/22/2015    TOBACCO NON-USER    • REPLACEMENT TOTAL HIP LATERAL POSITION     • SUBTOTAL COLECTOMY N/A 04/23/2019    sigmoid for colon cancer   • TOTAL KNEE ARTHROPLASTY       Family History   Problem Relation Age of Onset   • Diabetes Other    • Heart disease Other    • Hypertension Other    • Stroke Other    • Colon cancer Other    • Coronary artery disease Other    • Other Other         Heart surgery       Home Medications:  Prior to Admission medications    Medication Sig Start Date End Date Taking? Authorizing Provider   acetaminophen (TYLENOL) 500 MG tablet Take 500-1,000 mg by mouth every 4 (four) hours as needed for mild pain (1-3).    ProviderChandler MD   aspirin 81 MG EC tablet Take 81 mg by mouth Daily.    ProviderChandler MD   buPROPion SR (WELLBUTRIN SR) 100 MG 12 hr tablet  7/13/22   ProviderChandler MD   clonazePAM (KlonoPIN) 1 MG tablet Take 1 tablet by mouth 2 (Two) Times a Day. 11/21/22   Nilam Willis MD   clopidogrel (PLAVIX) 75 MG tablet Take 1 tablet by mouth Daily. 10/7/22   Nilam Willis MD   cyanocobalamin (VITAMIN B-12) 500 MCG tablet Take 1 tablet by mouth Daily.    ProviderChandler MD   cyclobenzaprine (FLEXERIL) 10 MG tablet Take 1 tablet by mouth 3 (Three) Times a Day. 7/5/22   Nilam Willis MD   FLUoxetine (PROzac) 20 MG capsule Take 3 capsules by mouth Daily. 9/22/22   Nilam Willis MD   furosemide (LASIX) 40 MG tablet Take 1 tablet by  mouth 2 (Two) Times a Day. 2/21/21   Nilam Willis MD   losartan (COZAAR) 25 MG tablet Take 1 tablet by mouth Daily. 10/7/22   Nilam Willis MD   metoprolol succinate XL (TOPROL-XL) 25 MG 24 hr tablet Take 1 tablet by mouth Daily. 10/7/22   Nilam Willis MD   nitroglycerin (NITROSTAT) 0.4 MG SL tablet Place 1 tablet under the tongue As Needed for Chest Pain.  - IF PAIN REMAINS AFTER 5 MIN CALL 911 AND REPEAT DOSE MAX 3 TABS IN 15 MINUTES 11/18/22   Nilam Willis MD   omeprazole (priLOSEC) 40 MG capsule Take 1 capsule by mouth Daily. 10/7/22   Nilam Willis MD   pravastatin (PRAVACHOL) 80 MG tablet Take 1 tablet by mouth Every Night for 30 days. 10/7/22 11/6/22  Nilam Willis MD   spironolactone (ALDACTONE) 25 MG tablet Take 1 tablet by mouth 2 (Two) Times a Day. 12/13/22   Nilam Willis MD   tiZANidine (ZANAFLEX) 4 MG tablet Take 1 tablet by mouth Every 8 (Eight) Hours As Needed for Muscle Spasms. 10/7/22   Nilam Willis MD   vitamin B-12 (CYANOCOBALAMIN) 500 MCG tablet Take 500 mcg by mouth Daily.    Provider, MD Chandler   vitamin D (ERGOCALCIFEROL) 1.25 MG (49221 UT) capsule capsule TAKE ONE CAPSULE BY MOUTH ONCE WEEKLY 11/18/21   Nilam Willis MD        Social History:   Social History     Tobacco Use   • Smoking status: Never   • Smokeless tobacco: Never   Vaping Use   • Vaping Use: Never used   Substance Use Topics   • Alcohol use: No   • Drug use: Never       Record Review:  I have reviewed the patient's records in [facility].     Review of Systems:  Review of Systems   Constitutional: Negative for chills and fever.   HENT: Negative for congestion, rhinorrhea and sore throat.    Eyes: Negative for pain and visual disturbance.   Respiratory: Positive for shortness of breath. Negative for apnea, cough and chest tightness.    Cardiovascular: Positive for chest pain. Negative for palpitations.   Gastrointestinal: Positive for nausea and vomiting. Negative for abdominal  "pain and diarrhea.   Genitourinary: Negative for difficulty urinating and dysuria.   Musculoskeletal: Positive for back pain. Negative for joint swelling and myalgias.   Skin: Negative for color change.   Neurological: Negative for seizures and headaches.   Psychiatric/Behavioral: Negative.    All other systems reviewed and are negative.       Physical Exam:  BP 98/65 (BP Location: Right arm, Patient Position: Lying)   Pulse 56   Temp 97.5 °F (36.4 °C) (Oral)   Resp 18   Ht 160 cm (63\")   Wt 64.5 kg (142 lb 3.2 oz)   SpO2 94%   BMI 25.19 kg/m²     Physical Exam  Vitals and nursing note reviewed.   Constitutional:       General: She is not in acute distress.     Appearance: Normal appearance. She is not toxic-appearing.      Comments: Upset and in pain (gripping her chest).   HENT:      Head: Normocephalic and atraumatic.      Jaw: There is normal jaw occlusion.   Eyes:      General: Lids are normal.      Extraocular Movements: Extraocular movements intact.      Conjunctiva/sclera: Conjunctivae normal.      Pupils: Pupils are equal, round, and reactive to light.   Cardiovascular:      Rate and Rhythm: Normal rate and regular rhythm.      Pulses: Normal pulses.      Heart sounds: Normal heart sounds.   Pulmonary:      Effort: Pulmonary effort is normal. No respiratory distress.      Breath sounds: Normal breath sounds. No wheezing or rhonchi.   Abdominal:      General: Abdomen is flat.      Palpations: Abdomen is soft.      Tenderness: There is no abdominal tenderness. There is no guarding or rebound.   Musculoskeletal:         General: Normal range of motion.      Cervical back: Normal range of motion and neck supple.      Right lower leg: No edema.      Left lower leg: No edema.   Skin:     General: Skin is warm and dry.   Neurological:      Mental Status: She is alert and oriented to person, place, and time. Mental status is at baseline.   Psychiatric:         Mood and Affect: Mood is anxious.                "     Medications in the Emergency Department:  Medications   sodium chloride 0.9 % flush 10 mL (has no administration in time range)   aspirin chewable tablet 324 mg (0 mg Oral Hold 12/22/22 0128)   aspirin EC tablet 81 mg (has no administration in time range)   clopidogrel (PLAVIX) tablet 75 mg (has no administration in time range)   clonazePAM (KlonoPIN) tablet 0.5 mg (has no administration in time range)   metoprolol succinate XL (TOPROL-XL) 24 hr tablet 25 mg (has no administration in time range)   pravastatin (PRAVACHOL) tablet 80 mg (has no administration in time range)   nitroglycerin (NITROSTAT) SL tablet 0.4 mg (has no administration in time range)   sodium chloride 0.9 % flush 10 mL (has no administration in time range)   sodium chloride 0.9 % flush 10 mL (has no administration in time range)   sodium chloride 0.9 % infusion 40 mL (has no administration in time range)   Enoxaparin Sodium (LOVENOX) syringe 40 mg (has no administration in time range)   lactated ringers infusion (75 mL/hr Intravenous New Bag 12/22/22 0511)   acetaminophen (TYLENOL) tablet 650 mg (has no administration in time range)     Or   acetaminophen (TYLENOL) 160 MG/5ML solution 650 mg (has no administration in time range)     Or   acetaminophen (TYLENOL) suppository 650 mg (has no administration in time range)   morphine injection 4 mg (has no administration in time range)   nitroglycerin (NITROSTAT) ointment 1 inch (1 inch Topical Given 12/22/22 0120)   morphine injection 4 mg (4 mg Intravenous Given 12/22/22 0125)   ondansetron (ZOFRAN) injection 4 mg (4 mg Intravenous Given 12/22/22 0122)   sodium chloride 0.9 % bolus 500 mL (0 mL Intravenous Stopped 12/22/22 0337)        Labs  Lab Results (last 24 hours)     Procedure Component Value Units Date/Time    POC Troponin I with Hold Tube [[id number]] Collected: 12/21/22 2303    Specimen: Blood Updated: 12/21/22 2316    Narrative:      The following orders were created for panel order POC  Troponin I with Hold Tube.  Procedure                               Abnormality         Status                     ---------                               -----------         ------                     POC Troponin I[[id number]]                                                              HOLD Troponin-I Tube[[id number]]                             Final result                 Please view results for these tests on the individual orders.    CBC & Differential [[id number]]  (Abnormal) Collected: 12/21/22 2303    Specimen: Blood Updated: 12/21/22 2314    Narrative:      The following orders were created for panel order CBC & Differential.  Procedure                               Abnormality         Status                     ---------                               -----------         ------                     CBC Auto Differential[[id number]]        Abnormal            Final result                 Please view results for these tests on the individual orders.    Comprehensive Metabolic Panel [[id number]]  (Abnormal) Collected: 12/21/22 2303    Specimen: Blood Updated: 12/21/22 2334     Glucose 129 mg/dL      BUN 11 mg/dL      Creatinine 1.44 mg/dL      Sodium 143 mmol/L      Potassium 3.5 mmol/L      Chloride 104 mmol/L      CO2 25.0 mmol/L      Calcium 9.3 mg/dL      Total Protein 7.4 g/dL      Albumin 4.20 g/dL      ALT (SGPT) 36 U/L      AST (SGOT) 47 U/L      Alkaline Phosphatase 153 U/L      Total Bilirubin 0.5 mg/dL      Globulin 3.2 gm/dL      A/G Ratio 1.3 g/dL      BUN/Creatinine Ratio 7.6     Anion Gap 14.0 mmol/L      eGFR 40.7 mL/min/1.73      Comment: National Kidney Foundation and American Society of Nephrology (ASN) Task Force recommended calculation based on the Chronic Kidney Disease Epidemiology Collaboration (CKD-EPI) equation refit without adjustment for race.       Narrative:      GFR Normal >60  Chronic Kidney Disease <60  Kidney Failure <15      Lipase [[id number]]  (Normal) Collected: 12/21/22 2303     Specimen: Blood Updated: 12/21/22 2334     Lipase 24 U/L     BNP [[id number]]  (Abnormal) Collected: 12/21/22 2303    Specimen: Blood Updated: 12/21/22 2332     proBNP 1,622.0 pg/mL     Narrative:      Among patients with dyspnea, NT-proBNP is highly sensitive for the detection of acute congestive heart failure. In addition NT-proBNP of <300 pg/ml effectively rules out acute congestive heart failure with 99% negative predictive value.      Magnesium [[id number]]  (Normal) Collected: 12/21/22 2303    Specimen: Blood Updated: 12/21/22 2334     Magnesium 2.0 mg/dL     CBC Auto Differential [[id number]]  (Abnormal) Collected: 12/21/22 2303    Specimen: Blood Updated: 12/21/22 2314     WBC 10.77 10*3/mm3      RBC 4.47 10*6/mm3      Hemoglobin 14.5 g/dL      Hematocrit 44.6 %      MCV 99.8 fL      MCH 32.4 pg      MCHC 32.5 g/dL      RDW 13.2 %      RDW-SD 49.1 fl      MPV 12.4 fL      Platelets 246 10*3/mm3      Neutrophil % 72.0 %      Lymphocyte % 17.3 %      Monocyte % 7.2 %      Eosinophil % 2.7 %      Basophil % 0.4 %      Immature Grans % 0.4 %      Neutrophils, Absolute 7.76 10*3/mm3      Lymphocytes, Absolute 1.86 10*3/mm3      Monocytes, Absolute 0.78 10*3/mm3      Eosinophils, Absolute 0.29 10*3/mm3      Basophils, Absolute 0.04 10*3/mm3      Immature Grans, Absolute 0.04 10*3/mm3      nRBC 0.0 /100 WBC     POC Troponin I [[id number]]  (Normal) Collected: 12/21/22 2303    Specimen: Blood Updated: 12/21/22 2316     Troponin I 0.00 ng/mL      Comment: Serial Number: [id number]:  [id number]       POC Troponin I with Hold Tube [[id number]] Collected: 12/22/22 0124    Specimen: Blood Updated: 12/22/22 0147    Narrative:      The following orders were created for panel order POC Troponin I with Hold Tube.  Procedure                               Abnormality         Status                     ---------                               -----------         ------                     POC Troponin I[[id number]]                                                               HOLD Troponin-I Tube[[id number]]                             Final result                 Please view results for these tests on the individual orders.    POC Troponin I [[id number]]  (Normal) Collected: 12/22/22 0124    Specimen: Blood Updated: 12/22/22 0136     Troponin I 0.00 ng/mL      Comment: Serial Number: [id number]:  [id number]       Troponin [[id number]]  (Normal) Collected: 12/22/22 0124    Specimen: Blood Updated: 12/22/22 0523     Troponin T <0.010 ng/mL     Narrative:      Troponin T Reference Range:  <= 0.03 ng/mL-   Negative for AMI  >0.03 ng/mL-     Abnormal for myocardial necrosis.  Clinicians would have to utilize clinical acumen, EKG, Troponin and serial changes to determine if it is an Acute Myocardial Infarction or myocardial injury due to an underlying chronic condition.       Results may be falsely decreased if patient taking Biotin.             Imaging:  XR Chest 1 View    Result Date: 12/22/2022  PROCEDURE: XR CHEST 1 VW  COMPARISON: 7/11/2022.  INDICATIONS: CHEST PAIN.  FINDINGS: A single frontal (AP or PA upright portable) chest radiograph reveals borderline cardiac enlargement and no acute infiltrate. No pneumothorax is seen. There is a left-sided cardiac implantable electronic device (CIED) in place, seen previously, and unchanged in position.  The thoracic aorta is atherosclerotic and mildly ectatic.  The patient has undergone median sternotomy and suspected CABG surgery. Chronic calcified granulomatous disease may involve the chest.  The bilateral airspace opacities seen on the prior study are thought to have cleared.  There may be minimal subsegmental atelectasis in the lung bases.       No acute infiltrate is appreciated.      Please note that portions of this note were completed with a voice recognition program.  VEGA FERREIRA JR, MD       Electronically Signed and Approved By: VEGA FERREIRA JR, MD on 12/22/2022 at 0:52                 Procedures:  Procedures    Progress  ED Course as of 12/22/22 0714   u Dec 22, 2022   0023 EKG: Sinus rhythm 90, normal P wave, normal QRS, nonspecific ST changes, prolonged QT interval, artifact present limiting interpretation.  Grossly unchanged from previous. [JS]      ED Course User Index  [JS] Harpal Uribe MD                            Medical Decision Making:  MDM  Number of Diagnoses or Management Options  Chest pain, unspecified type: established and worsening  Unstable angina (HCC): established and worsening  Diagnosis management comments: HEART SCORE  History: Highly suspicious (2)  ECG: Nonspecific repolarization or LBBB (1)  Age: 45-64 years old (1)  Risk factors: >3 Risks or PMH of ASCVD (2)  Troponin: normal (0)    This patient's HEART score is 6.    According to the HEART Score Study: Score (Risk of adverse cardiac event in the next 14 days)  Scores 0-3: (0.9-1.7%) In the HEART Score study, these patients were discharged home.  Scores 4-6: (12-16.6%)  In the HEART Score study, these patients were admitted to the hospital.  Scores =7: (50-65%) In the HEART Score study, these patients were candidates for early invasive measures.   Final disposition is based on individual provider judgement and current clinical situation.         Amount and/or Complexity of Data Reviewed  Clinical lab tests: ordered and reviewed  Tests in the radiology section of CPT®: ordered and reviewed  Tests in the medicine section of CPT®: ordered  Decide to obtain previous medical records or to obtain history from someone other than the patient: yes  Review and summarize past medical records: yes  Discuss the patient with other providers: yes  Independent visualization of images, tracings, or specimens: yes         Final diagnoses:   Chest pain, unspecified type   Unstable angina (HCC)        Disposition:  ED Disposition     ED Disposition   Decision to Admit    Condition   --    Comment   Level of Care: Telemetry [5]    Diagnosis: Chest pain, unspecified type [[id number]]               Dictated Utilizing Dragon Dictation    Documentation assistance provided by Galileo Sexton acting as scribe for Harpal Uribe MD. Information recorded by the scribe was done at my direction and has been verified and validated by me.        Galileo Sexton  12/22/22 0057       Harpal Uribe MD  12/22/22 0744

## 2022-12-22 NOTE — SIGNIFICANT NOTE
12/22/22 1145   Coping/Psychosocial   Observed Emotional State calm;cooperative   Verbalized Emotional State anger;anxiety   Trust Relationship/Rapport empathic listening provided   Family/Support Persons family   Involvement in Care interacting with patient   Additional Documentation Spiritual Care (Group)   Spiritual Care   Use of Spiritual Resources non-Restorationist use of spiritual care   Spiritual Care Source  initiative   Spiritual Care Follow-Up follow-up, none required as presently assessed   Response to Spiritual Care engaged in conversation;other (see comments)  (Pt is angry, because she says she received poor ED care, was about to die but no one did anything. she is very disappointed in the deal in ED.)   Spiritual Care Interventions supportive conversation provided   Spiritual Care Visit Type initial   Receptivity to Spiritual Care visit welcomed

## 2022-12-22 NOTE — SIGNIFICANT NOTE
12/22/22 6922   Plan   Plan Patient reports lives alone.  Twin sister, Danae, lives across the street and is able to provide assistance if needed.  Reports drives and provides own IADL's.  Facesheet verified.  No needs with mediations.  Plans to return home with no needs at this time

## 2022-12-22 NOTE — CONSULTS
Date of consultation: 12/22/2022    Reason for consultations: Chest pain    HPI: A 62-year-old female with a significant cardiac history as it will be stated below presented with retrosternal and left parasternal chest pain.  The pain occurred while she was sitting and was without radiation.  She took aspirin and a total of 3 nitroglycerin at home without complete relief.  She had no dyspnea, orthopnea, palpitation, syncope or near syncope.  He has no recent pneumonia, bronchitis or COVID infection.    Review of systems: Negative for headaches, tinnitus, vertigo, nausea, vomiting, abdominal pain, dysuria, hematuria, hemoptysis, TIA or CVA-like symptoms    Past medical surgical history:    1. CHF and CAD, status post PCI  2.  Hypertension  3.  Hypercholesterolemia  4.  Nephrolithiasis  5.  Colon cancer, that is post partial colectomy  6.  Degenerative joint disease  7.  Surgeries-procedures: Cardiac catheterization, colonoscopy, with the knee arthroplasty    Medications: See the patient medication list    Allergies: Bupropion    Social history: Never smoked.  No alcohol or illicit drug use.    Family history: Her twin sister has heart problems.  Positive for CVA, colon cancer and hypertension.    Physical examination: She was in no pain or respiratory distress.  Vital signs: Reviewed    HEENT: Sclerae nonicteric.  EOMI.  Neck: No JVD or carotid bruit.  No thyromegaly  Chest: Clear to auscultation.  Mild upper left-sided, lower sternum and lower  right-sided chest wall tenderness.    Cardiac: RRR.  No murmurs or S3 gallop.  Abdomen: Soft, nontender, no megalies or masses  Extremities: No pedal edema, cyanosis or clubbing.  Pulse intact  Neuro: Alert, oriented x3, no facial droop and speech was clear    Records: Reviewed    Assessment and plan:    1.  Angina pectoris.  Acute MI was ruled out.  2.  Musculoskeletal chest pain.  Tylenol as needed  3.  Coronary heart disease status post PCI and CABG  4.  Hypertension  5.   Chronic renal disease stage III  6.  Echocardiography was already ordered by the primary service  7.  Lexiscan nuclear stress test.  Any further management will be based on her clinical course and the test results.

## 2022-12-22 NOTE — CONSULTS
"Nutrition Services    Patient Name: Erika Bowens  YOB: 1960  MRN: [id number]  Admission date: 12/22/2022      CLINICAL NUTRITION ASSESSMENT      Reason for Assessment  MST score 2+     H&P:    Past Medical History:   Diagnosis Date   • Abdominal pain    • CHF (congestive heart failure) (HCC)    • Colon cancer (HCC)    • Coronary arteriosclerosis    • Depressive disorder     with anxiety   • Encounter for routine adult health examination    • Essential hypertension    • Generalized anxiety disorder    • GERD (gastroesophageal reflux disease)    • Hip pain    • Hyperlipidemia    • Kidney stone    • Osteoarthritis    • Radial styloid tenosynovitis of left hand    • Urinary tract infectious disease    • Vitamin D deficiency         Current Problems:   Active Hospital Problems    Diagnosis    • **Chest pain, unspecified type         Nutrition/Diet History         Narrative     RD assessed pt 2' to MST of 2, w/ unsure wt loss. Upon chart review, pt's wt shows fluctuations, with current wts questionable in accuracy. Pt's wt ranges in 140#s-150#. Pt is currently NPO. RD will f/u once diet initiates and will monitor for PO intake.        Anthropometrics        Current Height, Weight Height: 160 cm (63\")  Weight: 64.5 kg (142 lb 3.2 oz)   Current BMI Body mass index is 25.19 kg/m².       Weight Hx  Wt Readings from Last 30 Encounters:   12/21/22 2256 64.5 kg (142 lb 3.2 oz)   11/23/22 1438 69 kg (152 lb 1.9 oz)   10/07/22 1308 68 kg (150 lb)   09/22/22 1433 66.7 kg (147 lb)   07/11/22 1652 67.5 kg (148 lb 13 oz)   07/05/22 1342 66.8 kg (147 lb 4.8 oz)   05/24/22 1512 67 kg (147 lb 11.3 oz)   03/17/22 1538 68.2 kg (150 lb 6.4 oz)   02/14/22 0559 69.8 kg (153 lb 14.1 oz)   12/21/21 1420 68.7 kg (151 lb 6.4 oz)   12/17/21 1618 67.9 kg (149 lb 11.2 oz)   11/24/21 1449 67.5 kg (148 lb 13 oz)   10/19/21 1553 66.7 kg (147 lb)   09/17/21 1541 67.1 kg (147 lb 14.4 oz)   09/07/21 1433 67.1 kg (148 lb)   06/17/21 1434 " 67.6 kg (149 lb)   05/18/21 1328 68.4 kg (150 lb 12.7 oz)   05/07/21 0000 63.5 kg (140 lb)   03/16/21 0959 70.8 kg (156 lb)   12/15/20 1423 63.5 kg (140 lb)   11/09/20 0903 67.2 kg (148 lb 2.4 oz)   10/22/20 1341 68.7 kg (151 lb 7.3 oz)   09/15/20 1309 65.8 kg (145 lb)   07/23/20 0000 64 kg (141 lb)   05/18/20 1438 64 kg (141 lb 1.5 oz)   02/17/20 1427 66 kg (145 lb 8.1 oz)   02/12/20 0000 63.5 kg (140 lb)   02/05/20 0000 63.6 kg (140 lb 4 oz)   01/20/20 0000 66.2 kg (146 lb)   01/13/20 0000 65.3 kg (144 lb)            Wt Change Observation -3.4% x 5 mo--clinically insignificant      Estimated/Assessed Needs       Energy Requirements 25-30 kcal/kg ABW   EST Needs (kcal/day) 2408-3566       Protein Requirements 1.2 g/kg   EST Daily Needs (g/day) 66       Fluid Requirements 1 ml/kcal    Estimated Needs (mL/day) 1643-0626     Labs/Medications         Pertinent Labs Reviewed.   Results from last 7 days   Lab Units 12/21/22  2303   SODIUM mmol/L 143   POTASSIUM mmol/L 3.5   CHLORIDE mmol/L 104   CO2 mmol/L 25.0   BUN mg/dL 11   CREATININE mg/dL 1.44*   CALCIUM mg/dL 9.3   BILIRUBIN mg/dL 0.5   ALK PHOS U/L 153*   ALT (SGPT) U/L 36*   AST (SGOT) U/L 47*   GLUCOSE mg/dL 129*     Results from last 7 days   Lab Units 12/21/22  2303   MAGNESIUM mg/dL 2.0   HEMOGLOBIN g/dL 14.5   HEMATOCRIT % 44.6     COVID19   Date Value Ref Range Status   07/11/2022 Not Detected Not Detected - Ref. Range Final     No results found for: HGBA1C      Pertinent Medications Reviewed.     Current Nutrition Orders & Evaluation of Intake       Oral Nutrition     Current PO Diet NPO Diet NPO Type: Strict NPO   Supplement No active supplement orders       Malnutrition Severity Assessment                Nutrition Diagnosis         Nutrition Dx Problem 1 No nutrition diagnosis at this time        Nutrition Intervention         No nutrition intervention at this time     Medical Nutrition Therapy/Nutrition Education          Learner     Readiness  Patient  N/A     Method     Response N/A  N/A     Monitor/Evaluation        Monitor Per protocol, PO intake, Weight, Diet advancement       Nutrition Discharge Plan         To be determined       Electronically signed by:  Kasandra Perera RD  12/22/22 09:18 EST

## 2022-12-23 ENCOUNTER — TRANSITIONAL CARE MANAGEMENT TELEPHONE ENCOUNTER (OUTPATIENT)
Dept: CALL CENTER | Facility: HOSPITAL | Age: 62
End: 2022-12-23

## 2022-12-23 LAB — QT INTERVAL: 429 MS

## 2022-12-23 NOTE — OUTREACH NOTE
Call Center TCM Note    Flowsheet Row Responses   [facility] facility patient discharged from? [facility]   Does the patient have one of the following disease processes/diagnoses(primary or secondary)? Other   TCM attempt successful? No   Unsuccessful attempts Attempt 2          Priscilla Fournier LPN    12/23/2022, 14:27 CST

## 2022-12-23 NOTE — OUTREACH NOTE
Prep Survey    Flowsheet Row Responses   [facility] patient discharged from? [facility]   Is LACE score < 7 ? Yes   Eligibility [facility]   Date of Admission 12/22/22   Date of Discharge 12/22/22   Discharge Disposition Home or Self Care   Discharge diagnosis Chest pain   Does the patient have one of the following disease processes/diagnoses(primary or secondary)? Other   Does the patient have Home health ordered? No   Is there a DME ordered? No   Prep survey completed? Yes          MARSHAL ENAMORADO - Registered Nurse

## 2022-12-23 NOTE — OUTREACH NOTE
Call Center TCM Note    Flowsheet Row Responses   [facility] facility patient discharged from? [facility]   Does the patient have one of the following disease processes/diagnoses(primary or secondary)? Other   TCM attempt successful? No   Unsuccessful attempts Attempt 1          Priscilla Fournier LPN    12/23/2022, 08:19 EST

## 2022-12-26 ENCOUNTER — TRANSITIONAL CARE MANAGEMENT TELEPHONE ENCOUNTER (OUTPATIENT)
Dept: CALL CENTER | Facility: HOSPITAL | Age: 62
End: 2022-12-26

## 2022-12-26 NOTE — OUTREACH NOTE
Call Center TCM Note    Flowsheet Row Responses   [facility] patient discharged from? [facility]   Does the patient have one of the following disease processes/diagnoses(primary or secondary)? Other   TCM attempt successful? Yes   Call start time 1226   Call end time 1233   Discharge diagnosis Chest pain   Person spoke with today (if not patient) and relationship sister   Meds reviewed with patient/caregiver? Yes   Is the patient having any side effects they believe may be caused by any medication additions or changes? No   Does the patient have all medications ordered at discharge? Yes   Is the patient taking all medications as directed (includes completed medication regime)? Yes   Comments Sister declines to make HOSP DC FU appt at this time, stating she is upset with all of health care.    Does the patient have an appointment with their PCP within 7 days of discharge? No   Nursing Interventions Patient declined scheduling/rescheduling appointment at this time   Has home health visited the patient within 72 hours of discharge? N/A   Psychosocial issues? No   Did the patient receive a copy of their discharge instructions? Yes   What is the patient's perception of their health status since discharge? Same  [however not having chest pain at this time. ]   Is the patient/caregiver able to teach back signs and symptoms related to disease process for when to call PCP? Yes   Is the patient/caregiver able to teach back signs and symptoms related to disease process for when to call 911? Yes   Is the patient/caregiver able to teach back the hierarchy of who to call/visit for symptoms/problems? PCP, Specialist, Home health nurse, Urgent Care, ED, 911 Yes   TCM call completed? Yes   Wrap up additional comments  declines to make PCP FU appt at this time. Education provided and continues to decline.    Call end time 1233          Pushpa Priest RN    12/26/2022, 12:35 CST

## 2022-12-27 LAB
BH CV ECHO MEAS - AO ROOT DIAM: 2.5 CM
BH CV ECHO MEAS - EF(MOD-BP): 35 %
BH CV ECHO MEAS - IVSD: 1 CM
BH CV ECHO MEAS - LA DIMENSION: 4.5 CM
BH CV ECHO MEAS - LAT PEAK E' VEL: 4 CM/SEC
BH CV ECHO MEAS - LVIDD: 5.4 CM
BH CV ECHO MEAS - LVIDS: 4.5 CM
BH CV ECHO MEAS - LVPWD: 0.8 CM
BH CV ECHO MEAS - MED PEAK E' VEL: 6 CM/SEC
BH CV ECHO MEAS - MV A MAX VEL: 65 CM/SEC
BH CV ECHO MEAS - MV DEC TIME: 219 MSEC
BH CV ECHO MEAS - MV E MAX VEL: 78 CM/SEC
BH CV ECHO MEAS - MV E/A: 1.2
BH CV ECHO MEASUREMENTS AVERAGE E/E' RATIO: 15.6
IVRT: 44 MSEC
LEFT ATRIUM VOLUME INDEX: 23 ML/M2
MAXIMAL PREDICTED HEART RATE: 156 BPM
STRESS TARGET HR: 133 BPM

## 2022-12-28 LAB — QT INTERVAL: 538 MS

## 2023-01-01 ENCOUNTER — TELEPHONE (OUTPATIENT)
Dept: ONCOLOGY | Facility: HOSPITAL | Age: 63
End: 2023-01-01

## 2023-01-09 ENCOUNTER — OFFICE VISIT (OUTPATIENT)
Dept: FAMILY MEDICINE CLINIC | Facility: CLINIC | Age: 63
End: 2023-01-09
Payer: MEDICARE

## 2023-01-09 VITALS
WEIGHT: 142.2 LBS | HEART RATE: 74 BPM | SYSTOLIC BLOOD PRESSURE: 130 MMHG | OXYGEN SATURATION: 99 % | DIASTOLIC BLOOD PRESSURE: 70 MMHG | HEIGHT: 63 IN | BODY MASS INDEX: 25.2 KG/M2

## 2023-01-09 DIAGNOSIS — I10 ESSENTIAL HYPERTENSION: Primary | Chronic | ICD-10-CM

## 2023-01-09 DIAGNOSIS — F32.5 MAJOR DEPRESSIVE DISORDER WITH SINGLE EPISODE, IN FULL REMISSION: ICD-10-CM

## 2023-01-09 DIAGNOSIS — F41.1 GENERALIZED ANXIETY DISORDER: ICD-10-CM

## 2023-01-09 DIAGNOSIS — I25.10 CORONARY ARTERIOSCLEROSIS: Chronic | ICD-10-CM

## 2023-01-09 PROCEDURE — 99214 OFFICE O/P EST MOD 30 MIN: CPT | Performed by: GENERAL PRACTICE

## 2023-01-09 RX ORDER — ISOSORBIDE MONONITRATE 30 MG/1
30 TABLET, EXTENDED RELEASE ORAL DAILY
Qty: 30 TABLET | Refills: 2 | Status: SHIPPED | OUTPATIENT
Start: 2023-01-09 | End: 2023-01-20

## 2023-01-09 NOTE — PROGRESS NOTES
Subjective   Erika Bowens is a 62 y.o. female.   Chief Complaint   Patient presents with   • Hypertension     For review and evaluation of management of chronic medical problems. Records reviewed. Any recent labs, xrays reviewed and medications reconciled. Recent hospitalization for chest pain. Not happy with the care she received. Was started on Imdur. Is looking into getting new cardiologist. Has colon cancer mets in the liver and is scheduled for a PET scan. Depression and anxiety stable.    Hypertension  This is a chronic problem. The current episode started more than 1 year ago. The problem is unchanged. The problem is controlled. Pertinent negatives include no headaches, neck pain or palpitations. There are no associated agents to hypertension. Current antihypertension treatment includes beta blockers, diuretics and angiotensin blockers. The current treatment provides significant improvement. There are no compliance problems.  Hypertensive end-organ damage includes CAD/MI.   Coronary Artery Disease  Presents for follow-up visit. Pertinent negatives include no chest pressure, dizziness, leg swelling or palpitations. The symptoms have been stable. Compliance with diet is good. Compliance with exercise is variable. Compliance with medications is good.      The following portions of the patient's history were reviewed and updated as appropriate: allergies, current medications, past social history and problem list.    Outpatient Medications Prior to Visit   Medication Sig Dispense Refill   • acetaminophen (TYLENOL) 500 MG tablet Take 500-1,000 mg by mouth every 4 (four) hours as needed for mild pain (1-3).     • aspirin 81 MG EC tablet Take 81 mg by mouth Daily.     • clonazePAM (KlonoPIN) 1 MG tablet Take 1 tablet by mouth 2 (Two) Times a Day. 60 tablet 2   • clopidogrel (PLAVIX) 75 MG tablet Take 1 tablet by mouth Daily. 30 tablet 11   • cyanocobalamin (VITAMIN B-12) 500 MCG tablet Take 1 tablet by mouth  Daily.     • cyclobenzaprine (FLEXERIL) 10 MG tablet Take 1 tablet by mouth 3 (Three) Times a Day. 90 tablet 5   • FLUoxetine (PROzac) 20 MG capsule Take 3 capsules by mouth Daily. 90 capsule 1   • furosemide (LASIX) 40 MG tablet Take 1 tablet by mouth 2 (Two) Times a Day. 60 tablet 2   • losartan (COZAAR) 25 MG tablet Take 1 tablet by mouth Daily. 30 tablet 11   • metoprolol succinate XL (TOPROL-XL) 25 MG 24 hr tablet Take 1 tablet by mouth Daily. 30 tablet 11   • nitroglycerin (NITROSTAT) 0.4 MG SL tablet Place 1 tablet under the tongue As Needed for Chest Pain.  - IF PAIN REMAINS AFTER 5 MIN CALL 911 AND REPEAT DOSE MAX 3 TABS IN 15 MINUTES 25 tablet 2   • omeprazole (priLOSEC) 40 MG capsule Take 1 capsule by mouth Daily. 30 capsule 5   • pravastatin (PRAVACHOL) 80 MG tablet Take 1 tablet by mouth Every Night for 30 days. 30 tablet 11   • spironolactone (ALDACTONE) 25 MG tablet Take 1 tablet by mouth 2 (Two) Times a Day. 60 tablet 5   • tiZANidine (ZANAFLEX) 4 MG tablet Take 1 tablet by mouth Every 8 (Eight) Hours As Needed for Muscle Spasms. 90 tablet 5   • vitamin D (ERGOCALCIFEROL) 1.25 MG (56115 UT) capsule capsule TAKE ONE CAPSULE BY MOUTH ONCE WEEKLY 9 capsule 1   • isosorbide mononitrate (IMDUR) 30 MG 24 hr tablet Take 1 tablet by mouth Daily for 30 days. 30 tablet 0     No facility-administered medications prior to visit.       Review of Systems   Cardiovascular: Negative for palpitations and leg swelling.   Musculoskeletal: Negative for neck pain.   Neurological: Negative for dizziness and headaches.     I have reviewed 12 systems with patient. Findings were negative except what is noted below and/or in history of present illness.     Objective   Visit Vitals  /70   Pulse 74   Ht 160 cm (63\")   Wt 64.5 kg (142 lb 3.2 oz)   SpO2 99%   BMI 25.19 kg/m²     Physical Exam  Vitals and nursing note reviewed.   Constitutional:       General: She is not in acute distress.     Appearance: She is  well-developed.   HENT:      Head: Normocephalic and atraumatic.      Nose: Nose normal.   Eyes:      General:         Right eye: No discharge.         Left eye: No discharge.      Conjunctiva/sclera: Conjunctivae normal.      Pupils: Pupils are equal, round, and reactive to light.   Neck:      Thyroid: No thyromegaly.   Cardiovascular:      Rate and Rhythm: Normal rate and regular rhythm.      Heart sounds: Normal heart sounds.   Pulmonary:      Effort: Pulmonary effort is normal.      Breath sounds: Normal breath sounds.   Lymphadenopathy:      Cervical: No cervical adenopathy.   Skin:     General: Skin is warm and dry.   Neurological:      Mental Status: She is alert and oriented to person, place, and time.         Notes brought forward are reviewed and updated if indicated.     Assessment & Plan   Problems Addressed this Visit        Cardiac and Vasculature    Essential hypertension - Primary (Chronic)    Coronary arteriosclerosis    Relevant Medications    isosorbide mononitrate (IMDUR) 30 MG 24 hr tablet       Mental Health    Generalized anxiety disorder (Chronic)   Other Visit Diagnoses     Major depressive disorder with single episode, in full remission (HCC)          Diagnoses       Codes Comments    Essential hypertension    -  Primary ICD-10-CM: I10  ICD-9-CM: 401.9     Coronary arteriosclerosis     ICD-10-CM: I25.10  ICD-9-CM: 414.00     Major depressive disorder with single episode, in full remission (HCC)     ICD-10-CM: F32.5  ICD-9-CM: 296.26     Generalized anxiety disorder     ICD-10-CM: F41.1  ICD-9-CM: 300.02           Continue current medications. See cardiology as planned.    New Medications Ordered This Visit   Medications   • isosorbide mononitrate (IMDUR) 30 MG 24 hr tablet     Sig: Take 1 tablet by mouth Daily for 30 days.     Dispense:  30 tablet     Refill:  2     Return in about 3 months (around 4/9/2023) for medicare wellness visit.        This document has been electronically signed by  Nilam Willis MD on January 9, 2023 17:57 CST

## 2023-01-16 ENCOUNTER — HOSPITAL ENCOUNTER (OUTPATIENT)
Dept: PET IMAGING | Facility: HOSPITAL | Age: 63
Discharge: HOME | End: 2023-01-16
Payer: MEDICARE

## 2023-01-16 DIAGNOSIS — C78.7 SECONDARY MALIGNANT NEOPLASM OF LIVER: ICD-10-CM

## 2023-01-16 PROCEDURE — 78815 PET IMAGE W/CT SKULL-THIGH: CPT

## 2023-01-16 PROCEDURE — 0 FLUDEOXYGLUCOSE F18 SOLUTION: Performed by: SURGERY

## 2023-01-16 PROCEDURE — A9552 F18 FDG: HCPCS | Performed by: SURGERY

## 2023-01-16 RX ADMIN — FLUDEOXYGLUCOSE F18 1 DOSE: 300 INJECTION INTRAVENOUS at 14:02

## 2023-01-17 DIAGNOSIS — E55.9 VITAMIN D DEFICIENCY: ICD-10-CM

## 2023-01-17 RX ORDER — ERGOCALCIFEROL 1.25 MG/1
50000 CAPSULE ORAL WEEKLY
Qty: 9 CAPSULE | Refills: 1 | Status: SHIPPED | OUTPATIENT
Start: 2023-01-17 | End: 2023-01-30 | Stop reason: SDUPTHER

## 2023-01-20 DIAGNOSIS — I25.10 CORONARY ARTERIOSCLEROSIS: ICD-10-CM

## 2023-01-20 DIAGNOSIS — M62.838 MUSCLE SPASM: ICD-10-CM

## 2023-01-20 DIAGNOSIS — F32.A DEPRESSIVE DISORDER: Chronic | ICD-10-CM

## 2023-01-20 DIAGNOSIS — I10 ESSENTIAL HYPERTENSION: Chronic | ICD-10-CM

## 2023-01-20 DIAGNOSIS — E78.2 MIXED HYPERLIPIDEMIA: Chronic | ICD-10-CM

## 2023-01-20 DIAGNOSIS — F41.1 GENERALIZED ANXIETY DISORDER: Chronic | ICD-10-CM

## 2023-01-20 DIAGNOSIS — F32.5 MAJOR DEPRESSIVE DISORDER WITH SINGLE EPISODE, IN FULL REMISSION: Chronic | ICD-10-CM

## 2023-01-20 RX ORDER — FUROSEMIDE 40 MG/1
40 TABLET ORAL 2 TIMES DAILY
Qty: 180 TABLET | Refills: 3 | Status: SHIPPED | OUTPATIENT
Start: 2023-01-20

## 2023-01-20 RX ORDER — LOSARTAN POTASSIUM 25 MG/1
25 TABLET ORAL DAILY
Qty: 90 TABLET | Refills: 3 | Status: SHIPPED | OUTPATIENT
Start: 2023-01-20

## 2023-01-20 RX ORDER — SPIRONOLACTONE 25 MG/1
25 TABLET ORAL 2 TIMES DAILY
Qty: 180 TABLET | Refills: 3 | Status: SHIPPED | OUTPATIENT
Start: 2023-01-20

## 2023-01-20 RX ORDER — CYCLOBENZAPRINE HCL 10 MG
10 TABLET ORAL 3 TIMES DAILY
Qty: 270 TABLET | Refills: 1 | Status: SHIPPED | OUTPATIENT
Start: 2023-01-20

## 2023-01-20 RX ORDER — FLUOXETINE HYDROCHLORIDE 20 MG/1
60 CAPSULE ORAL DAILY
Qty: 270 CAPSULE | Refills: 1 | Status: SHIPPED | OUTPATIENT
Start: 2023-01-20 | End: 2023-01-30 | Stop reason: SDUPTHER

## 2023-01-20 RX ORDER — CLOPIDOGREL BISULFATE 75 MG/1
75 TABLET ORAL DAILY
Qty: 90 TABLET | Refills: 3 | Status: SHIPPED | OUTPATIENT
Start: 2023-01-20

## 2023-01-20 RX ORDER — TIZANIDINE 4 MG/1
4 TABLET ORAL EVERY 8 HOURS PRN
Qty: 270 TABLET | Refills: 1 | Status: SHIPPED | OUTPATIENT
Start: 2023-01-20

## 2023-01-20 RX ORDER — PRAVASTATIN SODIUM 80 MG/1
80 TABLET ORAL NIGHTLY
Qty: 90 TABLET | Refills: 3 | Status: SHIPPED | OUTPATIENT
Start: 2023-01-20

## 2023-01-20 RX ORDER — ISOSORBIDE MONONITRATE 30 MG/1
30 TABLET, EXTENDED RELEASE ORAL DAILY
Qty: 90 TABLET | Refills: 3 | Status: SHIPPED | OUTPATIENT
Start: 2023-01-20 | End: 2023-02-19

## 2023-01-20 RX ORDER — NITROGLYCERIN 0.4 MG/1
0.4 TABLET SUBLINGUAL AS NEEDED
Qty: 25 TABLET | Refills: 3 | Status: SHIPPED | OUTPATIENT
Start: 2023-01-20

## 2023-01-20 RX ORDER — CLONAZEPAM 1 MG/1
1 TABLET ORAL 2 TIMES DAILY
Qty: 180 TABLET | Refills: 0 | Status: SHIPPED | OUTPATIENT
Start: 2023-01-20

## 2023-01-20 RX ORDER — METOPROLOL SUCCINATE 25 MG/1
25 TABLET, EXTENDED RELEASE ORAL DAILY
Qty: 90 TABLET | Refills: 3 | Status: SHIPPED | OUTPATIENT
Start: 2023-01-20

## 2023-01-25 ENCOUNTER — TELEPHONE (OUTPATIENT)
Dept: FAMILY MEDICINE CLINIC | Facility: CLINIC | Age: 63
End: 2023-01-25
Payer: MEDICARE

## 2023-01-25 NOTE — TELEPHONE ENCOUNTER
Pt's sister has insurance card and time is of the essence wants to know if ma can send by fax a copy of insurance   [facility] Dr Mcguire working on treatment for pt liver cancer     Fax [phone]  Attn Kathleen

## 2023-01-25 NOTE — TELEPHONE ENCOUNTER
Caller: Danae Bowens    Relationship: Emergency Contact    Best call back number: [phone]    What is the best time to reach you: ANYTIME    Who are you requesting to speak with (clinical staff, provider,  specific staff member): DR BAUTISTA OR TODD    What was the call regarding: DANAE CALLING TO SEE IF THERE IS AN UPDATE ON PTS TREATMENT PLAN. DR MATTHEWS FROM  WAS SUPPOSED TO CALL DR BAUTISTA TO DISCUSS THE PLAN. PLEASE CALL DANAE WITH AN UPDATE.     Do you require a callback: YES

## 2023-01-30 DIAGNOSIS — F32.A DEPRESSIVE DISORDER: Chronic | ICD-10-CM

## 2023-01-30 DIAGNOSIS — E55.9 VITAMIN D DEFICIENCY: ICD-10-CM

## 2023-01-30 RX ORDER — ERGOCALCIFEROL 1.25 MG/1
50000 CAPSULE ORAL WEEKLY
Qty: 9 CAPSULE | Refills: 1 | Status: SHIPPED | OUTPATIENT
Start: 2023-01-30

## 2023-01-30 RX ORDER — FLUOXETINE HYDROCHLORIDE 20 MG/1
60 CAPSULE ORAL DAILY
Qty: 270 CAPSULE | Refills: 1 | Status: SHIPPED | OUTPATIENT
Start: 2023-01-30

## 2023-02-06 ENCOUNTER — LAB (OUTPATIENT)
Dept: LAB | Facility: HOSPITAL | Age: 63
End: 2023-02-06
Payer: MEDICARE

## 2023-02-06 ENCOUNTER — TRANSCRIBE ORDERS (OUTPATIENT)
Dept: LAB | Facility: HOSPITAL | Age: 63
End: 2023-02-06
Payer: MEDICARE

## 2023-02-06 DIAGNOSIS — C18.7 CANCER OF SIGMOID COLON: ICD-10-CM

## 2023-02-06 DIAGNOSIS — C78.7 SECONDARY MALIGNANT NEOPLASM OF LIVER: ICD-10-CM

## 2023-02-06 DIAGNOSIS — C18.9 MALIGNANT NEOPLASM OF COLON, UNSPECIFIED PART OF COLON: Primary | ICD-10-CM

## 2023-02-06 DIAGNOSIS — C18.9 MALIGNANT NEOPLASM OF COLON, UNSPECIFIED PART OF COLON: ICD-10-CM

## 2023-02-06 DIAGNOSIS — D75.89 MACROCYTOSIS: ICD-10-CM

## 2023-02-06 LAB
ALBUMIN SERPL-MCNC: 4.3 G/DL (ref 3.5–5.2)
ALBUMIN/GLOB SERPL: 1.5 G/DL
ALP SERPL-CCNC: 131 U/L (ref 39–117)
ALT SERPL W P-5'-P-CCNC: 27 U/L (ref 1–33)
ANION GAP SERPL CALCULATED.3IONS-SCNC: 11.8 MMOL/L (ref 5–15)
APTT PPP: 26.1 SECONDS (ref 78–95.9)
AST SERPL-CCNC: 38 U/L (ref 1–32)
BASOPHILS # BLD AUTO: 0.06 10*3/MM3 (ref 0–0.2)
BASOPHILS NFR BLD AUTO: 0.7 % (ref 0–1.5)
BILIRUB SERPL-MCNC: 0.4 MG/DL (ref 0–1.2)
BUN SERPL-MCNC: 17 MG/DL (ref 8–23)
BUN/CREAT SERPL: 10.2 (ref 7–25)
CALCIUM SPEC-SCNC: 9.2 MG/DL (ref 8.6–10.5)
CHLORIDE SERPL-SCNC: 104 MMOL/L (ref 98–107)
CO2 SERPL-SCNC: 26.2 MMOL/L (ref 22–29)
CREAT SERPL-MCNC: 1.66 MG/DL (ref 0.57–1)
DEPRECATED RDW RBC AUTO: 47.8 FL (ref 37–54)
EGFRCR SERPLBLD CKD-EPI 2021: 34.1 ML/MIN/1.73
EOSINOPHIL # BLD AUTO: 0.42 10*3/MM3 (ref 0–0.4)
EOSINOPHIL NFR BLD AUTO: 5.1 % (ref 0.3–6.2)
ERYTHROCYTE [DISTWIDTH] IN BLOOD BY AUTOMATED COUNT: 13 % (ref 12.3–15.4)
GLOBULIN UR ELPH-MCNC: 2.8 GM/DL
GLUCOSE SERPL-MCNC: 86 MG/DL (ref 65–99)
HCT VFR BLD AUTO: 44 % (ref 34–46.6)
HGB BLD-MCNC: 14.2 G/DL (ref 12–15.9)
IMM GRANULOCYTES # BLD AUTO: 0.05 10*3/MM3 (ref 0–0.05)
IMM GRANULOCYTES NFR BLD AUTO: 0.6 % (ref 0–0.5)
INR PPP: 0.94 (ref 0.86–1.15)
LYMPHOCYTES # BLD AUTO: 2.35 10*3/MM3 (ref 0.7–3.1)
LYMPHOCYTES NFR BLD AUTO: 28.6 % (ref 19.6–45.3)
MCH RBC QN AUTO: 32.3 PG (ref 26.6–33)
MCHC RBC AUTO-ENTMCNC: 32.3 G/DL (ref 31.5–35.7)
MCV RBC AUTO: 100.2 FL (ref 79–97)
MONOCYTES # BLD AUTO: 0.85 10*3/MM3 (ref 0.1–0.9)
MONOCYTES NFR BLD AUTO: 10.3 % (ref 5–12)
NEUTROPHILS NFR BLD AUTO: 4.49 10*3/MM3 (ref 1.7–7)
NEUTROPHILS NFR BLD AUTO: 54.7 % (ref 42.7–76)
NRBC BLD AUTO-RTO: 0 /100 WBC (ref 0–0.2)
PLATELET # BLD AUTO: 197 10*3/MM3 (ref 140–450)
PMV BLD AUTO: 13.2 FL (ref 6–12)
POTASSIUM SERPL-SCNC: 4.3 MMOL/L (ref 3.5–5.2)
PROT SERPL-MCNC: 7.1 G/DL (ref 6–8.5)
PROTHROMBIN TIME: 12.7 SECONDS (ref 11.8–14.9)
RBC # BLD AUTO: 4.39 10*6/MM3 (ref 3.77–5.28)
SODIUM SERPL-SCNC: 142 MMOL/L (ref 136–145)
WBC NRBC COR # BLD: 8.22 10*3/MM3 (ref 3.4–10.8)

## 2023-02-06 PROCEDURE — 85730 THROMBOPLASTIN TIME PARTIAL: CPT

## 2023-02-06 PROCEDURE — 85610 PROTHROMBIN TIME: CPT

## 2023-02-06 PROCEDURE — 85025 COMPLETE CBC W/AUTO DIFF WBC: CPT

## 2023-02-06 PROCEDURE — 36415 COLL VENOUS BLD VENIPUNCTURE: CPT

## 2023-02-06 PROCEDURE — 80053 COMPREHEN METABOLIC PANEL: CPT

## 2023-03-20 ENCOUNTER — TRANSCRIBE ORDERS (OUTPATIENT)
Dept: ADMINISTRATIVE | Facility: HOSPITAL | Age: 63
End: 2023-03-20
Payer: MEDICARE

## 2023-03-20 ENCOUNTER — LAB (OUTPATIENT)
Dept: LAB | Facility: HOSPITAL | Age: 63
End: 2023-03-20
Payer: MEDICARE

## 2023-03-20 DIAGNOSIS — C18.9 MALIGNANT NEOPLASM OF COLON, UNSPECIFIED PART OF COLON: Primary | ICD-10-CM

## 2023-03-20 DIAGNOSIS — C18.9 MALIGNANT NEOPLASM OF COLON, UNSPECIFIED PART OF COLON: ICD-10-CM

## 2023-03-20 DIAGNOSIS — C78.7 SECONDARY MALIGNANT NEOPLASM OF LIVER: ICD-10-CM

## 2023-03-20 LAB
APTT PPP: 27.4 SECONDS (ref 78–95.9)
BASOPHILS # BLD AUTO: 0.05 10*3/MM3 (ref 0–0.2)
BASOPHILS NFR BLD AUTO: 0.7 % (ref 0–1.5)
DEPRECATED RDW RBC AUTO: 46.5 FL (ref 37–54)
EOSINOPHIL # BLD AUTO: 0.17 10*3/MM3 (ref 0–0.4)
EOSINOPHIL NFR BLD AUTO: 2.4 % (ref 0.3–6.2)
ERYTHROCYTE [DISTWIDTH] IN BLOOD BY AUTOMATED COUNT: 12.9 % (ref 12.3–15.4)
HCT VFR BLD AUTO: 43.5 % (ref 34–46.6)
HGB BLD-MCNC: 14.6 G/DL (ref 12–15.9)
IMM GRANULOCYTES # BLD AUTO: 0.06 10*3/MM3 (ref 0–0.05)
IMM GRANULOCYTES NFR BLD AUTO: 0.8 % (ref 0–0.5)
INR PPP: 1.01 (ref 0.86–1.15)
LYMPHOCYTES # BLD AUTO: 0.71 10*3/MM3 (ref 0.7–3.1)
LYMPHOCYTES NFR BLD AUTO: 9.8 % (ref 19.6–45.3)
MCH RBC QN AUTO: 32.6 PG (ref 26.6–33)
MCHC RBC AUTO-ENTMCNC: 33.6 G/DL (ref 31.5–35.7)
MCV RBC AUTO: 97.1 FL (ref 79–97)
MONOCYTES # BLD AUTO: 0.73 10*3/MM3 (ref 0.1–0.9)
MONOCYTES NFR BLD AUTO: 10.1 % (ref 5–12)
NEUTROPHILS NFR BLD AUTO: 5.49 10*3/MM3 (ref 1.7–7)
NEUTROPHILS NFR BLD AUTO: 76.2 % (ref 42.7–76)
NRBC BLD AUTO-RTO: 0 /100 WBC (ref 0–0.2)
PLATELET # BLD AUTO: 209 10*3/MM3 (ref 140–450)
PMV BLD AUTO: 12.4 FL (ref 6–12)
PROTHROMBIN TIME: 13.4 SECONDS (ref 11.8–14.9)
RBC # BLD AUTO: 4.48 10*6/MM3 (ref 3.77–5.28)
WBC NRBC COR # BLD: 7.21 10*3/MM3 (ref 3.4–10.8)

## 2023-03-20 PROCEDURE — 85730 THROMBOPLASTIN TIME PARTIAL: CPT

## 2023-03-20 PROCEDURE — 85025 COMPLETE CBC W/AUTO DIFF WBC: CPT

## 2023-03-20 PROCEDURE — 80053 COMPREHEN METABOLIC PANEL: CPT

## 2023-03-20 PROCEDURE — 36415 COLL VENOUS BLD VENIPUNCTURE: CPT

## 2023-03-20 PROCEDURE — 85610 PROTHROMBIN TIME: CPT

## 2023-03-21 LAB
ALBUMIN SERPL-MCNC: 4 G/DL (ref 3.5–5.2)
ALBUMIN/GLOB SERPL: 1.1 G/DL
ALP SERPL-CCNC: 168 U/L (ref 39–117)
ALT SERPL W P-5'-P-CCNC: 25 U/L (ref 1–33)
ANION GAP SERPL CALCULATED.3IONS-SCNC: 13 MMOL/L (ref 5–15)
AST SERPL-CCNC: 41 U/L (ref 1–32)
BILIRUB SERPL-MCNC: 0.4 MG/DL (ref 0–1.2)
BUN SERPL-MCNC: 21 MG/DL (ref 8–23)
BUN/CREAT SERPL: 14.1 (ref 7–25)
CALCIUM SPEC-SCNC: 9.2 MG/DL (ref 8.6–10.5)
CHLORIDE SERPL-SCNC: 98 MMOL/L (ref 98–107)
CO2 SERPL-SCNC: 27 MMOL/L (ref 22–29)
CREAT SERPL-MCNC: 1.49 MG/DL (ref 0.57–1)
EGFRCR SERPLBLD CKD-EPI 2021: 38.8 ML/MIN/1.73
GLOBULIN UR ELPH-MCNC: 3.8 GM/DL
GLUCOSE SERPL-MCNC: 92 MG/DL (ref 65–99)
POTASSIUM SERPL-SCNC: 3.7 MMOL/L (ref 3.5–5.2)
PROT SERPL-MCNC: 7.8 G/DL (ref 6–8.5)
SODIUM SERPL-SCNC: 138 MMOL/L (ref 136–145)

## 2023-03-29 ENCOUNTER — TELEPHONE (OUTPATIENT)
Dept: ONCOLOGY | Facility: HOSPITAL | Age: 63
End: 2023-03-29

## 2023-03-29 NOTE — TELEPHONE ENCOUNTER
Fely from Dr Mcguire office called back and states that Dr Mcguire is going to due the 3 Phase Abd/Pelv CT on 5/25/23 and see the pt afterwards. Dr Boyce had ordered Abd/Pelv and Chest CT's for the pt to have done on 5/18/23. Chanda ROSS had previously spoken to Fely about the CT's and Dr Boyce was ok with canceling his CT's if Dr Velasco orders the 3 Phase CT's.    The CT's that Dr Boyce ordered need to be canceled now.

## 2023-04-10 ENCOUNTER — OFFICE VISIT (OUTPATIENT)
Dept: FAMILY MEDICINE CLINIC | Facility: CLINIC | Age: 63
End: 2023-04-10
Payer: MEDICARE

## 2023-04-10 VITALS
OXYGEN SATURATION: 97 % | SYSTOLIC BLOOD PRESSURE: 140 MMHG | BODY MASS INDEX: 25.34 KG/M2 | DIASTOLIC BLOOD PRESSURE: 70 MMHG | HEIGHT: 63 IN | HEART RATE: 77 BPM | WEIGHT: 143 LBS

## 2023-04-10 DIAGNOSIS — I10 ESSENTIAL HYPERTENSION: Chronic | ICD-10-CM

## 2023-04-10 DIAGNOSIS — E78.2 MIXED HYPERLIPIDEMIA: ICD-10-CM

## 2023-04-10 DIAGNOSIS — Z00.00 WELCOME TO MEDICARE PREVENTIVE VISIT: Primary | ICD-10-CM

## 2023-04-10 DIAGNOSIS — F32.5 MAJOR DEPRESSIVE DISORDER WITH SINGLE EPISODE, IN FULL REMISSION: Chronic | ICD-10-CM

## 2023-04-10 DIAGNOSIS — F41.1 GENERALIZED ANXIETY DISORDER: Chronic | ICD-10-CM

## 2023-04-10 PROCEDURE — 1160F RVW MEDS BY RX/DR IN RCRD: CPT | Performed by: GENERAL PRACTICE

## 2023-04-10 PROCEDURE — 1159F MED LIST DOCD IN RCRD: CPT | Performed by: GENERAL PRACTICE

## 2023-04-10 PROCEDURE — 1170F FXNL STATUS ASSESSED: CPT | Performed by: GENERAL PRACTICE

## 2023-04-10 PROCEDURE — 3078F DIAST BP <80 MM HG: CPT | Performed by: GENERAL PRACTICE

## 2023-04-10 PROCEDURE — 3077F SYST BP >= 140 MM HG: CPT | Performed by: GENERAL PRACTICE

## 2023-04-10 PROCEDURE — G0402 INITIAL PREVENTIVE EXAM: HCPCS | Performed by: GENERAL PRACTICE

## 2023-04-10 RX ORDER — CLONAZEPAM 1 MG/1
1 TABLET ORAL 2 TIMES DAILY
Qty: 180 TABLET | Refills: 1 | Status: SHIPPED | OUTPATIENT
Start: 2023-04-10

## 2023-04-10 NOTE — PROGRESS NOTES
The ABCs of the Annual Wellness Visit  Electra to Medicare Visit    Chief Complaint   Patient presents with   • Welcome To Medicare     Subjective {   History of Present Illness:  Erika Bowens is a 63 y.o. female who presents for a  Welcome to Medicare Visit.    The following portions of the patient's history were reviewed and   updated as appropriate: allergies, current medications, past family history, past medical history, past social history, past surgical history and problem list.     Compared to one year ago, the patient feels her physical   health is worse.    Compared to one year ago, the patient feels her mental   health is the same.    Recent Hospitalizations:  This patient has had a [facility] admission record on file within the last 365 days.    Current Medical Providers:  Patient Care Team:  Nilam Willis MD as PCP - General  Praveen Chong MD as Cardiologist (Cardiology)  Luis Boyce MD as Consulting Physician (Hematology and Oncology)    Outpatient Medications Prior to Visit   Medication Sig Dispense Refill   • acetaminophen (TYLENOL) 500 MG tablet Take 1-2 tablets by mouth Every 4 (Four) Hours As Needed for Mild Pain.     • aspirin 81 MG EC tablet Take 1 tablet by mouth Daily.     • clopidogrel (PLAVIX) 75 MG tablet Take 1 tablet by mouth Daily. 90 tablet 3   • cyanocobalamin (VITAMIN B-12) 500 MCG tablet Take 1 tablet by mouth Daily.     • cyclobenzaprine (FLEXERIL) 10 MG tablet Take 1 tablet by mouth 3 (Three) Times a Day. 270 tablet 1   • FLUoxetine (PROzac) 20 MG capsule Take 3 capsules by mouth Daily. 270 capsule 1   • furosemide (LASIX) 40 MG tablet Take 1 tablet by mouth 2 (Two) Times a Day. 180 tablet 3   • isosorbide mononitrate (IMDUR) 30 MG 24 hr tablet Take 1 tablet by mouth Daily for 30 days. 90 tablet 3   • losartan (COZAAR) 25 MG tablet Take 1 tablet by mouth Daily. 90 tablet 3   • metoprolol succinate XL (TOPROL-XL) 25 MG 24 hr tablet Take 1 tablet by  mouth Daily. 90 tablet 3   • nitroglycerin (NITROSTAT) 0.4 MG SL tablet Place 1 tablet under the tongue As Needed for Chest Pain.  - IF PAIN REMAINS AFTER 5 MIN CALL 911 AND REPEAT DOSE MAX 3 TABS IN 15 MINUTES 25 tablet 3   • omeprazole (priLOSEC) 40 MG capsule Take 1 capsule by mouth Daily. 30 capsule 5   • pravastatin (PRAVACHOL) 80 MG tablet Take 1 tablet by mouth Every Night. 90 tablet 3   • spironolactone (ALDACTONE) 25 MG tablet Take 1 tablet by mouth 2 (Two) Times a Day. 180 tablet 3   • tiZANidine (ZANAFLEX) 4 MG tablet Take 1 tablet by mouth Every 8 (Eight) Hours As Needed for Muscle Spasms. 270 tablet 1   • vitamin D (ERGOCALCIFEROL) 1.25 MG (85817 UT) capsule capsule Take 1 capsule by mouth 1 (One) Time Per Week. 9 capsule 1   • clonazePAM (KlonoPIN) 1 MG tablet Take 1 tablet by mouth 2 (Two) Times a Day. 180 tablet 0     No facility-administered medications prior to visit.       No opioid medication identified on active medication list. I have reviewed chart for other potential  high risk medication/s and harmful drug interactions in the elderly.          Aspirin is on active medication list. Aspirin use is indicated based on review of current medical condition/s. Pros and cons of this therapy have been discussed today. Benefits of this medication outweigh potential harm.  Patient has been encouraged to continue taking this medication.  .      Patient Active Problem List   Diagnosis   • Coronary arteriosclerosis   • Depressive disorder   • Essential hypertension   • Generalized anxiety disorder   • GERD (gastroesophageal reflux disease)   • Hip pain   • Mixed hyperlipidemia   • Primary osteoarthritis involving multiple joints   • Vitamin D deficiency   • Chronic renal failure, stage 3 (moderate)   • Cancer of sigmoid colon   • Personal history of colon cancer   • Anxiety   • Arthritis   • Metastatic colon cancer to liver   • Congestive heart failure   • Heart attack   • Heart disease   • Hypokalemia   •  "Kidney disorder   • Limb swelling   • Neurologic disorder   • Shortness of breath   • Stage 3a chronic kidney disease   • Primary osteoarthritis of right knee   • Hepatic metastasis   • Liver mass   • Macrocytosis   • Pernicious anemia   • Stage 3b chronic kidney disease     Advance Care Planning  Advance Directive is not on file.  ACP discussion was held with the patient during this visit. Patient does not have an advance directive, information provided.          Objective      Vitals:    04/10/23 1509   BP: 140/70   Pulse: 77   SpO2: 97%   Weight: 64.9 kg (143 lb)   Height: 160 cm (63\")   PainSc:   8   PainLoc: Generalized     Estimated body mass index is 25.33 kg/m² as calculated from the following:    Height as of this encounter: 160 cm (63\").    Weight as of this encounter: 64.9 kg (143 lb).    BMI is >= 25 and <30. (Overweight) The following options were offered after discussion;: exercise counseling/recommendations and nutrition counseling/recommendations      Does the patient have evidence of cognitive impairment? No    Physical Exam  Vitals and nursing note reviewed.   Constitutional:       General: She is not in acute distress.     Appearance: She is well-developed.   HENT:      Head: Normocephalic and atraumatic.      Nose: Nose normal.   Eyes:      General:         Right eye: No discharge.         Left eye: No discharge.      Conjunctiva/sclera: Conjunctivae normal.      Pupils: Pupils are equal, round, and reactive to light.   Neck:      Thyroid: No thyromegaly.   Cardiovascular:      Rate and Rhythm: Normal rate and regular rhythm.      Heart sounds: Normal heart sounds.   Pulmonary:      Effort: Pulmonary effort is normal.      Breath sounds: Normal breath sounds.   Lymphadenopathy:      Cervical: No cervical adenopathy.   Skin:     General: Skin is warm and dry.   Neurological:      Mental Status: She is alert and oriented to person, place, and time.              Procedures       HEALTH RISK " ASSESSMENT    Smoking Status:  Social History     Tobacco Use   Smoking Status Never   Smokeless Tobacco Never     Alcohol Consumption:  Social History     Substance and Sexual Activity   Alcohol Use No       Fall Risk Screen:    STEADI Fall Risk Assessment was completed, and patient is at MODERATE risk for falls. Assessment completed on:4/10/2023    Depression Screen:   PHQ-2/PHQ-9 Depression Screening 4/10/2023   Retired Total Score -   Little Interest or Pleasure in Doing Things 0-->not at all   Feeling Down, Depressed or Hopeless 0-->not at all   PHQ-9: Brief Depression Severity Measure Score 0       Health Habits and Functional and Cognitive Screening:  Functional & Cognitive Status 4/10/2023   Do you have difficulty preparing food and eating? No   Do you have difficulty bathing yourself, getting dressed or grooming yourself? No   Do you have difficulty using the toilet? No   Do you have difficulty moving around from place to place? No   Do you have trouble with steps or getting out of a bed or a chair? No   Current Diet Well Balanced Diet   Dental Exam Up to date   Eye Exam Not up to date   Exercise (times per week) 0 times per week   Current Exercises Include No Regular Exercise   Do you need help using the phone?  No   Are you deaf or do you have serious difficulty hearing?  No   Do you need help with transportation? No   Do you need help shopping? No   Do you need help preparing meals?  No   Do you need help with housework?  Yes   Do you need help with laundry? No   Do you need help taking your medications? No   Do you need help managing money? No   Do you ever drive or ride in a car without wearing a seat belt? Yes   Have you felt unusual stress, anger or loneliness in the last month? Yes   Who do you live with? Alone   If you need help, do you have trouble finding someone available to you? No   Have you been bothered in the last four weeks by sexual problems? No   Do you have difficulty concentrating,  remembering or making decisions? Yes       Visual Acuity:    Vision Screening    Right eye Left eye Both eyes   Without correction blind 20/50 20/50   With correction blind 20/20/ 20/20       Age-appropriate Screening Schedule:  Refer to the list below for future screening recommendations based on patient's age, sex and/or medical conditions. Orders for these recommended tests are listed in the plan section. The patient has been provided with a written plan.    Health Maintenance   Topic Date Due   • ANNUAL WELLNESS VISIT  Never done   • DXA SCAN  04/10/2023 (Originally 12/13/2021)   • ZOSTER VACCINE (1 of 2) 04/10/2023 (Originally 1/22/2008)   • COVID-19 Vaccine (1) 04/12/2023 (Originally 1960)   • Pneumococcal Vaccine 65+ (1 - PCV) 04/10/2024 (Originally 1/22/1964)   • TDAP/TD VACCINES (1 - Tdap) 04/10/2024 (Originally 1/22/1977)   • INFLUENZA VACCINE  08/01/2023   • LIPID PANEL  09/30/2023   • MAMMOGRAM  10/07/2024   • PAP SMEAR  12/13/2024   • COLONOSCOPY  02/14/2025   • HEPATITIS C SCREENING  Completed          Assessment & Plan   CMS Preventative Services Quick Reference  Risk Factors Identified During Encounter  Depression/Dysphoria: Current medication is effective, no change recommended  Fall Risk-High or Moderate: Discussed Fall Prevention in the home  Immunizations Discussed/Encouraged: Tdap, Prevnar 20 (Pneumococcal 20-valent conjugate), Shingrix and COVID19  The above risks/problems have been discussed with the patient.  Pertinent information has been shared with the patient in the After Visit Summary.  Follow up plans and orders are seen below in the Assessment/Plan Section.    Diagnoses and all orders for this visit:    1. Welcome to Medicare preventive visit (Primary)  -     Cancel: ECG 12 Lead  -     CBC & Differential; Future  -     Comprehensive Metabolic Panel; Future  -     Lipid Panel; Future  -     Urinalysis With Culture If Indicated - Urine, Clean Catch; Future    2. Generalized anxiety  disorder  -     clonazePAM (KlonoPIN) 1 MG tablet; Take 1 tablet by mouth 2 (Two) Times a Day.  Dispense: 180 tablet; Refill: 1    3. Major depressive disorder with single episode, in full remission  -     clonazePAM (KlonoPIN) 1 MG tablet; Take 1 tablet by mouth 2 (Two) Times a Day.  Dispense: 180 tablet; Refill: 1    4. Essential hypertension  -     CBC & Differential; Future  -     Comprehensive Metabolic Panel; Future  -     Lipid Panel; Future  -     Urinalysis With Culture If Indicated - Urine, Clean Catch; Future    5. Mixed hyperlipidemia  -     CBC & Differential; Future  -     Comprehensive Metabolic Panel; Future  -     Lipid Panel; Future  -     Urinalysis With Culture If Indicated - Urine, Clean Catch; Future    Other orders  -     SCANNED PATHOLOGY  -     SCANNED - LABS        Follow Up:   Return in about 6 months (around 10/10/2023) for Annual physical.     An After Visit Summary and PPPS were made available to the patient.  Information has been scanned into chart. Discussed importance of taking medications as prescribed. Encouraged healthy eating habits with low fat, low salt choices and working towards maintaining a healthy weight. Recommended regular exercise if able as well as care to prevent falls including avoiding anything on the floor that they could slip or trip on such as throw rugs, making sure they have a bathmat to step onto when their feet are wet and having grab bars and railings where needed.

## 2023-04-10 NOTE — PATIENT INSTRUCTIONS
Dr. Shirley - heart failure specialist    Medicare Wellness  Personal Prevention Plan of Service     Date of Office Visit:    Encounter Provider:  Nilam Willis MD  Place of Service:  [facility]  Patient Name: Erika Bowens  :  1960    As part of the Medicare Wellness portion of your visit today, we are providing you with this personalized preventive plan of services (PPPS). This plan is based upon recommendations of the [place] Preventive Services Task Force (USPSTF) and the Advisory Committee on Immunization Practices (ACIP).    This lists the preventive care services that should be considered, and provides dates of when you are due. Items listed as completed are up-to-date and do not require any further intervention.    Health Maintenance   Topic Date Due    COVID-19 Vaccine (1) Never done    Pneumococcal Vaccine 65+ (1 - PCV) Never done    TDAP/TD VACCINES (1 - Tdap) Never done    ZOSTER VACCINE (1 of 2) Never done    ANNUAL WELLNESS VISIT  Never done    DXA SCAN  2021    PAP SMEAR  2022    INFLUENZA VACCINE  2023    LIPID PANEL  2023    MAMMOGRAM  10/07/2024    COLONOSCOPY  2025    HEPATITIS C SCREENING  Completed       Orders Placed This Encounter   Procedures    SCANNED - LABS    ECG 12 Lead     Order Specific Question:   Reason for Exam:     Answer:   Welcome to Medicare     Order Specific Question:   Release to patient     Answer:   Routine Release       No follow-ups on file.

## 2023-04-18 ENCOUNTER — LAB (OUTPATIENT)
Dept: LAB | Facility: HOSPITAL | Age: 63
End: 2023-04-18
Payer: MEDICARE

## 2023-04-18 ENCOUNTER — TRANSCRIBE ORDERS (OUTPATIENT)
Dept: ADMINISTRATIVE | Facility: HOSPITAL | Age: 63
End: 2023-04-18
Payer: MEDICARE

## 2023-04-18 DIAGNOSIS — E55.9 VITAMIN D DEFICIENCY, UNSPECIFIED: ICD-10-CM

## 2023-04-18 DIAGNOSIS — N18.31 CHRONIC KIDNEY DISEASE (CKD) STAGE G3A/A1, MODERATELY DECREASED GLOMERULAR FILTRATION RATE (GFR) BETWEEN 45-59 ML/MIN/1.73 SQUARE METER AND ALBUMINURIA CREATININE RATIO LESS THAN 30 MG/G (CMS/H*: Primary | ICD-10-CM

## 2023-04-18 DIAGNOSIS — N18.31 CHRONIC KIDNEY DISEASE (CKD) STAGE G3A/A1, MODERATELY DECREASED GLOMERULAR FILTRATION RATE (GFR) BETWEEN 45-59 ML/MIN/1.73 SQUARE METER AND ALBUMINURIA CREATININE RATIO LESS THAN 30 MG/G (CMS/H*: ICD-10-CM

## 2023-04-18 LAB
ALBUMIN SERPL-MCNC: 4 G/DL (ref 3.5–5.2)
ANION GAP SERPL CALCULATED.3IONS-SCNC: 14.5 MMOL/L (ref 5–15)
BACTERIA UR QL AUTO: ABNORMAL /HPF
BASOPHILS # BLD AUTO: 0.04 10*3/MM3 (ref 0–0.2)
BASOPHILS NFR BLD AUTO: 0.5 % (ref 0–1.5)
BILIRUB UR QL STRIP: NEGATIVE
BUN SERPL-MCNC: 7 MG/DL (ref 8–23)
BUN/CREAT SERPL: 6.1 (ref 7–25)
CALCIUM SPEC-SCNC: 9.7 MG/DL (ref 8.6–10.5)
CHLORIDE SERPL-SCNC: 100 MMOL/L (ref 98–107)
CLARITY UR: CLEAR
CO2 SERPL-SCNC: 23.5 MMOL/L (ref 22–29)
COD CRY URNS QL: ABNORMAL /HPF
COLOR UR: YELLOW
CREAT SERPL-MCNC: 1.15 MG/DL (ref 0.57–1)
CREAT UR-MCNC: 169.2 MG/DL
DEPRECATED RDW RBC AUTO: 45.6 FL (ref 37–54)
EGFRCR SERPLBLD CKD-EPI 2021: 53 ML/MIN/1.73
EOSINOPHIL # BLD AUTO: 0.32 10*3/MM3 (ref 0–0.4)
EOSINOPHIL NFR BLD AUTO: 4.4 % (ref 0.3–6.2)
ERYTHROCYTE [DISTWIDTH] IN BLOOD BY AUTOMATED COUNT: 13.1 % (ref 12.3–15.4)
GLUCOSE SERPL-MCNC: 90 MG/DL (ref 65–99)
GLUCOSE UR STRIP-MCNC: NEGATIVE MG/DL
HCT VFR BLD AUTO: 44.1 % (ref 34–46.6)
HGB BLD-MCNC: 14.9 G/DL (ref 12–15.9)
HGB UR QL STRIP.AUTO: NEGATIVE
HYALINE CASTS UR QL AUTO: ABNORMAL /LPF
IMM GRANULOCYTES # BLD AUTO: 0.06 10*3/MM3 (ref 0–0.05)
IMM GRANULOCYTES NFR BLD AUTO: 0.8 % (ref 0–0.5)
KETONES UR QL STRIP: NEGATIVE
LEUKOCYTE ESTERASE UR QL STRIP.AUTO: ABNORMAL
LYMPHOCYTES # BLD AUTO: 0.54 10*3/MM3 (ref 0.7–3.1)
LYMPHOCYTES NFR BLD AUTO: 7.4 % (ref 19.6–45.3)
MCH RBC QN AUTO: 32.4 PG (ref 26.6–33)
MCHC RBC AUTO-ENTMCNC: 33.8 G/DL (ref 31.5–35.7)
MCV RBC AUTO: 95.9 FL (ref 79–97)
MONOCYTES # BLD AUTO: 0.81 10*3/MM3 (ref 0.1–0.9)
MONOCYTES NFR BLD AUTO: 11.1 % (ref 5–12)
MUCOUS THREADS URNS QL MICRO: ABNORMAL /HPF
NEUTROPHILS NFR BLD AUTO: 5.56 10*3/MM3 (ref 1.7–7)
NEUTROPHILS NFR BLD AUTO: 75.8 % (ref 42.7–76)
NITRITE UR QL STRIP: NEGATIVE
NRBC BLD AUTO-RTO: 0.1 /100 WBC (ref 0–0.2)
PH UR STRIP.AUTO: 6.5 [PH] (ref 5–8)
PHOSPHATE SERPL-MCNC: 2.3 MG/DL (ref 2.5–4.5)
PLATELET # BLD AUTO: 265 10*3/MM3 (ref 140–450)
PMV BLD AUTO: 12.6 FL (ref 6–12)
POTASSIUM SERPL-SCNC: 3.3 MMOL/L (ref 3.5–5.2)
PROT ?TM UR-MCNC: 20.7 MG/DL
PROT UR QL STRIP: ABNORMAL
PROT/CREAT UR: 0.12 MG/G{CREAT}
PTH-INTACT SERPL-MCNC: 150 PG/ML (ref 15–65)
RBC # BLD AUTO: 4.6 10*6/MM3 (ref 3.77–5.28)
RBC # UR STRIP: ABNORMAL /HPF
REF LAB TEST METHOD: ABNORMAL
SODIUM SERPL-SCNC: 138 MMOL/L (ref 136–145)
SP GR UR STRIP: 1.02 (ref 1–1.03)
SQUAMOUS #/AREA URNS HPF: ABNORMAL /HPF
UROBILINOGEN UR QL STRIP: ABNORMAL
WBC # UR STRIP: ABNORMAL /HPF
WBC NRBC COR # BLD: 7.33 10*3/MM3 (ref 3.4–10.8)

## 2023-04-18 PROCEDURE — 85025 COMPLETE CBC W/AUTO DIFF WBC: CPT | Performed by: INTERNAL MEDICINE

## 2023-04-18 PROCEDURE — 36415 COLL VENOUS BLD VENIPUNCTURE: CPT | Performed by: INTERNAL MEDICINE

## 2023-04-18 PROCEDURE — 82570 ASSAY OF URINE CREATININE: CPT | Performed by: INTERNAL MEDICINE

## 2023-04-18 PROCEDURE — 84156 ASSAY OF PROTEIN URINE: CPT | Performed by: INTERNAL MEDICINE

## 2023-04-18 PROCEDURE — 83970 ASSAY OF PARATHORMONE: CPT | Performed by: INTERNAL MEDICINE

## 2023-04-18 PROCEDURE — 81001 URINALYSIS AUTO W/SCOPE: CPT

## 2023-04-18 PROCEDURE — 80069 RENAL FUNCTION PANEL: CPT | Performed by: INTERNAL MEDICINE

## 2023-04-18 PROCEDURE — 82306 VITAMIN D 25 HYDROXY: CPT | Performed by: INTERNAL MEDICINE

## 2023-04-19 LAB — 25(OH)D3 SERPL-MCNC: 48.6 NG/ML (ref 30–100)

## 2023-05-18 ENCOUNTER — TRANSCRIBE ORDERS (OUTPATIENT)
Dept: ADMINISTRATIVE | Facility: HOSPITAL | Age: 63
End: 2023-05-18
Payer: MEDICARE

## 2023-05-18 ENCOUNTER — LAB (OUTPATIENT)
Dept: LAB | Facility: HOSPITAL | Age: 63
End: 2023-05-18
Payer: MEDICARE

## 2023-05-18 DIAGNOSIS — C18.9 MALIGNANT NEOPLASM OF COLON, UNSPECIFIED PART OF COLON: Primary | ICD-10-CM

## 2023-05-18 DIAGNOSIS — C78.7 METASTASIS TO LIVER: ICD-10-CM

## 2023-05-18 DIAGNOSIS — C18.9 MALIGNANT NEOPLASM OF COLON, UNSPECIFIED PART OF COLON: ICD-10-CM

## 2023-05-18 LAB
ALBUMIN SERPL-MCNC: 4 G/DL (ref 3.5–5.2)
ALBUMIN/GLOB SERPL: 1 G/DL
ALP SERPL-CCNC: 237 U/L (ref 39–117)
ALT SERPL W P-5'-P-CCNC: 43 U/L (ref 1–33)
ANION GAP SERPL CALCULATED.3IONS-SCNC: 15 MMOL/L (ref 5–15)
AST SERPL-CCNC: 58 U/L (ref 1–32)
BASOPHILS # BLD AUTO: 0.04 10*3/MM3 (ref 0–0.2)
BASOPHILS NFR BLD AUTO: 0.6 % (ref 0–1.5)
BILIRUB SERPL-MCNC: 0.6 MG/DL (ref 0–1.2)
BUN SERPL-MCNC: 13 MG/DL (ref 8–23)
BUN/CREAT SERPL: 10 (ref 7–25)
CALCIUM SPEC-SCNC: 10 MG/DL (ref 8.6–10.5)
CEA SERPL-MCNC: 2.7 NG/ML
CHLORIDE SERPL-SCNC: 96 MMOL/L (ref 98–107)
CO2 SERPL-SCNC: 26 MMOL/L (ref 22–29)
CREAT SERPL-MCNC: 1.3 MG/DL (ref 0.57–1)
DEPRECATED RDW RBC AUTO: 42.7 FL (ref 37–54)
EGFRCR SERPLBLD CKD-EPI 2021: 45.7 ML/MIN/1.73
EOSINOPHIL # BLD AUTO: 0.06 10*3/MM3 (ref 0–0.4)
EOSINOPHIL NFR BLD AUTO: 0.9 % (ref 0.3–6.2)
ERYTHROCYTE [DISTWIDTH] IN BLOOD BY AUTOMATED COUNT: 11.8 % (ref 12.3–15.4)
GLOBULIN UR ELPH-MCNC: 3.9 GM/DL
GLUCOSE SERPL-MCNC: 99 MG/DL (ref 65–99)
HCT VFR BLD AUTO: 49.2 % (ref 34–46.6)
HGB BLD-MCNC: 16.3 G/DL (ref 12–15.9)
LYMPHOCYTES # BLD AUTO: 0.74 10*3/MM3 (ref 0.7–3.1)
LYMPHOCYTES NFR BLD AUTO: 10.7 % (ref 19.6–45.3)
MCH RBC QN AUTO: 32.5 PG (ref 26.6–33)
MCHC RBC AUTO-ENTMCNC: 33.1 G/DL (ref 31.5–35.7)
MCV RBC AUTO: 98 FL (ref 79–97)
MONOCYTES # BLD AUTO: 0.65 10*3/MM3 (ref 0.1–0.9)
MONOCYTES NFR BLD AUTO: 9.4 % (ref 5–12)
NEUTROPHILS NFR BLD AUTO: 5.41 10*3/MM3 (ref 1.7–7)
NEUTROPHILS NFR BLD AUTO: 77.8 % (ref 42.7–76)
PLATELET # BLD AUTO: 193 10*3/MM3 (ref 140–450)
PMV BLD AUTO: 13.5 FL (ref 6–12)
POTASSIUM SERPL-SCNC: 3.4 MMOL/L (ref 3.5–5.2)
PROT SERPL-MCNC: 7.9 G/DL (ref 6–8.5)
RBC # BLD AUTO: 5.02 10*6/MM3 (ref 3.77–5.28)
SODIUM SERPL-SCNC: 137 MMOL/L (ref 136–145)
WBC NRBC COR # BLD: 6.94 10*3/MM3 (ref 3.4–10.8)

## 2023-05-18 PROCEDURE — 36415 COLL VENOUS BLD VENIPUNCTURE: CPT

## 2023-05-18 PROCEDURE — 80053 COMPREHEN METABOLIC PANEL: CPT

## 2023-05-18 PROCEDURE — 82378 CARCINOEMBRYONIC ANTIGEN: CPT

## 2023-05-18 PROCEDURE — 85025 COMPLETE CBC W/AUTO DIFF WBC: CPT

## 2023-05-30 ENCOUNTER — TRANSCRIBE ORDERS (OUTPATIENT)
Dept: ADMINISTRATIVE | Facility: HOSPITAL | Age: 63
End: 2023-05-30

## 2023-05-30 ENCOUNTER — LAB (OUTPATIENT)
Dept: LAB | Facility: HOSPITAL | Age: 63
End: 2023-05-30

## 2023-05-30 DIAGNOSIS — E87.6 HYPOKALEMIA: ICD-10-CM

## 2023-05-30 DIAGNOSIS — E78.2 MIXED HYPERLIPIDEMIA: ICD-10-CM

## 2023-05-30 DIAGNOSIS — N18.32 STAGE 3B CHRONIC KIDNEY DISEASE: ICD-10-CM

## 2023-05-30 DIAGNOSIS — E55.9 VITAMIN D DEFICIENCY DISEASE: ICD-10-CM

## 2023-05-30 DIAGNOSIS — I10 ESSENTIAL HYPERTENSION: ICD-10-CM

## 2023-05-30 DIAGNOSIS — N18.32 STAGE 3B CHRONIC KIDNEY DISEASE: Primary | ICD-10-CM

## 2023-05-30 DIAGNOSIS — Z00.00 WELCOME TO MEDICARE PREVENTIVE VISIT: ICD-10-CM

## 2023-05-30 LAB
ANION GAP SERPL CALCULATED.3IONS-SCNC: 10.8 MMOL/L (ref 5–15)
BACTERIA UR QL AUTO: NORMAL /HPF
BASOPHILS # BLD AUTO: 0.03 10*3/MM3 (ref 0–0.2)
BASOPHILS NFR BLD AUTO: 0.5 % (ref 0–1.5)
BILIRUB UR QL STRIP: NEGATIVE
BUN SERPL-MCNC: 18 MG/DL (ref 8–23)
BUN/CREAT SERPL: 11 (ref 7–25)
CALCIUM SPEC-SCNC: 9.6 MG/DL (ref 8.6–10.5)
CHLORIDE SERPL-SCNC: 104 MMOL/L (ref 98–107)
CLARITY UR: ABNORMAL
CO2 SERPL-SCNC: 27.2 MMOL/L (ref 22–29)
COD CRY URNS QL: NORMAL /HPF
COLOR UR: YELLOW
CREAT SERPL-MCNC: 1.63 MG/DL (ref 0.57–1)
DEPRECATED RDW RBC AUTO: 42 FL (ref 37–54)
EGFRCR SERPLBLD CKD-EPI 2021: 34.9 ML/MIN/1.73
EOSINOPHIL # BLD AUTO: 0.1 10*3/MM3 (ref 0–0.4)
EOSINOPHIL NFR BLD AUTO: 1.5 % (ref 0.3–6.2)
ERYTHROCYTE [DISTWIDTH] IN BLOOD BY AUTOMATED COUNT: 11.8 % (ref 12.3–15.4)
GLUCOSE SERPL-MCNC: 82 MG/DL (ref 65–99)
GLUCOSE UR STRIP-MCNC: NEGATIVE MG/DL
HCT VFR BLD AUTO: 44.4 % (ref 34–46.6)
HGB BLD-MCNC: 14.2 G/DL (ref 12–15.9)
HGB UR QL STRIP.AUTO: NEGATIVE
HYALINE CASTS UR QL AUTO: NORMAL /LPF
KETONES UR QL STRIP: NEGATIVE
LEUKOCYTE ESTERASE UR QL STRIP.AUTO: ABNORMAL
LYMPHOCYTES # BLD AUTO: 0.8 10*3/MM3 (ref 0.7–3.1)
LYMPHOCYTES NFR BLD AUTO: 12.2 % (ref 19.6–45.3)
MAGNESIUM SERPL-MCNC: 2.2 MG/DL (ref 1.6–2.4)
MCH RBC QN AUTO: 31.6 PG (ref 26.6–33)
MCHC RBC AUTO-ENTMCNC: 32 G/DL (ref 31.5–35.7)
MCV RBC AUTO: 98.7 FL (ref 79–97)
MONOCYTES # BLD AUTO: 0.87 10*3/MM3 (ref 0.1–0.9)
MONOCYTES NFR BLD AUTO: 13.3 % (ref 5–12)
NEUTROPHILS NFR BLD AUTO: 4.74 10*3/MM3 (ref 1.7–7)
NEUTROPHILS NFR BLD AUTO: 72.2 % (ref 42.7–76)
NITRITE UR QL STRIP: NEGATIVE
PH UR STRIP.AUTO: 6 [PH] (ref 5–8)
PLATELET # BLD AUTO: 164 10*3/MM3 (ref 140–450)
PMV BLD AUTO: 14.3 FL (ref 6–12)
POTASSIUM SERPL-SCNC: 4.2 MMOL/L (ref 3.5–5.2)
PROT UR QL STRIP: NEGATIVE
RBC # BLD AUTO: 4.5 10*6/MM3 (ref 3.77–5.28)
RBC # UR STRIP: NORMAL /HPF
REF LAB TEST METHOD: NORMAL
SODIUM SERPL-SCNC: 142 MMOL/L (ref 136–145)
SP GR UR STRIP: 1.02 (ref 1–1.03)
SQUAMOUS #/AREA URNS HPF: NORMAL /HPF
UROBILINOGEN UR QL STRIP: ABNORMAL
WBC # UR STRIP: NORMAL /HPF
WBC NRBC COR # BLD: 6.56 10*3/MM3 (ref 3.4–10.8)

## 2023-05-30 PROCEDURE — 36415 COLL VENOUS BLD VENIPUNCTURE: CPT

## 2023-05-30 PROCEDURE — 83735 ASSAY OF MAGNESIUM: CPT

## 2023-05-30 PROCEDURE — 87086 URINE CULTURE/COLONY COUNT: CPT

## 2023-05-30 PROCEDURE — 85025 COMPLETE CBC W/AUTO DIFF WBC: CPT

## 2023-05-30 PROCEDURE — 81001 URINALYSIS AUTO W/SCOPE: CPT

## 2023-05-30 PROCEDURE — 80048 BASIC METABOLIC PNL TOTAL CA: CPT

## 2023-05-30 PROCEDURE — 87147 CULTURE TYPE IMMUNOLOGIC: CPT

## 2023-06-01 LAB — BACTERIA SPEC AEROBE CULT: ABNORMAL

## 2023-06-02 RX ORDER — AMOXICILLIN 500 MG/1
500 CAPSULE ORAL 3 TIMES DAILY
Qty: 9 CAPSULE | Refills: 0 | Status: SHIPPED | OUTPATIENT
Start: 2023-06-02

## 2023-06-07 DIAGNOSIS — M62.838 MUSCLE SPASM: ICD-10-CM

## 2023-06-07 RX ORDER — CYCLOBENZAPRINE HCL 10 MG
10 TABLET ORAL 3 TIMES DAILY
Qty: 270 TABLET | Refills: 1 | Status: CANCELLED | OUTPATIENT
Start: 2023-06-07

## 2023-06-07 NOTE — TELEPHONE ENCOUNTER
Incoming Refill Request      Medication requested (name and dose): cyclobenzaprine (FLEXERIL) 10 MG tablet     Pharmacy where request should be sent: [facility]    Additional details provided by patient: [facility] states they need a PA on this.  Also states that they have sent this PA at least 3 times.  Wants to know if they can get at least a weeks supply sent to [address] [place], [place]  Phone number for the PA is [phone].  Please contact Pt when this is completed.  Pt is completely out.    Best call back number: [phone]    Does the patient have less than a 3 day supply:  [x] Yes  [] No    Yamila Cabrera Rep  06/07/23, 09:26 CDT

## 2023-06-07 NOTE — TELEPHONE ENCOUNTER
UPCOMING APPTS  With Family Medicine (Nilam Willis MD)  10/12/2023 at 3:00 PM  LAST OFFICE VISIT - THIS DEPT  04/2023  LUCINA Saenz,   Are you doing PA's for Dr. Willis?     Her Flexeril needs a PA

## 2023-06-13 DIAGNOSIS — M62.838 MUSCLE SPASM: ICD-10-CM

## 2023-06-13 RX ORDER — CYCLOBENZAPRINE HCL 10 MG
10 TABLET ORAL 3 TIMES DAILY
Qty: 270 TABLET | Refills: 1 | Status: SHIPPED | OUTPATIENT
Start: 2023-06-13

## 2023-06-13 RX ORDER — CYCLOBENZAPRINE HCL 10 MG
10 TABLET ORAL 3 TIMES DAILY
Qty: 90 TABLET | Refills: 1 | Status: SHIPPED | OUTPATIENT
Start: 2023-06-13 | End: 2023-06-13 | Stop reason: SDUPTHER

## 2023-06-14 ENCOUNTER — TELEPHONE (OUTPATIENT)
Dept: FAMILY MEDICINE CLINIC | Facility: CLINIC | Age: 63
End: 2023-06-14
Payer: MEDICARE

## 2023-07-12 PROBLEM — I81 PORTAL VEIN THROMBOSIS: Status: ACTIVE | Noted: 2023-07-12

## 2023-07-13 NOTE — TELEPHONE ENCOUNTER
Caller: Sebastian    Best call back number: [phone]    What is the best time to reach you: ANYTIME     Who are you requesting to speak with (clinical staff, provider,  specific staff member): CLINICAL     What was the call regarding: TL CALLING FROM Sebastian SHE NEEDS FOR YOU TO FAX THE PET SCAN ORDERS AND CLINICALS ON PATIENT -[phone].

## 2023-07-23 ENCOUNTER — HOSPITAL ENCOUNTER (INPATIENT)
Facility: HOSPITAL | Age: 63
LOS: 1 days | Discharge: HOME | DRG: 640 | End: 2023-07-25
Attending: EMERGENCY MEDICINE | Admitting: INTERNAL MEDICINE
Payer: MEDICARE

## 2023-07-23 ENCOUNTER — APPOINTMENT (OUTPATIENT)
Dept: GENERAL RADIOLOGY | Facility: HOSPITAL | Age: 63
DRG: 640 | End: 2023-07-23
Payer: MEDICARE

## 2023-07-23 DIAGNOSIS — N17.9 AKI (ACUTE KIDNEY INJURY): ICD-10-CM

## 2023-07-23 DIAGNOSIS — I21.4 NSTEMI (NON-ST ELEVATED MYOCARDIAL INFARCTION): ICD-10-CM

## 2023-07-23 DIAGNOSIS — I47.20 V TACH: Primary | ICD-10-CM

## 2023-07-23 DIAGNOSIS — R07.9 CHEST PAIN, UNSPECIFIED TYPE: ICD-10-CM

## 2023-07-23 DIAGNOSIS — E87.6 HYPOKALEMIA: ICD-10-CM

## 2023-07-23 PROBLEM — Z45.02 DEFIBRILLATOR DISCHARGE: Status: ACTIVE | Noted: 2023-07-23

## 2023-07-23 LAB
ALBUMIN SERPL-MCNC: 3.7 G/DL (ref 3.5–5.2)
ALBUMIN/GLOB SERPL: 1.1 G/DL
ALP SERPL-CCNC: 251 U/L (ref 39–117)
ALT SERPL W P-5'-P-CCNC: 42 U/L (ref 1–33)
ANION GAP SERPL CALCULATED.3IONS-SCNC: 15.8 MMOL/L (ref 5–15)
APTT PPP: 26.6 SECONDS (ref 78–95.9)
AST SERPL-CCNC: 55 U/L (ref 1–32)
BASOPHILS # BLD AUTO: 0.03 10*3/MM3 (ref 0–0.2)
BASOPHILS NFR BLD AUTO: 0.3 % (ref 0–1.5)
BILIRUB SERPL-MCNC: 0.7 MG/DL (ref 0–1.2)
BUN SERPL-MCNC: 31 MG/DL (ref 8–23)
BUN/CREAT SERPL: 16.2 (ref 7–25)
CALCIUM SPEC-SCNC: 9.1 MG/DL (ref 8.6–10.5)
CHLORIDE SERPL-SCNC: 101 MMOL/L (ref 98–107)
CO2 SERPL-SCNC: 24.2 MMOL/L (ref 22–29)
CREAT SERPL-MCNC: 1.91 MG/DL (ref 0.57–1)
DEPRECATED RDW RBC AUTO: 45.9 FL (ref 37–54)
EGFRCR SERPLBLD CKD-EPI 2021: 28.8 ML/MIN/1.73
EOSINOPHIL # BLD AUTO: 0 10*3/MM3 (ref 0–0.4)
EOSINOPHIL NFR BLD AUTO: 0 % (ref 0.3–6.2)
ERYTHROCYTE [DISTWIDTH] IN BLOOD BY AUTOMATED COUNT: 12.9 % (ref 12.3–15.4)
GEN 5 2HR TROPONIN T REFLEX: 39 NG/L
GLOBULIN UR ELPH-MCNC: 3.3 GM/DL
GLUCOSE SERPL-MCNC: 141 MG/DL (ref 65–99)
HCT VFR BLD AUTO: 42 % (ref 34–46.6)
HGB BLD-MCNC: 13.7 G/DL (ref 12–15.9)
HOLD SPECIMEN: NORMAL
HOLD SPECIMEN: NORMAL
IMM GRANULOCYTES # BLD AUTO: 0.04 10*3/MM3 (ref 0–0.05)
IMM GRANULOCYTES NFR BLD AUTO: 0.4 % (ref 0–0.5)
INR PPP: 1.36 (ref 0.86–1.15)
LIPASE SERPL-CCNC: 26 U/L (ref 13–60)
LYMPHOCYTES # BLD AUTO: 0.95 10*3/MM3 (ref 0.7–3.1)
LYMPHOCYTES NFR BLD AUTO: 8.8 % (ref 19.6–45.3)
MAGNESIUM SERPL-MCNC: 2 MG/DL (ref 1.6–2.4)
MCH RBC QN AUTO: 31.7 PG (ref 26.6–33)
MCHC RBC AUTO-ENTMCNC: 32.6 G/DL (ref 31.5–35.7)
MCV RBC AUTO: 97.2 FL (ref 79–97)
MONOCYTES # BLD AUTO: 0.59 10*3/MM3 (ref 0.1–0.9)
MONOCYTES NFR BLD AUTO: 5.5 % (ref 5–12)
NEUTROPHILS NFR BLD AUTO: 85 % (ref 42.7–76)
NEUTROPHILS NFR BLD AUTO: 9.13 10*3/MM3 (ref 1.7–7)
NRBC BLD AUTO-RTO: 0.2 /100 WBC (ref 0–0.2)
NT-PROBNP SERPL-MCNC: 513.3 PG/ML (ref 0–900)
PLATELET # BLD AUTO: 200 10*3/MM3 (ref 140–450)
PMV BLD AUTO: 12.7 FL (ref 6–12)
POTASSIUM SERPL-SCNC: 2.7 MMOL/L (ref 3.5–5.2)
PROT SERPL-MCNC: 7 G/DL (ref 6–8.5)
PROTHROMBIN TIME: 16.8 SECONDS (ref 11.8–14.9)
RBC # BLD AUTO: 4.32 10*6/MM3 (ref 3.77–5.28)
SODIUM SERPL-SCNC: 141 MMOL/L (ref 136–145)
TROPONIN T DELTA: 19 NG/L
TROPONIN T SERPL HS-MCNC: 20 NG/L
WBC NRBC COR # BLD: 10.74 10*3/MM3 (ref 3.4–10.8)
WHOLE BLOOD HOLD COAG: NORMAL
WHOLE BLOOD HOLD SPECIMEN: NORMAL

## 2023-07-23 PROCEDURE — 25010000002 MORPHINE PER 10 MG: Performed by: EMERGENCY MEDICINE

## 2023-07-23 PROCEDURE — 84484 ASSAY OF TROPONIN QUANT: CPT | Performed by: EMERGENCY MEDICINE

## 2023-07-23 PROCEDURE — 71045 X-RAY EXAM CHEST 1 VIEW: CPT

## 2023-07-23 PROCEDURE — 93005 ELECTROCARDIOGRAM TRACING: CPT | Performed by: EMERGENCY MEDICINE

## 2023-07-23 PROCEDURE — 85730 THROMBOPLASTIN TIME PARTIAL: CPT | Performed by: EMERGENCY MEDICINE

## 2023-07-23 PROCEDURE — 83735 ASSAY OF MAGNESIUM: CPT

## 2023-07-23 PROCEDURE — 83880 ASSAY OF NATRIURETIC PEPTIDE: CPT

## 2023-07-23 PROCEDURE — 93010 ELECTROCARDIOGRAM REPORT: CPT | Performed by: INTERNAL MEDICINE

## 2023-07-23 PROCEDURE — 84484 ASSAY OF TROPONIN QUANT: CPT

## 2023-07-23 PROCEDURE — 99285 EMERGENCY DEPT VISIT HI MDM: CPT

## 2023-07-23 PROCEDURE — 83690 ASSAY OF LIPASE: CPT

## 2023-07-23 PROCEDURE — 25010000002 ONDANSETRON PER 1 MG: Performed by: EMERGENCY MEDICINE

## 2023-07-23 PROCEDURE — 93005 ELECTROCARDIOGRAM TRACING: CPT

## 2023-07-23 PROCEDURE — 80053 COMPREHEN METABOLIC PANEL: CPT

## 2023-07-23 PROCEDURE — 85025 COMPLETE CBC W/AUTO DIFF WBC: CPT

## 2023-07-23 PROCEDURE — 0 POTASSIUM CHLORIDE 10 MEQ/100ML SOLUTION: Performed by: EMERGENCY MEDICINE

## 2023-07-23 PROCEDURE — 85610 PROTHROMBIN TIME: CPT | Performed by: EMERGENCY MEDICINE

## 2023-07-23 RX ORDER — POTASSIUM CHLORIDE 7.45 MG/ML
10 INJECTION INTRAVENOUS ONCE
Status: COMPLETED | OUTPATIENT
Start: 2023-07-23 | End: 2023-07-23

## 2023-07-23 RX ORDER — ONDANSETRON 2 MG/ML
4 INJECTION INTRAMUSCULAR; INTRAVENOUS ONCE
Status: COMPLETED | OUTPATIENT
Start: 2023-07-23 | End: 2023-07-23

## 2023-07-23 RX ORDER — POTASSIUM CHLORIDE 750 MG/1
40 CAPSULE, EXTENDED RELEASE ORAL ONCE
Status: COMPLETED | OUTPATIENT
Start: 2023-07-23 | End: 2023-07-23

## 2023-07-23 RX ORDER — SODIUM CHLORIDE 0.9 % (FLUSH) 0.9 %
10 SYRINGE (ML) INJECTION AS NEEDED
Status: DISCONTINUED | OUTPATIENT
Start: 2023-07-23 | End: 2023-07-25 | Stop reason: HOSPADM

## 2023-07-23 RX ORDER — ASPIRIN 81 MG/1
324 TABLET, CHEWABLE ORAL ONCE
Status: COMPLETED | OUTPATIENT
Start: 2023-07-23 | End: 2023-07-23

## 2023-07-23 RX ADMIN — ASPIRIN 324 MG: 81 TABLET, CHEWABLE ORAL at 21:28

## 2023-07-23 RX ADMIN — SODIUM CHLORIDE 1000 ML: 9 INJECTION, SOLUTION INTRAVENOUS at 22:22

## 2023-07-23 RX ADMIN — MORPHINE SULFATE 4 MG: 4 INJECTION, SOLUTION INTRAMUSCULAR; INTRAVENOUS at 22:22

## 2023-07-23 RX ADMIN — POTASSIUM CHLORIDE 10 MEQ: 7.46 INJECTION, SOLUTION INTRAVENOUS at 21:29

## 2023-07-23 RX ADMIN — POTASSIUM CHLORIDE 40 MEQ: 750 CAPSULE, EXTENDED RELEASE ORAL at 21:29

## 2023-07-23 RX ADMIN — ONDANSETRON 4 MG: 2 INJECTION INTRAMUSCULAR; INTRAVENOUS at 22:22

## 2023-07-23 NOTE — Clinical Note
Level of Care: Remote Telemetry [26]   Diagnosis: Defibrillator discharge [[id number]]   Admitting Physician: ACE NICHOLAS [[id number]]   Attending Physician: ACE NICHOLAS [[id number]]   Certification: I Certify That Inpatient Hospital Services Are Medically Necessary For Greater Than 2 Midnights

## 2023-07-24 LAB
ALBUMIN SERPL-MCNC: 3.4 G/DL (ref 3.5–5.2)
ALBUMIN/GLOB SERPL: 1 G/DL
ALP SERPL-CCNC: 242 U/L (ref 39–117)
ALT SERPL W P-5'-P-CCNC: 40 U/L (ref 1–33)
ANION GAP SERPL CALCULATED.3IONS-SCNC: 12.9 MMOL/L (ref 5–15)
APTT PPP: 35.8 SECONDS (ref 78–95.9)
APTT PPP: 91 SECONDS (ref 78–95.9)
AST SERPL-CCNC: 52 U/L (ref 1–32)
BASOPHILS # BLD AUTO: 0.02 10*3/MM3 (ref 0–0.2)
BASOPHILS NFR BLD AUTO: 0.2 % (ref 0–1.5)
BILIRUB SERPL-MCNC: 0.8 MG/DL (ref 0–1.2)
BUN SERPL-MCNC: 25 MG/DL (ref 8–23)
BUN/CREAT SERPL: 19.4 (ref 7–25)
CALCIUM SPEC-SCNC: 8.7 MG/DL (ref 8.6–10.5)
CHLORIDE SERPL-SCNC: 105 MMOL/L (ref 98–107)
CO2 SERPL-SCNC: 21.1 MMOL/L (ref 22–29)
CREAT SERPL-MCNC: 1.29 MG/DL (ref 0.57–1)
DEPRECATED RDW RBC AUTO: 45.8 FL (ref 37–54)
EGFRCR SERPLBLD CKD-EPI 2021: 46.2 ML/MIN/1.73
EOSINOPHIL # BLD AUTO: 0 10*3/MM3 (ref 0–0.4)
EOSINOPHIL NFR BLD AUTO: 0 % (ref 0.3–6.2)
ERYTHROCYTE [DISTWIDTH] IN BLOOD BY AUTOMATED COUNT: 12.8 % (ref 12.3–15.4)
GLOBULIN UR ELPH-MCNC: 3.5 GM/DL
GLUCOSE SERPL-MCNC: 137 MG/DL (ref 65–99)
HCT VFR BLD AUTO: 40.6 % (ref 34–46.6)
HGB BLD-MCNC: 13.5 G/DL (ref 12–15.9)
IMM GRANULOCYTES # BLD AUTO: 0.05 10*3/MM3 (ref 0–0.05)
IMM GRANULOCYTES NFR BLD AUTO: 0.5 % (ref 0–0.5)
LYMPHOCYTES # BLD AUTO: 1.28 10*3/MM3 (ref 0.7–3.1)
LYMPHOCYTES NFR BLD AUTO: 12.7 % (ref 19.6–45.3)
MAGNESIUM SERPL-MCNC: 2.5 MG/DL (ref 1.6–2.4)
MCH RBC QN AUTO: 32.5 PG (ref 26.6–33)
MCHC RBC AUTO-ENTMCNC: 33.3 G/DL (ref 31.5–35.7)
MCV RBC AUTO: 97.8 FL (ref 79–97)
MONOCYTES # BLD AUTO: 0.54 10*3/MM3 (ref 0.1–0.9)
MONOCYTES NFR BLD AUTO: 5.4 % (ref 5–12)
NEUTROPHILS NFR BLD AUTO: 8.18 10*3/MM3 (ref 1.7–7)
NEUTROPHILS NFR BLD AUTO: 81.2 % (ref 42.7–76)
NRBC BLD AUTO-RTO: 0 /100 WBC (ref 0–0.2)
PHOSPHATE SERPL-MCNC: 2.5 MG/DL (ref 2.5–4.5)
PLATELET # BLD AUTO: 153 10*3/MM3 (ref 140–450)
PMV BLD AUTO: 13.1 FL (ref 6–12)
POTASSIUM SERPL-SCNC: 4.2 MMOL/L (ref 3.5–5.2)
PROT SERPL-MCNC: 6.9 G/DL (ref 6–8.5)
RBC # BLD AUTO: 4.15 10*6/MM3 (ref 3.77–5.28)
SODIUM SERPL-SCNC: 139 MMOL/L (ref 136–145)
WBC NRBC COR # BLD: 10.07 10*3/MM3 (ref 3.4–10.8)

## 2023-07-24 PROCEDURE — 84100 ASSAY OF PHOSPHORUS: CPT | Performed by: INTERNAL MEDICINE

## 2023-07-24 PROCEDURE — 80053 COMPREHEN METABOLIC PANEL: CPT | Performed by: INTERNAL MEDICINE

## 2023-07-24 PROCEDURE — 85025 COMPLETE CBC W/AUTO DIFF WBC: CPT | Performed by: EMERGENCY MEDICINE

## 2023-07-24 PROCEDURE — 36415 COLL VENOUS BLD VENIPUNCTURE: CPT | Performed by: EMERGENCY MEDICINE

## 2023-07-24 PROCEDURE — 99222 1ST HOSP IP/OBS MODERATE 55: CPT | Performed by: SPECIALIST

## 2023-07-24 PROCEDURE — 93010 ELECTROCARDIOGRAM REPORT: CPT | Performed by: INTERNAL MEDICINE

## 2023-07-24 PROCEDURE — 25010000002 HEPARIN (PORCINE) 25000-0.45 UT/250ML-% SOLUTION

## 2023-07-24 PROCEDURE — 85730 THROMBOPLASTIN TIME PARTIAL: CPT | Performed by: INTERNAL MEDICINE

## 2023-07-24 PROCEDURE — 94799 UNLISTED PULMONARY SVC/PX: CPT

## 2023-07-24 PROCEDURE — 85730 THROMBOPLASTIN TIME PARTIAL: CPT | Performed by: FAMILY MEDICINE

## 2023-07-24 PROCEDURE — 94761 N-INVAS EAR/PLS OXIMETRY MLT: CPT

## 2023-07-24 PROCEDURE — 83735 ASSAY OF MAGNESIUM: CPT | Performed by: INTERNAL MEDICINE

## 2023-07-24 PROCEDURE — 93005 ELECTROCARDIOGRAM TRACING: CPT | Performed by: FAMILY MEDICINE

## 2023-07-24 RX ORDER — CLOPIDOGREL BISULFATE 75 MG/1
75 TABLET ORAL DAILY
Status: DISCONTINUED | OUTPATIENT
Start: 2023-07-24 | End: 2023-07-25 | Stop reason: HOSPADM

## 2023-07-24 RX ORDER — CAPECITABINE 500 MG/1
1500 TABLET, FILM COATED ORAL
Status: DISCONTINUED | OUTPATIENT
Start: 2023-07-24 | End: 2023-07-25 | Stop reason: HOSPADM

## 2023-07-24 RX ORDER — SODIUM CHLORIDE 0.9 % (FLUSH) 0.9 %
10 SYRINGE (ML) INJECTION AS NEEDED
Status: DISCONTINUED | OUTPATIENT
Start: 2023-07-24 | End: 2023-07-25 | Stop reason: HOSPADM

## 2023-07-24 RX ORDER — LOSARTAN POTASSIUM 50 MG/1
50 TABLET ORAL DAILY
Status: DISCONTINUED | OUTPATIENT
Start: 2023-07-24 | End: 2023-07-25 | Stop reason: HOSPADM

## 2023-07-24 RX ORDER — AMOXICILLIN 250 MG
2 CAPSULE ORAL 2 TIMES DAILY
Status: DISCONTINUED | OUTPATIENT
Start: 2023-07-24 | End: 2023-07-25 | Stop reason: HOSPADM

## 2023-07-24 RX ORDER — HEPARIN SODIUM 10000 [USP'U]/100ML
12 INJECTION, SOLUTION INTRAVENOUS
Status: DISCONTINUED | OUTPATIENT
Start: 2023-07-24 | End: 2023-07-24

## 2023-07-24 RX ORDER — POLYETHYLENE GLYCOL 3350 17 G/17G
17 POWDER, FOR SOLUTION ORAL DAILY PRN
Status: DISCONTINUED | OUTPATIENT
Start: 2023-07-24 | End: 2023-07-24 | Stop reason: SDUPTHER

## 2023-07-24 RX ORDER — CAPECITABINE 500 MG/1
1500 TABLET, FILM COATED ORAL 2 TIMES DAILY
Status: DISCONTINUED | OUTPATIENT
Start: 2023-07-24 | End: 2023-07-24

## 2023-07-24 RX ORDER — BISACODYL 10 MG
10 SUPPOSITORY, RECTAL RECTAL DAILY PRN
Status: DISCONTINUED | OUTPATIENT
Start: 2023-07-24 | End: 2023-07-24 | Stop reason: SDUPTHER

## 2023-07-24 RX ORDER — ISOSORBIDE MONONITRATE 30 MG/1
30 TABLET, EXTENDED RELEASE ORAL DAILY
COMMUNITY

## 2023-07-24 RX ORDER — CLONAZEPAM 0.5 MG/1
1 TABLET ORAL ONCE AS NEEDED
Status: COMPLETED | OUTPATIENT
Start: 2023-07-24 | End: 2023-07-24

## 2023-07-24 RX ORDER — AMOXICILLIN 250 MG
2 CAPSULE ORAL 2 TIMES DAILY
Status: DISCONTINUED | OUTPATIENT
Start: 2023-07-24 | End: 2023-07-24 | Stop reason: SDUPTHER

## 2023-07-24 RX ORDER — CLONAZEPAM 0.5 MG/1
1 TABLET ORAL 2 TIMES DAILY PRN
Status: DISCONTINUED | OUTPATIENT
Start: 2023-07-24 | End: 2023-07-25 | Stop reason: HOSPADM

## 2023-07-24 RX ORDER — SODIUM CHLORIDE 0.9 % (FLUSH) 0.9 %
10 SYRINGE (ML) INJECTION EVERY 12 HOURS SCHEDULED
Status: DISCONTINUED | OUTPATIENT
Start: 2023-07-24 | End: 2023-07-25 | Stop reason: HOSPADM

## 2023-07-24 RX ORDER — CYCLOBENZAPRINE HCL 10 MG
10 TABLET ORAL 3 TIMES DAILY PRN
Status: DISCONTINUED | OUTPATIENT
Start: 2023-07-24 | End: 2023-07-25 | Stop reason: HOSPADM

## 2023-07-24 RX ORDER — BISACODYL 5 MG/1
5 TABLET, DELAYED RELEASE ORAL DAILY PRN
Status: DISCONTINUED | OUTPATIENT
Start: 2023-07-24 | End: 2023-07-24 | Stop reason: SDUPTHER

## 2023-07-24 RX ORDER — ASPIRIN 81 MG/1
81 TABLET ORAL DAILY
Status: DISCONTINUED | OUTPATIENT
Start: 2023-07-24 | End: 2023-07-25 | Stop reason: HOSPADM

## 2023-07-24 RX ORDER — POLYETHYLENE GLYCOL 3350 17 G/17G
17 POWDER, FOR SOLUTION ORAL DAILY PRN
Status: DISCONTINUED | OUTPATIENT
Start: 2023-07-24 | End: 2023-07-25 | Stop reason: HOSPADM

## 2023-07-24 RX ORDER — SPIRONOLACTONE 25 MG/1
25 TABLET ORAL 2 TIMES DAILY
Status: DISCONTINUED | OUTPATIENT
Start: 2023-07-24 | End: 2023-07-25 | Stop reason: HOSPADM

## 2023-07-24 RX ORDER — PANTOPRAZOLE SODIUM 40 MG/1
40 TABLET, DELAYED RELEASE ORAL
Status: DISCONTINUED | OUTPATIENT
Start: 2023-07-24 | End: 2023-07-25 | Stop reason: HOSPADM

## 2023-07-24 RX ORDER — BISACODYL 5 MG/1
5 TABLET, DELAYED RELEASE ORAL DAILY PRN
Status: DISCONTINUED | OUTPATIENT
Start: 2023-07-24 | End: 2023-07-25 | Stop reason: HOSPADM

## 2023-07-24 RX ORDER — METOPROLOL SUCCINATE 25 MG/1
25 TABLET, EXTENDED RELEASE ORAL
Status: DISCONTINUED | OUTPATIENT
Start: 2023-07-24 | End: 2023-07-25 | Stop reason: HOSPADM

## 2023-07-24 RX ORDER — CYCLOBENZAPRINE HCL 10 MG
10 TABLET ORAL ONCE AS NEEDED
Status: COMPLETED | OUTPATIENT
Start: 2023-07-24 | End: 2023-07-24

## 2023-07-24 RX ORDER — ASPIRIN 81 MG/1
324 TABLET, CHEWABLE ORAL ONCE
Status: COMPLETED | OUTPATIENT
Start: 2023-07-24 | End: 2023-07-24

## 2023-07-24 RX ORDER — NITROGLYCERIN 0.4 MG/1
TABLET SUBLINGUAL
Status: DISPENSED
Start: 2023-07-24 | End: 2023-07-24

## 2023-07-24 RX ORDER — ISOSORBIDE MONONITRATE 30 MG/1
30 TABLET, EXTENDED RELEASE ORAL DAILY
Status: DISCONTINUED | OUTPATIENT
Start: 2023-07-24 | End: 2023-07-25 | Stop reason: HOSPADM

## 2023-07-24 RX ORDER — FLUOXETINE HYDROCHLORIDE 20 MG/1
60 CAPSULE ORAL DAILY
Status: DISCONTINUED | OUTPATIENT
Start: 2023-07-24 | End: 2023-07-25 | Stop reason: HOSPADM

## 2023-07-24 RX ORDER — NITROGLYCERIN 0.4 MG/1
0.4 TABLET SUBLINGUAL
Status: DISCONTINUED | OUTPATIENT
Start: 2023-07-24 | End: 2023-07-25 | Stop reason: HOSPADM

## 2023-07-24 RX ORDER — BISACODYL 10 MG
10 SUPPOSITORY, RECTAL RECTAL DAILY PRN
Status: DISCONTINUED | OUTPATIENT
Start: 2023-07-24 | End: 2023-07-25 | Stop reason: HOSPADM

## 2023-07-24 RX ORDER — PRAVASTATIN SODIUM 40 MG
80 TABLET ORAL NIGHTLY
Status: DISCONTINUED | OUTPATIENT
Start: 2023-07-24 | End: 2023-07-25 | Stop reason: HOSPADM

## 2023-07-24 RX ORDER — SODIUM CHLORIDE 9 MG/ML
40 INJECTION, SOLUTION INTRAVENOUS AS NEEDED
Status: DISCONTINUED | OUTPATIENT
Start: 2023-07-24 | End: 2023-07-25 | Stop reason: HOSPADM

## 2023-07-24 RX ADMIN — APIXABAN 5 MG: 5 TABLET, FILM COATED ORAL at 20:59

## 2023-07-24 RX ADMIN — CLOPIDOGREL BISULFATE 75 MG: 75 TABLET ORAL at 10:39

## 2023-07-24 RX ADMIN — DOCUSATE SODIUM 50MG AND SENNOSIDES 8.6MG 2 TABLET: 8.6; 5 TABLET, FILM COATED ORAL at 10:39

## 2023-07-24 RX ADMIN — ASPIRIN 324 MG: 81 TABLET, CHEWABLE ORAL at 02:36

## 2023-07-24 RX ADMIN — NITROGLYCERIN 0.4 MG: 0.4 TABLET, ORALLY DISINTEGRATING SUBLINGUAL at 01:00

## 2023-07-24 RX ADMIN — PRAVASTATIN SODIUM 80 MG: 40 TABLET ORAL at 20:59

## 2023-07-24 RX ADMIN — Medication 10 ML: at 10:42

## 2023-07-24 RX ADMIN — ISOSORBIDE MONONITRATE 30 MG: 30 TABLET, EXTENDED RELEASE ORAL at 14:03

## 2023-07-24 RX ADMIN — HEPARIN SODIUM 12 UNITS/KG/HR: 10000 INJECTION, SOLUTION INTRAVENOUS at 02:22

## 2023-07-24 RX ADMIN — CLONAZEPAM 1 MG: 0.5 TABLET ORAL at 03:47

## 2023-07-24 RX ADMIN — Medication 10 ML: at 02:37

## 2023-07-24 RX ADMIN — FLUOXETINE 60 MG: 20 CAPSULE ORAL at 10:39

## 2023-07-24 RX ADMIN — CYCLOBENZAPRINE HYDROCHLORIDE 10 MG: 10 TABLET, FILM COATED ORAL at 03:48

## 2023-07-24 RX ADMIN — ASPIRIN 81 MG: 81 TABLET, COATED ORAL at 10:39

## 2023-07-24 RX ADMIN — Medication 10 ML: at 21:05

## 2023-07-24 RX ADMIN — PANTOPRAZOLE SODIUM 40 MG: 40 TABLET, DELAYED RELEASE ORAL at 10:39

## 2023-07-24 RX ADMIN — METOPROLOL SUCCINATE 25 MG: 25 TABLET, EXTENDED RELEASE ORAL at 10:39

## 2023-07-24 RX ADMIN — SPIRONOLACTONE 25 MG: 25 TABLET ORAL at 20:59

## 2023-07-24 RX ADMIN — CAPECITABINE 1500 MG: 500 TABLET, FILM COATED ORAL at 14:01

## 2023-07-24 NOTE — ED PROVIDER NOTES
Time: 8:21 PM EDT  Date of encounter:  7/23/2023  Independent Historian/Clinical History and Information was obtained by:   Patient    History is limited by: N/A    Chief Complaint   Patient presents with    Pacemaker Problem         History of Present Illness:  Patient is a 63 y.o. year old female who presents to the emergency department for evaluation of pacemaker and internal defib shocked pt X1. Pt was having CP right before she was shocked. No pain now. Took 1 SL nitro prior to shock. (REGINALDO Meeks, LUCILA)    Patient presented to the ED for evaluation after defibrillator fired x1 and Chest pain.  Present on around 6 PM she started feeling strange and lightheaded.  Suddenly her defibrillator went off.  After that she felt better.  She did have chest pain.  Chest pain then resolved.  At time of evaluation around 9:35 patient started having active chest pain she took one of her nitroglycerin.  Another EKG was obtained at that time.  Patient is diaphoretic.  She reported burning sensation in her chest with no radiation.  She denies any nausea or vomiting.  She does have extensive cardiac history and has had 4 MIs in the past.  She has had a bypass in the past.      HPI    Patient Care Team  Primary Care Provider: Nilam Willis MD    Past Medical History:     Allergies   Allergen Reactions    Bupropion Other (See Comments)     Caused falls     Past Medical History:   Diagnosis Date    Abdominal pain     CHF (congestive heart failure)     Colon cancer     Coronary arteriosclerosis     Depressive disorder     with anxiety    Encounter for routine adult health examination     Essential hypertension     Generalized anxiety disorder     GERD (gastroesophageal reflux disease)     Hip pain     Hyperlipidemia     Kidney stone     Osteoarthritis     Radial styloid tenosynovitis of left hand     Urinary tract infectious disease     Vitamin D deficiency      Past Surgical History:   Procedure Laterality Date     CARDIAC CATHETERIZATION  04/15/2016    Enlarged left ventricle with reduced contractility.Severe coronary artery disease as described above. [facility].    COLONOSCOPY  2019    COLON CANCER DOMINGO.    COLONOSCOPY N/A 02/14/2022    Procedure: COLONOSCOPY WITH POLYPECTOMY;  Surgeon: Coy Ordoñez MD;  Location: [facility] ENDOSCOPY;  Service: Gastroenterology;  Laterality: N/A;  COLON POLYP, ANASTAMOSIS IN SIGMOID    CORONARY ANGIOPLASTY WITH STENT PLACEMENT  05/06/2012    PTCA implantation in the vein graft of the  branch. Done at [facility] in [place], [place].    CORONARY ARTERY BYPASS GRAFT  03/18/2003    Emergent CABG x 5 CAD    DE QUERVAIN'S RELEASE Right 11/30/2009    Release of De Quervain's to the right thumb    DEQUERVAIN RELEASE Left 11/18/2015    Release of de Quervain's to the left wrist.    LIVER SURGERY      PACEMAKER IMPLANTATION      DEFIBRILLATOR    PAP SMEAR  06/28/2012    normal    PROCEDURE GENERIC CONVERTED  11/22/2015    MOST RECENT DIASTOLIC BP < 90 mmHg     PROCEDURE GENERIC CONVERTED  11/22/2015    MOST RECENT SYSTOLIC BP > or = 140 mmHg     PROCEDURE GENERIC CONVERTED  11/22/2015    TOBACCO NON-USER     REPLACEMENT TOTAL HIP LATERAL POSITION      SUBTOTAL COLECTOMY N/A 04/23/2019    sigmoid for colon cancer    TOTAL KNEE ARTHROPLASTY       Family History   Problem Relation Age of Onset    Liver disease Mother     Hyperlipidemia Father     Heart disease Father     Diabetes Maternal Grandmother     Arthritis Maternal Grandmother     Heart disease Paternal Grandmother     Heart disease Paternal Grandfather     Diabetes Other     Heart disease Other     Hypertension Other     Stroke Other     Colon cancer Other     Coronary artery disease Other     Other Other         Heart surgery       Home Medications:  Prior to Admission medications    Medication Sig Start Date End Date Taking? Authorizing Provider   acetaminophen (TYLENOL) 500 MG tablet Take 1-2 tablets by mouth  Every 4 (Four) Hours As Needed for Mild Pain.    Chandler Diamond MD   apixaban (ELIQUIS) 5 MG tablet tablet Take 1 tablet by mouth 2 (Two) Times a Day. 7/12/23   Luis Boyce MD   aspirin 81 MG EC tablet Take 1 tablet by mouth Daily.    Chandler Diamond MD   capecitabine (XELODA) 500 MG chemo tablet Take 3 tablets by mouth 2 (Two) Times a Day. Take 3 tablets by mouth in the AM and 3 tablets in the PM on days 1-14, then off 7 days, on a 21 day cycle 7/16/23   Luis Boyce MD   clonazePAM (KlonoPIN) 1 MG tablet Take 1 tablet by mouth 2 (Two) Times a Day. 4/10/23   Nilam Willis MD   clopidogrel (PLAVIX) 75 MG tablet Take 1 tablet by mouth Daily. 1/20/23   Nilam Willis MD   cyanocobalamin (VITAMIN B-12) 500 MCG tablet Take 1 tablet by mouth Daily.    Chandler Diamond MD   cyclobenzaprine (FLEXERIL) 10 MG tablet Take 1 tablet by mouth 3 (Three) Times a Day. 6/13/23   Nilam Willis MD   FLUoxetine (PROzac) 20 MG capsule Take 3 capsules by mouth Daily. 1/30/23   Nilam Willis MD   furosemide (LASIX) 40 MG tablet Take 1 tablet by mouth 2 (Two) Times a Day. 1/20/23   Nilam Willis MD   isosorbide mononitrate (IMDUR) 30 MG 24 hr tablet Take 1 tablet by mouth Daily for 30 days. 1/20/23 4/10/23  Nilam Willis MD   losartan (COZAAR) 25 MG tablet Take 1 tablet by mouth Daily. 1/20/23   Nilam Willis MD   metoprolol succinate XL (TOPROL-XL) 25 MG 24 hr tablet Take 1 tablet by mouth Daily. 1/20/23   Nilam Willis MD   nitroglycerin (NITROSTAT) 0.4 MG SL tablet Place 1 tablet under the tongue As Needed for Chest Pain.  - IF PAIN REMAINS AFTER 5 MIN CALL 911 AND REPEAT DOSE MAX 3 TABS IN 15 MINUTES 1/20/23   Nilam Willis MD   omeprazole (priLOSEC) 40 MG capsule Take 1 capsule by mouth Daily. 10/7/22   Nilam Willis MD   ondansetron (ZOFRAN) 8 MG tablet Take 1 tablet by mouth 3 (Three) Times a Day As Needed for Nausea or Vomiting. 7/13/23   Luis Boyce  "MD CASPER   pravastatin (PRAVACHOL) 80 MG tablet Take 1 tablet by mouth Every Night. 1/20/23   Nilam Willis MD   spironolactone (ALDACTONE) 25 MG tablet Take 1 tablet by mouth 2 (Two) Times a Day. 1/20/23   Nilam Willis MD   tiZANidine (ZANAFLEX) 4 MG tablet Take 1 tablet by mouth Every 8 (Eight) Hours As Needed for Muscle Spasms. 1/20/23   Nilam Willis MD   vitamin D (ERGOCALCIFEROL) 1.25 MG (38666 UT) capsule capsule Take 1 capsule by mouth 1 (One) Time Per Week. 1/30/23   Nilam Willis MD        Social History:   Social History     Tobacco Use    Smoking status: Never    Smokeless tobacco: Never   Vaping Use    Vaping Use: Never used   Substance Use Topics    Alcohol use: No    Drug use: Never         Review of Systems:  Review of Systems   Constitutional:  Negative for chills and fever.   HENT:  Negative for congestion, ear pain and sore throat.    Eyes:  Negative for pain.   Respiratory:  Negative for cough, chest tightness and shortness of breath.    Cardiovascular:  Positive for chest pain.   Gastrointestinal:  Negative for abdominal pain, diarrhea, nausea and vomiting.   Genitourinary:  Negative for flank pain and hematuria.   Musculoskeletal:  Negative for joint swelling.   Skin:  Negative for pallor.   Neurological:  Negative for seizures and headaches.   All other systems reviewed and are negative.     Physical Exam:  /68 (Patient Position: Lying)   Pulse 77   Temp 98.6 °F (37 °C) (Oral)   Resp 20   Ht 160 cm (63\")   Wt 63.5 kg (140 lb)   SpO2 98%   BMI 24.80 kg/m²         Physical Exam  Constitutional:       General: She is in acute distress.      Appearance: She is diaphoretic.   HENT:      Head: Normocephalic and atraumatic.      Nose: Nose normal.      Mouth/Throat:      Mouth: Mucous membranes are moist.   Eyes:      Extraocular Movements: Extraocular movements intact.      Conjunctiva/sclera: Conjunctivae normal.      Pupils: Pupils are equal, round, and reactive to " light.   Cardiovascular:      Rate and Rhythm: Normal rate and regular rhythm.      Pulses: Normal pulses.      Heart sounds: Normal heart sounds.   Pulmonary:      Effort: Pulmonary effort is normal.      Breath sounds: Normal breath sounds.   Abdominal:      General: There is no distension.      Palpations: Abdomen is soft.      Tenderness: There is no abdominal tenderness.   Musculoskeletal:         General: Normal range of motion.      Cervical back: Normal range of motion.   Skin:     General: Skin is warm.      Capillary Refill: Capillary refill takes less than 2 seconds.   Neurological:      General: No focal deficit present.      Mental Status: She is alert and oriented to person, place, and time. Mental status is at baseline.   Psychiatric:         Mood and Affect: Mood normal.         Behavior: Behavior normal.                    Procedures:  Procedures      Medical Decision Making:      Comorbidities that affect care:    Anxiety, Cancer, Congestive Heart Failure, Coronary Artery Disease, Hypertension    External Notes reviewed:    Previous cath report on 1/13/21  CONCLUSION:    1.  Enlarged left ventricle with reduced systolic function.    2.  Widely patent LIMA graft to the LAD, vein graft to the diagonal and        occluded vein graft to the right coronary artery and vein graft to the        marginal branch.        The following orders were placed and all results were independently analyzed by me:  Orders Placed This Encounter   Procedures    XR Chest 1 View    [place] Draw    High Sensitivity Troponin T    Comprehensive Metabolic Panel    Lipase    BNP    Magnesium    CBC Auto Differential    High Sensitivity Troponin T 2Hr    Protime-INR    aPTT    aPTT    CBC Auto Differential    NPO Diet NPO Type: Strict NPO    Undress & Gown    Continuous Pulse Oximetry    Device Interrogation. Device type? Pacemaker; Company to contact? [employer] [phone]    Interrogate pace maker  Misc Nursing Order  (Specify)    Notify Provider Platelet Count Less Than [phone]    Notify Provider if PTT Not in Therapeutic Range After 24 Hours    Stop Infusion & Notify Provider if Bleeding Occurs    RN To Release aPTT Order 6 Hours After Heparin Bolus & 6 Hours After Any Heparin Rate Change    After 2 Consecutive Therapeutic aPTTs, Obtain aPTT Daily.  If a Rate Adjustment is Necessary, Resume Every 6 Hour aPTT Draws    Inpatient Cardiology Consult    Inpatient Hospitalist Consult    Oxygen Therapy- Nasal Cannula; Titrate 1-6 LPM Per SpO2; 90 - 95%    ECG 12 Lead ED Triage Standing Order; Chest Pain    ECG 12 Lead ED Triage Standing Order; Chest Pain    ECG 12 Lead Chest Pain    Insert Peripheral IV    CBC & Differential    Green Top (Gel)    Lavender Top    Gold Top - SST    Light Blue Top    CBC & Differential       Medications Given in the Emergency Department:  Medications   sodium chloride 0.9 % flush 10 mL (has no administration in time range)   aspirin chewable tablet 324 mg (324 mg Oral Given 7/23/23 2128)   potassium chloride 10 mEq in 100 mL IVPB (0 mEq Intravenous Stopped 7/23/23 2230)   potassium chloride (MICRO-K) CR capsule 40 mEq (40 mEq Oral Given 7/23/23 2129)   morphine injection 4 mg (4 mg Intravenous Given 7/23/23 2222)   sodium chloride 0.9 % bolus 1,000 mL (1,000 mL Intravenous New Bag 7/23/23 2222)   ondansetron (ZOFRAN) injection 4 mg (4 mg Intravenous Given 7/23/23 2222)        ED Course:    The patient was initially evaluated in the triage area where orders were placed. The patient was later dispositioned by Iron Alcantar MD.      The patient was advised to stay for completion of workup which includes but is not limited to communication of labs and radiological results, reassessment and plan. The patient was advised that leaving prior to disposition by a provider could result in critical findings that are not communicated to the patient.     ED Course as of 07/23/23 2243   Sun Jul 23, 2023 2024    --- PROVIDER IN TRIAGE NOTE ---    Patient was seen and evaluated in triage by LUCILA Stephenson.  Orders were written and the patient is currently awaiting disposition.   [MS]   2123 ECG 12 Lead ED Triage Standing Order; Chest Pain  Sinus rhythm rate of 63, LVH, normal MD interval.  Prolonged QTc.  Intermittently paced.  No acute ST elevation.  EKG interpreted by me [LD]   2125 ECG 12 Lead ED Triage Standing Order; Chest Pain  Sinus rhythm with rate of 57.  No acute ST elevation.  Prolonged QTc interval.  Normal MD interval.  Mild ST depression in lead V2.  EKG interpreted by me [LD]   2200 ECG 12 Lead Chest Pain  Sinus rhythm rate of 66.  ST elevation in lead III, aVF with ST depression in lead V2, she also has a mild ST depression in lead I and aVL.  EKG interpreted by me. [LD]      ED Course User Index  [LD] Iron Alcantar MD  [MS] Carla Meeks APRN       Labs:    Lab Results (last 24 hours)       Procedure Component Value Units Date/Time    High Sensitivity Troponin T [[id number]]  (Abnormal) Collected: 07/23/23 1910    Specimen: Blood Updated: 07/23/23 1940     HS Troponin T 20 ng/L     Narrative:      High Sensitive Troponin T Reference Range:  <10.0 ng/L- Negative Female for AMI  <15.0 ng/L- Negative Male for AMI  >=10 - Abnormal Female indicating possible myocardial injury.  >=15 - Abnormal Male indicating possible myocardial injury.   Clinicians would have to utilize clinical acumen, EKG, Troponin, and serial changes to determine if it is an Acute Myocardial Infarction or myocardial injury due to an underlying chronic condition.         CBC & Differential [[id number]]  (Abnormal) Collected: 07/23/23 1910    Specimen: Blood Updated: 07/23/23 1917    Narrative:      The following orders were created for panel order CBC & Differential.  Procedure                               Abnormality         Status                     ---------                               -----------         ------                      CBC Auto Differential[[id number]]        Abnormal            Final result                 Please view results for these tests on the individual orders.    Comprehensive Metabolic Panel [[id number]]  (Abnormal) Collected: 07/23/23 1910    Specimen: Blood Updated: 07/23/23 1940     Glucose 141 mg/dL      BUN 31 mg/dL      Creatinine 1.91 mg/dL      Sodium 141 mmol/L      Potassium 2.7 mmol/L      Comment: Slight hemolysis detected by analyzer. Results may be affected.        Chloride 101 mmol/L      CO2 24.2 mmol/L      Calcium 9.1 mg/dL      Total Protein 7.0 g/dL      Albumin 3.7 g/dL      ALT (SGPT) 42 U/L      AST (SGOT) 55 U/L      Alkaline Phosphatase 251 U/L      Total Bilirubin 0.7 mg/dL      Globulin 3.3 gm/dL      A/G Ratio 1.1 g/dL      BUN/Creatinine Ratio 16.2     Anion Gap 15.8 mmol/L      eGFR 28.8 mL/min/1.73     Narrative:      GFR Normal >60  Chronic Kidney Disease <60  Kidney Failure <15      Lipase [[id number]]  (Normal) Collected: 07/23/23 1910    Specimen: Blood Updated: 07/23/23 1940     Lipase 26 U/L     BNP [[id number]]  (Normal) Collected: 07/23/23 1910    Specimen: Blood Updated: 07/23/23 1935     proBNP 513.3 pg/mL     Narrative:      Among patients with dyspnea, NT-proBNP is highly sensitive for the detection of acute congestive heart failure. In addition NT-proBNP of <300 pg/ml effectively rules out acute congestive heart failure with 99% negative predictive value.      Magnesium [[id number]]  (Normal) Collected: 07/23/23 1910    Specimen: Blood Updated: 07/23/23 1940     Magnesium 2.0 mg/dL     CBC Auto Differential [[id number]]  (Abnormal) Collected: 07/23/23 1910    Specimen: Blood Updated: 07/23/23 1917     WBC 10.74 10*3/mm3      RBC 4.32 10*6/mm3      Hemoglobin 13.7 g/dL      Hematocrit 42.0 %      MCV 97.2 fL      MCH 31.7 pg      MCHC 32.6 g/dL      RDW 12.9 %      RDW-SD 45.9 fl      MPV 12.7 fL      Platelets 200 10*3/mm3      Neutrophil % 85.0 %      Lymphocyte %  8.8 %      Monocyte % 5.5 %      Eosinophil % 0.0 %      Basophil % 0.3 %      Immature Grans % 0.4 %      Neutrophils, Absolute 9.13 10*3/mm3      Lymphocytes, Absolute 0.95 10*3/mm3      Monocytes, Absolute 0.59 10*3/mm3      Eosinophils, Absolute 0.00 10*3/mm3      Basophils, Absolute 0.03 10*3/mm3      Immature Grans, Absolute 0.04 10*3/mm3      nRBC 0.2 /100 WBC     Protime-INR [[id number]]  (Abnormal) Collected: 07/23/23 1910    Specimen: Blood Updated: 07/23/23 2203     Protime 16.8 Seconds      INR 1.36    Narrative:      Suggested Therapeutic Ranges For Oral Anticoagulant Therapy:  Level of Therapy                      INR Target Range  Standard Dose                            2.0-3.0  High Dose                                2.5-3.5  Patients not receiving anticoagulant  Therapy Normal Range                     0.86-1.15    aPTT [[id number]]  (Abnormal) Collected: 07/23/23 1910    Specimen: Blood Updated: 07/23/23 2203     PTT 26.6 seconds     High Sensitivity Troponin T 2Hr [[id number]] Collected: 07/23/23 2109    Specimen: Blood Updated: 07/23/23 2219             Imaging:    XR Chest 1 View    Result Date: 7/23/2023  PROCEDURE: XR CHEST 1 VW  COMPARISON: [facility], PET, NM PET/CT SKULL BASE TO MID THIGH, 1/16/2023, 14:57.  [facility], CR, XR CHEST 1 VW, 12/21/2022, 23:24.  INDICATIONS: Chest Pain Triage Protocol  FINDINGS:  Cardiomediastinal contours appear stable, including postoperative changes and pacemaker/ICD leads.  Lungs are clear.  No pneumothorax or large pleural effusion is seen.        No evidence of acute cardiopulmonary abnormality or significant change.       MACK LARSEN MD       Electronically Signed and Approved By: MACK LARSEN MD on 7/23/2023 at 19:38                Differential Diagnosis and Discussion:      Chest Pain:  Based on the patient's signs and symptoms, I considered aortic dissection, myocardial infaction, pulmonary embolism, cardiac  tamponade, pericarditis, pneumothorax, musculoskeletal chest pain and other differential diagnosis as an etiology of the patient's chest pain.     All labs were reviewed and interpreted by me.  All X-rays impressions were independently interpreted by me.  EKG was interpreted by me.    MDM  Number of Diagnoses or Management Options  TERESSA (acute kidney injury)  Chest pain, unspecified type  Hypokalemia  NSTEMI (non-ST elevated myocardial infarction)  V tach  Diagnosis management comments: At time of evaluation patient is lying in bed complains severe chest pain.  Another EKG was obtained that showed acute changes when compared to the previous 1.  There was some ST elevation in inferior leads with depressed ST segments and anterior leads.  With these acute changes I discussed patient with interventional cardiologist Dr. Ochoa.  He reviewed EKG and states likely due to previous injury.  He recommend discontinuing Eliquis and starting her on heparin drip.  Also recommend pain medication for pain.  Pacemaker was also interpreted which does show that patient went into V-tach and was defibrillated.  Labs also showed low potassium of 2.7.  Patient was given supplemental potassium.  Chest x-ray showed no acute findings.  Discussed patient with hospitalist and she will be admitted for further care.       Amount and/or Complexity of Data Reviewed  Clinical lab tests: reviewed  Tests in the radiology section of CPT®: reviewed  Review and summarize past medical records: yes  Independent visualization of images, tracings, or specimens: yes    Risk of Complications, Morbidity, and/or Mortality  Presenting problems: moderate  Management options: moderate         Critical Care Note: Total Critical Care time of 45 minutes. Total critical care time documented does not include time spent on separately billed procedures for services of nurses or physician assistants. I personally saw and examined the patient. I have reviewed all diagnostic  interpretations and treatment plans as written. I was present for the key portions of any procedures performed and the inclusive time noted in any critical care statement. Critical care time includes patient management by me, time spent at the patients bedside,  time to review lab and imaging results, discussing patient care, documentation in the medical record, and time spent with family or caregiver.    Patient Care Considerations:    CT CHEST: I considered ordering a CT scan of the chest, however not emergently indicated can obtain as in patient if indicated      Consultants/Shared Management Plan:    Hospitalist: I have discussed the case with Dr Ojeda who agrees to accept the patient for admission.  Consultant: I have discussed the case with Dr Ochoa who states not acute STEMI recommend starting her on heparin and admission to hospital    Social Determinants of Health:    Patient is independent, reliable, and has access to care.       Disposition and Care Coordination:    Admit:   Through independent evaluation of the patient's history, physical, and imperical data, the patient meets criteria for observation/admission to the hospital.        Final diagnoses:   V tach   Hypokalemia   NSTEMI (non-ST elevated myocardial infarction)   TERESSA (acute kidney injury)   Chest pain, unspecified type        ED Disposition       ED Disposition   Decision to Admit    Condition   --    Comment   --               This medical record created using voice recognition software.             Iron Alcantar MD  07/23/23 7523

## 2023-07-24 NOTE — H&P
" [facility] HISTORY AND PHYSICAL  Date: 2023   Patient Name: Erika Bowens  : 1960  MRN: [id number]  Primary Care Physician:  Nilam Willis MD  Date of admission: 2023    Subjective   Subjective     Chief Complaint: Pacemaker problem    HPI:    Erika Bowens is a 63 y.o. female brought into the emergency department for evaluation of defibrillator discharge x1 episode.  Patient states that she had an abrupt onset of chest pain prior to the shock.  She had taken 1 sublingual nitroglycerin.  Subsequently came to the emergency department for evaluation, onset of symptoms approximately 6 PM.  States she felt \"strange and lightheaded\", defibrillator fired, and improvement in symptoms.  However chest pain continued and then subsequently resolved.  While in the emergency department, chest pain had recurred, took 1 more sublingual nitro.  Patient became diaphoretic at that time, with a burning sensation in her chest without radiation.  Patient has an extensive cardiac history with 4 prior MIs and bypass grafting in the past.      Personal History     Past Medical History:  Past Medical History:   Diagnosis Date   • Abdominal pain    • CHF (congestive heart failure)    • Colon cancer    • Coronary arteriosclerosis    • Depressive disorder     with anxiety   • Encounter for routine adult health examination    • Essential hypertension    • Generalized anxiety disorder    • GERD (gastroesophageal reflux disease)    • Hip pain    • Hyperlipidemia    • Kidney stone    • Osteoarthritis    • Radial styloid tenosynovitis of left hand    • Urinary tract infectious disease    • Vitamin D deficiency          Past Surgical History:  Past Surgical History:   Procedure Laterality Date   • CARDIAC CATHETERIZATION  04/15/2016    Enlarged left ventricle with reduced contractility.Severe coronary artery disease as described above. [facility].   • COLONOSCOPY  2019    COLON CANCER DOMINGO.   • " COLONOSCOPY N/A 02/14/2022    Procedure: COLONOSCOPY WITH POLYPECTOMY;  Surgeon: Coy Ordoñez MD;  Location: [facility] ENDOSCOPY;  Service: Gastroenterology;  Laterality: N/A;  COLON POLYP, ANASTAMOSIS IN SIGMOID   • CORONARY ANGIOPLASTY WITH STENT PLACEMENT  05/06/2012    PTCA implantation in the vein graft of the OM branch. Done at [facility] in [place], [place].   • CORONARY ARTERY BYPASS GRAFT  03/18/2003    Emergent CABG x 5 CAD   • DE QUERVAIN'S RELEASE Right 11/30/2009    Release of De Quervain's to the right thumb   • DEQUERVAIN RELEASE Left 11/18/2015    Release of de Quervain's to the left wrist.   • LIVER SURGERY     • PACEMAKER IMPLANTATION      DEFIBRILLATOR   • PAP SMEAR  06/28/2012    normal   • PROCEDURE GENERIC CONVERTED  11/22/2015    MOST RECENT DIASTOLIC BP < 90 mmHg    • PROCEDURE GENERIC CONVERTED  11/22/2015    MOST RECENT SYSTOLIC BP > or = 140 mmHg    • PROCEDURE GENERIC CONVERTED  11/22/2015    TOBACCO NON-USER    • REPLACEMENT TOTAL HIP LATERAL POSITION     • SUBTOTAL COLECTOMY N/A 04/23/2019    sigmoid for colon cancer   • TOTAL KNEE ARTHROPLASTY           Family History:   Family History   Problem Relation Age of Onset   • Liver disease Mother    • Hyperlipidemia Father    • Heart disease Father    • Diabetes Maternal Grandmother    • Arthritis Maternal Grandmother    • Heart disease Paternal Grandmother    • Heart disease Paternal Grandfather    • Diabetes Other    • Heart disease Other    • Hypertension Other    • Stroke Other    • Colon cancer Other    • Coronary artery disease Other    • Other Other         Heart surgery           Social History:   Social History     Socioeconomic History   • Marital status: Single   Tobacco Use   • Smoking status: Never   • Smokeless tobacco: Never   Vaping Use   • Vaping Use: Never used   Substance and Sexual Activity   • Alcohol use: No   • Drug use: Never   • Sexual activity: Never         Home Medications:  FLUoxetine,  acetaminophen, apixaban, aspirin, capecitabine, clonazePAM, clopidogrel, cyanocobalamin, cyclobenzaprine, furosemide, isosorbide mononitrate, losartan, metoprolol succinate XL, nitroglycerin, omeprazole, ondansetron, pravastatin, spironolactone, tiZANidine, and vitamin D    Allergies:  Allergies   Allergen Reactions   • Bupropion Other (See Comments)     Caused falls       Review of Systems   All systems were reviewed and negative except for: Chest pain, lightheadedness, dizziness, defibrillator discharge    Objective   Objective     Vitals:   Temp:  [98.6 °F (37 °C)] 98.6 °F (37 °C)  Heart Rate:  [62-77] 77  Resp:  [20] 20  BP: (105-159)/() 117/68    Physical Exam    Constitutional: Awake, alert, no acute distress   Eyes: Pupils equal, sclerae anicteric, no conjunctival injection   HENT: NCAT, mucous membranes moist   Neck: Supple, no thyromegaly, no lymphadenopathy, trachea midline   Respiratory: Clear to auscultation bilaterally, nonlabored respirations    Cardiovascular: RRR, no murmurs, rubs, or gallops, palpable pedal pulses bilaterally   Gastrointestinal: Positive bowel sounds, soft, nontender, nondistended   Musculoskeletal: No bilateral ankle edema, no clubbing or cyanosis to extremities   Psychiatric: Appropriate affect, cooperative   Neurologic: Oriented x 3, strength symmetric in all extremities, Cranial Nerves grossly intact to confrontation, speech clear   Skin: No rashes     Result Review    Result Review:  I have personally reviewed the results from the time of this admission to 7/23/2023 22:14 EDT and agree with these findings:  [x]  Laboratory  []  Microbiology  [x]  Radiology  [x]  EKG/Telemetry   [x]  Cardiology/Vascular   []  Pathology  [x]  Old records  []  Other:      Assessment & Plan   Assessment / Plan     Assessment/Plan:   Defibrillator discharge-cardiology was consulted by ED, made recommendations to hold patient's Eliquis and transition to IV heparin.  EKG was reviewed,  recommended no intervention at this time, continue with medical management.  [employer] pacemaker/defibrillator #[id number] was interrogated in the ED, confirmed received by [employer].  Appreciate cardiology's recommendations the care of this patient.  2D echo was performed in December 2022, EF was 31 to 35%.  We will repeat 2D echo during this hospitalization due to defibrillator discharge.  Rule out type I versus type II NSTEMI-plan as above.  Probable cardiotoxicity secondary to capecitabine-oncology consult placed, we appreciate their recommendations and titration of this medication.  Patient reports metastatic colorectal cancer to the liver.  Hypokalemia-replete, recheck.  CKD-avoid contrast if necessary, however benefits of heart catheterization versus risk of contrast-induced nephropathy will be deferred to cardiology services.  If no improvement, consider nephrology consultation.  Continue to monitor.        DVT prophylaxis: Heparin    CODE STATUS: Full code    Admission Status:  I believe this patient meets inpatient status.    Electronically signed by Josiah Ojeda DO, 07/23/23, 10:14 PM EDT.

## 2023-07-24 NOTE — SIGNIFICANT NOTE
23 1230   Coping/Psychosocial   Observed Emotional State calm;cooperative   Verbalized Emotional State fear;other (see comments)  (Pt says she says she has cancer and she juhi to .)   Trust Relationship/Rapport empathic listening provided   Involvement in Care interacting with patient   Additional Documentation Spiritual Care (Group)   Spiritual Care   Use of Spiritual Resources non-Faith use of spiritual care   Spiritual Care Source  initiative   Response to Spiritual Care receptive of support;engaged in conversation   Spiritual Care Interventions supportive conversation provided   Spiritual Care Visit Type initial   Receptivity to Spiritual Care visit welcomed

## 2023-07-24 NOTE — PROGRESS NOTES
[facility]   Hospitalist Progress Note  Date: 2023  Patient Name: Erika Bowens  : 1960  MRN: [id number]  Date of admission: 2023      Subjective   Subjective     Chief Complaint: Pacemaker fired    Summary:  63 y.o. female PMH CHF, HTN, colorectal cancer with mets to the liver, ischemic cardiomyopathy status post AICD placement, CAD status post CABG with occluded grafts brought into the emergency department for evaluation of defibrillator discharge x1 episode.  Patient states that she had an abrupt onset of chest pain prior to the shock.  She had taken 1 sublingual nitroglycerin.  Subsequently came to the emergency department for evaluation, onset of symptoms approximately 6 PM.  While in the emergency department, chest pain recurred, took 1 more sublingual nitro.  She was admitted for further care, hypokalemia was treated.  Currently, cardiology feels if we correct the hypokalemia she can be discharged on .  Monitoring on telemetry 1 more night.    Interval Followup: No acute events overnight.  No further shocks since admission.  Did have 2 short bursts of nonsustained V. tach since admission.  Potassium improved this morning.    Objective   Objective     Vitals:   Temp:  [97.7 °F (36.5 °C)-98.6 °F (37 °C)] 97.9 °F (36.6 °C)  Heart Rate:  [62-82] 70  Resp:  [16-20] 18  BP: ()/() 124/56  Physical Exam    Constitutional: Awake, alert, no acute distress   Respiratory: Clear to auscultation bilaterally, nonlabored respirations    Cardiovascular: RRR, pacemaker in place, no murmurs, rubs, or gallops   Gastrointestinal: Positive bowel sounds, soft, nontender, nondistended   Neurologic: Oriented x 3, strength symmetric in all extremities, Cranial Nerves grossly intact to confrontation, speech clear    Result Review    Result Review:  I have personally reviewed the results below:  [x]  Laboratory personally reviewed CMP, magnesium, phosphorus, CBC  []  Microbiology  []   Radiology  [x]  EKG/Telemetry telemetry personally reviewed.  2 short 5 beat runs of nonsustained ventricular tachycardia overnight  []  Cardiology/Vascular   []  Pathology  []  Old records  []  Other:  CBC          7/12/2023    14:57 7/23/2023    19:10 7/24/2023    06:09   CBC   WBC 8.25  10.74  10.07    RBC 4.52  4.32  4.15    Hemoglobin 14.2  13.7  13.5    Hematocrit 44.8  42.0  40.6    MCV 99.1  97.2  97.8    MCH 31.4  31.7  32.5    MCHC 31.7  32.6  33.3    RDW 12.8  12.9  12.8    Platelets 173  200  153      CMP          7/12/2023    14:57 7/23/2023    19:10 7/24/2023    08:26   CMP   Glucose 90  141  137    BUN 18  31  25    Creatinine 1.29  1.91  1.29    EGFR 46.2  28.8  46.2    Sodium 138  141  139    Potassium 3.2  2.7  4.2    Chloride 99  101  105    Calcium 9.2  9.1  8.7    Total Protein 7.4  7.0  6.9    Albumin 3.8  3.7  3.4    Globulin 3.6  3.3  3.5    Total Bilirubin 0.6  0.7  0.8    Alkaline Phosphatase 328  251  242    AST (SGOT) 62  55  52    ALT (SGPT) 46  42  40    Albumin/Globulin Ratio 1.1  1.1  1.0    BUN/Creatinine Ratio 14.0  16.2  19.4    Anion Gap 12.4  15.8  12.9        Assessment & Plan   Assessment / Plan     Assessment/Plan:  Defibrillator discharge, likely secondary to hypokalemia  Hypokalemia  Likely type II NSTEMI secondary to defibrillator firing  Nonsustained ventricular tachycardia  Ischemic cardiomyopathy status post AICD placement  CKD stage III 3a  GERD  CAD status post CABG with occluded grafts on recent cath  Metastatic cortical cancer to the liver  Anxiety  Arthritis  Hypertension  Hyperlipidemia    Continue to monitor in the hospital for management of the above  Cardiology following, appreciate assistance  Patient's last cardiac catheterization did show 100% occlusion of right coronary marginal branches that are being medically managed  Cardiology feels ICD shocks are likely secondary to hypokalemia causing ventricular tachycardia  Will continue to monitor on telemetry 1  more night to ensure no sustained V. tach is captured and hypokalemia resolved  Potassium and magnesium significantly improved this morning  Restart home Aldactone, hold Lasix today and likely restart tomorrow  Restart home Klonopin  Restart home Eliquis  Patient is on triple therapy with DAPT and Eliquis for medical management of occluded grafts, will continue and watch closely for signs of anemia  Trend renal function and electrolytes with a.m. BMP, magnesium   Trend Hgb and WBC with a.m. CBC     Discussed plan with RN, cardiology    DVT prophylaxis:  Medical DVT prophylaxis orders are present.    CODE STATUS:   Level Of Support Discussed With: Patient  Code Status (Patient has no pulse and is not breathing): CPR (Attempt to Resuscitate)  Medical Interventions (Patient has pulse or is breathing): Full Support  Release to patient: Routine Release        Electronically signed by Sander Bennett MD, 07/24/23, 12:41 PM EDT.

## 2023-07-24 NOTE — PLAN OF CARE
Goal Outcome Evaluation:  Plan of Care Reviewed With: patient        Progress: improving          No c/o pt this shift. X2 runs of vtach this shift, MD aware. VSS.

## 2023-07-24 NOTE — CASE MANAGEMENT/SOCIAL WORK
Discharge Planning Assessment   [place]     Patient Name: Erika Bowens  MRN: [id number]  Today's Date: 7/24/2023    Admit Date: 7/23/2023    Plan: patient admitted due to defibrillator discharge. Patient reports she lives alone but her sister lives across the street and helps her. Patient is independent at home and comfirms pcp and pharm. Patient is on a oral chemo at this time. Patiet denies additional needs. Patient plans to return home at time of discharge   Discharge Needs Assessment       Row Name 07/24/23 1433       Living Environment    People in Home alone    Current Living Arrangements home    Potentially Unsafe Housing Conditions none    Primary Care Provided by self    Family Caregiver if Needed sibling(s)    Quality of Family Relationships helpful;involved;supportive    Able to Return to Prior Arrangements yes       Resource/Environmental Concerns    Resource/Environmental Concerns none    Transportation Concerns none       Transition Planning    Patient/Family Anticipates Transition to home with family    Patient/Family Anticipated Services at Transition none    Transportation Anticipated family or friend will provide       Discharge Needs Assessment    Readmission Within the Last 30 Days no previous admission in last 30 days    Equipment Currently Used at Home walker, rolling;wheelchair;walker, standard;wheelchair, motorized;shower chair;grab bar    Concerns to be Addressed discharge planning    Anticipated Changes Related to Illness none    Equipment Needed After Discharge none                   Discharge Plan       Row Name 07/24/23 1600       Plan    Plan patient admitted due to defibrillator discharge. Patient reports she lives alone but her sister lives across the street and helps her. Patient is independent at home and comfirms pcp and pharm. Patient is on a oral chemo at this time. Patiet denies additional needs. Patient plans to return home at time of discharge    Final Discharge  Disposition Code 01 - home or self-care                  Continued Care and Services - Admitted Since 7/23/2023    Coordination has not been started for this encounter.       Selected Continued Care - Episodes Includes continued care and service providers with selected services from the active episodes listed below      Oncology Episode start date: 7/13/2023   There are no active outsourced providers for this episode.                    Demographic Summary       Row Name 07/24/23 1431       General Information    Admission Type inpatient    Arrived From home;emergency department    Referral Source admission list    Reason for Consult discharge planning    Preferred Language English       Contact Information    Permission Granted to Share Info With family/designee    Contact Information Obtained for                    Functional Status       Row Name 07/24/23 1433       Functional Status    Usual Activity Tolerance good    Current Activity Tolerance good       Assessment of Health Literacy    How often do you have someone help you read hospital materials? Occasionally    How often do you have problems learning about your medical condition because of difficulty understanding written information? Never    How often do you have a problem understanding what is told to you about your medical condition? Never    How confident are you filling out medical forms by yourself? Quite a bit    Health Literacy Moderate       Functional Status, IADL    Medications independent;assistive person    Meal Preparation independent;assistive person    Housekeeping independent;assistive person    Laundry independent;assistive person    Shopping independent;assistive person       Mental Status    General Appearance WDL WDL       Mental Status Summary    Recent Changes in Mental Status/Cognitive Functioning no changes                   Psychosocial    No documentation.                  Abuse/Neglect    No documentation.                   Legal    No documentation.                  Substance Abuse    No documentation.                  Patient Forms    No documentation.                     Danuta Turpin RN

## 2023-07-24 NOTE — CONSULTS
[facility]   Cardiology Consult Note    Patient Name: Erika Bowens  : 1960  MRN: [id number]  Primary Care Physician:  Nilam Willis MD  Referring Physician: No ref. provider found  Date of admission: 2023    Subjective   Subjective     Reason for Consult/ Chief Complaint: ICD shock    HPI:  Erika Bowens is a 63 y.o. female with history of metastatic CA, ischemic cardiomyopathy, coronary artery disease s/p CABG with occluded grafts.  Admitted with hyperkalemia and ICD shocks.  No further ICD shocks since admission    Review of Systems:      Constitutional no fever,  no weight loss   Skin no rash   Otolaryngeal no difficulty swallowing   Cardiovascular See HPI   Pulmonary no cough, no sputum production                         Personal History       Past Medical/Surgical History:   Past Medical History:   Diagnosis Date   • Abdominal pain    • CHF (congestive heart failure)    • Colon cancer    • Coronary arteriosclerosis    • Depressive disorder     with anxiety   • Encounter for routine adult health examination    • Essential hypertension    • Generalized anxiety disorder    • GERD (gastroesophageal reflux disease)    • Hip pain    • Hyperlipidemia    • Kidney stone    • Osteoarthritis    • Radial styloid tenosynovitis of left hand    • Urinary tract infectious disease    • Vitamin D deficiency      Past Surgical History:   Procedure Laterality Date   • CARDIAC CATHETERIZATION  04/15/2016    Enlarged left ventricle with reduced contractility.Severe coronary artery disease as described above. [facility].   • COLONOSCOPY  2019    COLON CANCER DOMINGO.   • COLONOSCOPY N/A 2022    Procedure: COLONOSCOPY WITH POLYPECTOMY;  Surgeon: Coy Ordoñez MD;  Location: [facility] ENDOSCOPY;  Service: Gastroenterology;  Laterality: N/A;  COLON POLYP, ANASTAMOSIS IN SIGMOID   • CORONARY ANGIOPLASTY WITH STENT PLACEMENT  2012    PTCA implantation in the vein graft of the OM branch.  Done at [facility] in [place], [place]   • CORONARY ARTERY BYPASS GRAFT  03/18/2003    Emergent CABG x 5 CAD   • DE QUERVAIN'S RELEASE Right 11/30/2009    Release of De Quervain's to the right thumb   • DEQUERVAIN RELEASE Left 11/18/2015    Release of de Quervain's to the left wrist.   • LIVER SURGERY     • PACEMAKER IMPLANTATION      DEFIBRILLATOR   • PAP SMEAR  06/28/2012    normal   • PROCEDURE GENERIC CONVERTED  11/22/2015    MOST RECENT DIASTOLIC BP < 90 mmHg    • PROCEDURE GENERIC CONVERTED  11/22/2015    MOST RECENT SYSTOLIC BP > or = 140 mmHg    • PROCEDURE GENERIC CONVERTED  11/22/2015    TOBACCO NON-USER    • REPLACEMENT TOTAL HIP LATERAL POSITION     • SUBTOTAL COLECTOMY N/A 04/23/2019    sigmoid for colon cancer   • TOTAL KNEE ARTHROPLASTY           Family History: No Family History of CAD.    Social History:  reports that she has never smoked. She has never used smokeless tobacco. She reports that she does not drink alcohol and does not use drugs.    Medications:  Medications Prior to Admission   Medication Sig Dispense Refill Last Dose   • acetaminophen (TYLENOL) 500 MG tablet Take 1-2 tablets by mouth Every 4 (Four) Hours As Needed for Mild Pain.      • apixaban (ELIQUIS) 5 MG tablet tablet Take 1 tablet by mouth 2 (Two) Times a Day. 180 tablet 1    • aspirin 81 MG EC tablet Take 1 tablet by mouth Daily.      • capecitabine (XELODA) 500 MG chemo tablet Take 3 tablets by mouth 2 (Two) Times a Day. Take 3 tablets by mouth in the AM and 3 tablets in the PM on days 1-14, then off 7 days, on a 21 day cycle 84 tablet 11    • clonazePAM (KlonoPIN) 1 MG tablet Take 1 tablet by mouth 2 (Two) Times a Day. 180 tablet 1    • clopidogrel (PLAVIX) 75 MG tablet Take 1 tablet by mouth Daily. 90 tablet 3    • cyanocobalamin (VITAMIN B-12) 500 MCG tablet Take 1 tablet by mouth Daily.      • cyclobenzaprine (FLEXERIL) 10 MG tablet Take 1 tablet by mouth 3 (Three) Times a Day. 270 tablet 1    • FLUoxetine  (PROzac) 20 MG capsule Take 3 capsules by mouth Daily. 270 capsule 1    • furosemide (LASIX) 40 MG tablet Take 1 tablet by mouth 2 (Two) Times a Day. 180 tablet 3    • isosorbide mononitrate (IMDUR) 30 MG 24 hr tablet Take 1 tablet by mouth Daily.      • losartan (COZAAR) 25 MG tablet Take 1 tablet by mouth Daily. 90 tablet 3    • metoprolol succinate XL (TOPROL-XL) 25 MG 24 hr tablet Take 1 tablet by mouth Daily. 90 tablet 3    • nitroglycerin (NITROSTAT) 0.4 MG SL tablet Place 1 tablet under the tongue As Needed for Chest Pain.  - IF PAIN REMAINS AFTER 5 MIN CALL 911 AND REPEAT DOSE MAX 3 TABS IN 15 MINUTES 25 tablet 3    • omeprazole (priLOSEC) 40 MG capsule Take 1 capsule by mouth Daily. 30 capsule 5    • ondansetron (ZOFRAN) 8 MG tablet Take 1 tablet by mouth 3 (Three) Times a Day As Needed for Nausea or Vomiting. 30 tablet 5    • pravastatin (PRAVACHOL) 80 MG tablet Take 1 tablet by mouth Every Night. 90 tablet 3    • spironolactone (ALDACTONE) 25 MG tablet Take 1 tablet by mouth 2 (Two) Times a Day. 180 tablet 3    • tiZANidine (ZANAFLEX) 4 MG tablet Take 1 tablet by mouth Every 8 (Eight) Hours As Needed for Muscle Spasms. 270 tablet 1    • vitamin D (ERGOCALCIFEROL) 1.25 MG (46514 UT) capsule capsule Take 1 capsule by mouth 1 (One) Time Per Week. 9 capsule 1      Current medications:  aspirin, 81 mg, Oral, Daily  capecitabine, 1,500 mg, Oral, BID  clopidogrel, 75 mg, Oral, Daily  FLUoxetine, 60 mg, Oral, Daily  losartan, 50 mg, Oral, Daily  metoprolol succinate XL, 25 mg, Oral, Q24H  nitroglycerin, , ,   pantoprazole, 40 mg, Oral, Q AM  pravastatin, 80 mg, Oral, Nightly  senna-docusate sodium, 2 tablet, Oral, BID  sodium chloride, 10 mL, Intravenous, Q12H      Current IV drips:       Allergies:  Allergies   Allergen Reactions   • Bupropion Other (See Comments)     Caused falls       Objective    Objective     Vitals:   Temp:  [97.7 °F (36.5 °C)-98.6 °F (37 °C)] 97.7 °F (36.5 °C)  Heart Rate:  [62-82]  72  Resp:  [16-20] 16  BP: ()/() 98/64      Physical Exam:    Constitutional: Awake, alert, No acute distress   Eyes: PERRLA, sclerae anicteric, no conjunctival injection  HENT: NCAT, mucous membranes moist  Neck: Supple, no thyromegaly, no lymphadenopathy, trachea midline  Respiratory: Clear to auscultation bilaterally, nonlabored respirations   Cardiovascular: RRR, no murmurs, rubs, or gallops, palpable pedal pulses bilaterally    Result Review    Result Review:  I have personally reviewed the results from the time of this admission to 7/24/2023 08:54 EDT and agree with these findings:  [x]  Laboratory  [x]  EKG/Telemetry   [x]  Cardiology/Vascular   []  Pathology  [x]  Old records  [x]  Medications  Basic Metabolic Panel    Sodium Sodium   Date Value Ref Range Status   07/23/2023 141 136 - 145 mmol/L Final      Potassium Potassium   Date Value Ref Range Status   07/23/2023 2.7 (L) 3.5 - 5.2 mmol/L Final     Comment:     Slight hemolysis detected by analyzer. Results may be affected.      Chloride Chloride   Date Value Ref Range Status   07/23/2023 101 98 - 107 mmol/L Final      Bicarbonate No results found for: PLASMABICARB   BUN BUN   Date Value Ref Range Status   07/23/2023 31 (H) 8 - 23 mg/dL Final      Creatinine Creatinine   Date Value Ref Range Status   07/23/2023 1.91 (H) 0.57 - 1.00 mg/dL Final      Calcium Calcium   Date Value Ref Range Status   07/23/2023 9.1 8.6 - 10.5 mg/dL Final      Glucose        Results for orders placed during the hospital encounter of 12/22/22    Adult Transthoracic Echo Complete W/ Cont if Necessary Per Protocol    Interpretation Summary  •  Left ventricular ejection fraction appears to be 31 - 35%.  •  Left ventricular diastolic function was indeterminate.  •  Estimated right ventricular systolic pressure from tricuspid regurgitation is mildly elevated (35-45 mmHg).    1.  There were no apparent intracardiac masses, vegetations or thrombi  2.  A pacer wire was seen  in the right side chambers.        Lab Results   Component Value Date    PROBNP 513.3 07/23/2023          EKG shows sinus rhythm with no acute changes.  Telemetry reviewed    Assessment / Plan     Impression:   1.  Ischemic cardiomyopathy.  2.  Presence of ICD/ICD shock  3.  Hypokalemia.  4.  Coronary disease s/p CABG with occluded grafts on recent cath.  5.  Metastatic colon CA  Plan:   1.  Potassium replacement.  2.  Patient recent cardiac cath showed her grafts to the right coronary and marginal branch 100% occluded being managed medically.  3.  Continue current medications.  4.  ICD shock probably secondary to hypokalemia.  No further work-up is needed at the present time.    Electronically signed by Mike Hilliard MD, 07/24/23, 8:54 AM EDT.

## 2023-07-24 NOTE — CONSULTS
[facility]   Consult Note    Patient Name: Erika Bowens  : 1960  MRN: [id number]  Primary Care Physician:  Nilam Willis MD  Referring Physician: No ref. provider found  Date of admission: 2023    Subjective   Subjective     Reason for Consult/ Chief Complaint: colon cancer    HPI:  Erika Bowens is a 63 y.o. female patient known to me from the office with history of metastatic colon cancer.  She recently started palliative treatment with Xeloda.  She took it for about 6 days.  She has an extensive cardiac history.  The evening of admission, she had her defibrillator fire which prompted evaluation in the emergency room.  She was found to be hypokalemic and had several small runs of V. tach.  She was admitted for cardiology evaluation.  She was seen by Dr. Henao who felt that her defibrillator discharge was likely related to low potassium which has now been repleted.  With regard to her Xeloda, she denies mouth sores, nausea, vomiting, diarrhea.  She reports good appetite and is eating and drinking normally.    Personal History     Past Medical History:   Diagnosis Date    Abdominal pain     CHF (congestive heart failure)     Colon cancer     Coronary arteriosclerosis     Depressive disorder     with anxiety    Encounter for routine adult health examination     Essential hypertension     Generalized anxiety disorder     GERD (gastroesophageal reflux disease)     Hip pain     Hyperlipidemia     Kidney stone     Osteoarthritis     Radial styloid tenosynovitis of left hand     Urinary tract infectious disease     Vitamin D deficiency        Past Surgical History:   Procedure Laterality Date    CARDIAC CATHETERIZATION  04/15/2016    Enlarged left ventricle with reduced contractility.Severe coronary artery disease as described above. [facility].    COLONOSCOPY  2019    COLON CANCER DOMINGO.    COLONOSCOPY N/A 2022    Procedure: COLONOSCOPY WITH POLYPECTOMY;  Surgeon: Coy Ordoñez  MD Aquilino;  Location: [facility] ENDOSCOPY;  Service: Gastroenterology;  Laterality: N/A;  COLON POLYP, ANASTAMOSIS IN SIGMOID    CORONARY ANGIOPLASTY WITH STENT PLACEMENT  05/06/2012    PTCA implantation in the vein graft of the OM branch. Done at [facility] in [place], [place].    CORONARY ARTERY BYPASS GRAFT  03/18/2003    Emergent CABG x 5 CAD    DE QUERVAIN'S RELEASE Right 11/30/2009    Release of De Quervain's to the right thumb    DEQUERVAIN RELEASE Left 11/18/2015    Release of de Quervain's to the left wrist.    LIVER SURGERY      PACEMAKER IMPLANTATION      DEFIBRILLATOR    PAP SMEAR  06/28/2012    normal    PROCEDURE GENERIC CONVERTED  11/22/2015    MOST RECENT DIASTOLIC BP < 90 mmHg     PROCEDURE GENERIC CONVERTED  11/22/2015    MOST RECENT SYSTOLIC BP > or = 140 mmHg     PROCEDURE GENERIC CONVERTED  11/22/2015    TOBACCO NON-USER     REPLACEMENT TOTAL HIP LATERAL POSITION      SUBTOTAL COLECTOMY N/A 04/23/2019    sigmoid for colon cancer    TOTAL KNEE ARTHROPLASTY         Family History: family history includes Arthritis in her maternal grandmother; Colon cancer in an other family member; Coronary artery disease in an other family member; Diabetes in her maternal grandmother and another family member; Heart disease in her father, paternal grandfather, paternal grandmother, and another family member; Hyperlipidemia in her father; Hypertension in an other family member; Liver disease in her mother; Other in an other family member; Stroke in an other family member. Otherwise pertinent FHx was reviewed and not pertinent to current issue.    Social History:  reports that she has never smoked. She has never used smokeless tobacco. She reports that she does not drink alcohol and does not use drugs.    Home Medications:  FLUoxetine, acetaminophen, apixaban, aspirin, capecitabine, clonazePAM, clopidogrel, cyanocobalamin, cyclobenzaprine, furosemide, isosorbide mononitrate, losartan, metoprolol  succinate XL, nitroglycerin, omeprazole, ondansetron, pravastatin, spironolactone, tiZANidine, and vitamin D    Allergies:  Allergies   Allergen Reactions    Bupropion Other (See Comments)     Caused falls       Objective    Objective     Vitals:   Temp:  [97.7 °F (36.5 °C)-98.6 °F (37 °C)] 97.7 °F (36.5 °C)  Heart Rate:  [62-82] 72  Resp:  [16-20] 16  BP: ()/() 98/64    Physical Exam:   Constitutional: Awake, alert   Eyes: PERRLA, sclerae anicteric, no conjunctival injection   HENT: NCAT, mucous membranes moist   Neck: Supple, no thyromegaly, no lymphadenopathy, trachea midline   Respiratory: Clear to auscultation bilaterally, nonlabored respirations    Cardiovascular: RRR, no murmurs, rubs, or gallops.  Defibrillator in place.   Gastrointestinal: Positive bowel sounds, soft, nontender, nondistended   Musculoskeletal: No bilateral ankle edema, no clubbing or cyanosis to extremities   Psychiatric: Appropriate affect, cooperative   Neurologic: Oriented x 3, grossly nonfocal.   Skin: No hand-foot syndrome      Result Review    Result Review:  I have personally reviewed the results from the time of this admission to 7/24/2023 07:55 EDT and agree with these findings:  [x]  Laboratory  []  Microbiology  []  Radiology  []  EKG/Telemetry   []  Cardiology/Vascular   []  Pathology  []  Old records  []  Other:  Most notable findings include: Hypokalemia.  Elevated creatinine.  Mild elevations in the transaminases and alkaline phosphatase    Assessment & Plan   Assessment / Plan     Brief Patient Summary:  Erika Bowens is a 63 y.o. female who has history of metastatic colon cancer.  Recently started on palliative treatment with capecitabine/Xeloda.  Tolerating well.  Admitted to the hospital for defibrillator discharge.  She was evaluated by cardiology who felt that her defibrillator discharge was likely related to hypokalemia and secondary ventricular arrhythmia.  Her potassium is now been replaced.  This does  not appear to be related to her capecitabine.  She also has a recent history of hepatic vein thrombosis and is on anticoagulation with Eliquis.  She denies excessive bruising or bleeding.    Active Hospital Problems:  Active Hospital Problems    Diagnosis     **Defibrillator discharge      Plan: Continue capecitabine as ordered-patient will bring her own supply from home  Complete cycle 1 as planned.  Continue Eliquis full dose anticoagulation for recently diagnosed hepatic vein thrombosis.  Follow-up with me in the office once she is discharged.        Electronically signed by Luis Boyce MD, 07/24/23, 7:55 AM EDT.

## 2023-07-25 ENCOUNTER — APPOINTMENT (OUTPATIENT)
Dept: CARDIOLOGY | Facility: HOSPITAL | Age: 63
DRG: 640 | End: 2023-07-25
Payer: MEDICARE

## 2023-07-25 ENCOUNTER — READMISSION MANAGEMENT (OUTPATIENT)
Dept: CALL CENTER | Facility: HOSPITAL | Age: 63
End: 2023-07-25
Payer: MEDICARE

## 2023-07-25 VITALS
DIASTOLIC BLOOD PRESSURE: 77 MMHG | HEIGHT: 63 IN | RESPIRATION RATE: 20 BRPM | SYSTOLIC BLOOD PRESSURE: 109 MMHG | HEART RATE: 60 BPM | WEIGHT: 149.03 LBS | OXYGEN SATURATION: 97 % | BODY MASS INDEX: 26.41 KG/M2 | TEMPERATURE: 98.1 F

## 2023-07-25 LAB
ANION GAP SERPL CALCULATED.3IONS-SCNC: 10.5 MMOL/L (ref 5–15)
BASOPHILS # BLD AUTO: 0.04 10*3/MM3 (ref 0–0.2)
BASOPHILS NFR BLD AUTO: 0.5 % (ref 0–1.5)
BH CV ECHO MEAS - AO MAX PG: 4 MMHG
BH CV ECHO MEAS - AO MEAN PG: 2 MMHG
BH CV ECHO MEAS - AO ROOT DIAM: 3 CM
BH CV ECHO MEAS - AO V2 MAX: 103 CM/SEC
BH CV ECHO MEAS - AO V2 VTI: 23.1 CM
BH CV ECHO MEAS - AVA(I,D): 2.5 CM2
BH CV ECHO MEAS - EDV(CUBED): 117.6 ML
BH CV ECHO MEAS - EDV(MOD-SP2): 104 ML
BH CV ECHO MEAS - EDV(MOD-SP4): 103 ML
BH CV ECHO MEAS - EF(MOD-BP): 39.3 %
BH CV ECHO MEAS - EF(MOD-SP2): 44.3 %
BH CV ECHO MEAS - EF(MOD-SP4): 38 %
BH CV ECHO MEAS - ESV(CUBED): 64 ML
BH CV ECHO MEAS - ESV(MOD-SP2): 57.9 ML
BH CV ECHO MEAS - ESV(MOD-SP4): 63.9 ML
BH CV ECHO MEAS - FS: 18.4 %
BH CV ECHO MEAS - IVS/LVPW: 1.22 CM
BH CV ECHO MEAS - IVSD: 1.1 CM
BH CV ECHO MEAS - LA DIMENSION: 3.9 CM
BH CV ECHO MEAS - LAT PEAK E' VEL: 7.8 CM/SEC
BH CV ECHO MEAS - LV MASS(C)D: 176 GRAMS
BH CV ECHO MEAS - LV MAX PG: 2.18 MMHG
BH CV ECHO MEAS - LV MEAN PG: 1 MMHG
BH CV ECHO MEAS - LV V1 MAX: 73.9 CM/SEC
BH CV ECHO MEAS - LV V1 VTI: 16.9 CM
BH CV ECHO MEAS - LVIDD: 4.9 CM
BH CV ECHO MEAS - LVIDS: 4 CM
BH CV ECHO MEAS - LVOT AREA: 3.5 CM2
BH CV ECHO MEAS - LVOT DIAM: 2.1 CM
BH CV ECHO MEAS - LVPWD: 0.9 CM
BH CV ECHO MEAS - MED PEAK E' VEL: 6.4 CM/SEC
BH CV ECHO MEAS - MR MAX PG: 100 MMHG
BH CV ECHO MEAS - MR MAX VEL: 500 CM/SEC
BH CV ECHO MEAS - MR MEAN PG: 63 MMHG
BH CV ECHO MEAS - MR MEAN VEL: 368 CM/SEC
BH CV ECHO MEAS - MR VTI: 194 CM
BH CV ECHO MEAS - MV A MAX VEL: 116 CM/SEC
BH CV ECHO MEAS - MV DEC SLOPE: 486 CM/SEC2
BH CV ECHO MEAS - MV DEC TIME: 0.28 MSEC
BH CV ECHO MEAS - MV E MAX VEL: 68.1 CM/SEC
BH CV ECHO MEAS - MV E/A: 0.59
BH CV ECHO MEAS - MV MAX PG: 5 MMHG
BH CV ECHO MEAS - MV MEAN PG: 1 MMHG
BH CV ECHO MEAS - MV P1/2T: 51.2 MSEC
BH CV ECHO MEAS - MV V2 VTI: 31.3 CM
BH CV ECHO MEAS - MVA(P1/2T): 4.3 CM2
BH CV ECHO MEAS - MVA(VTI): 1.87 CM2
BH CV ECHO MEAS - RAP SYSTOLE: 3 MMHG
BH CV ECHO MEAS - RVDD: 2.6 CM
BH CV ECHO MEAS - RVSP: 36 MMHG
BH CV ECHO MEAS - SV(LVOT): 58.5 ML
BH CV ECHO MEAS - SV(MOD-SP2): 46.1 ML
BH CV ECHO MEAS - SV(MOD-SP4): 39.1 ML
BH CV ECHO MEAS - TR MAX PG: 32.9 MMHG
BH CV ECHO MEAS - TR MAX VEL: 287 CM/SEC
BH CV ECHO MEASUREMENTS AVERAGE E/E' RATIO: 9.59
BUN SERPL-MCNC: 28 MG/DL (ref 8–23)
BUN/CREAT SERPL: 18.5 (ref 7–25)
CALCIUM SPEC-SCNC: 8.9 MG/DL (ref 8.6–10.5)
CHLORIDE SERPL-SCNC: 104 MMOL/L (ref 98–107)
CO2 SERPL-SCNC: 22.5 MMOL/L (ref 22–29)
CREAT SERPL-MCNC: 1.51 MG/DL (ref 0.57–1)
DEPRECATED RDW RBC AUTO: 47.7 FL (ref 37–54)
EGFRCR SERPLBLD CKD-EPI 2021: 38.2 ML/MIN/1.73
EOSINOPHIL # BLD AUTO: 0 10*3/MM3 (ref 0–0.4)
EOSINOPHIL NFR BLD AUTO: 0 % (ref 0.3–6.2)
ERYTHROCYTE [DISTWIDTH] IN BLOOD BY AUTOMATED COUNT: 12.7 % (ref 12.3–15.4)
GLUCOSE SERPL-MCNC: 151 MG/DL (ref 65–99)
HCT VFR BLD AUTO: 42.4 % (ref 34–46.6)
HGB BLD-MCNC: 13.3 G/DL (ref 12–15.9)
IMM GRANULOCYTES # BLD AUTO: 0.05 10*3/MM3 (ref 0–0.05)
IMM GRANULOCYTES NFR BLD AUTO: 0.6 % (ref 0–0.5)
IVRT: 87 MSEC
LEFT ATRIUM VOLUME INDEX: 18.9 ML/M2
LEFT ATRIUM VOLUME: 32.3 ML
LYMPHOCYTES # BLD AUTO: 1.24 10*3/MM3 (ref 0.7–3.1)
LYMPHOCYTES NFR BLD AUTO: 15.3 % (ref 19.6–45.3)
MAGNESIUM SERPL-MCNC: 2.4 MG/DL (ref 1.6–2.4)
MCH RBC QN AUTO: 32.1 PG (ref 26.6–33)
MCHC RBC AUTO-ENTMCNC: 31.4 G/DL (ref 31.5–35.7)
MCV RBC AUTO: 102.4 FL (ref 79–97)
MONOCYTES # BLD AUTO: 0.47 10*3/MM3 (ref 0.1–0.9)
MONOCYTES NFR BLD AUTO: 5.8 % (ref 5–12)
NEUTROPHILS NFR BLD AUTO: 6.3 10*3/MM3 (ref 1.7–7)
NEUTROPHILS NFR BLD AUTO: 77.8 % (ref 42.7–76)
NRBC BLD AUTO-RTO: 0.2 /100 WBC (ref 0–0.2)
PHOSPHATE SERPL-MCNC: 2.1 MG/DL (ref 2.5–4.5)
PLATELET # BLD AUTO: 163 10*3/MM3 (ref 140–450)
PMV BLD AUTO: 12.6 FL (ref 6–12)
POTASSIUM SERPL-SCNC: 3.9 MMOL/L (ref 3.5–5.2)
RBC # BLD AUTO: 4.14 10*6/MM3 (ref 3.77–5.28)
SODIUM SERPL-SCNC: 137 MMOL/L (ref 136–145)
WBC NRBC COR # BLD: 8.1 10*3/MM3 (ref 3.4–10.8)

## 2023-07-25 PROCEDURE — 94761 N-INVAS EAR/PLS OXIMETRY MLT: CPT

## 2023-07-25 PROCEDURE — 99232 SBSQ HOSP IP/OBS MODERATE 35: CPT | Performed by: SPECIALIST

## 2023-07-25 PROCEDURE — 83735 ASSAY OF MAGNESIUM: CPT | Performed by: INTERNAL MEDICINE

## 2023-07-25 PROCEDURE — 93306 TTE W/DOPPLER COMPLETE: CPT

## 2023-07-25 PROCEDURE — 94799 UNLISTED PULMONARY SVC/PX: CPT

## 2023-07-25 PROCEDURE — 80048 BASIC METABOLIC PNL TOTAL CA: CPT | Performed by: INTERNAL MEDICINE

## 2023-07-25 PROCEDURE — 93306 TTE W/DOPPLER COMPLETE: CPT | Performed by: INTERNAL MEDICINE

## 2023-07-25 PROCEDURE — 84100 ASSAY OF PHOSPHORUS: CPT | Performed by: INTERNAL MEDICINE

## 2023-07-25 PROCEDURE — 85025 COMPLETE CBC W/AUTO DIFF WBC: CPT | Performed by: INTERNAL MEDICINE

## 2023-07-25 RX ORDER — POTASSIUM CHLORIDE 750 MG/1
20 CAPSULE, EXTENDED RELEASE ORAL DAILY
Status: SHIPPED | OUTPATIENT
Start: 2023-07-25 | End: 2023-08-01

## 2023-07-25 RX ORDER — POTASSIUM CHLORIDE 750 MG/1
20 CAPSULE, EXTENDED RELEASE ORAL ONCE
Status: COMPLETED | OUTPATIENT
Start: 2023-07-25 | End: 2023-07-25

## 2023-07-25 RX ORDER — LOSARTAN POTASSIUM 50 MG/1
50 TABLET ORAL DAILY
Qty: 30 TABLET | Refills: 0 | Status: ON HOLD | OUTPATIENT
Start: 2023-07-26 | End: 2023-08-05 | Stop reason: SDUPTHER

## 2023-07-25 RX ADMIN — CAPECITABINE 1500 MG: 500 TABLET, FILM COATED ORAL at 12:50

## 2023-07-25 RX ADMIN — ISOSORBIDE MONONITRATE 30 MG: 30 TABLET, EXTENDED RELEASE ORAL at 09:00

## 2023-07-25 RX ADMIN — Medication 10 ML: at 08:59

## 2023-07-25 RX ADMIN — PANTOPRAZOLE SODIUM 40 MG: 40 TABLET, DELAYED RELEASE ORAL at 05:20

## 2023-07-25 RX ADMIN — CLONAZEPAM 1 MG: 0.5 TABLET ORAL at 00:20

## 2023-07-25 RX ADMIN — METOPROLOL SUCCINATE 25 MG: 25 TABLET, EXTENDED RELEASE ORAL at 09:00

## 2023-07-25 RX ADMIN — FLUOXETINE 60 MG: 20 CAPSULE ORAL at 08:59

## 2023-07-25 RX ADMIN — LOSARTAN POTASSIUM 50 MG: 50 TABLET, FILM COATED ORAL at 08:59

## 2023-07-25 RX ADMIN — DOCUSATE SODIUM 50MG AND SENNOSIDES 8.6MG 2 TABLET: 8.6; 5 TABLET, FILM COATED ORAL at 09:00

## 2023-07-25 RX ADMIN — CLOPIDOGREL BISULFATE 75 MG: 75 TABLET ORAL at 09:00

## 2023-07-25 RX ADMIN — CAPECITABINE 1500 MG: 500 TABLET, FILM COATED ORAL at 00:31

## 2023-07-25 RX ADMIN — APIXABAN 5 MG: 5 TABLET, FILM COATED ORAL at 09:00

## 2023-07-25 RX ADMIN — SPIRONOLACTONE 25 MG: 25 TABLET ORAL at 08:59

## 2023-07-25 RX ADMIN — CYCLOBENZAPRINE 10 MG: 10 TABLET, FILM COATED ORAL at 00:31

## 2023-07-25 RX ADMIN — ASPIRIN 81 MG: 81 TABLET, COATED ORAL at 09:00

## 2023-07-25 RX ADMIN — POTASSIUM CHLORIDE 20 MEQ: 750 CAPSULE, EXTENDED RELEASE ORAL at 09:00

## 2023-07-25 NOTE — DISCHARGE SUMMARY
[facility]         HOSPITALIST  DISCHARGE SUMMARY    Patient Name: Erika Bowens  : 1960  MRN: [id number]    Date of Admission: 2023  Date of Discharge:  2023  Primary Care Physician: Nilam Willis MD    Consults       Date and Time Order Name Status Description    2023  7:50 AM Inpatient Cardiology Consult      2023 12:57 AM Hematology & Oncology Inpatient Consult Completed     2023  9:55 PM Inpatient Hospitalist Consult              Active and Resolved Hospital Problems:  Defibrillator discharge, likely secondary to hypokalemia  Hypokalemia  Likely type II NSTEMI secondary to defibrillator firing  Nonsustained ventricular tachycardia  Ischemic cardiomyopathy status post AICD placement  CKD stage III 3a  GERD  CAD status post CABG with occluded grafts on recent cath  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 female PMH CHF, HTN, colorectal cancer with mets to the liver, ischemic cardiomyopathy status post AICD placement, CAD status post CABG with occluded grafts brought into the emergency department for evaluation of defibrillator discharge x1 episode.  Patient states that she had an abrupt onset of chest pain prior to the shock.  She had taken 1 sublingual nitroglycerin.  Subsequently came to the emergency department for evaluation, onset of symptoms approximately 6 PM.  While in the emergency department, chest pain recurred, took 1 more sublingual nitro.  She was admitted for further care, hypokalemia was treated.  Patient did have some ectopy noted however no further defibrillator discharges.  Patient was cleared for discharge by cardiology.  Patient's medicines were optimized, patient should follow-up with cardiology in 1 week with repeat labs.  Patient is discharged home today in stable condition    Day of Discharge     Vital Signs:  Temp:  [97.9 °F (36.6  °C)-98.4 °F (36.9 °C)] 98.1 °F (36.7 °C)  Heart Rate:  [60-69] 60  Resp:  [16-20] 20  BP: (100-127)/(64-89) 109/77    Physical Exam:   GEN: No acute distress  HEENT: Moist mucous membranes  LUNGS: Equal chest rise bilaterally  CARDIAC: Regular rate and rhythm  NEURO: Moving all 4 extremities spontaneously  SKIN: No obvious breakdown    Discharge Details        Discharge Medications        Changes to Medications        Instructions Start Date   losartan 50 MG tablet  Commonly known as: COZAAR  What changed:   medication strength  how much to take   50 mg, Oral, Daily   Start Date: July 26, 2023            Continue These Medications        Instructions Start Date   acetaminophen 500 MG tablet  Commonly known as: TYLENOL   500-1,000 mg, Oral, Every 4 Hours PRN      apixaban 5 MG tablet tablet  Commonly known as: ELIQUIS   5 mg, Oral, 2 Times Daily      aspirin 81 MG EC tablet   81 mg, Oral, Daily      capecitabine 500 MG chemo tablet  Commonly known as: XELODA   1,500 mg, Oral, 2 Times Daily, Take 3 tablets by mouth in the AM and 3 tablets in the PM on days 1-14, then off 7 days, on a 21 day cycle      clonazePAM 1 MG tablet  Commonly known as: KlonoPIN   1 mg, Oral, 2 Times Daily      clopidogrel 75 MG tablet  Commonly known as: PLAVIX   75 mg, Oral, Daily      cyanocobalamin 500 MCG tablet  Commonly known as: VITAMIN B-12   1 tablet, Oral, Daily      cyclobenzaprine 10 MG tablet  Commonly known as: FLEXERIL   10 mg, Oral, 3 Times Daily      FLUoxetine 20 MG capsule  Commonly known as: PROzac   60 mg, Oral, Daily      furosemide 40 MG tablet  Commonly known as: LASIX   40 mg, Oral, 2 Times Daily      isosorbide mononitrate 30 MG 24 hr tablet  Commonly known as: IMDUR   30 mg, Oral, Daily      metoprolol succinate XL 25 MG 24 hr tablet  Commonly known as: TOPROL-XL   25 mg, Oral, Daily      nitroglycerin 0.4 MG SL tablet  Commonly known as: NITROSTAT   0.4 mg, Sublingual, As Needed,  - IF PAIN REMAINS AFTER 5 MIN CALL  911 AND REPEAT DOSE MAX 3 TABS IN 15 MINUTES      omeprazole 40 MG capsule  Commonly known as: priLOSEC   40 mg, Oral, Daily      ondansetron 8 MG tablet  Commonly known as: ZOFRAN   8 mg, Oral, 3 Times Daily PRN      pravastatin 80 MG tablet  Commonly known as: PRAVACHOL   80 mg, Oral, Nightly      spironolactone 25 MG tablet  Commonly known as: ALDACTONE   25 mg, Oral, 2 Times Daily      tiZANidine 4 MG tablet  Commonly known as: ZANAFLEX   4 mg, Oral, Every 8 Hours PRN      vitamin D 1.25 MG (33054 UT) capsule capsule  Commonly known as: ERGOCALCIFEROL   50,000 Units, Oral, Weekly               Allergies   Allergen Reactions    Bupropion Other (See Comments)     Caused falls       Discharge Disposition:  Home or Self Care    Diet:  Hospital:  Diet Order   Procedures    Diet: Cardiac Diets; Healthy Heart (2-3 Na+); Texture: Regular Texture (IDDSI 7); Fluid Consistency: Thin (IDDSI 0)       Discharge Activity:       CODE STATUS:  Code Status and Medical Interventions:   Ordered at: 07/23/23 2251     Level Of Support Discussed With:    Patient     Code Status (Patient has no pulse and is not breathing):    CPR (Attempt to Resuscitate)     Medical Interventions (Patient has pulse or is breathing):    Full Support     Release to patient:    Routine Release       Future Appointments   Date Time Provider Department [place]   8/7/2023  1:15 PM NURSE/MA ONC [facility] [address] [facility]   8/7/2023  1:30 PM Luis Boyce MD [facility] [address] [facility]   8/22/2023  3:30 PM [address]   10/12/2023  3:00 PM Nilam Willis MD [address]       Additional Instructions for the Follow-ups that You Need to Schedule       Discharge Follow-up with PCP   As directed       Currently Documented PCP:    Nilam Willis MD    PCP Phone Number:    [phone]     Follow Up Details: 3 to 7 days         Discharge Follow-up with Specified Provider: Cardiology; 1 Week   As directed      To: Cardiology    Follow Up: 1 Week                  Pertinent  and/or Most Recent Results     IMAGING:  XR Hand 2 View Left    Result Date: 7/20/2023  X-Ray Report: Left hand  X-Ray Indication: Evaluation of the left hand AP/Lateral view(s) Findings: CMC joint arthritis. Prior studies available for comparison: no     XR Knee 3 View Right    Result Date: 7/20/2023  X-Ray Report: Right knee X-Ray Indication: Evaluation of the right knee AP/Lateral and [place] view(s) Findings: Advanced degenerative arthritis. Prior studies available for comparison: yes      XR Chest 1 View    Result Date: 7/23/2023  PROCEDURE: XR CHEST 1 VW  COMPARISON: [facility], PET, NM PET/CT SKULL BASE TO MID THIGH, 1/16/2023, 14:57.  [facility], CR, XR CHEST 1 VW, 12/21/2022, 23:24.  INDICATIONS: Chest Pain Triage Protocol  FINDINGS:  Cardiomediastinal contours appear stable, including postoperative changes and pacemaker/ICD leads.  Lungs are clear.  No pneumothorax or large pleural effusion is seen.        No evidence of acute cardiopulmonary abnormality or significant change.       MACK LARSEN MD       Electronically Signed and Approved By: MACK LARSEN MD on 7/23/2023 at 19:38             IMAGING SCANNED RESULT    Result Date: 7/17/2023  This result has an attachment that is not available. Ordered by an unspecified provider.      LAB RESULTS:      Lab 07/25/23  0435 07/24/23  1155 07/24/23  0609 07/23/23  1910   WBC 8.10  --  10.07 10.74   HEMOGLOBIN 13.3  --  13.5 13.7   HEMATOCRIT 42.4  --  40.6 42.0   PLATELETS 163  --  153 200   NEUTROS ABS 6.30  --  8.18* 9.13*   IMMATURE GRANS (ABS) 0.05  --  0.05 0.04   LYMPHS ABS 1.24  --  1.28 0.95   MONOS ABS 0.47  --  0.54 0.59   EOS ABS 0.00  --  0.00 0.00   .4*  --  97.8* 97.2*   PROTIME  --   --   --  16.8*   APTT  --  35.8* 91.0 26.6*         Lab 07/25/23  0435 07/24/23  0826 07/23/23  1910   SODIUM 137 139 141   POTASSIUM 3.9 4.2 2.7*   CHLORIDE 104 105 101   CO2 22.5 21.1* 24.2   ANION  GAP 10.5 12.9 15.8*   BUN 28* 25* 31*   CREATININE 1.51* 1.29* 1.91*   EGFR 38.2* 46.2* 28.8*   GLUCOSE 151* 137* 141*   CALCIUM 8.9 8.7 9.1   MAGNESIUM 2.4 2.5* 2.0   PHOSPHORUS 2.1* 2.5  --          Lab 07/24/23  0826 07/23/23 1910   TOTAL PROTEIN 6.9 7.0   ALBUMIN 3.4* 3.7   GLOBULIN 3.5 3.3   ALT (SGPT) 40* 42*   AST (SGOT) 52* 55*   BILIRUBIN 0.8 0.7   ALK PHOS 242* 251*   LIPASE  --  26         Lab 07/23/23 2109 07/23/23 1910   PROBNP  --  513.3   HSTROP T 39* 20*   PROTIME  --  16.8*   INR  --  1.36*                 Brief Urine Lab Results  (Last result in the past 365 days)        Color   Clarity   Blood   Leuk Est   Nitrite   Protein   CREAT   Urine HCG        05/30/23 1554 Yellow   Cloudy   Negative   Moderate (2+)   Negative   Negative                 Microbiology Results (last 10 days)       ** No results found for the last 240 hours. **              Results for orders placed during the hospital encounter of 12/22/22    Adult Transthoracic Echo Complete W/ Cont if Necessary Per Protocol    Interpretation Summary    Left ventricular ejection fraction appears to be 31 - 35%.    Left ventricular diastolic function was indeterminate.    Estimated right ventricular systolic pressure from tricuspid regurgitation is mildly elevated (35-45 mmHg).    1.  There were no apparent intracardiac masses, vegetations or thrombi  2.  A pacer wire was seen in the right side chambers.      Time spent on Discharge including face to face service: Greater than 30 minutes      Electronically signed by Zak Gallagher MD, 07/25/23, 12:52 PM EDT.

## 2023-07-25 NOTE — DISCHARGE INSTR - LAB
Dr. Chong on July 31, 2023 at 1230.  Please bring all medications to appointment.    Dr. Willis on August 1, 2023 at 1130.

## 2023-07-25 NOTE — PROGRESS NOTES
[facility]   Cardiology Progress Note      Patient Name: Erika Bowens  : 1960  MRN: [id number]  Primary Care Physician:  Nilam Willis MD  Referring Physician: Sander Hines MD  Date of admission: 2023    Subjective   Subjective     Chief Complaint: ICD shock    HPI:  Erika Bowens is a 63 y.o. female with history of AV implantation and ischemic cardiomyopathy admitted with hyperkalemia and severe shock.  No further shocks    REVIEW OF SYSTEMS    Constitutional:    No fever, no weight loss  Skin:     No rash  Otolaryngeal:    No difficulty swallowing  Cardiovascular:  No chest pain or shortness of breath  Pulmonary:    No cough, no sputum production    Objective    Objective     Vitals:   Vitals:    23 0340 23 0602 23 0652 23 0730   BP: 104/89   100/64   BP Location: Right arm   Right arm   Patient Position: Lying   Lying   Pulse: 66   69   Resp: 18   16   Temp: 98.4 °F (36.9 °C)   97.9 °F (36.6 °C)   TempSrc: Oral   Oral   SpO2: 94%  91% 95%   Weight:  67.6 kg (149 lb 0.5 oz)     Height:                Physical Exam:   Constitutional: Awake, alert, No acute distress    Eyes: PERRLA, sclerae anicteric, no conjunctival injection   HENT: NCAT, mucous membranes moist   Neck: Supple, no thyromegaly, no lymphadenopathy, trachea midline   Respiratory: Clear to auscultation bilaterally, nonlabored respirations    Cardiovascular: RRR, no murmurs, rubs, or gallops, palpable pedal pulses bilaterally       Current medications:  !Patient Home Medications Stored on Unit, , Does not apply, BID  apixaban, 5 mg, Oral, BID  aspirin, 81 mg, Oral, Daily  clopidogrel, 75 mg, Oral, Daily  FLUoxetine, 60 mg, Oral, Daily  capecitabine, 1,500 mg, Oral, 2 times per day  isosorbide mononitrate, 30 mg, Oral, Daily  losartan, 50 mg, Oral, Daily  metoprolol succinate XL, 25 mg, Oral, Q24H  pantoprazole, 40 mg, Oral, Q AM  potassium chloride, 20 mEq, Oral, Once  pravastatin, 80 mg,  Oral, Nightly  senna-docusate sodium, 2 tablet, Oral, BID  sodium chloride, 10 mL, Intravenous, Q12H  spironolactone, 25 mg, Oral, BID      Current IV drips:       Result Review    Result Review:  I have personally reviewed the results from the time of this admission to 7/25/2023 08:24 EDT and agree with these findings:  []  Laboratory  []  EKG/Telemetry   []  Cardiology/Vascular   []  Radiology         CBC          7/23/2023    19:10 7/24/2023    06:09 7/25/2023    04:35   CBC   WBC 10.74  10.07  8.10    RBC 4.32  4.15  4.14    Hemoglobin 13.7  13.5  13.3    Hematocrit 42.0  40.6  42.4    MCV 97.2  97.8  102.4    MCH 31.7  32.5  32.1    MCHC 32.6  33.3  31.4    RDW 12.9  12.8  12.7    Platelets 200  153  163      CMP          7/23/2023    19:10 7/24/2023    08:26 7/25/2023    04:35   CMP   Glucose 141  137  151    BUN 31  25  28    Creatinine 1.91  1.29  1.51    EGFR 28.8  46.2  38.2    Sodium 141  139  137    Potassium 2.7  4.2  3.9    Chloride 101  105  104    Calcium 9.1  8.7  8.9    Total Protein 7.0  6.9     Albumin 3.7  3.4     Globulin 3.3  3.5     Total Bilirubin 0.7  0.8     Alkaline Phosphatase 251  242     AST (SGOT) 55  52     ALT (SGPT) 42  40     Albumin/Globulin Ratio 1.1  1.0     BUN/Creatinine Ratio 16.2  19.4  18.5    Anion Gap 15.8  12.9  10.5      Results for orders placed during the hospital encounter of 12/22/22    Adult Transthoracic Echo Complete W/ Cont if Necessary Per Protocol    Interpretation Summary    Left ventricular ejection fraction appears to be 31 - 35%.    Left ventricular diastolic function was indeterminate.    Estimated right ventricular systolic pressure from tricuspid regurgitation is mildly elevated (35-45 mmHg).    1.  There were no apparent intracardiac masses, vegetations or thrombi  2.  A pacer wire was seen in the right side chambers.        Lab Results   Component Value Date    PROBNP 513.3 07/23/2023         Telemetry reviewed     Assessment / Plan     ASSESSMENT:     Defibrillator discharge  Ischemic cardiomyopathy  Coronary artery s/p CABG with occluded graft  Hypokalemia improved    PLAN:  1.  Continue current management.  2.  Continue metoprolol  3.  Monitor potassium as an outpatient.  4.  Can discharge home and follow-up with her cardiologist.    Electronically signed by Mike Hilliard MD, 07/25/23, 8:24 AM EDT.

## 2023-07-25 NOTE — OUTREACH NOTE
Prep Survey      Flowsheet Row Responses   [facility] patient discharged from? [facility]   Is LACE score < 7 ? No   Eligibility [facility]   Date of Admission 07/23/23   Date of Discharge 07/25/23   Discharge Disposition Home or Self Care   Discharge diagnosis type II NSTEMI secondary to defibrillator firing   Does the patient have one of the following disease processes/diagnoses(primary or secondary)? Acute MI (STEMI,NSTEMI)   Does the patient have Home health ordered? No   Is there a DME ordered? No   Prep survey completed? Yes            Ida ENAMORADO - Registered Nurse

## 2023-07-26 ENCOUNTER — TRANSITIONAL CARE MANAGEMENT TELEPHONE ENCOUNTER (OUTPATIENT)
Dept: CALL CENTER | Facility: HOSPITAL | Age: 63
End: 2023-07-26
Payer: MEDICARE

## 2023-07-26 NOTE — OUTREACH NOTE
Call Center TCM Note      Flowsheet Row Responses   [facility] patient discharged from? [facility]   Does the patient have one of the following disease processes/diagnoses(primary or secondary)? Acute MI (STEMI,NSTEMI)   TCM attempt successful? Yes  [ VR]   Call start time 1403   Call end time 1405   Discharge diagnosis type II NSTEMI secondary to defibrillator firing   Person spoke with today (if not patient) and relationship sister   Meds reviewed with patient/caregiver? Yes   Is the patient having any side effects they believe may be caused by any medication additions or changes? No   Does the patient have all prescriptions related to this admission filled (includes statins,anticoagulants,HTN meds,anti-arrhythmia meds) Yes   Is the patient taking all medications as directed (includes completed medication regime)? Yes   Comments HOSP DC FU appt 23 1130 am   Does the patient have an appointment with their PCP within 7-14 days of discharge? Yes   Has home health visited the patient within 72 hours of discharge? N/A   Psychosocial issues? No   Did the patient receive a copy of their discharge instructions? Yes   Nursing interventions Reviewed instructions with patient   What is the patient's perception of their health status since discharge? Improving   Nursing interventions Nurse provided patient education   Is the patient/caregiver able to teach back signs and symptoms of when to call for help immediately: Sudden chest discomfort, Sudden discomfort in arms, back, neck or jaw, Sudden sweating or clammy skin, Shortness of breath at any time, Nausea or vomiting, Dizziness or lightheadedness, Irregular or rapid heart rate   Nursing interventions Nurse provided patient education   Is the patient/caregiver able to teach back lifestyle changes to help prevent MIs Heart healthy diet, Reducing stress   Is the patient/caregiver able to teach back ways to prevent a second heart attack: Take medications, Follow up  with MD   Is the patient/caregiver able to teach back the hierarchy of who to call/visit for symptoms/problems? PCP, Specialist, Home health nurse, Urgent Care, ED, 911 Yes   TCM call completed? Yes   Wrap up additional comments Reports she doing well at this time.   Call end time 1703            Pushpa Priest RN    7/26/2023, 14:05 CDT

## 2023-07-30 LAB
QT INTERVAL: 373 MS
QT INTERVAL: 451 MS
QT INTERVAL: 490 MS
QT INTERVAL: 528 MS
QT INTERVAL: 532 MS

## 2023-07-31 ENCOUNTER — SPECIALTY PHARMACY (OUTPATIENT)
Dept: PHARMACY | Facility: HOSPITAL | Age: 63
End: 2023-07-31
Payer: MEDICARE

## 2023-08-01 ENCOUNTER — SPECIALTY PHARMACY (OUTPATIENT)
Dept: PHARMACY | Facility: HOSPITAL | Age: 63
End: 2023-08-01
Payer: MEDICARE

## 2023-08-01 ENCOUNTER — OFFICE VISIT (OUTPATIENT)
Dept: FAMILY MEDICINE CLINIC | Facility: CLINIC | Age: 63
End: 2023-08-01
Payer: MEDICARE

## 2023-08-01 VITALS
BODY MASS INDEX: 24.34 KG/M2 | SYSTOLIC BLOOD PRESSURE: 100 MMHG | HEART RATE: 67 BPM | OXYGEN SATURATION: 99 % | DIASTOLIC BLOOD PRESSURE: 60 MMHG | WEIGHT: 137.4 LBS | HEIGHT: 63 IN

## 2023-08-01 DIAGNOSIS — I50.9 CHRONIC CONGESTIVE HEART FAILURE, UNSPECIFIED HEART FAILURE TYPE: ICD-10-CM

## 2023-08-01 DIAGNOSIS — I25.10 CORONARY ARTERIOSCLEROSIS: ICD-10-CM

## 2023-08-01 DIAGNOSIS — Z45.02 DEFIBRILLATOR DISCHARGE: Primary | ICD-10-CM

## 2023-08-01 DIAGNOSIS — E87.6 HYPOKALEMIA: ICD-10-CM

## 2023-08-01 NOTE — PROGRESS NOTES
Transitional Care Follow Up Visit  Subjective     Erika Bowens is a 63 y.o. female who presents for a transitional care management visit.    Within 48 business hours after discharge our office contacted her via telephone to coordinate her care and needs.      I reviewed and discussed the details of that call along with the discharge summary, hospital problems, inpatient lab results, inpatient diagnostic studies, and consultation reports with Erika.     Current outpatient and discharge medications have been reconciled for the patient.  Reviewed by: Nilam Willis MD          8/5/2023     2:09 PM   Date of TCM Phone Call   Hospital [facility]   Date of Admission 8/3/2023   Date of Discharge 8/5/2023   Discharge Disposition Home or Self Care     Risk for Readmission (LACE) Score: 13 (8/5/2023  6:01 AM)      History of Present Illness   Course During Hospital Stay: Recent hospitalization, labs, xrays reviewed and medications reconciled.  Defibrillator fired at home and she was having some chest pain following this.  Found to have hypokalemia which is thought in part the cause of her arrhythmia.    Copied from hospital record:   63 y.o. female PMH CHF, HTN, colorectal cancer with mets to the liver, ischemic cardiomyopathy status post AICD placement, CAD status post CABG with occluded grafts brought into the emergency department for evaluation of defibrillator discharge x1 episode.  Patient states that she had an abrupt onset of chest pain prior to the shock.  She had taken 1 sublingual nitroglycerin.  Subsequently came to the emergency department for evaluation, onset of symptoms approximately 6 PM.  While in the emergency department, chest pain recurred, took 1 more sublingual nitro.  She was admitted for further care, hypokalemia was treated.  Patient did have some ectopy noted however no further defibrillator discharges.  Patient was cleared for discharge by cardiology.  Patient's medicines were optimized, patient  should follow-up with cardiology in 1 week with repeat labs.  Patient is discharged home today in stable condition      The following portions of the patient's history were reviewed and updated as appropriate: allergies, current medications, past family history, past medical history, past social history, past surgical history, and problem list.  Outpatient Medications Prior to Visit   Medication Sig Dispense Refill    acetaminophen (TYLENOL) 500 MG tablet Take 1-2 tablets by mouth Every 4 (Four) Hours As Needed for Mild Pain.      apixaban (ELIQUIS) 5 MG tablet tablet Take 1 tablet by mouth 2 (Two) Times a Day. 180 tablet 1    capecitabine (XELODA) 500 MG chemo tablet Take 3 tablets by mouth 2 (Two) Times a Day. Take 3 tablets by mouth in the AM and 3 tablets in the PM on days 1-14, then off 7 days, on a 21 day cycle 84 tablet 11    clonazePAM (KlonoPIN) 1 MG tablet Take 1 tablet by mouth 2 (Two) Times a Day. 180 tablet 1    clopidogrel (PLAVIX) 75 MG tablet Take 1 tablet by mouth Daily. 90 tablet 3    cyanocobalamin (VITAMIN B-12) 500 MCG tablet Take 1 tablet by mouth Daily.      cyclobenzaprine (FLEXERIL) 10 MG tablet Take 1 tablet by mouth 3 (Three) Times a Day. 270 tablet 1    FLUoxetine (PROzac) 20 MG capsule Take 3 capsules by mouth Daily. 270 capsule 1    isosorbide mononitrate (IMDUR) 30 MG 24 hr tablet Take 1 tablet by mouth Daily.      metoprolol succinate XL (TOPROL-XL) 25 MG 24 hr tablet Take 1 tablet by mouth Daily. 90 tablet 3    nitroglycerin (NITROSTAT) 0.4 MG SL tablet Place 1 tablet under the tongue As Needed for Chest Pain.  - IF PAIN REMAINS AFTER 5 MIN CALL 911 AND REPEAT DOSE MAX 3 TABS IN 15 MINUTES 25 tablet 3    ondansetron (ZOFRAN) 8 MG tablet Take 1 tablet by mouth 3 (Three) Times a Day As Needed for Nausea or Vomiting. 30 tablet 5    pravastatin (PRAVACHOL) 80 MG tablet Take 1 tablet by mouth Every Night. 90 tablet 3    tiZANidine (ZANAFLEX) 4 MG tablet Take 1 tablet by mouth Every 8  "(Eight) Hours As Needed for Muscle Spasms. 270 tablet 1    vitamin D (ERGOCALCIFEROL) 1.25 MG (82469 UT) capsule capsule Take 1 capsule by mouth 1 (One) Time Per Week. 9 capsule 1    aspirin 81 MG EC tablet Take 1 tablet by mouth Daily.      furosemide (LASIX) 40 MG tablet Take 1 tablet by mouth 2 (Two) Times a Day. 180 tablet 3    losartan (COZAAR) 50 MG tablet Take 1 tablet by mouth Daily for 30 days. 30 tablet 0    omeprazole (priLOSEC) 40 MG capsule Take 1 capsule by mouth Daily. 30 capsule 5    spironolactone (ALDACTONE) 25 MG tablet Take 1 tablet by mouth 2 (Two) Times a Day. 180 tablet 3     No facility-administered medications prior to visit.       Review of Systems  I have reviewed 12 systems with patient. Findings were negative except what is noted below and/or in history of present illness.    Objective   Visit Vitals  /60   Pulse 67   Ht 160 cm (63\")   Wt 62.3 kg (137 lb 6.4 oz)   SpO2 99%   BMI 24.34 kg/mý         Physical Exam  Vitals and nursing note reviewed.   Constitutional:       General: She is not in acute distress.     Appearance: She is well-developed.   HENT:      Head: Normocephalic and atraumatic.      Nose: Nose normal.   Eyes:      General:         Right eye: No discharge.         Left eye: No discharge.      Conjunctiva/sclera: Conjunctivae normal.      Pupils: Pupils are equal, round, and reactive to light.   Neck:      Thyroid: No thyromegaly.   Cardiovascular:      Rate and Rhythm: Normal rate and regular rhythm.      Heart sounds: Normal heart sounds.   Pulmonary:      Effort: Pulmonary effort is normal.      Breath sounds: Normal breath sounds.   Lymphadenopathy:      Cervical: No cervical adenopathy.   Skin:     General: Skin is warm and dry.   Neurological:      Mental Status: She is alert and oriented to person, place, and time.     Assessment & Plan   Diagnoses and all orders for this visit:    1. Defibrillator discharge (Primary)    2. Chronic congestive heart failure, " unspecified heart failure type    3. Hypokalemia    4. Coronary arteriosclerosis    Continue current medications.  Follow-up with specialty care as scheduled.  Will need potassium levels followed also.  35  minutes was spent with the patient and reviewing records, counseling and coordination of care.    No orders of the defined types were placed in this encounter.      Current outpatient and discharge medications have been reconciled for the patient.  Reviewed by: Nilam Willis MD      Return if symptoms worsen or fail to improve, for Next scheduled follow up.

## 2023-08-02 ENCOUNTER — READMISSION MANAGEMENT (OUTPATIENT)
Dept: CALL CENTER | Facility: HOSPITAL | Age: 63
End: 2023-08-02
Payer: MEDICARE

## 2023-08-03 ENCOUNTER — HOSPITAL ENCOUNTER (INPATIENT)
Facility: HOSPITAL | Age: 63
LOS: 2 days | Discharge: HOME | DRG: 682 | End: 2023-08-05
Attending: EMERGENCY MEDICINE | Admitting: INTERNAL MEDICINE
Payer: MEDICARE

## 2023-08-03 ENCOUNTER — APPOINTMENT (OUTPATIENT)
Dept: GENERAL RADIOLOGY | Facility: HOSPITAL | Age: 63
DRG: 682 | End: 2023-08-03
Payer: MEDICARE

## 2023-08-03 ENCOUNTER — LAB (OUTPATIENT)
Dept: LAB | Facility: HOSPITAL | Age: 63
End: 2023-08-03
Payer: MEDICARE

## 2023-08-03 ENCOUNTER — APPOINTMENT (OUTPATIENT)
Dept: CT IMAGING | Facility: HOSPITAL | Age: 63
DRG: 682 | End: 2023-08-03
Payer: MEDICARE

## 2023-08-03 ENCOUNTER — TRANSCRIBE ORDERS (OUTPATIENT)
Dept: ADMINISTRATIVE | Facility: HOSPITAL | Age: 63
End: 2023-08-03
Payer: MEDICARE

## 2023-08-03 ENCOUNTER — READMISSION MANAGEMENT (OUTPATIENT)
Dept: CALL CENTER | Facility: HOSPITAL | Age: 63
End: 2023-08-03
Payer: MEDICARE

## 2023-08-03 ENCOUNTER — TELEPHONE (OUTPATIENT)
Dept: ORTHOPEDIC SURGERY | Facility: CLINIC | Age: 63
End: 2023-08-03
Payer: MEDICARE

## 2023-08-03 DIAGNOSIS — A41.9 SEPSIS, DUE TO UNSPECIFIED ORGANISM, UNSPECIFIED WHETHER ACUTE ORGAN DYSFUNCTION PRESENT: Primary | ICD-10-CM

## 2023-08-03 DIAGNOSIS — R53.83 OTHER FATIGUE: ICD-10-CM

## 2023-08-03 DIAGNOSIS — I10 ESSENTIAL HYPERTENSION: Chronic | ICD-10-CM

## 2023-08-03 DIAGNOSIS — E86.0 DEHYDRATION: ICD-10-CM

## 2023-08-03 DIAGNOSIS — R06.02 SHORTNESS OF BREATH: ICD-10-CM

## 2023-08-03 DIAGNOSIS — R07.9 CHEST PAIN, UNSPECIFIED TYPE: ICD-10-CM

## 2023-08-03 DIAGNOSIS — I25.10 CORONARY ARTERIOSCLEROSIS: ICD-10-CM

## 2023-08-03 DIAGNOSIS — R07.9 CHEST PAIN, UNSPECIFIED TYPE: Primary | ICD-10-CM

## 2023-08-03 DIAGNOSIS — I95.9 HYPOTENSION, UNSPECIFIED HYPOTENSION TYPE: ICD-10-CM

## 2023-08-03 LAB
ALBUMIN SERPL-MCNC: 3.7 G/DL (ref 3.5–5.2)
ALBUMIN SERPL-MCNC: 4.3 G/DL (ref 3.5–5.2)
ALBUMIN/GLOB SERPL: 1.2 G/DL
ALBUMIN/GLOB SERPL: 1.3 G/DL
ALP SERPL-CCNC: 183 U/L (ref 39–117)
ALP SERPL-CCNC: 200 U/L (ref 39–117)
ALT SERPL W P-5'-P-CCNC: 43 U/L (ref 1–33)
ALT SERPL W P-5'-P-CCNC: 49 U/L (ref 1–33)
ANION GAP SERPL CALCULATED.3IONS-SCNC: 15.7 MMOL/L (ref 5–15)
ANION GAP SERPL CALCULATED.3IONS-SCNC: 16.4 MMOL/L (ref 5–15)
APTT PPP: 29.8 SECONDS (ref 24.2–34.2)
ARTERIAL PATENCY WRIST A: ABNORMAL
AST SERPL-CCNC: 47 U/L (ref 1–32)
AST SERPL-CCNC: 52 U/L (ref 1–32)
BACTERIA UR QL AUTO: ABNORMAL /HPF
BASE EXCESS BLDA CALC-SCNC: -7.3 MMOL/L (ref -2–2)
BASOPHILS # BLD AUTO: 0.02 10*3/MM3 (ref 0–0.2)
BASOPHILS # BLD AUTO: 0.03 10*3/MM3 (ref 0–0.2)
BASOPHILS NFR BLD AUTO: 0.1 % (ref 0–1.5)
BASOPHILS NFR BLD AUTO: 0.2 % (ref 0–1.5)
BDY SITE: ABNORMAL
BILIRUB SERPL-MCNC: 1.1 MG/DL (ref 0–1.2)
BILIRUB SERPL-MCNC: 1.2 MG/DL (ref 0–1.2)
BILIRUB UR QL STRIP: NEGATIVE
BUN SERPL-MCNC: 64 MG/DL (ref 8–23)
BUN SERPL-MCNC: 64 MG/DL (ref 8–23)
BUN/CREAT SERPL: 14 (ref 7–25)
BUN/CREAT SERPL: 14.8 (ref 7–25)
CALCIUM SPEC-SCNC: 8.5 MG/DL (ref 8.6–10.5)
CALCIUM SPEC-SCNC: 9.6 MG/DL (ref 8.6–10.5)
CHLORIDE SERPL-SCNC: 100 MMOL/L (ref 98–107)
CHLORIDE SERPL-SCNC: 95 MMOL/L (ref 98–107)
CLARITY UR: CLEAR
CO2 SERPL-SCNC: 17.6 MMOL/L (ref 22–29)
CO2 SERPL-SCNC: 18.3 MMOL/L (ref 22–29)
COHGB MFR BLD: 0.5 % (ref 0–1.5)
COLOR UR: YELLOW
CREAT SERPL-MCNC: 4.32 MG/DL (ref 0.57–1)
CREAT SERPL-MCNC: 4.56 MG/DL (ref 0.57–1)
CRP SERPL-MCNC: 0.89 MG/DL (ref 0–0.5)
D-LACTATE SERPL-SCNC: 2.9 MMOL/L (ref 0.5–2)
D-LACTATE SERPL-SCNC: 3 MMOL/L (ref 0.5–2)
DEPRECATED RDW RBC AUTO: 42.1 FL (ref 37–54)
DEPRECATED RDW RBC AUTO: 44.8 FL (ref 37–54)
EGFRCR SERPLBLD CKD-EPI 2021: 10.1 ML/MIN/1.73
EGFRCR SERPLBLD CKD-EPI 2021: 10.8 ML/MIN/1.73
EOSINOPHIL # BLD AUTO: 0.01 10*3/MM3 (ref 0–0.4)
EOSINOPHIL # BLD AUTO: 0.18 10*3/MM3 (ref 0–0.4)
EOSINOPHIL NFR BLD AUTO: 0.1 % (ref 0.3–6.2)
EOSINOPHIL NFR BLD AUTO: 1.5 % (ref 0.3–6.2)
ERYTHROCYTE [DISTWIDTH] IN BLOOD BY AUTOMATED COUNT: 13.5 % (ref 12.3–15.4)
ERYTHROCYTE [DISTWIDTH] IN BLOOD BY AUTOMATED COUNT: 14.6 % (ref 12.3–15.4)
FHHB: 5.4 % (ref 0–5)
GAS FLOW AIRWAY: 0 LPM
GLOBULIN UR ELPH-MCNC: 3.2 GM/DL
GLOBULIN UR ELPH-MCNC: 3.4 GM/DL
GLUCOSE BLDC GLUCOMTR-MCNC: 119 MG/DL (ref 70–99)
GLUCOSE SERPL-MCNC: 114 MG/DL (ref 65–99)
GLUCOSE SERPL-MCNC: 99 MG/DL (ref 65–99)
GLUCOSE UR STRIP-MCNC: NEGATIVE MG/DL
HCO3 BLDA-SCNC: 16.9 MMOL/L (ref 22–26)
HCT VFR BLD AUTO: 44.8 % (ref 34–46.6)
HCT VFR BLD AUTO: 46.1 % (ref 34–46.6)
HGB BLD-MCNC: 15.1 G/DL (ref 12–15.9)
HGB BLD-MCNC: 15.5 G/DL (ref 12–15.9)
HGB BLDA-MCNC: 13 G/DL (ref 11.7–14.6)
HGB UR QL STRIP.AUTO: ABNORMAL
HOLD SPECIMEN: NORMAL
HOLD SPECIMEN: NORMAL
HYALINE CASTS UR QL AUTO: ABNORMAL /LPF
IMM GRANULOCYTES # BLD AUTO: 0.07 10*3/MM3 (ref 0–0.05)
IMM GRANULOCYTES # BLD AUTO: 0.07 10*3/MM3 (ref 0–0.05)
IMM GRANULOCYTES NFR BLD AUTO: 0.4 % (ref 0–0.5)
IMM GRANULOCYTES NFR BLD AUTO: 0.6 % (ref 0–0.5)
INHALED O2 CONCENTRATION: 21 %
INR PPP: 2.2 (ref 0.86–1.15)
KETONES UR QL STRIP: NEGATIVE
LEUKOCYTE ESTERASE UR QL STRIP.AUTO: ABNORMAL
LYMPHOCYTES # BLD AUTO: 1.18 10*3/MM3 (ref 0.7–3.1)
LYMPHOCYTES # BLD AUTO: 1.22 10*3/MM3 (ref 0.7–3.1)
LYMPHOCYTES NFR BLD AUTO: 7.6 % (ref 19.6–45.3)
LYMPHOCYTES NFR BLD AUTO: 9.8 % (ref 19.6–45.3)
MAGNESIUM SERPL-MCNC: 2.1 MG/DL (ref 1.6–2.4)
MCH RBC QN AUTO: 32.7 PG (ref 26.6–33)
MCH RBC QN AUTO: 32.8 PG (ref 26.6–33)
MCHC RBC AUTO-ENTMCNC: 33.6 G/DL (ref 31.5–35.7)
MCHC RBC AUTO-ENTMCNC: 33.7 G/DL (ref 31.5–35.7)
MCV RBC AUTO: 97 FL (ref 79–97)
MCV RBC AUTO: 97.5 FL (ref 79–97)
METHGB BLD QL: 0.3 % (ref 0–1.5)
MODALITY: ABNORMAL
MONOCYTES # BLD AUTO: 0.94 10*3/MM3 (ref 0.1–0.9)
MONOCYTES # BLD AUTO: 0.98 10*3/MM3 (ref 0.1–0.9)
MONOCYTES NFR BLD AUTO: 6.1 % (ref 5–12)
MONOCYTES NFR BLD AUTO: 7.8 % (ref 5–12)
MUCOUS THREADS URNS QL MICRO: ABNORMAL /HPF
NEUTROPHILS NFR BLD AUTO: 13.72 10*3/MM3 (ref 1.7–7)
NEUTROPHILS NFR BLD AUTO: 80.1 % (ref 42.7–76)
NEUTROPHILS NFR BLD AUTO: 85.7 % (ref 42.7–76)
NEUTROPHILS NFR BLD AUTO: 9.68 10*3/MM3 (ref 1.7–7)
NITRITE UR QL STRIP: NEGATIVE
NRBC BLD AUTO-RTO: 0.1 /100 WBC (ref 0–0.2)
NRBC BLD AUTO-RTO: 0.1 /100 WBC (ref 0–0.2)
NT-PROBNP SERPL-MCNC: 5346 PG/ML (ref 0–900)
OXYHGB MFR BLDV: 93.8 % (ref 94–99)
PCO2 BLDA: 30.9 MM HG (ref 35–45)
PH BLDA: 7.36 PH UNITS (ref 7.35–7.45)
PH UR STRIP.AUTO: 5.5 [PH] (ref 5–8)
PHOSPHATE SERPL-MCNC: 5.8 MG/DL (ref 2.5–4.5)
PLATELET # BLD AUTO: 138 10*3/MM3 (ref 140–450)
PLATELET # BLD AUTO: 153 10*3/MM3 (ref 140–450)
PMV BLD AUTO: 12.2 FL (ref 6–12)
PMV BLD AUTO: 12.3 FL (ref 6–12)
PO2 BLD: 400 MM[HG] (ref 0–500)
PO2 BLDA: 84.1 MM HG (ref 80–100)
POTASSIUM SERPL-SCNC: 4.7 MMOL/L (ref 3.5–5.2)
POTASSIUM SERPL-SCNC: 5.7 MMOL/L (ref 3.5–5.2)
PROT SERPL-MCNC: 6.9 G/DL (ref 6–8.5)
PROT SERPL-MCNC: 7.7 G/DL (ref 6–8.5)
PROT UR QL STRIP: NEGATIVE
PROTHROMBIN TIME: 24.2 SECONDS (ref 11.8–14.9)
QT INTERVAL: 531 MS
RBC # BLD AUTO: 4.62 10*6/MM3 (ref 3.77–5.28)
RBC # BLD AUTO: 4.73 10*6/MM3 (ref 3.77–5.28)
RBC # UR STRIP: ABNORMAL /HPF
REF LAB TEST METHOD: ABNORMAL
SAO2 % BLDCOA: 94.6 % (ref 95–99)
SODIUM SERPL-SCNC: 129 MMOL/L (ref 136–145)
SODIUM SERPL-SCNC: 134 MMOL/L (ref 136–145)
SP GR UR STRIP: 1.01 (ref 1–1.03)
SQUAMOUS #/AREA URNS HPF: ABNORMAL /HPF
T4 SERPL-MCNC: 8.51 MCG/DL (ref 4.5–11.7)
TROPONIN T SERPL HS-MCNC: 28 NG/L
TSH SERPL DL<=0.05 MIU/L-ACNC: 2.51 UIU/ML (ref 0.27–4.2)
UROBILINOGEN UR QL STRIP: ABNORMAL
WBC # UR STRIP: ABNORMAL /HPF
WBC NRBC COR # BLD: 12.08 10*3/MM3 (ref 3.4–10.8)
WBC NRBC COR # BLD: 16.02 10*3/MM3 (ref 3.4–10.8)
WHOLE BLOOD HOLD COAG: NORMAL
WHOLE BLOOD HOLD SPECIMEN: NORMAL

## 2023-08-03 PROCEDURE — 36600 WITHDRAWAL OF ARTERIAL BLOOD: CPT | Performed by: EMERGENCY MEDICINE

## 2023-08-03 PROCEDURE — 0 CEFEPIME PER 500 MG: Performed by: EMERGENCY MEDICINE

## 2023-08-03 PROCEDURE — 74176 CT ABD & PELVIS W/O CONTRAST: CPT

## 2023-08-03 PROCEDURE — 93010 ELECTROCARDIOGRAM REPORT: CPT | Performed by: INTERNAL MEDICINE

## 2023-08-03 PROCEDURE — 82948 REAGENT STRIP/BLOOD GLUCOSE: CPT

## 2023-08-03 PROCEDURE — 99223 1ST HOSP IP/OBS HIGH 75: CPT | Performed by: INTERNAL MEDICINE

## 2023-08-03 PROCEDURE — 84436 ASSAY OF TOTAL THYROXINE: CPT

## 2023-08-03 PROCEDURE — 86140 C-REACTIVE PROTEIN: CPT | Performed by: EMERGENCY MEDICINE

## 2023-08-03 PROCEDURE — 85025 COMPLETE CBC W/AUTO DIFF WBC: CPT

## 2023-08-03 PROCEDURE — 36415 COLL VENOUS BLD VENIPUNCTURE: CPT

## 2023-08-03 PROCEDURE — 87040 BLOOD CULTURE FOR BACTERIA: CPT | Performed by: EMERGENCY MEDICINE

## 2023-08-03 PROCEDURE — 85610 PROTHROMBIN TIME: CPT | Performed by: EMERGENCY MEDICINE

## 2023-08-03 PROCEDURE — 85730 THROMBOPLASTIN TIME PARTIAL: CPT | Performed by: EMERGENCY MEDICINE

## 2023-08-03 PROCEDURE — 83880 ASSAY OF NATRIURETIC PEPTIDE: CPT

## 2023-08-03 PROCEDURE — 80053 COMPREHEN METABOLIC PANEL: CPT

## 2023-08-03 PROCEDURE — 82805 BLOOD GASES W/O2 SATURATION: CPT | Performed by: EMERGENCY MEDICINE

## 2023-08-03 PROCEDURE — 93005 ELECTROCARDIOGRAM TRACING: CPT

## 2023-08-03 PROCEDURE — 84100 ASSAY OF PHOSPHORUS: CPT | Performed by: EMERGENCY MEDICINE

## 2023-08-03 PROCEDURE — 71045 X-RAY EXAM CHEST 1 VIEW: CPT

## 2023-08-03 PROCEDURE — 84443 ASSAY THYROID STIM HORMONE: CPT

## 2023-08-03 PROCEDURE — 83050 HGB METHEMOGLOBIN QUAN: CPT | Performed by: EMERGENCY MEDICINE

## 2023-08-03 PROCEDURE — 83605 ASSAY OF LACTIC ACID: CPT | Performed by: EMERGENCY MEDICINE

## 2023-08-03 PROCEDURE — 84484 ASSAY OF TROPONIN QUANT: CPT | Performed by: EMERGENCY MEDICINE

## 2023-08-03 PROCEDURE — 82375 ASSAY CARBOXYHB QUANT: CPT | Performed by: EMERGENCY MEDICINE

## 2023-08-03 PROCEDURE — 83735 ASSAY OF MAGNESIUM: CPT | Performed by: EMERGENCY MEDICINE

## 2023-08-03 PROCEDURE — 80053 COMPREHEN METABOLIC PANEL: CPT | Performed by: EMERGENCY MEDICINE

## 2023-08-03 PROCEDURE — 93005 ELECTROCARDIOGRAM TRACING: CPT | Performed by: EMERGENCY MEDICINE

## 2023-08-03 PROCEDURE — 99285 EMERGENCY DEPT VISIT HI MDM: CPT

## 2023-08-03 PROCEDURE — 70450 CT HEAD/BRAIN W/O DYE: CPT

## 2023-08-03 PROCEDURE — 81001 URINALYSIS AUTO W/SCOPE: CPT | Performed by: EMERGENCY MEDICINE

## 2023-08-03 RX ORDER — ACETAMINOPHEN 160 MG/5ML
650 SOLUTION ORAL EVERY 4 HOURS PRN
Status: DISCONTINUED | OUTPATIENT
Start: 2023-08-03 | End: 2023-08-05 | Stop reason: HOSPADM

## 2023-08-03 RX ORDER — POLYETHYLENE GLYCOL 3350 17 G/17G
17 POWDER, FOR SOLUTION ORAL DAILY PRN
Status: DISCONTINUED | OUTPATIENT
Start: 2023-08-03 | End: 2023-08-05 | Stop reason: HOSPADM

## 2023-08-03 RX ORDER — SODIUM CHLORIDE, SODIUM LACTATE, POTASSIUM CHLORIDE, CALCIUM CHLORIDE 600; 310; 30; 20 MG/100ML; MG/100ML; MG/100ML; MG/100ML
125 INJECTION, SOLUTION INTRAVENOUS CONTINUOUS
Status: DISCONTINUED | OUTPATIENT
Start: 2023-08-03 | End: 2023-08-05 | Stop reason: HOSPADM

## 2023-08-03 RX ORDER — ACETAMINOPHEN 325 MG/1
650 TABLET ORAL EVERY 4 HOURS PRN
Status: DISCONTINUED | OUTPATIENT
Start: 2023-08-03 | End: 2023-08-05 | Stop reason: HOSPADM

## 2023-08-03 RX ORDER — AMOXICILLIN 250 MG
2 CAPSULE ORAL 2 TIMES DAILY
Status: DISCONTINUED | OUTPATIENT
Start: 2023-08-03 | End: 2023-08-05 | Stop reason: HOSPADM

## 2023-08-03 RX ORDER — BISACODYL 5 MG/1
5 TABLET, DELAYED RELEASE ORAL DAILY PRN
Status: DISCONTINUED | OUTPATIENT
Start: 2023-08-03 | End: 2023-08-05 | Stop reason: HOSPADM

## 2023-08-03 RX ORDER — SODIUM CHLORIDE 9 MG/ML
40 INJECTION, SOLUTION INTRAVENOUS AS NEEDED
Status: DISCONTINUED | OUTPATIENT
Start: 2023-08-03 | End: 2023-08-05 | Stop reason: HOSPADM

## 2023-08-03 RX ORDER — ONDANSETRON 2 MG/ML
4 INJECTION INTRAMUSCULAR; INTRAVENOUS ONCE
Status: DISCONTINUED | OUTPATIENT
Start: 2023-08-03 | End: 2023-08-05 | Stop reason: HOSPADM

## 2023-08-03 RX ORDER — SODIUM CHLORIDE 0.9 % (FLUSH) 0.9 %
10 SYRINGE (ML) INJECTION AS NEEDED
Status: DISCONTINUED | OUTPATIENT
Start: 2023-08-03 | End: 2023-08-05 | Stop reason: HOSPADM

## 2023-08-03 RX ORDER — SODIUM CHLORIDE 0.9 % (FLUSH) 0.9 %
10 SYRINGE (ML) INJECTION EVERY 12 HOURS SCHEDULED
Status: DISCONTINUED | OUTPATIENT
Start: 2023-08-03 | End: 2023-08-05 | Stop reason: HOSPADM

## 2023-08-03 RX ORDER — NITROGLYCERIN 0.4 MG/1
0.4 TABLET SUBLINGUAL
Status: DISCONTINUED | OUTPATIENT
Start: 2023-08-03 | End: 2023-08-05 | Stop reason: HOSPADM

## 2023-08-03 RX ORDER — ACETAMINOPHEN 650 MG/1
650 SUPPOSITORY RECTAL EVERY 4 HOURS PRN
Status: DISCONTINUED | OUTPATIENT
Start: 2023-08-03 | End: 2023-08-05 | Stop reason: HOSPADM

## 2023-08-03 RX ORDER — BISACODYL 10 MG
10 SUPPOSITORY, RECTAL RECTAL DAILY PRN
Status: DISCONTINUED | OUTPATIENT
Start: 2023-08-03 | End: 2023-08-05 | Stop reason: HOSPADM

## 2023-08-03 RX ADMIN — SODIUM CHLORIDE 1854 ML: 9 INJECTION, SOLUTION INTRAVENOUS at 16:24

## 2023-08-03 RX ADMIN — CEFEPIME 2000 MG: 2 INJECTION, POWDER, FOR SOLUTION INTRAVENOUS at 19:27

## 2023-08-03 NOTE — H&P
[facility] HISTORY AND PHYSICAL  Date: 8/3/2023   Patient Name: Erika Bowens  : 1960  MRN: [id number]  Primary Care Physician:  Nilam Willis MD  Date of admission: 8/3/2023    Subjective   Subjective     Chief Complaint: Generalized weakness    HPI:    rEika Bowens is a 63 y.o. female past medical history cardiomyopathy, and metastatic colon cancer that presents to the emergency department for evaluation of generalized weakness over the past 3 days that is gotten progressively worse.  She had an episode of nausea and vomiting yesterday as well.  She denies any fevers, chills and sweats, chest pain, shortness of breath, palpitations, abdominal pain diarrhea constipation, dysuria, rash.  In the emergency department she was noted to be hypotensive and also noted to have leukocytosis and lactic acid of 3.  She has received IV fluids, empirically started on cefepime although no obvious source of infection at this point and will be admitted for ongoing monitoring and management.  CMP pending at this time.      Personal History     Past Medical History:  Past Medical History:   Diagnosis Date    Abdominal pain     CHF (congestive heart failure)     Colon cancer     Coronary arteriosclerosis     Depressive disorder     with anxiety    Encounter for routine adult health examination     Essential hypertension     Generalized anxiety disorder     GERD (gastroesophageal reflux disease)     Hip pain     Hyperlipidemia     Kidney stone     Osteoarthritis     Radial styloid tenosynovitis of left hand     Urinary tract infectious disease     Vitamin D deficiency    Metastatic colon cancer      Past Surgical History:  Past Surgical History:   Procedure Laterality Date    CARDIAC CATHETERIZATION  04/15/2016    Enlarged left ventricle with reduced contractility.Severe coronary artery disease as described above. [facility].    COLONOSCOPY  2019    COLON CANCER DOMINGO.    COLONOSCOPY N/A  02/14/2022    Procedure: COLONOSCOPY WITH POLYPECTOMY;  Surgeon: Coy Ordoñez MD;  Location: [facility];  Service: Gastroenterology;  Laterality: N/A;  COLON POLYP, ANASTAMOSIS IN SIGMOID    CORONARY ANGIOPLASTY WITH STENT PLACEMENT  05/06/2012    PTCA implantation in the vein graft of the OM branch. Done at [facility] in [place], [place].    CORONARY ARTERY BYPASS GRAFT  03/18/2003    Emergent CABG x 5 CAD    DE QUERVAIN'S RELEASE Right 11/30/2009    Release of De Quervain's to the right thumb    DEQUERVAIN RELEASE Left 11/18/2015    Release of de Quervain's to the left wrist.    LIVER SURGERY      PACEMAKER IMPLANTATION      DEFIBRILLATOR    PAP SMEAR  06/28/2012    normal    PROCEDURE GENERIC CONVERTED  11/22/2015    MOST RECENT DIASTOLIC BP < 90 mmHg     PROCEDURE GENERIC CONVERTED  11/22/2015    MOST RECENT SYSTOLIC BP > or = 140 mmHg     PROCEDURE GENERIC CONVERTED  11/22/2015    TOBACCO NON-USER     REPLACEMENT TOTAL HIP LATERAL POSITION      SUBTOTAL COLECTOMY N/A 04/23/2019    sigmoid for colon cancer    TOTAL KNEE ARTHROPLASTY           Family History:   Family History   Problem Relation Age of Onset    Liver disease Mother     Hyperlipidemia Father     Heart disease Father     Breast cancer Sister     Diabetes Maternal Grandmother     Arthritis Maternal Grandmother     Heart disease Paternal Grandmother     Heart disease Paternal Grandfather     Diabetes Other     Heart disease Other     Hypertension Other     Stroke Other     Colon cancer Other     Coronary artery disease Other     Other Other         Heart surgery         Social History:   Social History     Tobacco Use    Smoking status: Never    Smokeless tobacco: Never   Vaping Use    Vaping Use: Never used   Substance Use Topics    Alcohol use: No    Drug use: Never         Home Medications:  FLUoxetine, acetaminophen, apixaban, capecitabine, clonazePAM, clopidogrel, cyanocobalamin, cyclobenzaprine, furosemide,  isosorbide mononitrate, losartan, metoprolol succinate XL, nitroglycerin, ondansetron, pravastatin, spironolactone, tiZANidine, and vitamin D    Allergies:  Allergies   Allergen Reactions    Bupropion Other (See Comments)     Caused falls       Review of Systems   All systems were reviewed and negative except for: Generalized weakness    Objective   Objective     Vitals:   Temp:  [97.6 °F (36.4 °C)-97.7 °F (36.5 °C)] 97.6 °F (36.4 °C)  Heart Rate:  [50-65] 53  Resp:  [19] 19  BP: ()/(47-91) 115/91    Physical Exam    Constitutional: Awake, alert, no acute distress   Eyes: Pupils equal, sclerae anicteric, no conjunctival injection   HENT: NCAT, mucous membranes moist   Neck: Supple, no thyromegaly, no lymphadenopathy, trachea midline   Respiratory: Clear to auscultation bilaterally, nonlabored respirations    Cardiovascular: RRR, no murmurs, rubs, or gallops, palpable pedal pulses bilaterally   Gastrointestinal: Positive bowel sounds, soft, nontender, nondistended   Musculoskeletal: No bilateral ankle edema, no clubbing or cyanosis to extremities   Psychiatric: Appropriate affect, cooperative   Neurologic: Oriented x 3, strength symmetric in all extremities, Cranial Nerves grossly intact to confrontation, speech clear   Skin: No rashes     Result Review    Result Review:  I have personally reviewed the results from the time of this admission to 8/3/2023 19:38 EDT and agree with these findings:  [x]  Laboratory  []  Microbiology  [x]  Radiology  []  EKG/Telemetry   []  Cardiology/Vascular   []  Pathology  []  Old records  []  Other:      Assessment & Plan   Assessment / Plan     Assessment/Plan:   Sepsis, unclear etiology: Continue with cefepime started in the emergency department.  Follow cultures already obtained.  Serial labs.  Close monitoring in PCU.  Monitor on telemetry.  Metastatic colon cancer: Outpatient follow-up  Cardiomyopathy: Resume home regimen, monitor volume status.  Continue to monitor on  telemetry  QT prolongation: Avoid QT prolonging agents.  Monitor on telemetry.  Check electrolytes.      Addendum: CMP revealed acute renal failure with hyperkalemia at 5.7.  Patient has been started on Lokelma and, will continue with aggressive IV hydration.  I have ordered CT abdomen which did not show any definitive acute pathology.  We will avoid nephrotoxins and monitor serial labs.  Monitor intake and output.  Consider nephrology consultation if not improving.      DVT prophylaxis:  Resume Eliquis once verified    CODE STATUS:    Code Status (Patient has no pulse and is not breathing): CPR (Attempt to Resuscitate)  Medical Interventions (Patient has pulse or is breathing): Full Support  Release to patient: Routine Release      Admission Status:  I believe this patient meets inpatient status.    Electronically signed by Harpal Sams Jr, MD, 08/03/23, 7:38 PM EDT.

## 2023-08-03 NOTE — ED PROVIDER NOTES
Time: 5:35 PM EDT  Date of encounter:  8/3/2023  Independent Historian/Clinical History and Information was obtained by:   Patient    History is limited by: N/A    Chief Complaint: Weakness      History of Present Illness:  Patient is a 63 y.o. year old female who presents to the emergency department for evaluation of generalized weakness.  Patient is currently undergoing chemotherapy for cancer and reports becoming very weak past couple days.  On arrival to the emergency department she has been found to be hypotensive.    HPI    Patient Care Team  Primary Care Provider: Nilam Willis MD    Past Medical History:     Allergies   Allergen Reactions    Bupropion Other (See Comments)     Caused falls     Past Medical History:   Diagnosis Date    Abdominal pain     CHF (congestive heart failure)     Colon cancer     Coronary arteriosclerosis     Depressive disorder     with anxiety    Encounter for routine adult health examination     Essential hypertension     Generalized anxiety disorder     GERD (gastroesophageal reflux disease)     Hip pain     Hyperlipidemia     Kidney stone     Osteoarthritis     Radial styloid tenosynovitis of left hand     Urinary tract infectious disease     Vitamin D deficiency      Past Surgical History:   Procedure Laterality Date    CARDIAC CATHETERIZATION  04/15/2016    Enlarged left ventricle with reduced contractility.Severe coronary artery disease as described above. [facility].    COLONOSCOPY  2019    COLON CANCER DOMINGO.    COLONOSCOPY N/A 02/14/2022    Procedure: COLONOSCOPY WITH POLYPECTOMY;  Surgeon: Coy Ordoñez MD;  Location: [facility] ENDOSCOPY;  Service: Gastroenterology;  Laterality: N/A;  COLON POLYP, ANASTAMOSIS IN SIGMOID    CORONARY ANGIOPLASTY WITH STENT PLACEMENT  05/06/2012    PTCA implantation in the vein graft of the OM branch. Done at [facility] in [place], [place].    CORONARY ARTERY BYPASS GRAFT  03/18/2003    Emergent CABG x 5 CAD    DE  QUERVAIN'S RELEASE Right 11/30/2009    Release of De Quervain's to the right thumb    DEQUERVAIN RELEASE Left 11/18/2015    Release of de Quervain's to the left wrist.    LIVER SURGERY      PACEMAKER IMPLANTATION      DEFIBRILLATOR    PAP SMEAR  06/28/2012    normal    PROCEDURE GENERIC CONVERTED  11/22/2015    MOST RECENT DIASTOLIC BP < 90 mmHg     PROCEDURE GENERIC CONVERTED  11/22/2015    MOST RECENT SYSTOLIC BP > or = 140 mmHg     PROCEDURE GENERIC CONVERTED  11/22/2015    TOBACCO NON-USER     REPLACEMENT TOTAL HIP LATERAL POSITION      SUBTOTAL COLECTOMY N/A 04/23/2019    sigmoid for colon cancer    TOTAL KNEE ARTHROPLASTY       Family History   Problem Relation Age of Onset    Liver disease Mother     Hyperlipidemia Father     Heart disease Father     Breast cancer Sister     Diabetes Maternal Grandmother     Arthritis Maternal Grandmother     Heart disease Paternal Grandmother     Heart disease Paternal Grandfather     Diabetes Other     Heart disease Other     Hypertension Other     Stroke Other     Colon cancer Other     Coronary artery disease Other     Other Other         Heart surgery       Home Medications:  Prior to Admission medications    Medication Sig Start Date End Date Taking? Authorizing Provider   acetaminophen (TYLENOL) 500 MG tablet Take 1-2 tablets by mouth Every 4 (Four) Hours As Needed for Mild Pain.    Provider, MD Chandler   apixaban (ELIQUIS) 5 MG tablet tablet Take 1 tablet by mouth 2 (Two) Times a Day. 7/12/23   Luis Boyce MD   aspirin 81 MG EC tablet Take 1 tablet by mouth Daily.    ProviderChandler MD   capecitabine (XELODA) 500 MG chemo tablet Take 3 tablets by mouth 2 (Two) Times a Day. Take 3 tablets by mouth in the AM and 3 tablets in the PM on days 1-14, then off 7 days, on a 21 day cycle 7/16/23   Luis Boyce MD   clonazePAM (KlonoPIN) 1 MG tablet Take 1 tablet by mouth 2 (Two) Times a Day. 4/10/23   Nilam Willis MD   clopidogrel (PLAVIX) 75 MG  tablet Take 1 tablet by mouth Daily. 1/20/23   Nilam Willis MD   cyanocobalamin (VITAMIN B-12) 500 MCG tablet Take 1 tablet by mouth Daily.    ProviderChandler MD   cyclobenzaprine (FLEXERIL) 10 MG tablet Take 1 tablet by mouth 3 (Three) Times a Day. 6/13/23   Nilam Willis MD   FLUoxetine (PROzac) 20 MG capsule Take 3 capsules by mouth Daily. 1/30/23   Nilam Willis MD   furosemide (LASIX) 40 MG tablet Take 1 tablet by mouth 2 (Two) Times a Day. 1/20/23   Nilam Willis MD   isosorbide mononitrate (IMDUR) 30 MG 24 hr tablet Take 1 tablet by mouth Daily.    ProviderChandler MD   losartan (COZAAR) 50 MG tablet Take 1 tablet by mouth Daily for 30 days. 7/26/23 8/25/23  Zak Gallagher MD   metoprolol succinate XL (TOPROL-XL) 25 MG 24 hr tablet Take 1 tablet by mouth Daily. 1/20/23   Nilam Willis MD   nitroglycerin (NITROSTAT) 0.4 MG SL tablet Place 1 tablet under the tongue As Needed for Chest Pain.  - IF PAIN REMAINS AFTER 5 MIN CALL 911 AND REPEAT DOSE MAX 3 TABS IN 15 MINUTES 1/20/23   Nilam Willis MD   omeprazole (priLOSEC) 40 MG capsule Take 1 capsule by mouth Daily. 10/7/22   Nilam Willis MD   ondansetron (ZOFRAN) 8 MG tablet Take 1 tablet by mouth 3 (Three) Times a Day As Needed for Nausea or Vomiting. 7/13/23   Luis Boyce MD   pravastatin (PRAVACHOL) 80 MG tablet Take 1 tablet by mouth Every Night. 1/20/23   Nilam Willis MD   spironolactone (ALDACTONE) 25 MG tablet Take 1 tablet by mouth 2 (Two) Times a Day. 1/20/23   Nilam Willis MD   tiZANidine (ZANAFLEX) 4 MG tablet Take 1 tablet by mouth Every 8 (Eight) Hours As Needed for Muscle Spasms. 1/20/23   Nilam Willis MD   vitamin D (ERGOCALCIFEROL) 1.25 MG (39981 UT) capsule capsule Take 1 capsule by mouth 1 (One) Time Per Week. 1/30/23   Nilam Willis MD        Social History:   Social History     Tobacco Use    Smoking status: Never    Smokeless tobacco: Never   Vaping Use     "Vaping Use: Never used   Substance Use Topics    Alcohol use: No    Drug use: Never         Review of Systems:  Review of Systems   Constitutional:  Negative for chills and fever.   HENT:  Negative for congestion, rhinorrhea and sore throat.    Eyes:  Negative for pain and visual disturbance.   Respiratory:  Negative for apnea, cough, chest tightness and shortness of breath.    Cardiovascular:  Negative for chest pain and palpitations.   Gastrointestinal:  Negative for abdominal pain, diarrhea, nausea and vomiting.   Genitourinary:  Negative for difficulty urinating and dysuria.   Musculoskeletal:  Negative for joint swelling and myalgias.   Skin:  Negative for color change.   Neurological:  Positive for weakness. Negative for seizures and headaches.   Psychiatric/Behavioral: Negative.     All other systems reviewed and are negative.     Physical Exam:  /91   Pulse 53   Temp 97.6 °F (36.4 °C) (Oral)   Resp 19   Ht 160 cm (63\")   Wt 61.8 kg (136 lb 3.9 oz)   SpO2 91%   BMI 24.13 kg/m²     Physical Exam  Vitals and nursing note reviewed.   Constitutional:       General: She is not in acute distress.     Appearance: Normal appearance. She is not toxic-appearing.   HENT:      Head: Normocephalic and atraumatic.      Jaw: There is normal jaw occlusion.      Mouth/Throat:      Mouth: Mucous membranes are dry.   Eyes:      General: Lids are normal.      Extraocular Movements: Extraocular movements intact.      Conjunctiva/sclera: Conjunctivae normal.      Pupils: Pupils are equal, round, and reactive to light.   Cardiovascular:      Rate and Rhythm: Normal rate and regular rhythm.      Pulses: Normal pulses.      Heart sounds: Normal heart sounds.   Pulmonary:      Effort: Pulmonary effort is normal. No respiratory distress.      Breath sounds: Normal breath sounds. No wheezing or rhonchi.   Abdominal:      General: Abdomen is flat.      Palpations: Abdomen is soft.      Tenderness: There is no abdominal " tenderness. There is no guarding or rebound.   Musculoskeletal:         General: Normal range of motion.      Cervical back: Normal range of motion and neck supple.      Right lower leg: No edema.      Left lower leg: No edema.   Skin:     General: Skin is warm and dry.      Comments: Poor color   Neurological:      Mental Status: She is alert and oriented to person, place, and time. Mental status is at baseline.   Psychiatric:         Mood and Affect: Mood normal.                Procedures:  Procedures      Medical Decision Making:      Comorbidities that affect care:    Cancer    External Notes reviewed:    Previous Clinic Note: Failure medicine office visit for general medical management      The following orders were placed and all results were independently analyzed by me:  Orders Placed This Encounter   Procedures    Blood Culture - Blood,    Blood Culture - Blood,    XR Chest 1 View    CT Head Without Contrast    [place] Draw    Comprehensive Metabolic Panel    Single High Sensitivity Troponin T    Magnesium    Urinalysis With Microscopic If Indicated (No Culture) - Urine, Clean Catch    CBC Auto Differential    Protime-INR    aPTT    Phosphorus    Calcium, Ionized    Lactic Acid, Plasma    C-reactive Protein    Blood Gas, Arterial -With Co-Ox Panel: Yes    STAT Lactic Acid, Reflex    Urinalysis, Microscopic Only - Urine, Clean Catch    NPO Diet NPO Type: Strict NPO    Undress & Gown    Continuous Pulse Oximetry    Vital Signs    Orthostatic Blood Pressure    Measure Blood Pressure    Inpatient Hospitalist Consult    Oxygen Therapy- Nasal Cannula; Titrate 1-6 LPM Per SpO2; 90 - 95%    POC Glucose Once    POC Glucose Once    ECG 12 Lead ED Triage Standing Order; Weak / Dizzy / AMS    Insert Peripheral IV    Insert Large Bore Peripheral IV    Insert 2nd Large Bore Peripheral IV    Fall Precautions    CBC & Differential    Green Top (Gel)    Lavender Top    Gold Top - SST    Light Blue Top       Medications  Given in the Emergency Department:  Medications   sodium chloride 0.9 % flush 10 mL (has no administration in time range)   morphine injection 4 mg (0 mg Intravenous Hold 8/3/23 1703)   ondansetron (ZOFRAN) injection 4 mg (4 mg Intravenous Not Given 8/3/23 1900)   cefepime (MAXIPIME) 2000 mg/100 mL 0.9% NS (mbp) (has no administration in time range)   sodium chloride 0.9 % bolus 1,854 mL (0 mL Intravenous Stopped 8/3/23 1730)        ED Course:         Labs:    Lab Results (last 24 hours)       Procedure Component Value Units Date/Time    CBC & Differential [[id number]] Collected: 08/03/23 1517    Specimen: Blood Updated: 08/03/23 1618    Narrative:      The following orders were created for panel order CBC & Differential.  Procedure                               Abnormality         Status                     ---------                               -----------         ------                     CBC Auto Differential[[id number]]                            In process                   Please view results for these tests on the individual orders.    T4 [[id number]] Collected: 08/03/23 1517    Specimen: Blood Updated: 08/03/23 1618    TSH [[id number]] Collected: 08/03/23 1517    Specimen: Blood Updated: 08/03/23 1618    BNP [[id number]] Collected: 08/03/23 1517    Specimen: Blood Updated: 08/03/23 1618    Comprehensive Metabolic Panel [[id number]] Collected: 08/03/23 1517    Specimen: Blood Updated: 08/03/23 1618    CBC Auto Differential [[id number]] Collected: 08/03/23 1517    Specimen: Blood Updated: 08/03/23 1618    POC Glucose Once [[id number]]  (Abnormal) Collected: 08/03/23 1603    Specimen: Blood Updated: 08/03/23 1608     Glucose 119 mg/dL      Comment: Serial Number: [id number]:  [id number]       CBC & Differential [[id number]]  (Abnormal) Collected: 08/03/23 1604    Specimen: Blood Updated: 08/03/23 1627    Narrative:      The following orders were created for panel order CBC & Differential.  Procedure                                Abnormality         Status                     ---------                               -----------         ------                     CBC Auto Differential[[id number]]        Abnormal            Final result                 Please view results for these tests on the individual orders.    CBC Auto Differential [[id number]]  (Abnormal) Collected: 08/03/23 1604    Specimen: Blood Updated: 08/03/23 1627     WBC 16.02 10*3/mm3      RBC 4.73 10*6/mm3      Hemoglobin 15.5 g/dL      Hematocrit 46.1 %      MCV 97.5 fL      MCH 32.8 pg      MCHC 33.6 g/dL      RDW 14.6 %      RDW-SD 44.8 fl      MPV 12.3 fL      Platelets 153 10*3/mm3      Neutrophil % 85.7 %      Lymphocyte % 7.6 %      Monocyte % 6.1 %      Eosinophil % 0.1 %      Basophil % 0.1 %      Immature Grans % 0.4 %      Neutrophils, Absolute 13.72 10*3/mm3      Lymphocytes, Absolute 1.22 10*3/mm3      Monocytes, Absolute 0.98 10*3/mm3      Eosinophils, Absolute 0.01 10*3/mm3      Basophils, Absolute 0.02 10*3/mm3      Immature Grans, Absolute 0.07 10*3/mm3      nRBC 0.1 /100 WBC     Protime-INR [[id number]]  (Abnormal) Collected: 08/03/23 1604    Specimen: Blood Updated: 08/03/23 1639     Protime 24.2 Seconds      INR 2.20    Narrative:      Suggested Therapeutic Ranges For Oral Anticoagulant Therapy:  Level of Therapy                      INR Target Range  Standard Dose                            2.0-3.0  High Dose                                2.5-3.5  Patients not receiving anticoagulant  Therapy Normal Range                     0.86-1.15    aPTT [[id number]]  (Normal) Collected: 08/03/23 1604    Specimen: Blood Updated: 08/03/23 1639     PTT 29.8 seconds     Phosphorus [[id number]]  (Abnormal) Collected: 08/03/23 1604    Specimen: Blood Updated: 08/03/23 1720     Phosphorus 5.8 mg/dL     Lactic Acid, Plasma [[id number]]  (Abnormal) Collected: 08/03/23 1604    Specimen: Blood Updated: 08/03/23 1726     Lactate 3.0 mmol/L     C-reactive Protein  [[id number]]  (Abnormal) Collected: 08/03/23 1604    Specimen: Blood Updated: 08/03/23 1720     C-Reactive Protein 0.89 mg/dL     Blood Culture - Blood, Arm, Left [[id number]] Collected: 08/03/23 1604    Specimen: Blood from Arm, Left Updated: 08/03/23 1642    Blood Culture - Blood, Arm, Left [[id number]] Collected: 08/03/23 1604    Specimen: Blood from Arm, Left Updated: 08/03/23 1642    Blood Gas, Arterial -With Co-Ox Panel: Yes [[id number]]  (Abnormal) Collected: 08/03/23 1645    Specimen: Arterial Blood Updated: 08/03/23 1652     pH, Arterial 7.357 pH units      pCO2, Arterial 30.9 mm Hg      pO2, Arterial 84.1 mm Hg      HCO3, Arterial 16.9 mmol/L      Base Excess, Arterial -7.3 mmol/L      O2 Saturation, Arterial 94.6 %      Hemoglobin, Blood Gas 13.0 g/dL      Carboxyhemoglobin 0.5 %      Methemoglobin 0.30 %      Oxyhemoglobin 93.8 %      FHHB 5.4 %      Site Arterial: left brachial     Modality Room Air     FIO2 21 %      Flow Rate 0 lpm      PO2/FIO2 400     Taiwo's Test N/A    STAT Lactic Acid, Reflex [[id number]] Updated: 08/03/23 1902    Specimen: Blood     Urinalysis With Microscopic If Indicated (No Culture) - Urine, Clean Catch [[id number]]  (Abnormal) Collected: 08/03/23 1801    Specimen: Urine, Clean Catch Updated: 08/03/23 1823     Color, UA Yellow     Appearance, UA Clear     pH, UA 5.5     Specific Gravity, UA 1.009     Glucose, UA Negative     Ketones, UA Negative     Bilirubin, UA Negative     Blood, UA Moderate (2+)     Protein, UA Negative     Leuk Esterase, UA Small (1+)     Nitrite, UA Negative     Urobilinogen, UA 1.0 E.U./dL    Urinalysis, Microscopic Only - Urine, Clean Catch [[id number]]  (Abnormal) Collected: 08/03/23 1801    Specimen: Urine, Clean Catch Updated: 08/03/23 1833     RBC, UA 0-2 /HPF      WBC, UA 0-2 /HPF      Bacteria, UA Trace /HPF      Squamous Epithelial Cells, UA 0-2 /HPF      Hyaline Casts, UA None Seen /LPF      Mucus, UA Trace /HPF      Methodology Automated Microscopy     Comprehensive Metabolic Panel [[id number]] Collected: 08/03/23 1853    Specimen: Blood Updated: 08/03/23 1901    Single High Sensitivity Troponin T [[id number]] Collected: 08/03/23 1853    Specimen: Blood Updated: 08/03/23 1901    Magnesium [[id number]] Collected: 08/03/23 1853    Specimen: Blood Updated: 08/03/23 1901             Imaging:    CT Head Without Contrast    Result Date: 8/3/2023  PROCEDURE: CT HEAD WO CONTRAST  COMPARISON:  None  INDICATIONS: Headache, nausea, fall  PROTOCOL:   Standard imaging protocol performed    RADIATION:   DLP: 1018.2  mGy*cm   MA and/or KV was adjusted to minimize radiation dose.     TECHNIQUE: After obtaining the patient's consent, CT images were obtained without non-ionic intravenous contrast material.  FINDINGS:  The ventricles and sulci are proportional prominent compatible with global cerebral volume loss.  There is no mass effect or midline shift.  There is no acute intracranial hemorrhage.  There is no abnormal extra-axial fluid collection.  The gray-white matter differentiation is preserved.  There is mild periventricular white matter hypodensity likely representing sequelae of chronic small vessel ischemic disease.  The calvarium is intact.  The mastoid air cells and middle ears are well aerated.  The paranasal sinuses appear clear.  Visualized orbits and globes appear symmetric.        1. No acute intracranial abnormality.  Specifically, no evidence of acute hemorrhage, mass effect or midline shift. 2. Global volume loss with mild periventricular white matter hypodensity, likely representing sequelae of chronic small vessel ischemic disease.     JAH NARVAEZ MD       Electronically Signed and Approved By: JAH NARVAEZ MD on 8/03/2023 at 17:21             XR Chest 1 View    Result Date: 8/3/2023  PROCEDURE: XR CHEST 1 VW  COMPARISON: [facility], CR, XR CHEST 1 VW, 7/23/2023, 19:16.  INDICATIONS: WEAKNESS TODAY  FINDINGS:  Prior median sternotomy and  CABG.  Unremarkable appearing AICD.  Heart size unchanged.  No overt edema, focal consolidation, large effusion or pneumothorax.  Borderline low lung volumes.  Osseous structures unremarkable.        No active pulmonary process.       TAIRK BROOKS MD       Electronically Signed and Approved By: TARIK BROOKS MD on 8/03/2023 at 17:00                Differential Diagnosis and Discussion:    Metabolic: Differential diagnosis includes but is not limited to hypertension, hyperglycemia, hyperkalemia, hypocalcemia, metabolic acidosis, hypokalemia, hypoglycemia, malnutrition, hypothyroidism, hyperthyroidism, and adrenal insufficiency.   Weakness: Based on the patient's history, signs, and symptoms, the diffential diagnosis includes but is not limited to meningitis, stroke, sepsis, subarachnoid hemorrhage, intracranial bleeding, encephalitis, acute uti, dehydration, MS, myasthenia gravis, [place], migraine variant, neuromuscular disorders vertigo, electrolyte imbalance, and metabolic disorders.    All labs were reviewed and interpreted by me.  All X-rays impressions were independently interpreted by me.  CT scan radiology impression was interpreted by me.    MDM  Number of Diagnoses or Management Options  Dehydration  Hypotension, unspecified hypotension type  Sepsis, due to unspecified organism, unspecified whether acute organ dysfunction present  Diagnosis management comments: In summary this is a 63-year-old female who presents to the emergency department for evaluation.  She has been found to be profoundly hypotensive and required IV fluids which have improved her blood pressure.  CBC remarkable for leukocytosis.  Initial lactate level elevated.  Chest x-ray and CT brain unremarkable.  Chemistry pending.  Patient was given IV antibiotics.  Patient case has been discussed with the hospitalist team who will admit to the hospital for further evaluation and continuation of treatment.    Sepsis criteria was met in  the emergency department and the Sepsis protocol (including antibiotic administration) was initiated.      SIRS criteria considered:   1.  Temperature > 100.4 or <98.6    2.  Heart Rate > 90    3.  Respiratory Rate > 22    4.  WBC > 12K or <4K.             Severe Sepsis:     Respiratory: Mechanical Ventilation or Bipap  Hypotension: SBP > 90 or MAP < 65  Renal: Creatinine > 2  Metabolic: Lactic Acid > 2  Hematologic: Platelets < 100K or INR > 1.5  Hepatic: BILI  >  2  CNS: Sudden AMS     Septic Shock:     Severe Sepsis + Persistent hypotension or Lactic Acid > 4     Normal saline bolus, Antibiotics, and final disposition was based on these definitions.        Sepsis was recognized at 1858    Antibiotics were ordered.     30 cc/kg bolus was indicated.     The patient was ordered 2 L of fluids.    Total Critical Care time of 40 minutes. Total critical care time documented does not include time spent on separately billed procedures for services of nurses or physician assistants. I personally saw and examined the patient. I have reviewed all diagnostic interpretations and treatment plans as written. I was present for the key portions of any procedures performed and the inclusive time noted in any critical care statement. Critical care time includes patient management by me, time spent at the patients bedside,  time to review lab and imaging results, discussing patient care, documentation in the medical record, and time spent with family or caregiver.          Patient Care Considerations:    CT CHEST: I considered ordering a CT scan of the chest, however no respiratory distress      Consultants/Shared Management Plan:    Hospitalist: I have discussed the case with Dr. Sams who agrees to accept the patient for admission.    Social Determinants of Health:    Patient has presented with family members who are responsible, reliable and will ensure follow up care.      Disposition and Care Coordination:    Admit:   Through  independent evaluation of the patient's history, physical, and imperical data, the patient meets criteria for observation/admission to the hospital.        Final diagnoses:   Sepsis, due to unspecified organism, unspecified whether acute organ dysfunction present   Dehydration   Hypotension, unspecified hypotension type        ED Disposition       ED Disposition   Decision to Admit    Condition   --    Comment   --               This medical record created using voice recognition software.             Tee Ruiz MD  08/03/23 1518

## 2023-08-04 LAB
ALBUMIN SERPL-MCNC: 3.6 G/DL (ref 3.5–5.2)
ALBUMIN/GLOB SERPL: 1.1 G/DL
ALP SERPL-CCNC: 183 U/L (ref 39–117)
ALT SERPL W P-5'-P-CCNC: 42 U/L (ref 1–33)
ANION GAP SERPL CALCULATED.3IONS-SCNC: 13.9 MMOL/L (ref 5–15)
AST SERPL-CCNC: 42 U/L (ref 1–32)
BASOPHILS # BLD AUTO: 0.02 10*3/MM3 (ref 0–0.2)
BASOPHILS NFR BLD AUTO: 0.2 % (ref 0–1.5)
BILIRUB SERPL-MCNC: 1.2 MG/DL (ref 0–1.2)
BUN SERPL-MCNC: 60 MG/DL (ref 8–23)
BUN/CREAT SERPL: 17.9 (ref 7–25)
CALCIUM SPEC-SCNC: 9 MG/DL (ref 8.6–10.5)
CHLORIDE SERPL-SCNC: 99 MMOL/L (ref 98–107)
CO2 SERPL-SCNC: 19.1 MMOL/L (ref 22–29)
CREAT SERPL-MCNC: 3.36 MG/DL (ref 0.57–1)
DEPRECATED RDW RBC AUTO: 43.6 FL (ref 37–54)
EGFRCR SERPLBLD CKD-EPI 2021: 14.6 ML/MIN/1.73
EOSINOPHIL # BLD AUTO: 0.01 10*3/MM3 (ref 0–0.4)
EOSINOPHIL NFR BLD AUTO: 0.1 % (ref 0.3–6.2)
ERYTHROCYTE [DISTWIDTH] IN BLOOD BY AUTOMATED COUNT: 14.2 % (ref 12.3–15.4)
GLOBULIN UR ELPH-MCNC: 3.3 GM/DL
GLUCOSE SERPL-MCNC: 124 MG/DL (ref 65–99)
HCT VFR BLD AUTO: 41.6 % (ref 34–46.6)
HGB BLD-MCNC: 13.9 G/DL (ref 12–15.9)
IMM GRANULOCYTES # BLD AUTO: 0.07 10*3/MM3 (ref 0–0.05)
IMM GRANULOCYTES NFR BLD AUTO: 0.5 % (ref 0–0.5)
LYMPHOCYTES # BLD AUTO: 0.96 10*3/MM3 (ref 0.7–3.1)
LYMPHOCYTES NFR BLD AUTO: 7.3 % (ref 19.6–45.3)
MCH RBC QN AUTO: 32.7 PG (ref 26.6–33)
MCHC RBC AUTO-ENTMCNC: 33.4 G/DL (ref 31.5–35.7)
MCV RBC AUTO: 97.9 FL (ref 79–97)
MONOCYTES # BLD AUTO: 1.04 10*3/MM3 (ref 0.1–0.9)
MONOCYTES NFR BLD AUTO: 7.9 % (ref 5–12)
NEUTROPHILS NFR BLD AUTO: 11.08 10*3/MM3 (ref 1.7–7)
NEUTROPHILS NFR BLD AUTO: 84 % (ref 42.7–76)
NRBC BLD AUTO-RTO: 0.2 /100 WBC (ref 0–0.2)
PLATELET # BLD AUTO: 105 10*3/MM3 (ref 140–450)
PMV BLD AUTO: 12.3 FL (ref 6–12)
POTASSIUM SERPL-SCNC: 4.5 MMOL/L (ref 3.5–5.2)
PROCALCITONIN SERPL-MCNC: 0.48 NG/ML (ref 0–0.25)
PROT SERPL-MCNC: 6.9 G/DL (ref 6–8.5)
RBC # BLD AUTO: 4.25 10*6/MM3 (ref 3.77–5.28)
SODIUM SERPL-SCNC: 132 MMOL/L (ref 136–145)
WBC NRBC COR # BLD: 13.18 10*3/MM3 (ref 3.4–10.8)

## 2023-08-04 PROCEDURE — 85025 COMPLETE CBC W/AUTO DIFF WBC: CPT | Performed by: INTERNAL MEDICINE

## 2023-08-04 PROCEDURE — 99233 SBSQ HOSP IP/OBS HIGH 50: CPT | Performed by: FAMILY MEDICINE

## 2023-08-04 PROCEDURE — 80053 COMPREHEN METABOLIC PANEL: CPT | Performed by: INTERNAL MEDICINE

## 2023-08-04 PROCEDURE — 84145 PROCALCITONIN (PCT): CPT | Performed by: FAMILY MEDICINE

## 2023-08-04 PROCEDURE — 0 CEFEPIME PER 500 MG: Performed by: INTERNAL MEDICINE

## 2023-08-04 RX ORDER — FLUOXETINE HYDROCHLORIDE 20 MG/1
60 CAPSULE ORAL DAILY
Status: DISCONTINUED | OUTPATIENT
Start: 2023-08-04 | End: 2023-08-05 | Stop reason: HOSPADM

## 2023-08-04 RX ORDER — CLONAZEPAM 0.5 MG/1
1 TABLET ORAL 2 TIMES DAILY PRN
Status: DISCONTINUED | OUTPATIENT
Start: 2023-08-04 | End: 2023-08-05 | Stop reason: HOSPADM

## 2023-08-04 RX ORDER — ISOSORBIDE MONONITRATE 30 MG/1
30 TABLET, EXTENDED RELEASE ORAL DAILY
Status: DISCONTINUED | OUTPATIENT
Start: 2023-08-04 | End: 2023-08-05 | Stop reason: HOSPADM

## 2023-08-04 RX ORDER — CLOPIDOGREL BISULFATE 75 MG/1
75 TABLET ORAL DAILY
Status: DISCONTINUED | OUTPATIENT
Start: 2023-08-04 | End: 2023-08-05 | Stop reason: HOSPADM

## 2023-08-04 RX ADMIN — ISOSORBIDE MONONITRATE 30 MG: 30 TABLET, EXTENDED RELEASE ORAL at 13:42

## 2023-08-04 RX ADMIN — NITROGLYCERIN 0.4 MG: 0.4 TABLET, ORALLY DISINTEGRATING SUBLINGUAL at 09:52

## 2023-08-04 RX ADMIN — SODIUM CHLORIDE, POTASSIUM CHLORIDE, SODIUM LACTATE AND CALCIUM CHLORIDE 125 ML/HR: 600; 310; 30; 20 INJECTION, SOLUTION INTRAVENOUS at 00:34

## 2023-08-04 RX ADMIN — SODIUM ZIRCONIUM CYCLOSILICATE 10 G: 10 POWDER, FOR SUSPENSION ORAL at 09:18

## 2023-08-04 RX ADMIN — FLUOXETINE 60 MG: 20 CAPSULE ORAL at 13:42

## 2023-08-04 RX ADMIN — CLOPIDOGREL BISULFATE 75 MG: 75 TABLET ORAL at 13:41

## 2023-08-04 RX ADMIN — CEFEPIME 2000 MG: 2 INJECTION, POWDER, FOR SOLUTION INTRAVENOUS at 19:40

## 2023-08-04 RX ADMIN — Medication 10 ML: at 00:10

## 2023-08-04 RX ADMIN — CLONAZEPAM 1 MG: 0.5 TABLET ORAL at 22:49

## 2023-08-04 RX ADMIN — Medication 10 ML: at 19:41

## 2023-08-04 RX ADMIN — SODIUM CHLORIDE, POTASSIUM CHLORIDE, SODIUM LACTATE AND CALCIUM CHLORIDE 125 ML/HR: 600; 310; 30; 20 INJECTION, SOLUTION INTRAVENOUS at 09:30

## 2023-08-04 RX ADMIN — Medication 10 ML: at 09:19

## 2023-08-04 RX ADMIN — NITROGLYCERIN 0.4 MG: 0.4 TABLET, ORALLY DISINTEGRATING SUBLINGUAL at 09:59

## 2023-08-04 RX ADMIN — SENNOSIDES AND DOCUSATE SODIUM 2 TABLET: 50; 8.6 TABLET ORAL at 22:49

## 2023-08-04 RX ADMIN — APIXABAN 5 MG: 5 TABLET, FILM COATED ORAL at 22:49

## 2023-08-04 RX ADMIN — SENNOSIDES AND DOCUSATE SODIUM 2 TABLET: 50; 8.6 TABLET ORAL at 09:25

## 2023-08-04 NOTE — PLAN OF CARE
Problem: Adult Inpatient Plan of Care  Goal: Plan of Care Review  Outcome: Ongoing, Progressing  Flowsheets (Taken 8/4/2023 1829)  Progress: no change  Goal: Patient-Specific Goal (Individualized)  Outcome: Ongoing, Progressing  Goal: Absence of Hospital-Acquired Illness or Injury  Outcome: Ongoing, Progressing  Intervention: Identify and Manage Fall Risk  Recent Flowsheet Documentation  Taken 8/4/2023 1800 by Cristina Monroe RN  Safety Promotion/Fall Prevention: safety round/check completed  Taken 8/4/2023 1600 by Cristina Monroe RN  Safety Promotion/Fall Prevention: safety round/check completed  Taken 8/4/2023 1400 by Cristina Monroe RN  Safety Promotion/Fall Prevention: safety round/check completed  Taken 8/4/2023 1200 by Cristina Monroe RN  Safety Promotion/Fall Prevention: safety round/check completed  Taken 8/4/2023 1000 by Cristina Monroe RN  Safety Promotion/Fall Prevention: safety round/check completed  Taken 8/4/2023 0800 by Cristina Monroe RN  Safety Promotion/Fall Prevention: safety round/check completed  Intervention: Prevent and Manage VTE (Venous Thromboembolism) Risk  Recent Flowsheet Documentation  Taken 8/4/2023 0800 by Cristina Monroe RN  Activity Management: activity encouraged  Range of Motion: active ROM (range of motion) encouraged  Intervention: Prevent Infection  Recent Flowsheet Documentation  Taken 8/4/2023 1600 by Cristina Monroe RN  Infection Prevention:   equipment surfaces disinfected   environmental surveillance performed   hand hygiene promoted   personal protective equipment utilized   rest/sleep promoted   single patient room provided  Taken 8/4/2023 1400 by Cristina Monroe RN  Infection Prevention:   environmental surveillance performed   equipment surfaces disinfected   hand hygiene promoted   personal protective equipment utilized   rest/sleep promoted   single patient room provided  Taken 8/4/2023 1200 by Cristina Monroe RN  Infection Prevention:   environmental surveillance  performed   equipment surfaces disinfected   hand hygiene promoted   personal protective equipment utilized   rest/sleep promoted   single patient room provided  Taken 8/4/2023 1000 by Cristina Monroe RN  Infection Prevention:   environmental surveillance performed   equipment surfaces disinfected   hand hygiene promoted   personal protective equipment utilized   rest/sleep promoted   single patient room provided  Taken 8/4/2023 0800 by Cristina Monroe RN  Infection Prevention:   equipment surfaces disinfected   hand hygiene promoted   personal protective equipment utilized   rest/sleep promoted   single patient room provided  Goal: Optimal Comfort and Wellbeing  Outcome: Ongoing, Progressing  Intervention: Provide Person-Centered Care  Recent Flowsheet Documentation  Taken 8/4/2023 0800 by Cristina Monroe RN  Trust Relationship/Rapport: care explained  Goal: Readiness for Transition of Care  Outcome: Ongoing, Progressing     Problem: Fall Injury Risk  Goal: Absence of Fall and Fall-Related Injury  Outcome: Ongoing, Progressing  Intervention: Identify and Manage Contributors  Recent Flowsheet Documentation  Taken 8/4/2023 1000 by Cristina Monroe RN  Medication Review/Management: medications reviewed  Intervention: Promote Injury-Free Environment  Recent Flowsheet Documentation  Taken 8/4/2023 1800 by Cristina Monroe RN  Safety Promotion/Fall Prevention: safety round/check completed  Taken 8/4/2023 1600 by Cristina Monroe RN  Safety Promotion/Fall Prevention: safety round/check completed  Taken 8/4/2023 1400 by Cristina Monroe RN  Safety Promotion/Fall Prevention: safety round/check completed  Taken 8/4/2023 1200 by Cristina Monroe RN  Safety Promotion/Fall Prevention: safety round/check completed  Taken 8/4/2023 1000 by Cristina Monroe RN  Safety Promotion/Fall Prevention: safety round/check completed  Taken 8/4/2023 0800 by Cristina Monroe RN  Safety Promotion/Fall Prevention: safety round/check completed     Problem:  Adjustment to Illness (Sepsis/Septic Shock)  Goal: Optimal Coping  Outcome: Ongoing, Progressing  Intervention: Optimize Psychosocial Adjustment to Illness  Recent Flowsheet Documentation  Taken 8/4/2023 0800 by Cristina Monroe RN  Supportive Measures:   active listening utilized   decision-making supported  Family/Support System Care: caregiver stress acknowledged     Problem: Bleeding (Sepsis/Septic Shock)  Goal: Absence of Bleeding  Outcome: Ongoing, Progressing     Problem: Glycemic Control Impaired (Sepsis/Septic Shock)  Goal: Blood Glucose Level Within Desired Range  Outcome: Ongoing, Progressing     Problem: Infection Progression (Sepsis/Septic Shock)  Goal: Absence of Infection Signs and Symptoms  Outcome: Ongoing, Progressing  Intervention: Initiate Sepsis Management  Recent Flowsheet Documentation  Taken 8/4/2023 1600 by Cristina Monroe RN  Infection Prevention:   equipment surfaces disinfected   environmental surveillance performed   hand hygiene promoted   personal protective equipment utilized   rest/sleep promoted   single patient room provided  Taken 8/4/2023 1400 by Cristina Monroe RN  Infection Prevention:   environmental surveillance performed   equipment surfaces disinfected   hand hygiene promoted   personal protective equipment utilized   rest/sleep promoted   single patient room provided  Taken 8/4/2023 1200 by Cristina Monroe RN  Infection Prevention:   environmental surveillance performed   equipment surfaces disinfected   hand hygiene promoted   personal protective equipment utilized   rest/sleep promoted   single patient room provided  Taken 8/4/2023 1000 by Cristina Monroe RN  Infection Prevention:   environmental surveillance performed   equipment surfaces disinfected   hand hygiene promoted   personal protective equipment utilized   rest/sleep promoted   single patient room provided  Taken 8/4/2023 0800 by Cristina Monroe RN  Infection Prevention:   equipment surfaces disinfected   hand  hygiene promoted   personal protective equipment utilized   rest/sleep promoted   single patient room provided  Intervention: Promote Recovery  Recent Flowsheet Documentation  Taken 8/4/2023 0800 by Cristina Monroe, RN  Activity Management: activity encouraged     Problem: Nutrition Impaired (Sepsis/Septic Shock)  Goal: Optimal Nutrition Intake  Outcome: Ongoing, Progressing   Goal Outcome Evaluation:           Progress: no change     Pt tolerating IV antibiotic well. No distress noted. Will continue with plan of care.

## 2023-08-04 NOTE — PROGRESS NOTES
[facility]   Hospitalist Progress Note  Date: 2023  Patient Name: rEika Bowens  : 1960  MRN: [id number]  Date of admission: 8/3/2023      Subjective   Subjective     Chief Complaint: Follow-up generalized weakness    Summary:Erika Bowens is a 63 y.o. female past medical history cardiomyopathy, and metastatic colon cancer that presents to the emergency department for evaluation of generalized weakness over the past 3 days that is gotten progressively worse. She had an episode of nausea and vomiting yesterday as well. She denies any fevers, chills and sweats, chest pain, shortness of breath, palpitations, abdominal pain diarrhea constipation, dysuria, rash. In the emergency department she was noted to be hypotensive systolic blood pressure in the 70s.  Also noted to have leukocytosis and lactic acid of 3.  Also noted to have acute kidney injury on chronic kidney disease with a creatinine of 4.56 up from baseline of 1.5.  Serum potassium elevated at 5.7.  Urinalysis significant for moderate blood 0-2 white blood cells.  CT head negative for any acute intracranial abnormalities.  CT abdomen pelvis negative for any acute changes.  She has received IV fluids, empirically started on cefepime although no obvious source of infection at this point and will be admitted for ongoing monitoring and management.  Patient continued on IV fluids and empiric antibiotics.  Home diuretics and antihypertensives held.  Given Lokelma for hyperkalemia.    Interval Followup: Patient lying in bed resting comfortably with family at the bedside.  Patient denies any painful stimuli currently.  Patient denies any new issues.  Afebrile overnight.  Sinus rhythm 60s on telemetry.  Blood pressure improved.  Satting well on room air.  Serum potassium improved.  Serum potassium within normal limits this morning.  Creatinine slowly improving.  Acidosis improved.  Platelet count trending down.  No other issues per  nursing.    Review of Systems  Constitutional: Positive for fatigue and negative fever.   HENT: Negative for sore throat and trouble swallowing.    Eyes: Negative for pain and discharge.   Respiratory: Negative for cough and shortness of breath.    Cardiovascular: Negative for chest pain and palpitations.   Gastrointestinal: Negative for abdominal pain, nausea and vomiting.   Endocrine: Negative for cold intolerance and heat intolerance.   Genitourinary: Negative for difficulty urinating and dysuria.   Musculoskeletal: Negative for back pain and neck stiffness.   Skin: Negative for color change and rash.   Neurological: Negative for syncope and headaches.   Hematological: Negative for adenopathy.   Psychiatric/Behavioral: Negative for confusion and hallucinations.    Objective   Objective     Vitals:   Temp:  [97.4 °F (36.3 °C)-98.6 °F (37 °C)] 98.4 °F (36.9 °C)  Heart Rate:  [53-96] 75  Resp:  [16-18] 16  BP: ()/(46-91) 129/67  Physical Exam   Gen. well-developed appearing stated age in no acute distress  HEENT: Normocephalic atraumatic moist membranes pupils equal round reactive light, no scleral icterus no conjunctival injection  Cardiovascular: regular rate and rhythm no murmurs rubs or gallops S1-S2, no lower extremity edema appreciated  Pulmonary: Clear to auscultation bilaterally no wheezes rales or rhonchi symmetric chest expansion, unlabored, no conversational dyspnea appreciated  Gastrointestinal: Soft nontender nondistended positive bowel sounds all 4 quadrants no rebound or guarding  Musculoskeletal: No clubbing cyanosis, warm and well-perfused, calves soft symmetric nontender bilaterally  Skin: Clean dry without rashs  Neuro: Cranial nerves II through XII intact grossly no sensorimotor deficits appreciated bilateral upper and lower extremities  Psych: Patient is calm cooperative and appropriate with exam not responding to internal stimuli  : No Burk catheter no bladder distention no suprapubic  tenderness    Result Review    Result Review:  I have personally reviewed these results and agree with these findings:  [x]  Laboratory  LAB RESULTS:      Lab 08/04/23  0512 08/03/23 1853 08/03/23  1604 08/03/23  1517   WBC 13.18*  --  16.02* 12.08*   HEMOGLOBIN 13.9  --  15.5 15.1   HEMATOCRIT 41.6  --  46.1 44.8   PLATELETS 105*  --  153 138*   NEUTROS ABS 11.08*  --  13.72* 9.68*   IMMATURE GRANS (ABS) 0.07*  --  0.07* 0.07*   LYMPHS ABS 0.96  --  1.22 1.18   MONOS ABS 1.04*  --  0.98* 0.94*   EOS ABS 0.01  --  0.01 0.18   MCV 97.9*  --  97.5* 97.0   CRP  --   --  0.89*  --    PROCALCITONIN 0.48*  --   --   --    LACTATE  --  2.9* 3.0*  --    PROTIME  --   --  24.2*  --    APTT  --   --  29.8  --          Lab 08/04/23 0512 08/03/23 1853 08/03/23 1604 08/03/23  1517   SODIUM 132* 134*  --  129*   POTASSIUM 4.5 5.7*  --  4.7   CHLORIDE 99 100  --  95*   CO2 19.1* 18.3*  --  17.6*   ANION GAP 13.9 15.7*  --  16.4*   BUN 60* 64*  --  64*   CREATININE 3.36* 4.32*  --  4.56*   EGFR 14.6* 10.8*  --  10.1*   GLUCOSE 124* 99  --  114*   CALCIUM 9.0 8.5*  --  9.6   MAGNESIUM  --  2.1  --   --    PHOSPHORUS  --   --  5.8*  --    TSH  --   --   --  2.510         Lab 08/04/23 0512 08/03/23 1853 08/03/23  1517   TOTAL PROTEIN 6.9 6.9 7.7   ALBUMIN 3.6 3.7 4.3   GLOBULIN 3.3 3.2 3.4   ALT (SGPT) 42* 43* 49*   AST (SGOT) 42* 47* 52*   BILIRUBIN 1.2 1.1 1.2   ALK PHOS 183* 183* 200*         Lab 08/03/23  1853 08/03/23  1604 08/03/23  1517   PROBNP  --   --  5,346.0*   HSTROP T 28*  --   --    PROTIME  --  24.2*  --    INR  --  2.20*  --                  Lab 08/03/23  1645   PH, ARTERIAL 7.357   PCO2, ARTERIAL 30.9*   PO2 ART 84.1   O2 SATURATION ART 94.6*   FIO2 21   HCO3 ART 16.9*   BASE EXCESS ART -7.3*   CARBOXYHEMOGLOBIN 0.5     Brief Urine Lab Results  (Last result in the past 365 days)        Color   Clarity   Blood   Leuk Est   Nitrite   Protein   CREAT   Urine HCG        08/03/23 1801 Yellow   Clear   Moderate  (2+)   Small (1+)   Negative   Negative                 Microbiology Results (last 10 days)       Procedure Component Value - Date/Time    Blood Culture - Blood, Arm, Left [[id number]]  (Normal) Collected: 08/03/23 1604    Lab Status: Preliminary result Specimen: Blood from Arm, Left Updated: 08/04/23 1645     Blood Culture No growth at 24 hours    Blood Culture - Blood, Arm, Left [[id number]]  (Normal) Collected: 08/03/23 1604    Lab Status: Preliminary result Specimen: Blood from Arm, Left Updated: 08/04/23 1645     Blood Culture No growth at 24 hours            [x]  Microbiology  [x]  Radiology  CT Abdomen Pelvis Without Contrast    Result Date: 8/3/2023   No definite acute findings are appreciated by nonenhanced CT examination of the abdomen and pelvis.  Please see above comments for further detail.     Please note that portions of this note were completed with a voice recognition program.  VEGA FERREIRA JR, MD       Electronically Signed and Approved By: VEGA FERREIRA JR, MD on 8/03/2023 at 21:13              CT Head Without Contrast    Result Date: 8/3/2023    1. No acute intracranial abnormality.  Specifically, no evidence of acute hemorrhage, mass effect or midline shift. 2. Global volume loss with mild periventricular white matter hypodensity, likely representing sequelae of chronic small vessel ischemic disease.     JAH NARVAEZ MD       Electronically Signed and Approved By: JAH NARVAEZ MD on 8/03/2023 at 17:21             XR Chest 1 View    Result Date: 8/3/2023    No active pulmonary process.       TARIK BROOSK MD       Electronically Signed and Approved By: TARIK BROOKS MD on 8/03/2023 at 17:00              [x]  EKG/Telemetry   []  Cardiology/Vascular   []  Pathology  []  Old records  [x]  Other:  Scheduled Meds:apixaban, 5 mg, Oral, BID  cefepime, 2,000 mg, Intravenous, Q24H  clopidogrel, 75 mg, Oral, Daily  FLUoxetine, 60 mg, Oral, Daily  isosorbide mononitrate, 30 mg, Oral,  Daily  Morphine, 4 mg, Intravenous, Once  ondansetron, 4 mg, Intravenous, Once  senna-docusate sodium, 2 tablet, Oral, BID  sodium chloride, 10 mL, Intravenous, Q12H      Continuous Infusions:lactated ringers, 125 mL/hr, Last Rate: 125 mL/hr (08/04/23 0930)  Pharmacy to Dose Cefepime,       PRN Meds:.  acetaminophen **OR** acetaminophen **OR** acetaminophen    senna-docusate sodium **AND** polyethylene glycol **AND** bisacodyl **AND** bisacodyl    clonazePAM    nitroglycerin    Pharmacy to Dose Cefepime    sodium chloride    sodium chloride    sodium chloride      Assessment & Plan   Assessment / Plan     Assessment/Plan:  Suspected severe sepsis unspecified source  Acute kidney injury on CKD 3  Hypotension present on admission  Lactic acidosis present on admission  Leukocytosis  Hyperkalemia due to TERESSA  Metabolic acidosis secondary to TERESSA on CKD 3  Thrombocytopenia  Colon cancer with mets to liver  Portal venous thrombosis  Cardiomyopathy  Prolonged QT  Depression with anxiety  Coronary artery disease        Patient admitted for further evaluation and treatment  Continue cefepime empirically if cultures remain negative the next 24 hours can likely discontinue  Continue IV fluids  Monitor volume status closely  Continue to hold home diuretics and antihypertensives  Continue to monitor electrolytes and replace as needed  Hyperkalemia resolved with Lokelma  Continue to monitor thrombocytopenia and transfuse to keep platelet count greater than 10  Continue home Eliquis and Plavix  Continue home fluoxetine  Continue home Imdur  Further inpatient orders recommendations pending clinical course           Discussed plan with bedside RN.    Disposition: Home once renal function improves    DVT prophylaxis:  Medical DVT prophylaxis orders are present.    CODE STATUS:   Code Status (Patient has no pulse and is not breathing): CPR (Attempt to Resuscitate)  Medical Interventions (Patient has pulse or is breathing): Full  Support  Release to patient: Routine Release

## 2023-08-04 NOTE — PLAN OF CARE
Problem: Adult Inpatient Plan of Care  Goal: Plan of Care Review  Outcome: Ongoing, Progressing  Flowsheets (Taken 8/4/2023 0640)  Progress: improving  Plan of Care Reviewed With:   patient   family  Outcome Evaluation: Patient alert and oriented throughout shift. Vital sign stable throughout shift. Patient very anxious last night due to not having normal medications. Notified the PA and tried to get her regular meds to calm her down. Reassured and calmed her down.   Goal Outcome Evaluation:  Plan of Care Reviewed With: patient, family        Progress: improving  Outcome Evaluation: Patient alert and oriented throughout shift. Vital sign stable throughout shift. Patient very anxious last night due to not having normal medications. Notified the PA and tried to get her regular meds to calm her down. Reassured and calmed her down.

## 2023-08-04 NOTE — PAYOR COMM NOTE
"PATIENT INFORMATION  Name:  Erika Castaneda  MRN#:     [id number]  :  1960         ADMISSION INFORMATION  CLASS: Inpatient   DOS:  8/3/2023        CURRENT ATTENDING PROVIDER INFORMATION  Name/NPI: Eulalio Rashid MD [[id number]]  Phone:  Phone: [phone]        RENDERING FACILITY  Name:  [facility]   NPI:  [id number]  TID:  [id number]  Address:      [address] [place], [place] [address]  Phone  [phone]        CASE MANAGEMENT CONTACT INFORMATION  Phone:      [phone]  Fax:           [phone]          Erika Castaneda (63 y.o. Female)       Date of Birth   1960    Social Security Number       Address   [address] [place] [address]    Home Phone   [phone]    MRN   [id number]       Yarsanism   None    Marital Status   Single                            Admission Date   8/3/23    Admission Type   Emergency    Admitting Provider   Harpal Sams Jr., MD    Attending Provider   Eulalio Rashid MD    Department, Room/Bed   Psychiatric 4TH FLOOR MEDICAL TELEMETRY UNIT, 409/1       Discharge Date       Discharge Disposition       Discharge Destination                                 Attending Provider: Eulalio Rashid MD    Allergies: Bupropion    Isolation: None   Infection: None   Code Status: CPR    Ht: 160 cm (63\")   Wt: 65.7 kg (144 lb 13.5 oz)    Admission Cmt: None   Principal Problem: Sepsis [A41.9]                   Active Insurance as of 8/3/2023       Primary Coverage       Payor Plan Insurance Group Employer/Plan Group    [facility] MEDICARE REPLACEMENT [facility] MEDICARE REPLACEMENT [id number]       Payor Plan Address Payor Plan Phone Number Payor Plan Fax Number Effective Dates    [address] [address]   2023 - None Entered    [facility] [id number]         Subscriber Name Subscriber Birth Date Member ID       ERIKA CASTANEDA 1960 [id number]                   History & Physical        Harpal Sams Jr., MD at " 23           [facility]  Date: 8/3/2023   Patient Name: Erika Bowens  : 1960  MRN: [id number]  Primary Care Physician:  Nilam Willis MD  Date of admission: 8/3/2023    Subjective  Subjective     Chief Complaint: Generalized weakness    HPI:    Erika Bowens is a 63 y.o. female past medical history cardiomyopathy, and metastatic colon cancer that presents to the emergency department for evaluation of generalized weakness over the past 3 days that is gotten progressively worse.  She had an episode of nausea and vomiting yesterday as well.  She denies any fevers, chills and sweats, chest pain, shortness of breath, palpitations, abdominal pain diarrhea constipation, dysuria, rash.  In the emergency department she was noted to be hypotensive and also noted to have leukocytosis and lactic acid of 3.  She has received IV fluids, empirically started on cefepime although no obvious source of infection at this point and will be admitted for ongoing monitoring and management.  CMP pending at this time.      Personal History     Past Medical History:  Past Medical History:   Diagnosis Date    Abdominal pain     CHF (congestive heart failure)     Colon cancer     Coronary arteriosclerosis     Depressive disorder     with anxiety    Encounter for routine adult health examination     Essential hypertension     Generalized anxiety disorder     GERD (gastroesophageal reflux disease)     Hip pain     Hyperlipidemia     Kidney stone     Osteoarthritis     Radial styloid tenosynovitis of left hand     Urinary tract infectious disease     Vitamin D deficiency    Metastatic colon cancer      Past Surgical History:  Past Surgical History:   Procedure Laterality Date    CARDIAC CATHETERIZATION  04/15/2016    Enlarged left ventricle with reduced contractility.Severe coronary artery disease as described above. [facility].    COLONOSCOPY  2019    COLON CANCER DOMINGO.     COLONOSCOPY N/A 02/14/2022    Procedure: COLONOSCOPY WITH POLYPECTOMY;  Surgeon: Coy Ordoñez MD;  Location: [facility] ENDOSCOPY;  Service: Gastroenterology;  Laterality: N/A;  COLON POLYP, ANASTAMOSIS IN SIGMOID    CORONARY ANGIOPLASTY WITH STENT PLACEMENT  05/06/2012    PTCA implantation in the vein graft of the OM branch. Done at [facility] in [place], [place].    CORONARY ARTERY BYPASS GRAFT  03/18/2003    Emergent CABG x 5 CAD    DE QUERVAIN'S RELEASE Right 11/30/2009    Release of De Quervain's to the right thumb    DEQUERVAIN RELEASE Left 11/18/2015    Release of de Quervain's to the left wrist.    LIVER SURGERY      PACEMAKER IMPLANTATION      DEFIBRILLATOR    PAP SMEAR  06/28/2012    normal    PROCEDURE GENERIC CONVERTED  11/22/2015    MOST RECENT DIASTOLIC BP < 90 mmHg     PROCEDURE GENERIC CONVERTED  11/22/2015    MOST RECENT SYSTOLIC BP > or = 140 mmHg     PROCEDURE GENERIC CONVERTED  11/22/2015    TOBACCO NON-USER     REPLACEMENT TOTAL HIP LATERAL POSITION      SUBTOTAL COLECTOMY N/A 04/23/2019    sigmoid for colon cancer    TOTAL KNEE ARTHROPLASTY           Family History:   Family History   Problem Relation Age of Onset    Liver disease Mother     Hyperlipidemia Father     Heart disease Father     Breast cancer Sister     Diabetes Maternal Grandmother     Arthritis Maternal Grandmother     Heart disease Paternal Grandmother     Heart disease Paternal Grandfather     Diabetes Other     Heart disease Other     Hypertension Other     Stroke Other     Colon cancer Other     Coronary artery disease Other     Other Other         Heart surgery         Social History:   Social History     Tobacco Use    Smoking status: Never    Smokeless tobacco: Never   Vaping Use    Vaping Use: Never used   Substance Use Topics    Alcohol use: No    Drug use: Never         Home Medications:  FLUoxetine, acetaminophen, apixaban, capecitabine, clonazePAM, clopidogrel, cyanocobalamin, cyclobenzaprine,  furosemide, isosorbide mononitrate, losartan, metoprolol succinate XL, nitroglycerin, ondansetron, pravastatin, spironolactone, tiZANidine, and vitamin D    Allergies:  Allergies   Allergen Reactions    Bupropion Other (See Comments)     Caused falls       Review of Systems   All systems were reviewed and negative except for: Generalized weakness    Objective  Objective     Vitals:   Temp:  [97.6 °F (36.4 °C)-97.7 °F (36.5 °C)] 97.6 °F (36.4 °C)  Heart Rate:  [50-65] 53  Resp:  [19] 19  BP: ()/(47-91) 115/91    Physical Exam    Constitutional: Awake, alert, no acute distress   Eyes: Pupils equal, sclerae anicteric, no conjunctival injection   HENT: NCAT, mucous membranes moist   Neck: Supple, no thyromegaly, no lymphadenopathy, trachea midline   Respiratory: Clear to auscultation bilaterally, nonlabored respirations    Cardiovascular: RRR, no murmurs, rubs, or gallops, palpable pedal pulses bilaterally   Gastrointestinal: Positive bowel sounds, soft, nontender, nondistended   Musculoskeletal: No bilateral ankle edema, no clubbing or cyanosis to extremities   Psychiatric: Appropriate affect, cooperative   Neurologic: Oriented x 3, strength symmetric in all extremities, Cranial Nerves grossly intact to confrontation, speech clear   Skin: No rashes     Result Review   Result Review:  I have personally reviewed the results from the time of this admission to 8/3/2023 19:38 EDT and agree with these findings:  [x]  Laboratory  []  Microbiology  [x]  Radiology  []  EKG/Telemetry   []  Cardiology/Vascular   []  Pathology  []  Old records  []  Other:      Assessment & Plan  Assessment / Plan     Assessment/Plan:   Sepsis, unclear etiology: Continue with cefepime started in the emergency department.  Follow cultures already obtained.  Serial labs.  Close monitoring in PCU.  Monitor on telemetry.  Metastatic colon cancer: Outpatient follow-up  Cardiomyopathy: Resume home regimen, monitor volume status.  Continue to  monitor on telemetry  QT prolongation: Avoid QT prolonging agents.  Monitor on telemetry.  Check electrolytes.      Addendum: CMP revealed acute renal failure with hyperkalemia at 5.7.  Patient has been started on Lokelma and, will continue with aggressive IV hydration.  I have ordered CT abdomen which did not show any definitive acute pathology.  We will avoid nephrotoxins and monitor serial labs.  Monitor intake and output.  Consider nephrology consultation if not improving.      DVT prophylaxis:  Resume Eliquis once verified    CODE STATUS:    Code Status (Patient has no pulse and is not breathing): CPR (Attempt to Resuscitate)  Medical Interventions (Patient has pulse or is breathing): Full Support  Release to patient: Routine Release      Admission Status:  I believe this patient meets inpatient status.    Electronically signed by Harpal Sams Jr, MD, 08/03/23, 7:38 PM EDT.             Electronically signed by Harpal Sams Jr., MD at 08/03/23 2249          Emergency Department Notes        Tee Ruiz MD at 08/03/23 1735          Time: 5:35 PM EDT  Date of encounter:  8/3/2023  Independent Historian/Clinical History and Information was obtained by:   Patient    History is limited by: N/A    Chief Complaint: Weakness      History of Present Illness:  Patient is a 63 y.o. year old female who presents to the emergency department for evaluation of generalized weakness.  Patient is currently undergoing chemotherapy for cancer and reports becoming very weak past couple days.  On arrival to the emergency department she has been found to be hypotensive.    HPI    Patient Care Team  Primary Care Provider: Nilam Willis MD    Past Medical History:     Allergies   Allergen Reactions    Bupropion Other (See Comments)     Caused falls     Past Medical History:   Diagnosis Date    Abdominal pain     CHF (congestive heart failure)     Colon cancer     Coronary arteriosclerosis     Depressive disorder     with  anxiety    Encounter for routine adult health examination     Essential hypertension     Generalized anxiety disorder     GERD (gastroesophageal reflux disease)     Hip pain     Hyperlipidemia     Kidney stone     Osteoarthritis     Radial styloid tenosynovitis of left hand     Urinary tract infectious disease     Vitamin D deficiency      Past Surgical History:   Procedure Laterality Date    CARDIAC CATHETERIZATION  04/15/2016    Enlarged left ventricle with reduced contractility.Severe coronary artery disease as described above. [facility].    COLONOSCOPY  2019    COLON CANCER DOMINGO.    COLONOSCOPY N/A 02/14/2022    Procedure: COLONOSCOPY WITH POLYPECTOMY;  Surgeon: Coy Ordoñez MD;  Location: [facility] ENDOSCOPY;  Service: Gastroenterology;  Laterality: N/A;  COLON POLYP, ANASTAMOSIS IN SIGMOID    CORONARY ANGIOPLASTY WITH STENT PLACEMENT  05/06/2012    PTCA implantation in the vein graft of the  branch. Done at [facility] in [place], [place].    CORONARY ARTERY BYPASS GRAFT  03/18/2003    Emergent CABG x 5 CAD    DE QUERVAIN'S RELEASE Right 11/30/2009    Release of De Quervain's to the right thumb    DEQUERVAIN RELEASE Left 11/18/2015    Release of de Quervain's to the left wrist.    LIVER SURGERY      PACEMAKER IMPLANTATION      DEFIBRILLATOR    PAP SMEAR  06/28/2012    normal    PROCEDURE GENERIC CONVERTED  11/22/2015    MOST RECENT DIASTOLIC BP < 90 mmHg     PROCEDURE GENERIC CONVERTED  11/22/2015    MOST RECENT SYSTOLIC BP > or = 140 mmHg     PROCEDURE GENERIC CONVERTED  11/22/2015    TOBACCO NON-USER     REPLACEMENT TOTAL HIP LATERAL POSITION      SUBTOTAL COLECTOMY N/A 04/23/2019    sigmoid for colon cancer    TOTAL KNEE ARTHROPLASTY       Family History   Problem Relation Age of Onset    Liver disease Mother     Hyperlipidemia Father     Heart disease Father     Breast cancer Sister     Diabetes Maternal Grandmother     Arthritis Maternal Grandmother     Heart disease Paternal  Grandmother     Heart disease Paternal Grandfather     Diabetes Other     Heart disease Other     Hypertension Other     Stroke Other     Colon cancer Other     Coronary artery disease Other     Other Other         Heart surgery       Home Medications:  Prior to Admission medications    Medication Sig Start Date End Date Taking? Authorizing Provider   acetaminophen (TYLENOL) 500 MG tablet Take 1-2 tablets by mouth Every 4 (Four) Hours As Needed for Mild Pain.    Chandler Diamond MD   apixaban (ELIQUIS) 5 MG tablet tablet Take 1 tablet by mouth 2 (Two) Times a Day. 7/12/23   Luis Boyce MD   aspirin 81 MG EC tablet Take 1 tablet by mouth Daily.    Chandler Diamond MD   capecitabine (XELODA) 500 MG chemo tablet Take 3 tablets by mouth 2 (Two) Times a Day. Take 3 tablets by mouth in the AM and 3 tablets in the PM on days 1-14, then off 7 days, on a 21 day cycle 7/16/23   Luis Boyce MD   clonazePAM (KlonoPIN) 1 MG tablet Take 1 tablet by mouth 2 (Two) Times a Day. 4/10/23   Nilam Willis MD   clopidogrel (PLAVIX) 75 MG tablet Take 1 tablet by mouth Daily. 1/20/23   Nilam Willis MD   cyanocobalamin (VITAMIN B-12) 500 MCG tablet Take 1 tablet by mouth Daily.    Chandler Diamond MD   cyclobenzaprine (FLEXERIL) 10 MG tablet Take 1 tablet by mouth 3 (Three) Times a Day. 6/13/23   Nilam Willis MD   FLUoxetine (PROzac) 20 MG capsule Take 3 capsules by mouth Daily. 1/30/23   Nilam Willis MD   furosemide (LASIX) 40 MG tablet Take 1 tablet by mouth 2 (Two) Times a Day. 1/20/23   Nilam Willis MD   isosorbide mononitrate (IMDUR) 30 MG 24 hr tablet Take 1 tablet by mouth Daily.    Chandler Diamond MD   losartan (COZAAR) 50 MG tablet Take 1 tablet by mouth Daily for 30 days. 7/26/23 8/25/23  Zak Gallagher MD   metoprolol succinate XL (TOPROL-XL) 25 MG 24 hr tablet Take 1 tablet by mouth Daily. 1/20/23   Nilam Willis MD   nitroglycerin (NITROSTAT) 0.4 MG SL  tablet Place 1 tablet under the tongue As Needed for Chest Pain.  - IF PAIN REMAINS AFTER 5 MIN CALL 911 AND REPEAT DOSE MAX 3 TABS IN 15 MINUTES 1/20/23   Nilam Willis MD   omeprazole (priLOSEC) 40 MG capsule Take 1 capsule by mouth Daily. 10/7/22   Nilam Willis MD   ondansetron (ZOFRAN) 8 MG tablet Take 1 tablet by mouth 3 (Three) Times a Day As Needed for Nausea or Vomiting. 7/13/23   Luis Boyce MD   pravastatin (PRAVACHOL) 80 MG tablet Take 1 tablet by mouth Every Night. 1/20/23   Nilam Willis MD   spironolactone (ALDACTONE) 25 MG tablet Take 1 tablet by mouth 2 (Two) Times a Day. 1/20/23   Nilam Willis MD   tiZANidine (ZANAFLEX) 4 MG tablet Take 1 tablet by mouth Every 8 (Eight) Hours As Needed for Muscle Spasms. 1/20/23   Nilam Willis MD   vitamin D (ERGOCALCIFEROL) 1.25 MG (60253 UT) capsule capsule Take 1 capsule by mouth 1 (One) Time Per Week. 1/30/23   Nilam Willis MD        Social History:   Social History     Tobacco Use    Smoking status: Never    Smokeless tobacco: Never   Vaping Use    Vaping Use: Never used   Substance Use Topics    Alcohol use: No    Drug use: Never         Review of Systems:  Review of Systems   Constitutional:  Negative for chills and fever.   HENT:  Negative for congestion, rhinorrhea and sore throat.    Eyes:  Negative for pain and visual disturbance.   Respiratory:  Negative for apnea, cough, chest tightness and shortness of breath.    Cardiovascular:  Negative for chest pain and palpitations.   Gastrointestinal:  Negative for abdominal pain, diarrhea, nausea and vomiting.   Genitourinary:  Negative for difficulty urinating and dysuria.   Musculoskeletal:  Negative for joint swelling and myalgias.   Skin:  Negative for color change.   Neurological:  Positive for weakness. Negative for seizures and headaches.   Psychiatric/Behavioral: Negative.     All other systems reviewed and are negative.     Physical Exam:  /91   Pulse 53    "Temp 97.6 °F (36.4 °C) (Oral)   Resp 19   Ht 160 cm (63\")   Wt 61.8 kg (136 lb 3.9 oz)   SpO2 91%   BMI 24.13 kg/m²     Physical Exam  Vitals and nursing note reviewed.   Constitutional:       General: She is not in acute distress.     Appearance: Normal appearance. She is not toxic-appearing.   HENT:      Head: Normocephalic and atraumatic.      Jaw: There is normal jaw occlusion.      Mouth/Throat:      Mouth: Mucous membranes are dry.   Eyes:      General: Lids are normal.      Extraocular Movements: Extraocular movements intact.      Conjunctiva/sclera: Conjunctivae normal.      Pupils: Pupils are equal, round, and reactive to light.   Cardiovascular:      Rate and Rhythm: Normal rate and regular rhythm.      Pulses: Normal pulses.      Heart sounds: Normal heart sounds.   Pulmonary:      Effort: Pulmonary effort is normal. No respiratory distress.      Breath sounds: Normal breath sounds. No wheezing or rhonchi.   Abdominal:      General: Abdomen is flat.      Palpations: Abdomen is soft.      Tenderness: There is no abdominal tenderness. There is no guarding or rebound.   Musculoskeletal:         General: Normal range of motion.      Cervical back: Normal range of motion and neck supple.      Right lower leg: No edema.      Left lower leg: No edema.   Skin:     General: Skin is warm and dry.      Comments: Poor color   Neurological:      Mental Status: She is alert and oriented to person, place, and time. Mental status is at baseline.   Psychiatric:         Mood and Affect: Mood normal.                Procedures:  Procedures      Medical Decision Making:      Comorbidities that affect care:    Cancer    External Notes reviewed:    Previous Clinic Note: Failure medicine office visit for general medical management      The following orders were placed and all results were independently analyzed by me:  Orders Placed This Encounter   Procedures    Blood Culture - Blood,    Blood Culture - Blood,    XR Chest " 1 View    CT Head Without Contrast    [place] Draw    Comprehensive Metabolic Panel    Single High Sensitivity Troponin T    Magnesium    Urinalysis With Microscopic If Indicated (No Culture) - Urine, Clean Catch    CBC Auto Differential    Protime-INR    aPTT    Phosphorus    Calcium, Ionized    Lactic Acid, Plasma    C-reactive Protein    Blood Gas, Arterial -With Co-Ox Panel: Yes    STAT Lactic Acid, Reflex    Urinalysis, Microscopic Only - Urine, Clean Catch    NPO Diet NPO Type: Strict NPO    Undress & Gown    Continuous Pulse Oximetry    Vital Signs    Orthostatic Blood Pressure    Measure Blood Pressure    Inpatient Hospitalist Consult    Oxygen Therapy- Nasal Cannula; Titrate 1-6 LPM Per SpO2; 90 - 95%    POC Glucose Once    POC Glucose Once    ECG 12 Lead ED Triage Standing Order; Weak / Dizzy / AMS    Insert Peripheral IV    Insert Large Bore Peripheral IV    Insert 2nd Large Bore Peripheral IV    Fall Precautions    CBC & Differential    Green Top (Gel)    Lavender Top    Gold Top - SST    Light Blue Top       Medications Given in the Emergency Department:  Medications   sodium chloride 0.9 % flush 10 mL (has no administration in time range)   morphine injection 4 mg (0 mg Intravenous Hold 8/3/23 1703)   ondansetron (ZOFRAN) injection 4 mg (4 mg Intravenous Not Given 8/3/23 1900)   cefepime (MAXIPIME) 2000 mg/100 mL 0.9% NS (mbp) (has no administration in time range)   sodium chloride 0.9 % bolus 1,854 mL (0 mL Intravenous Stopped 8/3/23 1730)        ED Course:         Labs:    Lab Results (last 24 hours)       Procedure Component Value Units Date/Time    CBC & Differential [[id number]] Collected: 08/03/23 1517    Specimen: Blood Updated: 08/03/23 1618    Narrative:      The following orders were created for panel order CBC & Differential.  Procedure                               Abnormality         Status                     ---------                               -----------         ------                      CBC Auto Differential[[id number]]                            In process                   Please view results for these tests on the individual orders.    T4 [[id number]] Collected: 08/03/23 1517    Specimen: Blood Updated: 08/03/23 1618    TSH [[id number]] Collected: 08/03/23 1517    Specimen: Blood Updated: 08/03/23 1618    BNP [[id number]] Collected: 08/03/23 1517    Specimen: Blood Updated: 08/03/23 1618    Comprehensive Metabolic Panel [[id number]] Collected: 08/03/23 1517    Specimen: Blood Updated: 08/03/23 1618    CBC Auto Differential [[id number]] Collected: 08/03/23 1517    Specimen: Blood Updated: 08/03/23 1618    POC Glucose Once [[id number]]  (Abnormal) Collected: 08/03/23 1603    Specimen: Blood Updated: 08/03/23 1608     Glucose 119 mg/dL      Comment: Serial Number: [id number]:  [id number]       CBC & Differential [[id number]]  (Abnormal) Collected: 08/03/23 1604    Specimen: Blood Updated: 08/03/23 1627    Narrative:      The following orders were created for panel order CBC & Differential.  Procedure                               Abnormality         Status                     ---------                               -----------         ------                     CBC Auto Differential[[id number]]        Abnormal            Final result                 Please view results for these tests on the individual orders.    CBC Auto Differential [[id number]]  (Abnormal) Collected: 08/03/23 1604    Specimen: Blood Updated: 08/03/23 1627     WBC 16.02 10*3/mm3      RBC 4.73 10*6/mm3      Hemoglobin 15.5 g/dL      Hematocrit 46.1 %      MCV 97.5 fL      MCH 32.8 pg      MCHC 33.6 g/dL      RDW 14.6 %      RDW-SD 44.8 fl      MPV 12.3 fL      Platelets 153 10*3/mm3      Neutrophil % 85.7 %      Lymphocyte % 7.6 %      Monocyte % 6.1 %      Eosinophil % 0.1 %      Basophil % 0.1 %      Immature Grans % 0.4 %      Neutrophils, Absolute 13.72 10*3/mm3      Lymphocytes, Absolute 1.22 10*3/mm3      Monocytes,  Absolute 0.98 10*3/mm3      Eosinophils, Absolute 0.01 10*3/mm3      Basophils, Absolute 0.02 10*3/mm3      Immature Grans, Absolute 0.07 10*3/mm3      nRBC 0.1 /100 WBC     Protime-INR [[id number]]  (Abnormal) Collected: 08/03/23 1604    Specimen: Blood Updated: 08/03/23 1639     Protime 24.2 Seconds      INR 2.20    Narrative:      Suggested Therapeutic Ranges For Oral Anticoagulant Therapy:  Level of Therapy                      INR Target Range  Standard Dose                            2.0-3.0  High Dose                                2.5-3.5  Patients not receiving anticoagulant  Therapy Normal Range                     0.86-1.15    aPTT [[id number]]  (Normal) Collected: 08/03/23 1604    Specimen: Blood Updated: 08/03/23 1639     PTT 29.8 seconds     Phosphorus [[id number]]  (Abnormal) Collected: 08/03/23 1604    Specimen: Blood Updated: 08/03/23 1720     Phosphorus 5.8 mg/dL     Lactic Acid, Plasma [[id number]]  (Abnormal) Collected: 08/03/23 1604    Specimen: Blood Updated: 08/03/23 1726     Lactate 3.0 mmol/L     C-reactive Protein [[id number]]  (Abnormal) Collected: 08/03/23 1604    Specimen: Blood Updated: 08/03/23 1720     C-Reactive Protein 0.89 mg/dL     Blood Culture - Blood, Arm, Left [[id number]] Collected: 08/03/23 1604    Specimen: Blood from Arm, Left Updated: 08/03/23 1642    Blood Culture - Blood, Arm, Left [[id number]] Collected: 08/03/23 1604    Specimen: Blood from Arm, Left Updated: 08/03/23 1642    Blood Gas, Arterial -With Co-Ox Panel: Yes [[id number]]  (Abnormal) Collected: 08/03/23 1645    Specimen: Arterial Blood Updated: 08/03/23 1652     pH, Arterial 7.357 pH units      pCO2, Arterial 30.9 mm Hg      pO2, Arterial 84.1 mm Hg      HCO3, Arterial 16.9 mmol/L      Base Excess, Arterial -7.3 mmol/L      O2 Saturation, Arterial 94.6 %      Hemoglobin, Blood Gas 13.0 g/dL      Carboxyhemoglobin 0.5 %      Methemoglobin 0.30 %      Oxyhemoglobin 93.8 %      FHHB 5.4 %      Site Arterial: left  brachial     Modality Room Air     FIO2 21 %      Flow Rate 0 lpm      PO2/FIO2 400     Taiwo's Test N/A    STAT Lactic Acid, Reflex [[id number]] Updated: 08/03/23 1902    Specimen: Blood     Urinalysis With Microscopic If Indicated (No Culture) - Urine, Clean Catch [[id number]]  (Abnormal) Collected: 08/03/23 1801    Specimen: Urine, Clean Catch Updated: 08/03/23 1823     Color, UA Yellow     Appearance, UA Clear     pH, UA 5.5     Specific Gravity, UA 1.009     Glucose, UA Negative     Ketones, UA Negative     Bilirubin, UA Negative     Blood, UA Moderate (2+)     Protein, UA Negative     Leuk Esterase, UA Small (1+)     Nitrite, UA Negative     Urobilinogen, UA 1.0 E.U./dL    Urinalysis, Microscopic Only - Urine, Clean Catch [[id number]]  (Abnormal) Collected: 08/03/23 1801    Specimen: Urine, Clean Catch Updated: 08/03/23 1833     RBC, UA 0-2 /HPF      WBC, UA 0-2 /HPF      Bacteria, UA Trace /HPF      Squamous Epithelial Cells, UA 0-2 /HPF      Hyaline Casts, UA None Seen /LPF      Mucus, UA Trace /HPF      Methodology Automated Microscopy    Comprehensive Metabolic Panel [[id number]] Collected: 08/03/23 1853    Specimen: Blood Updated: 08/03/23 1901    Single High Sensitivity Troponin T [[id number]] Collected: 08/03/23 1853    Specimen: Blood Updated: 08/03/23 1901    Magnesium [[id number]] Collected: 08/03/23 1853    Specimen: Blood Updated: 08/03/23 1901             Imaging:    CT Head Without Contrast    Result Date: 8/3/2023  PROCEDURE: CT HEAD WO CONTRAST  COMPARISON:  None  INDICATIONS: Headache, nausea, fall  PROTOCOL:   Standard imaging protocol performed    RADIATION:   DLP: 1018.2  mGy*cm   MA and/or KV was adjusted to minimize radiation dose.     TECHNIQUE: After obtaining the patient's consent, CT images were obtained without non-ionic intravenous contrast material.  FINDINGS:  The ventricles and sulci are proportional prominent compatible with global cerebral volume loss.  There is no mass effect or  midline shift.  There is no acute intracranial hemorrhage.  There is no abnormal extra-axial fluid collection.  The gray-white matter differentiation is preserved.  There is mild periventricular white matter hypodensity likely representing sequelae of chronic small vessel ischemic disease.  The calvarium is intact.  The mastoid air cells and middle ears are well aerated.  The paranasal sinuses appear clear.  Visualized orbits and globes appear symmetric.        1. No acute intracranial abnormality.  Specifically, no evidence of acute hemorrhage, mass effect or midline shift. 2. Global volume loss with mild periventricular white matter hypodensity, likely representing sequelae of chronic small vessel ischemic disease.     JAH NARVAEZ MD       Electronically Signed and Approved By: JAH NARVAEZ MD on 8/03/2023 at 17:21             XR Chest 1 View    Result Date: 8/3/2023  PROCEDURE: XR CHEST 1 VW  COMPARISON: [facility], CR, XR CHEST 1 VW, 7/23/2023, 19:16.  INDICATIONS: WEAKNESS TODAY  FINDINGS:  Prior median sternotomy and CABG.  Unremarkable appearing AICD.  Heart size unchanged.  No overt edema, focal consolidation, large effusion or pneumothorax.  Borderline low lung volumes.  Osseous structures unremarkable.        No active pulmonary process.       TARIK BROOKS MD       Electronically Signed and Approved By: TARIK BROOKS MD on 8/03/2023 at 17:00                Differential Diagnosis and Discussion:    Metabolic: Differential diagnosis includes but is not limited to hypertension, hyperglycemia, hyperkalemia, hypocalcemia, metabolic acidosis, hypokalemia, hypoglycemia, malnutrition, hypothyroidism, hyperthyroidism, and adrenal insufficiency.   Weakness: Based on the patient's history, signs, and symptoms, the diffential diagnosis includes but is not limited to meningitis, stroke, sepsis, subarachnoid hemorrhage, intracranial bleeding, encephalitis, acute uti, dehydration, MS,  myasthenia gravis, Guillan Ida, migraine variant, neuromuscular disorders vertigo, electrolyte imbalance, and metabolic disorders.    All labs were reviewed and interpreted by me.  All X-rays impressions were independently interpreted by me.  CT scan radiology impression was interpreted by me.    MDM  Number of Diagnoses or Management Options  Dehydration  Hypotension, unspecified hypotension type  Sepsis, due to unspecified organism, unspecified whether acute organ dysfunction present  Diagnosis management comments: In summary this is a 63-year-old female who presents to the emergency department for evaluation.  She has been found to be profoundly hypotensive and required IV fluids which have improved her blood pressure.  CBC remarkable for leukocytosis.  Initial lactate level elevated.  Chest x-ray and CT brain unremarkable.  Chemistry pending.  Patient was given IV antibiotics.  Patient case has been discussed with the hospitalist team who will admit to the hospital for further evaluation and continuation of treatment.    Sepsis criteria was met in the emergency department and the Sepsis protocol (including antibiotic administration) was initiated.      SIRS criteria considered:   1.  Temperature > 100.4 or <98.6    2.  Heart Rate > 90    3.  Respiratory Rate > 22    4.  WBC > 12K or <4K.             Severe Sepsis:     Respiratory: Mechanical Ventilation or Bipap  Hypotension: SBP > 90 or MAP < 65  Renal: Creatinine > 2  Metabolic: Lactic Acid > 2  Hematologic: Platelets < 100K or INR > 1.5  Hepatic: BILI  >  2  CNS: Sudden AMS     Septic Shock:     Severe Sepsis + Persistent hypotension or Lactic Acid > 4     Normal saline bolus, Antibiotics, and final disposition was based on these definitions.        Sepsis was recognized at 1858    Antibiotics were ordered.     30 cc/kg bolus was indicated.     The patient was ordered 2 L of fluids.    Total Critical Care time of 40 minutes. Total critical care time  documented does not include time spent on separately billed procedures for services of nurses or physician assistants. I personally saw and examined the patient. I have reviewed all diagnostic interpretations and treatment plans as written. I was present for the key portions of any procedures performed and the inclusive time noted in any critical care statement. Critical care time includes patient management by me, time spent at the patients bedside,  time to review lab and imaging results, discussing patient care, documentation in the medical record, and time spent with family or caregiver.          Patient Care Considerations:    CT CHEST: I considered ordering a CT scan of the chest, however no respiratory distress      Consultants/Shared Management Plan:    Hospitalist: I have discussed the case with Dr. Sams who agrees to accept the patient for admission.    Social Determinants of Health:    Patient has presented with family members who are responsible, reliable and will ensure follow up care.      Disposition and Care Coordination:    Admit:   Through independent evaluation of the patient's history, physical, and imperical data, the patient meets criteria for observation/admission to the hospital.        Final diagnoses:   Sepsis, due to unspecified organism, unspecified whether acute organ dysfunction present   Dehydration   Hypotension, unspecified hypotension type        ED Disposition       ED Disposition   Decision to Admit    Condition   --    Comment   --               This medical record created using voice recognition software.             Tee Ruiz MD  08/03/23 1903      Electronically signed by Tee Ruiz MD at 08/03/23 1903       Vital Signs (last day)       Date/Time Temp Temp src Pulse Resp BP Patient Position SpO2    08/04/23 0701 98.6 (37) Oral 63 16 102/61 Lying 95    08/04/23 0302 97.4 (36.3) Axillary 61 16 117/64 Lying 100    08/03/23 2317 97.8 (36.6) Axillary 57 16 112/63 Lying  100    08/03/23 2131 97.5 (36.4) Oral 96 18 91/46 Lying 93    08/03/23 1845 -- -- 53 -- -- -- 91    08/03/23 18:39:08 97.6 (36.4) Oral -- -- -- -- --    08/03/23 1837 -- -- 58 -- 115/91 -- 97    08/03/23 1827 -- -- 65 -- -- -- 93    08/03/23 1812 -- -- 55 -- -- -- 95    08/03/23 1802 -- -- 56 -- -- -- --    08/03/23 1751 -- -- 50 -- -- -- --    08/03/23 1731 -- -- 50 -- -- -- --    08/03/23 1730 -- -- 50 -- 90/64 -- --    08/03/23 1700 -- -- 50 -- 78/60 -- --    08/03/23 1651 -- -- 50 -- 89/69 -- --    08/03/23 1630 -- -- 50 -- 74/47 -- --    08/03/23 1624 -- -- 50 -- 78/50 -- --    08/03/23 1621 -- -- 50 -- -- -- 99    08/03/23 1551 97.7 (36.5) Rectal -- -- -- -- --    08/03/23 1550 -- -- 56 -- 82/53 -- --    08/03/23 1547 -- -- -- -- 72/52 -- --    08/03/23 1535 -- -- 57 19 74/52 Sitting 100          Oxygen Therapy (last day)       Date/Time SpO2 Device (Oxygen Therapy) Flow (L/min) Oxygen Concentration (%) ETCO2 (mmHg)    08/04/23 0800 -- room air -- -- --    08/04/23 0701 95 -- -- -- --    08/04/23 0302 100 -- -- -- --    08/03/23 2317 100 -- -- -- --    08/03/23 2219 -- room air -- -- --    08/03/23 2131 93 -- -- -- --    08/03/23 1845 91 -- -- -- --    08/03/23 1837 97 -- -- -- --    08/03/23 1827 93 -- -- -- --    08/03/23 1812 95 -- -- -- --    08/03/23 1621 99 -- -- -- --    08/03/23 1535 100 room air -- -- --          Facility-Administered Medications as of 8/4/2023   Medication Dose Route Frequency Provider Last Rate Last Admin    acetaminophen (TYLENOL) tablet 650 mg  650 mg Oral Q4H PRN Harpal Sams Jr., MD        Or    acetaminophen (TYLENOL) 160 MG/5ML solution 650 mg  650 mg Oral Q4H PRN Harpal Sams Jr., MD        Or    acetaminophen (TYLENOL) suppository 650 mg  650 mg Rectal Q4H PRN Harpal Sams Jr., MD        sennosides-docusate (PERICOLACE) 8.6-50 MG per tablet 2 tablet  2 tablet Oral BID Harpal Sams Jr., MD   2 tablet at 08/04/23 0925    And    polyethylene glycol (MIRALAX) packet  17 g  17 g Oral Daily PRN Harpal Sams Jr., MD        And    bisacodyl (DULCOLAX) EC tablet 5 mg  5 mg Oral Daily PRN Harpal Sams Jr., MD        And    bisacodyl (DULCOLAX) suppository 10 mg  10 mg Rectal Daily PRN Harpal Sams Jr., MD        [COMPLETED] cefepime (MAXIPIME) 2000 mg/100 mL 0.9% NS (mbp)  2,000 mg Intravenous Once Tee Ruzi MD   Stopped at 08/03/23 2001    cefepime (MAXIPIME) 2000 mg/100 mL 0.9% NS (mbp)  2,000 mg Intravenous Q24H Harpal Sams Jr., MD        clonazePAM (KlonoPIN) tablet 1 mg  1 mg Oral BID PRN Eulalio Rashid MD        clopidogrel (PLAVIX) tablet 75 mg  75 mg Oral Daily Eulalio Rashid MD        FLUoxetine (PROzac) capsule 60 mg  60 mg Oral Daily Eulalio Rashid MD        isosorbide mononitrate (IMDUR) 24 hr tablet 30 mg  30 mg Oral Daily Eulalio Rashid MD        lactated ringers infusion  125 mL/hr Intravenous Continuous Harpal Sams Jr.,  mL/hr at 08/04/23 0930 125 mL/hr at 08/04/23 0930    morphine injection 4 mg  4 mg Intravenous Once Harpal Sams Jr., MD        nitroglycerin (NITROSTAT) SL tablet 0.4 mg  0.4 mg Sublingual Q5 Min PRN Harpal Sams Jr., MD   0.4 mg at 08/04/23 0959    ondansetron (ZOFRAN) injection 4 mg  4 mg Intravenous Once Harpal Sams Jr., MD        Pharmacy to Dose Cefepime   Does not apply Continuous PRN Harpal Sams Jr., MD        [COMPLETED] sodium chloride 0.9 % bolus 1,854 mL  30 mL/kg Intravenous Once Tee Ruiz MD   Stopped at 08/03/23 1730    sodium chloride 0.9 % flush 10 mL  10 mL Intravenous PRN Harpal Sams Jr., MD        sodium chloride 0.9 % flush 10 mL  10 mL Intravenous Q12H Harpal Sams Jr., MD   10 mL at 08/04/23 0919    sodium chloride 0.9 % flush 10 mL  10 mL Intravenous PRN Harpal Sams Jr., MD        sodium chloride 0.9 % infusion 40 mL  40 mL Intravenous PRN Harpal Sams Jr., MD         Lab Results (last 24 hours)       Procedure Component Value Units Date/Time    Procalcitonin  "[[id number]]  (Abnormal) Collected: 08/04/23 0512    Specimen: Blood Updated: 08/04/23 1048     Procalcitonin 0.48 ng/mL     Narrative:      As a Marker for Sepsis (Non-Neonates):    1. <0.5 ng/mL represents a low risk of severe sepsis and/or septic shock.  2. >2 ng/mL represents a high risk of severe sepsis and/or septic shock.    As a Marker for Lower Respiratory Tract Infections that require antibiotic therapy:    PCT on Admission    Antibiotic Therapy       6-12 Hrs later    >0.5                Strongly Recommended  >0.25 - <0.5        Recommended  0.1 - 0.25          Discouraged              Remeasure/reassess PCT  <0.1                Strongly Discouraged     Remeasure/reassess PCT    As 28 day mortality risk marker: \"Change in Procalcitonin Result\" (>80% or <=80%) if Day 0 (or Day 1) and Day 4 values are available. Refer to http://www.[facility]pct[facility]cal[facility]    Change in PCT <=80%  A decrease of PCT levels below or equal to 80% defines a positive change in PCT test result representing a higher risk for 28-day all-cause mortality of patients diagnosed with severe sepsis for septic shock.    Change in PCT >80%  A decrease of PCT levels of more than 80% defines a negative change in PCT result representing a lower risk for 28-day all-cause mortality of patients diagnosed with severe sepsis or septic shock.    This test is Prognostic not Diagnostic, if elevated correlate with clinical findings before administering antibiotic treatment.        Comprehensive Metabolic Panel [[id number]]  (Abnormal) Collected: 08/04/23 0512    Specimen: Blood Updated: 08/04/23 0552     Glucose 124 mg/dL      BUN 60 mg/dL      Creatinine 3.36 mg/dL      Sodium 132 mmol/L      Potassium 4.5 mmol/L      Chloride 99 mmol/L      CO2 19.1 mmol/L      Calcium 9.0 mg/dL      Total Protein 6.9 g/dL      Albumin 3.6 g/dL      ALT (SGPT) 42 U/L      AST (SGOT) 42 U/L      Alkaline Phosphatase 183 U/L      Total Bilirubin 1.2 mg/dL      Globulin " 3.3 gm/dL      A/G Ratio 1.1 g/dL      BUN/Creatinine Ratio 17.9     Anion Gap 13.9 mmol/L      eGFR 14.6 mL/min/1.73      Comment: <15 Indicative of kidney failure       Narrative:      GFR Normal >60  Chronic Kidney Disease <60  Kidney Failure <15      CBC Auto Differential [[id number]]  (Abnormal) Collected: 08/04/23 0512    Specimen: Blood Updated: 08/04/23 0530     WBC 13.18 10*3/mm3      RBC 4.25 10*6/mm3      Hemoglobin 13.9 g/dL      Hematocrit 41.6 %      MCV 97.9 fL      MCH 32.7 pg      MCHC 33.4 g/dL      RDW 14.2 %      RDW-SD 43.6 fl      MPV 12.3 fL      Platelets 105 10*3/mm3      Neutrophil % 84.0 %      Lymphocyte % 7.3 %      Monocyte % 7.9 %      Eosinophil % 0.1 %      Basophil % 0.2 %      Immature Grans % 0.5 %      Neutrophils, Absolute 11.08 10*3/mm3      Lymphocytes, Absolute 0.96 10*3/mm3      Monocytes, Absolute 1.04 10*3/mm3      Eosinophils, Absolute 0.01 10*3/mm3      Basophils, Absolute 0.02 10*3/mm3      Immature Grans, Absolute 0.07 10*3/mm3      nRBC 0.2 /100 WBC     Comprehensive Metabolic Panel [[id number]]  (Abnormal) Collected: 08/03/23 1853    Specimen: Blood Updated: 08/03/23 1952     Glucose 99 mg/dL      BUN 64 mg/dL      Creatinine 4.32 mg/dL      Sodium 134 mmol/L      Potassium 5.7 mmol/L      Comment: Slight hemolysis detected by analyzer. Results may be affected.        Chloride 100 mmol/L      CO2 18.3 mmol/L      Calcium 8.5 mg/dL      Total Protein 6.9 g/dL      Albumin 3.7 g/dL      ALT (SGPT) 43 U/L      AST (SGOT) 47 U/L      Comment: Slight hemolysis detected by analyzer. Results may be affected.        Alkaline Phosphatase 183 U/L      Total Bilirubin 1.1 mg/dL      Globulin 3.2 gm/dL      A/G Ratio 1.2 g/dL      BUN/Creatinine Ratio 14.8     Anion Gap 15.7 mmol/L      eGFR 10.8 mL/min/1.73      Comment: <15 Indicative of kidney failure       Narrative:      GFR Normal >60  Chronic Kidney Disease <60  Kidney Failure <15      STAT Lactic Acid, Reflex [[id number]]   (Abnormal) Collected: 08/03/23 1853    Specimen: Blood Updated: 08/03/23 1948     Lactate 2.9 mmol/L     Magnesium [[id number]]  (Normal) Collected: 08/03/23 1853    Specimen: Blood Updated: 08/03/23 1938     Magnesium 2.1 mg/dL     Single High Sensitivity Troponin T [[id number]]  (Abnormal) Collected: 08/03/23 1853    Specimen: Blood Updated: 08/03/23 1927     HS Troponin T 28 ng/L     Narrative:      High Sensitive Troponin T Reference Range:  <10.0 ng/L- Negative Female for AMI  <15.0 ng/L- Negative Male for AMI  >=10 - Abnormal Female indicating possible myocardial injury.  >=15 - Abnormal Male indicating possible myocardial injury.   Clinicians would have to utilize clinical acumen, EKG, Troponin, and serial changes to determine if it is an Acute Myocardial Infarction or myocardial injury due to an underlying chronic condition.         Urinalysis, Microscopic Only - Urine, Clean Catch [[id number]]  (Abnormal) Collected: 08/03/23 1801    Specimen: Urine, Clean Catch Updated: 08/03/23 1833     RBC, UA 0-2 /HPF      WBC, UA 0-2 /HPF      Bacteria, UA Trace /HPF      Squamous Epithelial Cells, UA 0-2 /HPF      Hyaline Casts, UA None Seen /LPF      Mucus, UA Trace /HPF      Methodology Automated Microscopy    Urinalysis With Microscopic If Indicated (No Culture) - Urine, Clean Catch [[id number]]  (Abnormal) Collected: 08/03/23 1801    Specimen: Urine, Clean Catch Updated: 08/03/23 1823     Color, UA Yellow     Appearance, UA Clear     pH, UA 5.5     Specific Gravity, UA 1.009     Glucose, UA Negative     Ketones, UA Negative     Bilirubin, UA Negative     Blood, UA Moderate (2+)     Protein, UA Negative     Leuk Esterase, UA Small (1+)     Nitrite, UA Negative     Urobilinogen, UA 1.0 E.U./dL    Lactic Acid, Plasma [[id number]]  (Abnormal) Collected: 08/03/23 1604    Specimen: Blood Updated: 08/03/23 1726     Lactate 3.0 mmol/L     Phosphorus [[id number]]  (Abnormal) Collected: 08/03/23 1604    Specimen: Blood Updated:  08/03/23 1720     Phosphorus 5.8 mg/dL     C-reactive Protein [[id number]]  (Abnormal) Collected: 08/03/23 1604    Specimen: Blood Updated: 08/03/23 1720     C-Reactive Protein 0.89 mg/dL     Blood Gas, Arterial -With Co-Ox Panel: Yes [[id number]]  (Abnormal) Collected: 08/03/23 1645    Specimen: Arterial Blood Updated: 08/03/23 1652     pH, Arterial 7.357 pH units      pCO2, Arterial 30.9 mm Hg      pO2, Arterial 84.1 mm Hg      HCO3, Arterial 16.9 mmol/L      Base Excess, Arterial -7.3 mmol/L      O2 Saturation, Arterial 94.6 %      Hemoglobin, Blood Gas 13.0 g/dL      Carboxyhemoglobin 0.5 %      Methemoglobin 0.30 %      Oxyhemoglobin 93.8 %      FHHB 5.4 %      Site Arterial: left brachial     Modality Room Air     FIO2 21 %      Flow Rate 0 lpm      PO2/FIO2 400     Taiwo's Test N/A    Blood Culture - Blood, Arm, Left [[id number]] Collected: 08/03/23 1604    Specimen: Blood from Arm, Left Updated: 08/03/23 1642    Blood Culture - Blood, Arm, Left [[id number]] Collected: 08/03/23 1604    Specimen: Blood from Arm, Left Updated: 08/03/23 1642    Protime-INR [[id number]]  (Abnormal) Collected: 08/03/23 1604    Specimen: Blood Updated: 08/03/23 1639     Protime 24.2 Seconds      INR 2.20    Narrative:      Suggested Therapeutic Ranges For Oral Anticoagulant Therapy:  Level of Therapy                      INR Target Range  Standard Dose                            2.0-3.0  High Dose                                2.5-3.5  Patients not receiving anticoagulant  Therapy Normal Range                     0.86-1.15    aPTT [[id number]]  (Normal) Collected: 08/03/23 1604    Specimen: Blood Updated: 08/03/23 1639     PTT 29.8 seconds     [place] Draw [[id number]] Collected: 08/03/23 1604    Specimen: Blood Updated: 08/03/23 1636    Narrative:      The following orders were created for panel order [place] Draw.  Procedure                               Abnormality         Status                     ---------                                -----------         ------                     Green Top (Gel)[[id number]]                                  Final result               Lavender Top[[id number]]                                     Final result               Gold Top - SST[[id number]]                                   Final result               Light Blue Top[[id number]]                                   Final result                 Please view results for these tests on the individual orders.    Green Top (Gel) [[id number]] Collected: 08/03/23 1604    Specimen: Blood Updated: 08/03/23 1636     Extra Tube Hold for add-ons.     Comment: Auto resulted.       CBC & Differential [[id number]]  (Abnormal) Collected: 08/03/23 1604    Specimen: Blood Updated: 08/03/23 1627    Narrative:      The following orders were created for panel order CBC & Differential.  Procedure                               Abnormality         Status                     ---------                               -----------         ------                     CBC Auto Differential[[id number]]        Abnormal            Final result                 Please view results for these tests on the individual orders.    CBC Auto Differential [[id number]]  (Abnormal) Collected: 08/03/23 1604    Specimen: Blood Updated: 08/03/23 1627     WBC 16.02 10*3/mm3      RBC 4.73 10*6/mm3      Hemoglobin 15.5 g/dL      Hematocrit 46.1 %      MCV 97.5 fL      MCH 32.8 pg      MCHC 33.6 g/dL      RDW 14.6 %      RDW-SD 44.8 fl      MPV 12.3 fL      Platelets 153 10*3/mm3      Neutrophil % 85.7 %      Lymphocyte % 7.6 %      Monocyte % 6.1 %      Eosinophil % 0.1 %      Basophil % 0.1 %      Immature Grans % 0.4 %      Neutrophils, Absolute 13.72 10*3/mm3      Lymphocytes, Absolute 1.22 10*3/mm3      Monocytes, Absolute 0.98 10*3/mm3      Eosinophils, Absolute 0.01 10*3/mm3      Basophils, Absolute 0.02 10*3/mm3      Immature Grans, Absolute 0.07 10*3/mm3      nRBC 0.1 /100 WBC     Light Blue Top [[id number]]  Collected: 08/03/23 1604    Specimen: Blood Updated: 08/03/23 1625     Extra Tube Hold for add-ons.     Comment: Auto resulted       Lavender Top [[id number]] Collected: 08/03/23 1604    Specimen: Blood Updated: 08/03/23 1625     Extra Tube hold for add-on     Comment: Auto resulted       Gold Top - SST [[id number]] Collected: 08/03/23 1604    Specimen: Blood Updated: 08/03/23 1625     Extra Tube Hold for add-ons.     Comment: Auto resulted.       POC Glucose Once [[id number]]  (Abnormal) Collected: 08/03/23 1603    Specimen: Blood Updated: 08/03/23 1608     Glucose 119 mg/dL      Comment: Serial Number: [id number]:  [id number]             Imaging Results (Last 24 Hours)       Procedure Component Value Units Date/Time    CT Abdomen Pelvis Without Contrast [[id number]] Collected: 08/03/23 2113     Updated: 08/03/23 2116    Narrative:      PROCEDURE: CT ABDOMEN PELVIS WO CONTRAST     COMPARISONS: 1/16/2023; 11/21/2022; 5/23/2022; 11/23/2021.     INDICATIONS: 63-year-old female w/ possible sepsis; h/o colorectal carcinoma.     TECHNIQUE: 716 CT images were created without intravenous contrast.  No oral contrast agent was   administered for the study.     PROTOCOL:   Standard CT imaging protocol performed.      RADIATION:   Total DLP: 402.9 mGy*cm.    Automated exposure control was utilized to minimize radiation dose.      FINDINGS: Atherosclerotic changes are seen.  No aneurysmal dilatation of the aortoiliac arterial   system is identified.  No acute intraperitoneal or retroperitoneal hemorrhage.  Native coronary   artery calcifications are suggested.  The patient has undergone median sternotomy and suspected   CABG surgery.  The liver has a nodular contour, which may represent cirrhosis or treatment effect.    The previously seen hepatic metastases are not as well appreciated by nonenhanced CT.  No   splenomegaly is suspected.  There is chronic calcified granulomatous disease of the chest and the   abdomen.  There  is a partially visualized cardiac implantable electronic device (CIED) in place,   seen previously.  There is possible borderline cardiac enlargement.  A small hiatal hernia is   present.  The gallbladder is distended without definite gallstones.  No acute cholecystitis is   suggested.  Pancreatic lipomatosis is seen.  No acute pancreatitis is suspected.  There are   surgical clips in the right upper quadrant, new since prior exams such as the 1/16/2023 study.    Please correlate with the postoperative history.  It appears there has been partial resection of   the distal colon with an anastomotic suture line visible distally, as before.  No renal stones or   hydronephrosis or obstructive uropathy due to ureterolithiasis.  No urinary bladder calculi.  There   is a left hip prosthesis in place with associated streak artifact, obscuring detail.  Degenerative   changes are seen throughout the imaged spine.  There is a stable chronic T12 vertebral compression   fracture, estimated at 30-50 percent in degree with minimal if any posterior wall retropulsion.    There may be mild degenerative changes of the right hip joint.  Minimal degenerative changes of the   sacroiliac (SI) joints are possible.  There may be minimal pubic osteitis.  No acute fracture or   aggressive osseous lesion is seen.  The partially imaged lung bases reveal minimal linear   atelectasis and/or fibrosis.  There is age-indeterminate peribronchial thickening.  No definite   focal lobar infiltrate is seen.  Probably no suspicious pulmonary nodule is appreciated.  There are   small noncalcified subpleural pulmonary nodules, seen previously, such as seen in the left lower   lobe on image 27 of series 204 (with 2 nodules present, measuring 6 mm or less) and seen on image   22 of series 204 in the right lower lobe.  It was probably present previously, as well.  There is   nonspecific bowel wall fat deposition, especially in the right colon and distal ileum.   This   finding is probably within the submucosal and is nonspecific.  It may be a normal variant.    Inflammatory bowel disease or changes related to treatment effect cannot be excluded.  It is   thought to be chronic and not acute.  No definite acute colitis or diverticulitis.  The appendix is   not clearly identified.  It may be surgically absent.  No mechanical bowel obstruction.  No   pneumoperitoneum or pneumatosis.       Impression:       No definite acute findings are appreciated by nonenhanced CT examination of the abdomen   and pelvis.  Please see above comments for further detail.             Please note that portions of this note were completed with a voice recognition program.     VEGA FERREIRA JR, MD         Electronically Signed and Approved By: VEGA FERREIRA JR, MD on 8/03/2023 at 21:13                        CT Head Without Contrast [[id number]] Collected: 08/03/23 1722     Updated: 08/03/23 1725    Narrative:      PROCEDURE: CT HEAD WO CONTRAST     COMPARISON:  None     INDICATIONS: Headache, nausea, fall     PROTOCOL:   Standard imaging protocol performed      RADIATION:   DLP: 1018.2  mGy*cm    MA and/or KV was adjusted to minimize radiation dose.          TECHNIQUE: After obtaining the patient's consent, CT images were obtained without non-ionic   intravenous contrast material.      FINDINGS:   The ventricles and sulci are proportional prominent compatible with global cerebral volume loss.    There is no mass effect or midline shift.  There is no acute intracranial hemorrhage.  There is no   abnormal extra-axial fluid collection.  The gray-white matter differentiation is preserved.  There   is mild periventricular white matter hypodensity likely representing sequelae of chronic small   vessel ischemic disease.  The calvarium is intact.  The mastoid air cells and middle ears are well   aerated.  The paranasal sinuses appear clear.  Visualized orbits and globes appear symmetric.       Impression:          1. No acute intracranial abnormality.  Specifically, no evidence of acute hemorrhage, mass effect   or midline shift.  2. Global volume loss with mild periventricular white matter hypodensity, likely representing   sequelae of chronic small vessel ischemic disease.            JAH NARVAEZ MD         Electronically Signed and Approved By: JAH NARVAEZ MD on 8/03/2023 at 17:21                     XR Chest 1 View [[id number]] Collected: 08/03/23 1700     Updated: 08/03/23 1703    Narrative:      PROCEDURE: XR CHEST 1 VW     COMPARISON: [facility], CR, XR CHEST 1 VW, 7/23/2023, 19:16.     INDICATIONS: WEAKNESS TODAY     FINDINGS:   Prior median sternotomy and CABG.  Unremarkable appearing AICD.  Heart size unchanged.  No overt   edema, focal consolidation, large effusion or pneumothorax.  Borderline low lung volumes.  Osseous   structures unremarkable.       Impression:         No active pulmonary process.                  TARIK BROOKS MD         Electronically Signed and Approved By: TARIK BROOKS MD on 8/03/2023 at 17:00                           ECG/EMG Results (last 24 hours)       Procedure Component Value Units Date/Time    ECG 12 Lead ED Triage Standing Order; Weak / Dizzy / AMS [[id number]] Collected: 08/03/23 1614     Updated: 08/03/23 1615     QT Interval 531 ms     Narrative:      HEART RATE= 52  bpm  RR Interval= 1144  ms  DC Interval= 95  ms  P Horizontal Axis= -56  deg  P Front Axis= 24  deg  QRSD Interval= 95  ms  QT Interval= 531  ms  QRS Axis= -23  deg  T Wave Axis= 118  deg  - ABNORMAL ECG -  Sinus rhythm  Short DC interval  Abnormal R-wave progression, early transition  LVH with secondary repolarization abnormality  Inferior infarct, old  Anterior Q waves, possibly due to LVH  Electronically Signed By:   Date and Time of Study: 2023-08-03 16:14:10          Orders (active)        Start     Ordered    08/04/23 1900  cefepime (MAXIPIME) 2000 mg/100 mL 0.9% NS (mbp)  Every  24 Hours         08/03/23 2226    08/04/23 1115  clopidogrel (PLAVIX) tablet 75 mg  Daily         08/04/23 1027    08/04/23 1115  FLUoxetine (PROzac) capsule 60 mg  Daily         08/04/23 1027    08/04/23 1115  isosorbide mononitrate (IMDUR) 24 hr tablet 30 mg  Daily         08/04/23 1027    08/04/23 1029  PT Consult: Eval & Treat Functional Mobility Below Baseline  Once         08/04/23 1028    08/04/23 1026  clonazePAM (KlonoPIN) tablet 1 mg  2 Times Daily PRN         08/04/23 1027    08/04/23 0800  Oral Care  2 Times Daily       08/03/23 2218 08/04/23 0000  Vital Signs  Every 4 Hours       08/03/23 2218 08/03/23 2315  sodium chloride 0.9 % flush 10 mL  Every 12 Hours Scheduled         08/03/23 2218 08/03/23 2315  sennosides-docusate (PERICOLACE) 8.6-50 MG per tablet 2 tablet  2 Times Daily        See Hyperspace for full Linked Orders Report.    08/03/23 2218 08/03/23 2315  lactated ringers infusion  Continuous         08/03/23 2218 08/03/23 2219  Intake & Output  Every Shift       08/03/23 2218 08/03/23 2219  Weigh Patient  Once         08/03/23 2218 08/03/23 2219  Insert Peripheral IV  Once         08/03/23 2218 08/03/23 2219  Continuous Cardiac Monitoring  Continuous        Comments: Follow Standing Orders As Outlined in Process Instructions (Open Order Report to View Full Instructions)    08/03/23 2218 08/03/23 2219  Telemetry - Maintain IV Access  Continuous         08/03/23 2218 08/03/23 2219  Telemetry - Place Orders & Notify Provider of Results When Patient Experiences Acute Chest Pain, Dysrhythmia or Respiratory Distress  Until Discontinued         08/03/23 2218 08/03/23 2219  Notify Provider (With Default Parameters)  Until Discontinued         08/03/23 2218 08/03/23 2219  Diet: Regular/House Diet; Texture: Regular Texture (IDDSI 7); Fluid Consistency: Thin (IDDSI 0)  Diet Effective Now         08/03/23 2218 08/03/23 2218  polyethylene glycol (MIRALAX) packet 17 g   Daily PRN        See Hyperspace for full Linked Orders Report.    08/03/23 2218    08/03/23 2218  bisacodyl (DULCOLAX) EC tablet 5 mg  Daily PRN        See Hyperspace for full Linked Orders Report.    08/03/23 2218    08/03/23 2218  bisacodyl (DULCOLAX) suppository 10 mg  Daily PRN        See Hyperspace for full Linked Orders Report.    08/03/23 2218    08/03/23 2218  acetaminophen (TYLENOL) tablet 650 mg  Every 4 Hours PRN        See Hyperspace for full Linked Orders Report.    08/03/23 2218    08/03/23 2218  acetaminophen (TYLENOL) 160 MG/5ML solution 650 mg  Every 4 Hours PRN        See Hyperspace for full Linked Orders Report.    08/03/23 2218    08/03/23 2218  acetaminophen (TYLENOL) suppository 650 mg  Every 4 Hours PRN        See Hyperspace for full Linked Orders Report.    08/03/23 2218 08/03/23 2218  Pharmacy to Dose Cefepime  Continuous PRN         08/03/23 2218    08/03/23 2218  sodium chloride 0.9 % flush 10 mL  As Needed         08/03/23 2218 08/03/23 2218  sodium chloride 0.9 % infusion 40 mL  As Needed         08/03/23 2218 08/03/23 2218  nitroglycerin (NITROSTAT) SL tablet 0.4 mg  Every 5 Minutes PRN         08/03/23 2218    08/03/23 2153  STAT Lactic Acid, Reflex  PROCEDURE ONCE         08/03/23 1935    08/03/23 1935  Code Status and Medical Interventions:  Continuous         08/03/23 1935    08/03/23 1832  Inpatient Hospitalist Consult  Once        Specialty:  Hospitalist  Provider:  Harpal Sams Jr., MD    08/03/23 1831    08/03/23 1645  morphine injection 4 mg  Once         08/03/23 1623    08/03/23 1645  ondansetron (ZOFRAN) injection 4 mg  Once         08/03/23 1623    08/03/23 1556  Insert Large Bore Peripheral IV  Once         08/03/23 1556    08/03/23 1556  Insert 2nd Large Bore Peripheral IV  Once         08/03/23 1556    08/03/23 1556  Calcium, Ionized  Once         08/03/23 1556    08/03/23 1556  Blood Culture - Blood, Arm, Left  Once         08/03/23 1556    08/03/23 1556   Blood Culture - Blood, Arm, Left  Once         08/03/23 1556    08/03/23 1544  Orthostatic Blood Pressure  Once         08/03/23 1543    08/03/23 1544  Fall Precautions  Continuous         08/03/23 1543    08/03/23 1544  Insert Peripheral IV  Once         08/03/23 1543    08/03/23 1543  sodium chloride 0.9 % flush 10 mL  As Needed         08/03/23 1543    Unscheduled  Oxygen Therapy- Nasal Cannula; Titrate 1-6 LPM Per SpO2; 90 - 95%  Continuous PRN       08/03/23 1543    Unscheduled  Up With Assistance  As Needed       08/03/23 6991

## 2023-08-05 ENCOUNTER — READMISSION MANAGEMENT (OUTPATIENT)
Dept: CALL CENTER | Facility: HOSPITAL | Age: 63
End: 2023-08-05
Payer: MEDICARE

## 2023-08-05 VITALS
BODY MASS INDEX: 25.66 KG/M2 | DIASTOLIC BLOOD PRESSURE: 59 MMHG | HEART RATE: 85 BPM | TEMPERATURE: 97.7 F | SYSTOLIC BLOOD PRESSURE: 97 MMHG | WEIGHT: 144.84 LBS | RESPIRATION RATE: 18 BRPM | OXYGEN SATURATION: 96 % | HEIGHT: 63 IN

## 2023-08-05 LAB
ALBUMIN SERPL-MCNC: 3.1 G/DL (ref 3.5–5.2)
ALBUMIN/GLOB SERPL: 1.1 G/DL
ALP SERPL-CCNC: 156 U/L (ref 39–117)
ALT SERPL W P-5'-P-CCNC: 37 U/L (ref 1–33)
ANION GAP SERPL CALCULATED.3IONS-SCNC: 11 MMOL/L (ref 5–15)
AST SERPL-CCNC: 45 U/L (ref 1–32)
BILIRUB SERPL-MCNC: 1.2 MG/DL (ref 0–1.2)
BUN SERPL-MCNC: 31 MG/DL (ref 8–23)
BUN/CREAT SERPL: 17.9 (ref 7–25)
CALCIUM SPEC-SCNC: 8.7 MG/DL (ref 8.6–10.5)
CHLORIDE SERPL-SCNC: 102 MMOL/L (ref 98–107)
CO2 SERPL-SCNC: 20 MMOL/L (ref 22–29)
CREAT SERPL-MCNC: 1.73 MG/DL (ref 0.57–1)
DEPRECATED RDW RBC AUTO: 46.6 FL (ref 37–54)
EGFRCR SERPLBLD CKD-EPI 2021: 32.5 ML/MIN/1.73
ERYTHROCYTE [DISTWIDTH] IN BLOOD BY AUTOMATED COUNT: 13.9 % (ref 12.3–15.4)
GLOBULIN UR ELPH-MCNC: 2.8 GM/DL
GLUCOSE SERPL-MCNC: 114 MG/DL (ref 65–99)
HCT VFR BLD AUTO: 41.1 % (ref 34–46.6)
HGB BLD-MCNC: 12.9 G/DL (ref 12–15.9)
MCH RBC QN AUTO: 32.8 PG (ref 26.6–33)
MCHC RBC AUTO-ENTMCNC: 31.4 G/DL (ref 31.5–35.7)
MCV RBC AUTO: 104.6 FL (ref 79–97)
PLATELET # BLD AUTO: 108 10*3/MM3 (ref 140–450)
PMV BLD AUTO: 12.1 FL (ref 6–12)
POTASSIUM SERPL-SCNC: 4.3 MMOL/L (ref 3.5–5.2)
PROCALCITONIN SERPL-MCNC: 0.25 NG/ML (ref 0–0.25)
PROT SERPL-MCNC: 5.9 G/DL (ref 6–8.5)
RBC # BLD AUTO: 3.93 10*6/MM3 (ref 3.77–5.28)
SODIUM SERPL-SCNC: 133 MMOL/L (ref 136–145)
WBC NRBC COR # BLD: 7.92 10*3/MM3 (ref 3.4–10.8)

## 2023-08-05 PROCEDURE — 85027 COMPLETE CBC AUTOMATED: CPT | Performed by: FAMILY MEDICINE

## 2023-08-05 PROCEDURE — 80053 COMPREHEN METABOLIC PANEL: CPT | Performed by: FAMILY MEDICINE

## 2023-08-05 PROCEDURE — 99239 HOSP IP/OBS DSCHRG MGMT >30: CPT | Performed by: FAMILY MEDICINE

## 2023-08-05 PROCEDURE — 84145 PROCALCITONIN (PCT): CPT | Performed by: FAMILY MEDICINE

## 2023-08-05 RX ORDER — SPIRONOLACTONE 25 MG/1
25 TABLET ORAL 2 TIMES DAILY PRN
Qty: 180 TABLET | Refills: 3
Start: 2023-08-05

## 2023-08-05 RX ORDER — FUROSEMIDE 40 MG/1
40 TABLET ORAL 2 TIMES DAILY PRN
Qty: 180 TABLET | Refills: 3
Start: 2023-08-05

## 2023-08-05 RX ORDER — LOSARTAN POTASSIUM 50 MG/1
25 TABLET ORAL DAILY
Qty: 15 TABLET | Refills: 0
Start: 2023-08-05 | End: 2023-09-04

## 2023-08-05 RX ORDER — FAMOTIDINE 10 MG/ML
20 INJECTION, SOLUTION INTRAVENOUS ONCE
Status: COMPLETED | OUTPATIENT
Start: 2023-08-05 | End: 2023-08-05

## 2023-08-05 RX ADMIN — SODIUM CHLORIDE, POTASSIUM CHLORIDE, SODIUM LACTATE AND CALCIUM CHLORIDE 125 ML/HR: 600; 310; 30; 20 INJECTION, SOLUTION INTRAVENOUS at 10:04

## 2023-08-05 RX ADMIN — APIXABAN 5 MG: 5 TABLET, FILM COATED ORAL at 08:34

## 2023-08-05 RX ADMIN — FAMOTIDINE 20 MG: 10 INJECTION INTRAVENOUS at 00:27

## 2023-08-05 RX ADMIN — CLOPIDOGREL BISULFATE 75 MG: 75 TABLET ORAL at 08:34

## 2023-08-05 RX ADMIN — ISOSORBIDE MONONITRATE 30 MG: 30 TABLET, EXTENDED RELEASE ORAL at 08:34

## 2023-08-05 RX ADMIN — FLUOXETINE 60 MG: 20 CAPSULE ORAL at 08:34

## 2023-08-05 NOTE — PLAN OF CARE
Problem: Adult Inpatient Plan of Care  Goal: Plan of Care Review  Outcome: Ongoing, Progressing  Flowsheets (Taken 8/5/2023 6442)  Progress: improving  Plan of Care Reviewed With: patient  Outcome Evaluation: Patient alert and oriented throughshift. Vital signs stable throughout shift. Patient received Klonpine per MAR. Patient able to get some rest. Continue with plan of care.   Goal Outcome Evaluation:  Plan of Care Reviewed With: patient        Progress: improving  Outcome Evaluation: Patient alert and oriented throughshift. Vital signs stable throughout shift. Patient received Klonpine per MAR. Patient able to get some rest. Continue with plan of care.

## 2023-08-05 NOTE — DISCHARGE SUMMARY
[facility]         HOSPITALIST  DISCHARGE SUMMARY    Patient Name: Erika Bowens  : 1960  MRN: [id number]    Date of Admission: 8/3/2023  Date of Discharge: 2023  Primary Care Physician: Nilam Willis MD    Consults       Date and Time Order Name Status Description    8/3/2023  6:31 PM Inpatient Hospitalist Consult      2023 12:57 AM Hematology & Oncology Inpatient Consult Completed             Active and Resolved Hospital Problems:  Suspected severe sepsis unspecified source  Acute kidney injury on CKD 3  Hypotension present on admission  Lactic acidosis present on admission  Leukocytosis  Hyperkalemia due to TERESSA  Metabolic acidosis secondary to TERESSA on CKD 3  Thrombocytopenia  Colon cancer with mets to liver  Portal venous thrombosis  Cardiomyopathy  Prolonged QT  Depression with anxiety  Coronary artery disease     Active Hospital Problems    Diagnosis POA    **Sepsis [A41.9] Yes      Resolved Hospital Problems   No resolved problems to display.       Hospital Course     Hospital Course:  Erika Bowens is a 63 y.o. female  past medical history cardiomyopathy, and metastatic colon cancer that presents to the emergency department for evaluation of generalized weakness over the past 3 days that is gotten progressively worse. She had an episode of nausea and vomiting yesterday as well. She denies any fevers, chills and sweats, chest pain, shortness of breath, palpitations, abdominal pain diarrhea constipation, dysuria, rash. In the emergency department she was noted to be hypotensive systolic blood pressure in the 70s.  Also noted to have leukocytosis and lactic acid of 3.  Also noted to have acute kidney injury on chronic kidney disease with a creatinine of 4.56 up from baseline of 1.5.  Serum potassium elevated at 5.7.  Urinalysis significant for moderate blood 0-2 white blood cells.  CT head negative for any acute intracranial abnormalities.  CT abdomen pelvis negative  "for any acute changes.  She has received IV fluids, empirically started on cefepime although no obvious source of infection at this point and will be admitted for ongoing monitoring and management.  Patient continued on IV fluids and empiric antibiotics.  Home diuretics and antihypertensives held.  Given Lokelma for hyperkalemia.  Blood cultures remain negative.  Leukocytosis resolved.  Pro-Roberto within normal limits.  Antibiotics stopped.  Renal function returned to baseline with IV fluids.  Blood pressure remained within normal limits.  Patient very eager to return home.  Patient seen and evaluated on date of discharge and thought stable for discharge home to follow-up with a primary care provider in 1 to 2 weeks and the following instructions:        Acute kidney injury on chronic kidney disease suspected secondary to hypotension   -Record your blood pressure every morning prior to taking medications and present to your primary care provider   -Do not take your losartan if the top number of your blood pressure is less than 110   -Decrease home losartan to 25 mg daily and follow-up with your primary care provider for further titration   -Do not take your home metoprolol if the top number of your blood pressure is less than 110 or heart rate is less than 70   -Record your daily weights prior to taking furosemide or spironolactone.   -Change how you take your home furosemide and spironolactone. Take only for lower extremity swelling or increase in weight of 2lbs or more from current \"dry\" weight.   -Follow-up with your primary care provider 1 to 2 weeks to discuss further titration of these medications       DISCHARGE Follow Up Recommendations for labs and diagnostics: As above      Day of Discharge     Vital Signs:  Temp:  [97.3 °F (36.3 °C)-98.2 °F (36.8 °C)] 97.7 °F (36.5 °C)  Heart Rate:  [75-85] 85  Resp:  [16-18] 18  BP: ()/(57-77) 97/59  Physical Exam:   Gen. well-developed appearing stated age in no " acute distress  HEENT: Normocephalic atraumatic moist membranes pupils equal round reactive light, no scleral icterus no conjunctival injection  Cardiovascular: regular rate and rhythm no murmurs rubs or gallops S1-S2, no lower extremity edema appreciated  Pulmonary: Clear to auscultation bilaterally no wheezes rales or rhonchi symmetric chest expansion, unlabored, no conversational dyspnea appreciated  Gastrointestinal: Soft nontender nondistended positive bowel sounds all 4 quadrants no rebound or guarding  Musculoskeletal: No clubbing cyanosis, warm and well-perfused, calves soft symmetric nontender bilaterally  Skin: Clean dry without rashs  Neuro: Cranial nerves II through XII intact grossly no sensorimotor deficits appreciated bilateral upper and lower extremities  Psych: Patient is calm cooperative and appropriate with exam not responding to internal stimuli  : No Burk catheter no bladder distention no suprapubic tenderness      Discharge Details        Discharge Medications        Changes to Medications        Instructions Start Date   furosemide 40 MG tablet  Commonly known as: LASIX  What changed:   when to take this  reasons to take this   40 mg, Oral, 2 Times Daily PRN      losartan 50 MG tablet  Commonly known as: COZAAR  What changed: how much to take   25 mg, Oral, Daily      spironolactone 25 MG tablet  Commonly known as: ALDACTONE  What changed:   when to take this  reasons to take this   25 mg, Oral, 2 Times Daily PRN      vitamin D 1.25 MG (08228 UT) capsule capsule  Commonly known as: ERGOCALCIFEROL  What changed: additional instructions   50,000 Units, Oral, Weekly             Continue These Medications        Instructions Start Date   acetaminophen 500 MG tablet  Commonly known as: TYLENOL   500-1,000 mg, Oral, Every 4 Hours PRN      apixaban 5 MG tablet tablet  Commonly known as: ELIQUIS   5 mg, Oral, 2 Times Daily      capecitabine 500 MG chemo tablet  Commonly known as: XELODA   1,500 mg,  Oral, 2 Times Daily, Take 3 tablets by mouth in the AM and 3 tablets in the PM on days 1-14, then off 7 days, on a 21 day cycle      clonazePAM 1 MG tablet  Commonly known as: KlonoPIN   1 mg, Oral, 2 Times Daily      clopidogrel 75 MG tablet  Commonly known as: PLAVIX   75 mg, Oral, Daily      cyanocobalamin 500 MCG tablet  Commonly known as: VITAMIN B-12   1 tablet, Oral, Daily      cyclobenzaprine 10 MG tablet  Commonly known as: FLEXERIL   10 mg, Oral, 3 Times Daily      FLUoxetine 20 MG capsule  Commonly known as: PROzac   60 mg, Oral, Daily      isosorbide mononitrate 30 MG 24 hr tablet  Commonly known as: IMDUR   30 mg, Oral, Daily      metoprolol succinate XL 25 MG 24 hr tablet  Commonly known as: TOPROL-XL   25 mg, Oral, Daily      nitroglycerin 0.4 MG SL tablet  Commonly known as: NITROSTAT   0.4 mg, Sublingual, As Needed,  - IF PAIN REMAINS AFTER 5 MIN CALL 911 AND REPEAT DOSE MAX 3 TABS IN 15 MINUTES      ondansetron 8 MG tablet  Commonly known as: ZOFRAN   8 mg, Oral, 3 Times Daily PRN      pravastatin 80 MG tablet  Commonly known as: PRAVACHOL   80 mg, Oral, Nightly      tiZANidine 4 MG tablet  Commonly known as: ZANAFLEX   4 mg, Oral, Every 8 Hours PRN               Allergies   Allergen Reactions    Bupropion Other (See Comments)     Caused falls       Discharge Disposition:  Home or Self Care    Diet:  Hospital:  Diet Order   Procedures    Diet: Regular/House Diet; Texture: Regular Texture (IDDSI 7); Fluid Consistency: Thin (IDDSI 0)       Discharge Activity:   Activity Instructions       Gradually Increase Activity Until at Pre-Hospitalization Level              CODE STATUS:  Code Status and Medical Interventions:   Ordered at: 08/03/23 1935     Code Status (Patient has no pulse and is not breathing):    CPR (Attempt to Resuscitate)     Medical Interventions (Patient has pulse or is breathing):    Full Support     Release to patient:    Routine Release         Future Appointments   Date Time  Provider Department [place]   8/7/2023  1:15 PM NURSE/MA [place] [facility] ONC [address] [facility]   8/7/2023  1:30 PM Luis Boyce MD [facility] ONC [address] [facility]   8/16/2023  1:30 PM Zak Beckford MD [facility] ORS [address] [facility]   8/22/2023  3:30 PM [address]   10/12/2023  3:00 PM Nilam Willis MD [address]       Additional Instructions for the Follow-ups that You Need to Schedule       Discharge Follow-up with PCP   As directed       Currently Documented PCP:    Nilam Willis MD    PCP Phone Number:    [phone]     Follow Up Details: Hospital discharge follow up 1-2 weeks TERESSA on CKD, hypotension concern for sepsis no source identified                Pertinent  and/or Most Recent Results     PROCEDURES:   None    LAB RESULTS:      Lab 08/05/23  0426 08/04/23  0512 08/03/23 1853 08/03/23  1604 08/03/23  1517   WBC 7.92 13.18*  --  16.02* 12.08*   HEMOGLOBIN 12.9 13.9  --  15.5 15.1   HEMATOCRIT 41.1 41.6  --  46.1 44.8   PLATELETS 108* 105*  --  153 138*   NEUTROS ABS  --  11.08*  --  13.72* 9.68*   IMMATURE GRANS (ABS)  --  0.07*  --  0.07* 0.07*   LYMPHS ABS  --  0.96  --  1.22 1.18   MONOS ABS  --  1.04*  --  0.98* 0.94*   EOS ABS  --  0.01  --  0.01 0.18   .6* 97.9*  --  97.5* 97.0   CRP  --   --   --  0.89*  --    PROCALCITONIN 0.25 0.48*  --   --   --    LACTATE  --   --  2.9* 3.0*  --    PROTIME  --   --   --  24.2*  --    APTT  --   --   --  29.8  --          Lab 08/05/23  0426 08/04/23  0512 08/03/23  1853 08/03/23  1604 08/03/23  1517   SODIUM 133* 132* 134*  --  129*   POTASSIUM 4.3 4.5 5.7*  --  4.7   CHLORIDE 102 99 100  --  95*   CO2 20.0* 19.1* 18.3*  --  17.6*   ANION GAP 11.0 13.9 15.7*  --  16.4*   BUN 31* 60* 64*  --  64*   CREATININE 1.73* 3.36* 4.32*  --  4.56*   EGFR 32.5* 14.6* 10.8*  --  10.1*   GLUCOSE 114* 124* 99  --  114*   CALCIUM 8.7 9.0 8.5*  --  9.6   MAGNESIUM  --   --  2.1  --   --    PHOSPHORUS  --   --   --  5.8*  --    TSH  --   --   --   --  2.510         Lab  08/05/23  0426 08/04/23  0512 08/03/23  1853 08/03/23  1517   TOTAL PROTEIN 5.9* 6.9 6.9 7.7   ALBUMIN 3.1* 3.6 3.7 4.3   GLOBULIN 2.8 3.3 3.2 3.4   ALT (SGPT) 37* 42* 43* 49*   AST (SGOT) 45* 42* 47* 52*   BILIRUBIN 1.2 1.2 1.1 1.2   ALK PHOS 156* 183* 183* 200*         Lab 08/03/23  1853 08/03/23  1604 08/03/23  1517   PROBNP  --   --  5,346.0*   HSTROP T 28*  --   --    PROTIME  --  24.2*  --    INR  --  2.20*  --                  Lab 08/03/23  1645   PH, ARTERIAL 7.357   PCO2, ARTERIAL 30.9*   PO2 ART 84.1   O2 SATURATION ART 94.6*   FIO2 21   HCO3 ART 16.9*   BASE EXCESS ART -7.3*   CARBOXYHEMOGLOBIN 0.5     Brief Urine Lab Results  (Last result in the past 365 days)        Color   Clarity   Blood   Leuk Est   Nitrite   Protein   CREAT   Urine HCG        08/03/23 1801 Yellow   Clear   Moderate (2+)   Small (1+)   Negative   Negative                 Microbiology Results (last 10 days)       Procedure Component Value - Date/Time    Blood Culture - Blood, Arm, Left [[id number]]  (Normal) Collected: 08/03/23 1604    Lab Status: Preliminary result Specimen: Blood from Arm, Left Updated: 08/04/23 1645     Blood Culture No growth at 24 hours    Blood Culture - Blood, Arm, Left [[id number]]  (Normal) Collected: 08/03/23 1604    Lab Status: Preliminary result Specimen: Blood from Arm, Left Updated: 08/04/23 1645     Blood Culture No growth at 24 hours            CT Abdomen Pelvis Without Contrast    Result Date: 8/3/2023  Impression:  No definite acute findings are appreciated by nonenhanced CT examination of the abdomen and pelvis.  Please see above comments for further detail.     Please note that portions of this note were completed with a voice recognition program.  VEAG FERREIRA JR, MD       Electronically Signed and Approved By: VEGA FERREIRA JR, MD on 8/03/2023 at 21:13              CT Head Without Contrast    Result Date: 8/3/2023  Impression:   1. No acute intracranial abnormality.  Specifically, no evidence of  acute hemorrhage, mass effect or midline shift. 2. Global volume loss with mild periventricular white matter hypodensity, likely representing sequelae of chronic small vessel ischemic disease.     JAH NARVAEZ MD       Electronically Signed and Approved By: JAH NARVAEZ MD on 8/03/2023 at 17:21             XR Chest 1 View    Result Date: 8/3/2023  Impression:   No active pulmonary process.       TARIK BROOKS MD       Electronically Signed and Approved By: TARIK BROOKS MD on 8/03/2023 at 17:00             XR Chest 1 View    Result Date: 7/23/2023  Impression:   No evidence of acute cardiopulmonary abnormality or significant change.       MACK LARSEN MD       Electronically Signed and Approved By: MACK LARSEN MD on 7/23/2023 at 19:38                      Results for orders placed during the hospital encounter of 07/23/23    Adult Transthoracic Echo Complete W/ Cont if Necessary Per Protocol    Interpretation Summary    Left ventricular systolic function is moderately decreased. Calculated left ventricular EF = 39.3%    Left ventricular wall thickness is consistent with mild septal asymmetric hypertrophy.    Left ventricular diastolic function is consistent with (grade I) impaired relaxation and age.    The right ventricular cavity is mildly dilated.    Moderate mitral valve regurgitation is present.    Estimated right ventricular systolic pressure from tricuspid regurgitation is mildly elevated (35-45 mmHg).      Labs Pending at Discharge:  Pending Labs       Order Current Status    Calcium, Ionized Collected (08/03/23 1604)    Blood Culture - Blood, Arm, Left Preliminary result    Blood Culture - Blood, Arm, Left Preliminary result              Time spent on Discharge including face to face service: Greater than 35 minutes    Electronically signed by Eulalio Rashid MD, 08/05/23, 1:33 PM EDT.

## 2023-08-06 NOTE — PROGRESS NOTES
Enter Query Response Below      Query Response: Chronic systolic heart failure/HFrEF              If applicable, please update the problem list.     Patient: Erika Bowens        : 1960  Account: [id number]           Admit Date: 8/3/2023        How to Respond to this query:       a. Click New Note     b. Answer query within the yellow box.                c. Update the Problem List, if applicable.      If you have any questions about this query contact me at: [email or web address]     Dr. Rashid,    63 yr. old female presented to the emergency department for evaluation of generalized weakness over the past 3 days that is gotten progressively worse. Patient admitted with severe sepsis unspecified source and acute kidney injury. Patient has documented past medical history of congestive heart failure, hypertension, chronic kidney disease, CAD, hyperlipidemia, cardiomyopathy and colon cancer with metastasis to liver. Echo findings from 2023 on discharge summary showed EF 39%, LVH, grade I diastolic dysfunction, right ventricular cavity mildly dilated, moderate mitral valve regurgitation and RVSP mildly elevated. Patient's home medications included Eliquis, Aspirin, Plavix, Lasix, Cozaar, Toprol XL, Pravachol and Aldactone. The patient was treated with holding home diuretics and antihypertensives, 1854ml NS bolus in ER and changes to medication Lasix, Aldactone & Cozaar at discharge.    Please clarify the type of heart failure treated/monitored:    - Chronic systolic heart failure/HFrEF  - Chronic diastolic and systolic heart failure  - Other- specify_________  - Unable to determine    By submitting this query, we are merely seeking further clarification of documentation to accurately reflect all conditions that you are monitoring, evaluating, treating or that extend the hospitalization or utilize additional resources of care. Please utilize your independent clinical judgment when addressing the  question(s) above.     This query and your response, once completed, will be entered into the legal medical record.    Sincerely,  Deborah Perez RN  Clinical Documentation Integrity Program

## 2023-08-07 ENCOUNTER — TRANSITIONAL CARE MANAGEMENT TELEPHONE ENCOUNTER (OUTPATIENT)
Dept: CALL CENTER | Facility: HOSPITAL | Age: 63
End: 2023-08-07
Payer: MEDICARE

## 2023-08-07 ENCOUNTER — LAB (OUTPATIENT)
Dept: ONCOLOGY | Facility: HOSPITAL | Age: 63
End: 2023-08-07
Payer: MEDICARE

## 2023-08-07 ENCOUNTER — TELEPHONE (OUTPATIENT)
Dept: URGENT CARE | Facility: CLINIC | Age: 63
End: 2023-08-07
Payer: MEDICARE

## 2023-08-07 ENCOUNTER — OFFICE VISIT (OUTPATIENT)
Dept: ONCOLOGY | Facility: HOSPITAL | Age: 63
End: 2023-08-07
Payer: MEDICARE

## 2023-08-07 VITALS
OXYGEN SATURATION: 100 % | WEIGHT: 139.33 LBS | BODY MASS INDEX: 24.68 KG/M2 | DIASTOLIC BLOOD PRESSURE: 50 MMHG | SYSTOLIC BLOOD PRESSURE: 82 MMHG | HEART RATE: 62 BPM | TEMPERATURE: 98.1 F | RESPIRATION RATE: 16 BRPM

## 2023-08-07 DIAGNOSIS — C18.7 CANCER OF SIGMOID COLON: Primary | ICD-10-CM

## 2023-08-07 DIAGNOSIS — C18.7 CANCER OF SIGMOID COLON: ICD-10-CM

## 2023-08-07 DIAGNOSIS — I81 PORTAL VEIN THROMBOSIS: ICD-10-CM

## 2023-08-07 DIAGNOSIS — M79.671 RIGHT FOOT PAIN: ICD-10-CM

## 2023-08-07 LAB
ALBUMIN SERPL-MCNC: 3.9 G/DL (ref 3.5–5.2)
ALBUMIN/GLOB SERPL: 1.5 G/DL
ALP SERPL-CCNC: 151 U/L (ref 39–117)
ALT SERPL W P-5'-P-CCNC: 40 U/L (ref 1–33)
ANION GAP SERPL CALCULATED.3IONS-SCNC: 10.5 MMOL/L (ref 5–15)
AST SERPL-CCNC: 45 U/L (ref 1–32)
BILIRUB SERPL-MCNC: 0.7 MG/DL (ref 0–1.2)
BUN SERPL-MCNC: 22 MG/DL (ref 8–23)
BUN/CREAT SERPL: 13.6 (ref 7–25)
CALCIUM SPEC-SCNC: 8.6 MG/DL (ref 8.6–10.5)
CHLORIDE SERPL-SCNC: 103 MMOL/L (ref 98–107)
CO2 SERPL-SCNC: 23.5 MMOL/L (ref 22–29)
CREAT SERPL-MCNC: 1.62 MG/DL (ref 0.57–1)
EGFRCR SERPLBLD CKD-EPI 2021: 35.1 ML/MIN/1.73
GLOBULIN UR ELPH-MCNC: 2.6 GM/DL
GLUCOSE SERPL-MCNC: 139 MG/DL (ref 65–99)
POTASSIUM SERPL-SCNC: 3.8 MMOL/L (ref 3.5–5.2)
PROT SERPL-MCNC: 6.5 G/DL (ref 6–8.5)
SODIUM SERPL-SCNC: 137 MMOL/L (ref 136–145)
URATE SERPL-MCNC: 8.2 MG/DL (ref 2.4–5.7)

## 2023-08-07 PROCEDURE — 84550 ASSAY OF BLOOD/URIC ACID: CPT

## 2023-08-07 PROCEDURE — 36415 COLL VENOUS BLD VENIPUNCTURE: CPT

## 2023-08-07 PROCEDURE — 80053 COMPREHEN METABOLIC PANEL: CPT

## 2023-08-07 PROCEDURE — 82378 CARCINOEMBRYONIC ANTIGEN: CPT

## 2023-08-07 PROCEDURE — G0463 HOSPITAL OUTPT CLINIC VISIT: HCPCS | Performed by: INTERNAL MEDICINE

## 2023-08-07 NOTE — PROGRESS NOTES
Chief Complaint  Colon Cancer    Nilam Willis MD Goodale, Dianne L, MD Subjective          Erika Bowens presents to [facility] for ongoing treatment of her metastatic colon cancer.  She is on single agent capecitabine.  She states the first cycle went well.  She was hospitalized for blood pressure and cardiac issues since her last visit.  She states that these are doing better although her blood pressure remains on the low side-she has a cardiology appointment tomorrow.  She reports good appetite and she is eating and drinking well.  Sizing masses or adenopathy.  She is on anticoagulation for portal vein thrombosis.  She denies excessive bruising or bleeding.    Oncology/Hematology History Overview Note   3/25/2019  Sigmoid colon--Moderately differentiated adenocarcinoma. 9/21/2020 Liver biopsy revealed metastatic colonic adenocarcinoma            Clinical Staging      Stage II ([id number]), recurrent            Treatments      Surgeries       4/19.Liver Ablation at [place] by Dr. Prieto        1/23/23 Omniseq testing sent.     Cancer of sigmoid colon   5/20/2019 Initial Diagnosis    Colon cancer (HCC)     7/17/2023 -  Chemotherapy    OP COLORECTAL Capecitabine (Dose Selection Required) BID (8 cycles)           Review of Systems   Constitutional:  Positive for fatigue. Negative for appetite change, diaphoresis, fever, unexpected weight gain and unexpected weight loss.   HENT:  Negative for hearing loss, mouth sores, sore throat, swollen glands, trouble swallowing and voice change.    Eyes:  Negative for blurred vision.   Respiratory:  Negative for cough, shortness of breath and wheezing.    Cardiovascular:  Negative for chest pain and palpitations.   Gastrointestinal:  Negative for abdominal pain, blood in stool, constipation, diarrhea, nausea and vomiting.   Endocrine: Negative for cold intolerance and heat intolerance.   Genitourinary:  Negative for difficulty  urinating, dysuria, frequency, hematuria and urinary incontinence.   Musculoskeletal:  Negative for arthralgias, back pain and myalgias.   Skin:  Negative for rash, skin lesions and wound.   Neurological:  Negative for dizziness, seizures, weakness, numbness and headache.   Hematological:  Bruises/bleeds easily.   Psychiatric/Behavioral:  Negative for depressed mood. The patient is not nervous/anxious.    Current Outpatient Medications on File Prior to Visit   Medication Sig Dispense Refill    acetaminophen (TYLENOL) 500 MG tablet Take 1-2 tablets by mouth Every 4 (Four) Hours As Needed for Mild Pain.      apixaban (ELIQUIS) 5 MG tablet tablet Take 1 tablet by mouth 2 (Two) Times a Day. 180 tablet 1    capecitabine (XELODA) 500 MG chemo tablet Take 3 tablets by mouth 2 (Two) Times a Day. Take 3 tablets by mouth in the AM and 3 tablets in the PM on days 1-14, then off 7 days, on a 21 day cycle 84 tablet 11    cephalexin (KEFLEX) 500 MG capsule Take 1 capsule by mouth 3 (Three) Times a Day for 7 days. 21 capsule 0    clonazePAM (KlonoPIN) 1 MG tablet Take 1 tablet by mouth 2 (Two) Times a Day. 180 tablet 1    clopidogrel (PLAVIX) 75 MG tablet Take 1 tablet by mouth Daily. 90 tablet 3    cyanocobalamin (VITAMIN B-12) 500 MCG tablet Take 1 tablet by mouth Daily.      cyclobenzaprine (FLEXERIL) 10 MG tablet Take 1 tablet by mouth 3 (Three) Times a Day. 270 tablet 1    FLUoxetine (PROzac) 20 MG capsule Take 3 capsules by mouth Daily. 270 capsule 1    furosemide (LASIX) 40 MG tablet Take 1 tablet by mouth 2 (Two) Times a Day As Needed (lower extremity swelling or increase in weight of 2lbs or more from current weight. Take with spironolactone.). 180 tablet 3    isosorbide mononitrate (IMDUR) 30 MG 24 hr tablet Take 1 tablet by mouth Daily.      losartan (COZAAR) 50 MG tablet Take 0.5 tablets by mouth Daily for 30 days. 15 tablet 0    metoprolol succinate XL (TOPROL-XL) 25 MG 24 hr tablet Take 1 tablet by mouth Daily. 90  tablet 3    nitroglycerin (NITROSTAT) 0.4 MG SL tablet Place 1 tablet under the tongue As Needed for Chest Pain.  - IF PAIN REMAINS AFTER 5 MIN CALL 911 AND REPEAT DOSE MAX 3 TABS IN 15 MINUTES 25 tablet 3    ondansetron (ZOFRAN) 8 MG tablet Take 1 tablet by mouth 3 (Three) Times a Day As Needed for Nausea or Vomiting. 30 tablet 5    pravastatin (PRAVACHOL) 80 MG tablet Take 1 tablet by mouth Every Night. 90 tablet 3    predniSONE (DELTASONE) 20 MG tablet Take 2 tablets by mouth Daily for 5 days. 10 tablet 0    spironolactone (ALDACTONE) 25 MG tablet Take 1 tablet by mouth 2 (Two) Times a Day As Needed (lower extremity swelling or increase in weight of 2lbs or more from current weight. Take with furosemide.). 180 tablet 3    tiZANidine (ZANAFLEX) 4 MG tablet Take 1 tablet by mouth Every 8 (Eight) Hours As Needed for Muscle Spasms. 270 tablet 1    vitamin D (ERGOCALCIFEROL) 1.25 MG (60194 UT) capsule capsule Take 1 capsule by mouth 1 (One) Time Per Week. 9 capsule 1     No current facility-administered medications on file prior to visit.       Allergies   Allergen Reactions    Bupropion Other (See Comments)     Caused falls     Past Medical History:   Diagnosis Date    Abdominal pain     CHF (congestive heart failure)     Colon cancer     Coronary arteriosclerosis     Depressive disorder     with anxiety    Encounter for routine adult health examination     Essential hypertension     Generalized anxiety disorder     GERD (gastroesophageal reflux disease)     Hip pain     Hyperlipidemia     Kidney stone     Osteoarthritis     Radial styloid tenosynovitis of left hand     Urinary tract infectious disease     Vitamin D deficiency      Past Surgical History:   Procedure Laterality Date    CARDIAC CATHETERIZATION  04/15/2016    Enlarged left ventricle with reduced contractility.Severe coronary artery disease as described above. [facility].    COLONOSCOPY  2019    COLON CANCER DOMINGO.    COLONOSCOPY N/A 02/14/2022     Procedure: COLONOSCOPY WITH POLYPECTOMY;  Surgeon: Coy Ordoñez MD;  Location: [facility] ENDOSCOPY;  Service: Gastroenterology;  Laterality: N/A;  COLON POLYP, ANASTAMOSIS IN SIGMOID    CORONARY ANGIOPLASTY WITH STENT PLACEMENT  05/06/2012    PTCA implantation in the vein graft of the OM branch. Done at [facility] in [place], [place].    CORONARY ARTERY BYPASS GRAFT  03/18/2003    Emergent CABG x 5 CAD    DE QUERVAIN'S RELEASE Right 11/30/2009    Release of De Quervain's to the right thumb    DEQUERVAIN RELEASE Left 11/18/2015    Release of de Quervain's to the left wrist.    LIVER SURGERY      PACEMAKER IMPLANTATION      DEFIBRILLATOR    PAP SMEAR  06/28/2012    normal    PROCEDURE GENERIC CONVERTED  11/22/2015    MOST RECENT DIASTOLIC BP < 90 mmHg     PROCEDURE GENERIC CONVERTED  11/22/2015    MOST RECENT SYSTOLIC BP > or = 140 mmHg     PROCEDURE GENERIC CONVERTED  11/22/2015    TOBACCO NON-USER     REPLACEMENT TOTAL HIP LATERAL POSITION      SUBTOTAL COLECTOMY N/A 04/23/2019    sigmoid for colon cancer    TOTAL KNEE ARTHROPLASTY       Social History     Socioeconomic History    Marital status: Single   Tobacco Use    Smoking status: Never    Smokeless tobacco: Never   Vaping Use    Vaping Use: Never used   Substance and Sexual Activity    Alcohol use: No    Drug use: Never    Sexual activity: Never     Family History   Problem Relation Age of Onset    Liver disease Mother     Hyperlipidemia Father     Heart disease Father     Breast cancer Sister     Diabetes Maternal Grandmother     Arthritis Maternal Grandmother     Heart disease Paternal Grandmother     Heart disease Paternal Grandfather     Diabetes Other     Heart disease Other     Hypertension Other     Stroke Other     Colon cancer Other     Coronary artery disease Other     Other Other         Heart surgery       Objective   Physical Exam  Vitals reviewed. Exam conducted with a chaperone present.   Constitutional:       General: She  is not in acute distress.     Appearance: Normal appearance.   Cardiovascular:      Rate and Rhythm: Normal rate and regular rhythm.      Heart sounds: Normal heart sounds. No murmur heard.    No gallop.   Pulmonary:      Effort: Pulmonary effort is normal.      Breath sounds: Normal breath sounds.   Abdominal:      General: Abdomen is flat. Bowel sounds are normal.      Palpations: Abdomen is soft.   Musculoskeletal:      Cervical back: Neck supple.      Right lower leg: No edema.      Left lower leg: No edema.   Lymphadenopathy:      Cervical: No cervical adenopathy.   Neurological:      Mental Status: She is alert and oriented to person, place, and time.   Psychiatric:         Mood and Affect: Mood normal.         Behavior: Behavior normal.       Vitals:    08/07/23 1344   BP: (!) 82/50  Comment: dr. boyce notified   Pulse: 62   Resp: 16   Temp: 98.1 øF (36.7 øC)   TempSrc: Temporal   SpO2: 100%   Weight: 63.2 kg (139 lb 5.3 oz)   PainSc: 0-No pain     ECOG score: 2         PHQ-9 Total Score:                    Result Review :   The following data was reviewed by: Luis Boyce MD on 08/07/2023:  Lab Results   Component Value Date    HGB 12.9 08/05/2023    HCT 41.1 08/05/2023    .6 (H) 08/05/2023     (L) 08/05/2023    WBC 7.92 08/05/2023    NEUTROABS 11.08 (H) 08/04/2023    LYMPHSABS 0.96 08/04/2023    MONOSABS 1.04 (H) 08/04/2023    EOSABS 0.01 08/04/2023    BASOSABS 0.02 08/04/2023     Lab Results   Component Value Date    GLUCOSE 139 (H) 08/07/2023    BUN 22 08/07/2023    CREATININE 1.62 (H) 08/07/2023     08/07/2023    K 3.8 08/07/2023     08/07/2023    CO2 23.5 08/07/2023    CALCIUM 8.6 08/07/2023    PROTEINTOT 6.5 08/07/2023    ALBUMIN 3.9 08/07/2023    BILITOT 0.7 08/07/2023    ALKPHOS 151 (H) 08/07/2023    AST 45 (H) 08/07/2023    ALT 40 (H) 08/07/2023     Lab Results   Component Value Date    MG 2.1 08/03/2023    PHOS 5.8 (H) 08/03/2023    TSH 2.510 08/03/2023     No  results found for: IRON, LABIRON, TRANSFERRIN, TIBC  Lab Results   Component Value Date    FERRITIN 247.0 01/05/2014    [id number] 1,526 (H) 11/21/2022     Lab Results   Component Value Date    CEA 1.90 07/12/2023             Assessment and Plan    Diagnoses and all orders for this visit:    1. Cancer of sigmoid colon (Primary)  Assessment & Plan:  Metastatic.  Patient is on single agent capecitabine.  She is due for cycle 2.  Lab work is notable for elevated creatinine of 1.6 but she already has dose reduction in place for renal function.  Blood counts are adequate for therapy.  Proceed with cycle 2 as planned.  RTC 3 weeks for OV, cycle 3 with lab work prior to monitor for toxicities and restaging scans to assess response to therapy.      2. Portal vein thrombosis  Assessment & Plan:  Patient is on Eliquis.  Continue indefinitely given her underlying malignancy.              Patient Follow Up: Cycle 3-day 1    Patient was given instructions and counseling regarding her condition or for health maintenance advice. Please see specific information pulled into the AVS if appropriate.     Luis Boyce MD    8/7/2023

## 2023-08-07 NOTE — ASSESSMENT & PLAN NOTE
Metastatic.  Patient is on single agent capecitabine.  She is due for cycle 2.  Lab work is notable for elevated creatinine of 1.6 but she already has dose reduction in place for renal function.  Blood counts are adequate for therapy.  Proceed with cycle 2 as planned.  RTC 3 weeks for OV, cycle 3 with lab work prior to monitor for toxicities and restaging scans to assess response to therapy.

## 2023-08-07 NOTE — TELEPHONE ENCOUNTER
----- Message from LUCILA Leonard sent at 8/7/2023  3:09 PM EDT -----  Please notify patient of elevated uric acid result, consistent with gout.  Continue current plan of care and follow-up with PCP as needed or if symptoms worsen or persist.

## 2023-08-08 LAB
BACTERIA SPEC AEROBE CULT: NORMAL
BACTERIA SPEC AEROBE CULT: NORMAL
CEA SERPL-MCNC: 3.1 NG/ML

## 2023-08-10 ENCOUNTER — SPECIALTY PHARMACY (OUTPATIENT)
Dept: PHARMACY | Facility: HOSPITAL | Age: 63
End: 2023-08-10
Payer: MEDICARE

## 2023-08-14 ENCOUNTER — TRANSCRIBE ORDERS (OUTPATIENT)
Dept: CARDIOLOGY | Facility: HOSPITAL | Age: 63
End: 2023-08-14
Payer: MEDICARE

## 2023-08-14 ENCOUNTER — LAB (OUTPATIENT)
Dept: LAB | Facility: HOSPITAL | Age: 63
End: 2023-08-14
Payer: MEDICARE

## 2023-08-14 DIAGNOSIS — R06.02 SHORTNESS OF BREATH: ICD-10-CM

## 2023-08-14 DIAGNOSIS — R07.9 CHEST PAIN, UNSPECIFIED TYPE: ICD-10-CM

## 2023-08-14 DIAGNOSIS — R06.02 SHORTNESS OF BREATH: Primary | ICD-10-CM

## 2023-08-14 LAB
ALBUMIN SERPL-MCNC: 3.4 G/DL (ref 3.5–5.2)
ALBUMIN/GLOB SERPL: 1.4 G/DL
ALP SERPL-CCNC: 124 U/L (ref 39–117)
ALT SERPL W P-5'-P-CCNC: 47 U/L (ref 1–33)
ANION GAP SERPL CALCULATED.3IONS-SCNC: 9 MMOL/L (ref 5–15)
AST SERPL-CCNC: 40 U/L (ref 1–32)
BILIRUB SERPL-MCNC: 0.9 MG/DL (ref 0–1.2)
BUN SERPL-MCNC: 40 MG/DL (ref 8–23)
BUN/CREAT SERPL: 19.3 (ref 7–25)
CALCIUM SPEC-SCNC: 8.9 MG/DL (ref 8.6–10.5)
CHLORIDE SERPL-SCNC: 97 MMOL/L (ref 98–107)
CO2 SERPL-SCNC: 29 MMOL/L (ref 22–29)
CREAT SERPL-MCNC: 2.07 MG/DL (ref 0.57–1)
EGFRCR SERPLBLD CKD-EPI 2021: 26.2 ML/MIN/1.73
GLOBULIN UR ELPH-MCNC: 2.4 GM/DL
GLUCOSE SERPL-MCNC: 119 MG/DL (ref 65–99)
NT-PROBNP SERPL-MCNC: 1022 PG/ML (ref 0–900)
POTASSIUM SERPL-SCNC: 4.1 MMOL/L (ref 3.5–5.2)
PROT SERPL-MCNC: 5.8 G/DL (ref 6–8.5)
SODIUM SERPL-SCNC: 135 MMOL/L (ref 136–145)

## 2023-08-14 PROCEDURE — 83880 ASSAY OF NATRIURETIC PEPTIDE: CPT

## 2023-08-14 PROCEDURE — 80053 COMPREHEN METABOLIC PANEL: CPT

## 2023-08-14 PROCEDURE — 36415 COLL VENOUS BLD VENIPUNCTURE: CPT

## 2023-08-16 ENCOUNTER — DOCUMENTATION (OUTPATIENT)
Dept: PHARMACY | Facility: HOSPITAL | Age: 63
End: 2023-08-16
Payer: MEDICARE

## 2023-08-16 ENCOUNTER — TELEPHONE (OUTPATIENT)
Dept: PHARMACY | Facility: HOSPITAL | Age: 63
End: 2023-08-16
Payer: MEDICARE

## 2023-08-16 ENCOUNTER — OFFICE VISIT (OUTPATIENT)
Dept: ORTHOPEDIC SURGERY | Facility: CLINIC | Age: 63
End: 2023-08-16
Payer: MEDICARE

## 2023-08-16 ENCOUNTER — SPECIALTY PHARMACY (OUTPATIENT)
Dept: PHARMACY | Facility: HOSPITAL | Age: 63
End: 2023-08-16
Payer: MEDICARE

## 2023-08-16 VITALS
WEIGHT: 139 LBS | HEART RATE: 50 BPM | BODY MASS INDEX: 24.63 KG/M2 | SYSTOLIC BLOOD PRESSURE: 64 MMHG | DIASTOLIC BLOOD PRESSURE: 45 MMHG | HEIGHT: 63 IN | OXYGEN SATURATION: 96 %

## 2023-08-16 DIAGNOSIS — M17.11 PRIMARY OSTEOARTHRITIS OF RIGHT KNEE: Primary | ICD-10-CM

## 2023-08-16 DIAGNOSIS — I81 PORTAL VEIN THROMBOSIS: ICD-10-CM

## 2023-08-16 DIAGNOSIS — K12.30 MUCOSITIS: Primary | ICD-10-CM

## 2023-08-16 DIAGNOSIS — C18.7 CANCER OF SIGMOID COLON: ICD-10-CM

## 2023-08-16 RX ORDER — DIPHENHYDRAMINE HYDROCHLORIDE AND LIDOCAINE HYDROCHLORIDE AND ALUMINUM HYDROXIDE AND MAGNESIUM HYDRO
10 KIT EVERY 6 HOURS
Qty: 237 ML | Refills: 1 | Status: SHIPPED | OUTPATIENT
Start: 2023-08-16

## 2023-08-16 RX ORDER — DIPHENHYDRAMINE HYDROCHLORIDE AND LIDOCAINE HYDROCHLORIDE AND ALUMINUM HYDROXIDE AND MAGNESIUM HYDRO
10 KIT EVERY 6 HOURS
Qty: 237 ML | Refills: 1 | Status: SHIPPED | OUTPATIENT
Start: 2023-08-16 | End: 2023-08-16 | Stop reason: SDUPTHER

## 2023-08-16 RX ORDER — CAPECITABINE 500 MG/1
1000 TABLET, FILM COATED ORAL 2 TIMES DAILY
Qty: 56 TABLET | Refills: 5 | Status: SHIPPED | OUTPATIENT
Start: 2023-08-16

## 2023-08-16 NOTE — TELEPHONE ENCOUNTER
Spoke with patient's sister to inform them that an rx for magic mouthwash had been sent in to the pharmacy for her sore lips and mouth. Also discussed weakness and fatigue issues with her. States that they had seen the cardiologist and were working on adjusting her blood pressure meds. Informed her that Dr Boyce would be decreasing her dose of xeloda for the next cycle beginning on 8/28/23. The sister v/u of issues discussed.

## 2023-08-16 NOTE — PROGRESS NOTES
Oral Chemotherapy - Double Check    Drug: capecitabine  Strength: 500mg tablet  Directions: Take 2 tablets PO BID on Days 1-14 then off 7 days on a 21-day cycle  QTY: 56  RF:5    Released to pharmacy: [facility] [place]    Completed independent double check on medication order/RX.    Leanna Edwards PharmD, [facility]  Clinical Oncology Pharmacy Specialist  Phone: [phone]      8/16/2023  15:32 EDT

## 2023-08-16 NOTE — PROGRESS NOTES
"Chief Complaint  Follow-up of the Right Knee     Subjective      Erika Bowens presents to [facility] for follow up of the right knee. She has had osteo arthritis for years. She has had difficulty with prolonged standing, ambulation and stairs.  She is here for a right knee Synvisc Injection.     Allergies   Allergen Reactions    Bupropion Other (See Comments)     Caused falls        Social History     Socioeconomic History    Marital status: Single   Tobacco Use    Smoking status: Never    Smokeless tobacco: Never   Vaping Use    Vaping Use: Never used   Substance and Sexual Activity    Alcohol use: No    Drug use: Never    Sexual activity: Never        I reviewed the patient's chief complaint, history of present illness, review of systems, past medical history, surgical history, family history, social history, medications, and allergy list.     Review of Systems     Constitutional: Denies fevers, chills, weight loss  Cardiovascular: Denies chest pain, shortness of breath  Skin: Denies rashes, acute skin changes  Neurologic: Denies headache, loss of consciousness        Vital Signs:   BP (!) 64/45 (BP Location: Left arm, Patient Position: Sitting, Cuff Size: Adult)   Pulse 50   Ht 160 cm (63\")   Wt 63 kg (139 lb)   SpO2 96%   BMI 24.62 kg/mý          Physical Exam  General: Alert. No acute distress    Ortho Exam        RIGHT KNEE Flexion 120. Extension 0. Stable to varus/valgus stress. Stable to anterior/posterior drawer. Neurovascularly intact. Negative Ryan. Negative Lachman. Positive EHL, FHL, HS and TA. Sensation intact to light touch all 5 nerves of the foot. Ambulates with Antalgic gait. Patella is well tracking. Calf supple, non-tender. Positive tenderness to the medial joint line. Positive tenderness to the lateral joint line. Positive Crepitus. Good strength to hamstrings, quadriceps, dorsiflexors, and plantar flexors.  Knee Extensor Mechanism intact      Large " Joint Arthrocentesis: R knee  Date/Time: 8/16/2023 1:57 PM  Consent given by: patient  Site marked: site marked  Timeout: Immediately prior to procedure a time out was called to verify the correct patient, procedure, equipment, support staff and site/side marked as required   Supporting Documentation  Indications: pain   Procedure Details  Location: knee - R knee  Needle size: 22 G  Medications administered: 48 mg hylan 48 MG/6ML  Patient tolerance: patient tolerated the procedure well with no immediate complications          Imaging Results (Most Recent)       None             Result Review :         Adult Transthoracic Echo Complete W/ Cont if Necessary Per Protocol    Result Date: 7/25/2023  Narrative:   Left ventricular systolic function is moderately decreased. Calculated left ventricular EF = 39.3%   Left ventricular wall thickness is consistent with mild septal asymmetric hypertrophy.   Left ventricular diastolic function is consistent with (grade I) impaired relaxation and age.   The right ventricular cavity is mildly dilated.   Moderate mitral valve regurgitation is present.   Estimated right ventricular systolic pressure from tricuspid regurgitation is mildly elevated (35-45 mmHg).     CT Abdomen Pelvis Without Contrast    Result Date: 8/3/2023  Narrative: PROCEDURE: CT ABDOMEN PELVIS WO CONTRAST  COMPARISONS: 1/16/2023; 11/21/2022; 5/23/2022; 11/23/2021.  INDICATIONS: 63-year-old female w/ possible sepsis; h/o colorectal carcinoma.  TECHNIQUE: 716 CT images were created without intravenous contrast.  No oral contrast agent was administered for the study.  PROTOCOL:   Standard CT imaging protocol performed.    RADIATION:   Total DLP: 402.9 mGy*cm.   Automated exposure control was utilized to minimize radiation dose.  FINDINGS: Atherosclerotic changes are seen.  No aneurysmal dilatation of the aortoiliac arterial system is identified.  No acute intraperitoneal or retroperitoneal hemorrhage.  Native coronary  artery calcifications are suggested.  The patient has undergone median sternotomy and suspected CABG surgery.  The liver has a nodular contour, which may represent cirrhosis or treatment effect.  The previously seen hepatic metastases are not as well appreciated by nonenhanced CT.  No splenomegaly is suspected.  There is chronic calcified granulomatous disease of the chest and the abdomen.  There is a partially visualized cardiac implantable electronic device (CIED) in place, seen previously.  There is possible borderline cardiac enlargement.  A small hiatal hernia is present.  The gallbladder is distended without definite gallstones.  No acute cholecystitis is suggested.  Pancreatic lipomatosis is seen.  No acute pancreatitis is suspected.  There are surgical clips in the right upper quadrant, new since prior exams such as the 1/16/2023 study.  Please correlate with the postoperative history.  It appears there has been partial resection of the distal colon with an anastomotic suture line visible distally, as before.  No renal stones or hydronephrosis or obstructive uropathy due to ureterolithiasis.  No urinary bladder calculi.  There is a left hip prosthesis in place with associated streak artifact, obscuring detail.  Degenerative changes are seen throughout the imaged spine.  There is a stable chronic T12 vertebral compression fracture, estimated at 30-50 percent in degree with minimal if any posterior wall retropulsion.  There may be mild degenerative changes of the right hip joint.  Minimal degenerative changes of the sacroiliac (SI) joints are possible.  There may be minimal pubic osteitis.  No acute fracture or aggressive osseous lesion is seen.  The partially imaged lung bases reveal minimal linear atelectasis and/or fibrosis.  There is age-indeterminate peribronchial thickening.  No definite focal lobar infiltrate is seen.  Probably no suspicious pulmonary nodule is appreciated.  There are small noncalcified  subpleural pulmonary nodules, seen previously, such as seen in the left lower lobe on image 27 of series 204 (with 2 nodules present, measuring 6 mm or less) and seen on image 22 of series 204 in the right lower lobe.  It was probably present previously, as well.  There is nonspecific bowel wall fat deposition, especially in the right colon and distal ileum.  This finding is probably within the submucosal and is nonspecific.  It may be a normal variant.  Inflammatory bowel disease or changes related to treatment effect cannot be excluded.  It is thought to be chronic and not acute.  No definite acute colitis or diverticulitis.  The appendix is not clearly identified.  It may be surgically absent.  No mechanical bowel obstruction.  No pneumoperitoneum or pneumatosis.      Impression:  No definite acute findings are appreciated by nonenhanced CT examination of the abdomen and pelvis.  Please see above comments for further detail.     Please note that portions of this note were completed with a voice recognition program.  VEGA FERREIRA JR, MD       Electronically Signed and Approved By: VEGA FERREIRA JR, MD on 8/03/2023 at 21:13              XR Hand 2 View Left    Result Date: 7/20/2023  Narrative: X-Ray Report: Left hand  X-Ray Indication: Evaluation of the left hand AP/Lateral view(s) Findings: CMC joint arthritis. Prior studies available for comparison: no     XR Knee 3 View Right    Result Date: 7/20/2023  Narrative: X-Ray Report: Right knee X-Ray Indication: Evaluation of the right knee AP/Lateral and [place] view(s) Findings: Advanced degenerative arthritis. Prior studies available for comparison: yes      CT Head Without Contrast    Result Date: 8/3/2023  Narrative: PROCEDURE: CT HEAD WO CONTRAST  COMPARISON:  None  INDICATIONS: Headache, nausea, fall  PROTOCOL:   Standard imaging protocol performed    RADIATION:   DLP: 1018.2  mGy*cm   MA and/or KV was adjusted to minimize radiation dose.     TECHNIQUE: After  obtaining the patient's consent, CT images were obtained without non-ionic intravenous contrast material.  FINDINGS:  The ventricles and sulci are proportional prominent compatible with global cerebral volume loss.  There is no mass effect or midline shift.  There is no acute intracranial hemorrhage.  There is no abnormal extra-axial fluid collection.  The gray-white matter differentiation is preserved.  There is mild periventricular white matter hypodensity likely representing sequelae of chronic small vessel ischemic disease.  The calvarium is intact.  The mastoid air cells and middle ears are well aerated.  The paranasal sinuses appear clear.  Visualized orbits and globes appear symmetric.      Impression:   1. No acute intracranial abnormality.  Specifically, no evidence of acute hemorrhage, mass effect or midline shift. 2. Global volume loss with mild periventricular white matter hypodensity, likely representing sequelae of chronic small vessel ischemic disease.     JAH NARVAEZ MD       Electronically Signed and Approved By: JAH NARVAEZ MD on 8/03/2023 at 17:21             XR Chest 1 View    Result Date: 8/3/2023  Narrative: PROCEDURE: XR CHEST 1 VW  COMPARISON: [facility], CR, XR CHEST 1 VW, 7/23/2023, 19:16.  INDICATIONS: WEAKNESS TODAY  FINDINGS:  Prior median sternotomy and CABG.  Unremarkable appearing AICD.  Heart size unchanged.  No overt edema, focal consolidation, large effusion or pneumothorax.  Borderline low lung volumes.  Osseous structures unremarkable.      Impression:   No active pulmonary process.       TARIK BROOKS MD       Electronically Signed and Approved By: TARIK BROOKS MD on 8/03/2023 at 17:00             XR Chest 1 View    Result Date: 7/23/2023  Narrative: PROCEDURE: XR CHEST 1 VW  COMPARISON: [facility], PET, NM PET/CT SKULL BASE TO MID THIGH, 1/16/2023, 14:57.  [facility], CR, XR CHEST 1 VW, 12/21/2022, 23:24.   INDICATIONS: Chest Pain Triage Protocol  FINDINGS:  Cardiomediastinal contours appear stable, including postoperative changes and pacemaker/ICD leads.  Lungs are clear.  No pneumothorax or large pleural effusion is seen.      Impression:   No evidence of acute cardiopulmonary abnormality or significant change.       MACK LARSEN MD       Electronically Signed and Approved By: MACK LARSEN MD on 7/23/2023 at 19:38                     Assessment and Plan     Diagnoses and all orders for this visit:    1. Primary osteoarthritis of right knee (Primary)  -     Large Joint Arthrocentesis: R knee        Discussed the treatment plan with the patient.     Discussed the risks and benefits of conservative measures.  The patient expressed understanding and wished to proceed with a right knee Synvisc injection.  She tolerated the injection well.     Call or return if worsening symptoms.    Follow Up     PRN      Patient was given instructions and counseling regarding her condition or for health maintenance advice. Please see specific information pulled into the AVS if appropriate.     Scribed for Zak Beckford MD by Shreya Girard MA.  08/16/23   13:34 EDT      I have personally performed the services described in this document as scribed by the above individual and it is both accurate and complete. Zak Beckford MD 08/17/23

## 2023-08-16 NOTE — PROGRESS NOTES
Re: Refills of Oral Specialty Medication - Xeloda (capecitabine)    Drug-Drug Interactions: The current medication list was reviewed and there are no relevant drug-drug interactions.  Medication Allergies: The patient has no relevant allergies as it relates to their oral specialty medication  Review of Labs/Dose Adjustments: DOSE CHANGE - I reviewed the most recent note and labs. Due to fatigue, weakness, and mucositis the dose is being decreased. I sent refills as described below.    Drug: Xtandi (enzalutamide)  Strength: 500 mg  Directions: Take 2 tablets by mouth twice a day ON days 1-14, OFF for 7 days of each 21 day cycle  Quantity: 56  Refills: 5  Pharmacy prescription sent to: [facility]      David AtkinsonD, [facility]  Oncology Clinical Pharmacist  8/16/2023  15:13 EDT

## 2023-08-16 NOTE — TELEPHONE ENCOUNTER
Patient's sister called and reported that the patient has significant fatigue/weakness, has had multiple falls when she tries to walk, (pt has been crawling to get to the bathroom). Sister also states the patient is unable to eat because her lips are so sore (currently using Biotene and takes acetaminophen regularly for pain). Denied soreness inside her mouth. Patient's sister does not think she is going to be able to take much more of the capecitabine. She also would like something called in today for the patient's painful lips.

## 2023-08-17 ENCOUNTER — HOSPITAL ENCOUNTER (EMERGENCY)
Facility: HOSPITAL | Age: 63
Discharge: HOME | End: 2023-08-17
Attending: EMERGENCY MEDICINE
Payer: MEDICARE

## 2023-08-17 ENCOUNTER — APPOINTMENT (OUTPATIENT)
Dept: GENERAL RADIOLOGY | Facility: HOSPITAL | Age: 63
End: 2023-08-17
Payer: MEDICARE

## 2023-08-17 ENCOUNTER — READMISSION MANAGEMENT (OUTPATIENT)
Dept: CALL CENTER | Facility: HOSPITAL | Age: 63
End: 2023-08-17
Payer: MEDICARE

## 2023-08-17 VITALS
HEIGHT: 63 IN | TEMPERATURE: 98.6 F | SYSTOLIC BLOOD PRESSURE: 105 MMHG | WEIGHT: 138.01 LBS | HEART RATE: 54 BPM | BODY MASS INDEX: 24.45 KG/M2 | RESPIRATION RATE: 20 BRPM | OXYGEN SATURATION: 98 % | DIASTOLIC BLOOD PRESSURE: 56 MMHG

## 2023-08-17 DIAGNOSIS — U07.1 COVID-19: Primary | ICD-10-CM

## 2023-08-17 LAB
ALBUMIN SERPL-MCNC: 3.3 G/DL (ref 3.5–5.2)
ALBUMIN/GLOB SERPL: 1.3 G/DL
ALP SERPL-CCNC: 108 U/L (ref 39–117)
ALT SERPL W P-5'-P-CCNC: 28 U/L (ref 1–33)
ANION GAP SERPL CALCULATED.3IONS-SCNC: 9.8 MMOL/L (ref 5–15)
AST SERPL-CCNC: 25 U/L (ref 1–32)
BASOPHILS # BLD AUTO: 0 10*3/MM3 (ref 0–0.2)
BASOPHILS NFR BLD AUTO: 0 % (ref 0–1.5)
BILIRUB SERPL-MCNC: 1.4 MG/DL (ref 0–1.2)
BUN SERPL-MCNC: 28 MG/DL (ref 8–23)
BUN/CREAT SERPL: 17.5 (ref 7–25)
CALCIUM SPEC-SCNC: 8.6 MG/DL (ref 8.6–10.5)
CHLORIDE SERPL-SCNC: 98 MMOL/L (ref 98–107)
CO2 SERPL-SCNC: 25.2 MMOL/L (ref 22–29)
CREAT SERPL-MCNC: 1.6 MG/DL (ref 0.57–1)
DEPRECATED RDW RBC AUTO: 45.4 FL (ref 37–54)
EGFRCR SERPLBLD CKD-EPI 2021: 35.6 ML/MIN/1.73
EOSINOPHIL # BLD AUTO: 0.05 10*3/MM3 (ref 0–0.4)
EOSINOPHIL NFR BLD AUTO: 1 % (ref 0.3–6.2)
ERYTHROCYTE [DISTWIDTH] IN BLOOD BY AUTOMATED COUNT: 16.4 % (ref 12.3–15.4)
FLUAV AG NPH QL: NEGATIVE
FLUBV AG NPH QL IA: NEGATIVE
GLOBULIN UR ELPH-MCNC: 2.6 GM/DL
GLUCOSE SERPL-MCNC: 138 MG/DL (ref 65–99)
HCT VFR BLD AUTO: 35.8 % (ref 34–46.6)
HGB BLD-MCNC: 12.3 G/DL (ref 12–15.9)
IMM GRANULOCYTES # BLD AUTO: 0.04 10*3/MM3 (ref 0–0.05)
IMM GRANULOCYTES NFR BLD AUTO: 0.8 % (ref 0–0.5)
LIPASE SERPL-CCNC: 40 U/L (ref 13–60)
LYMPHOCYTES # BLD AUTO: 0.42 10*3/MM3 (ref 0.7–3.1)
LYMPHOCYTES NFR BLD AUTO: 8.6 % (ref 19.6–45.3)
MCH RBC QN AUTO: 33.6 PG (ref 26.6–33)
MCHC RBC AUTO-ENTMCNC: 34.4 G/DL (ref 31.5–35.7)
MCV RBC AUTO: 97.8 FL (ref 79–97)
MONOCYTES # BLD AUTO: 0.31 10*3/MM3 (ref 0.1–0.9)
MONOCYTES NFR BLD AUTO: 6.4 % (ref 5–12)
NEUTROPHILS NFR BLD AUTO: 4.05 10*3/MM3 (ref 1.7–7)
NEUTROPHILS NFR BLD AUTO: 83.2 % (ref 42.7–76)
NRBC BLD AUTO-RTO: 0 /100 WBC (ref 0–0.2)
NT-PROBNP SERPL-MCNC: 939 PG/ML (ref 0–900)
PLATELET # BLD AUTO: 91 10*3/MM3 (ref 140–450)
PMV BLD AUTO: 12.1 FL (ref 6–12)
POTASSIUM SERPL-SCNC: 4.5 MMOL/L (ref 3.5–5.2)
PROT SERPL-MCNC: 5.9 G/DL (ref 6–8.5)
RBC # BLD AUTO: 3.66 10*6/MM3 (ref 3.77–5.28)
S PYO AG THROAT QL: NEGATIVE
SARS-COV-2 RNA RESP QL NAA+PROBE: DETECTED
SODIUM SERPL-SCNC: 133 MMOL/L (ref 136–145)
WBC NRBC COR # BLD: 4.87 10*3/MM3 (ref 3.4–10.8)

## 2023-08-17 PROCEDURE — 83690 ASSAY OF LIPASE: CPT | Performed by: EMERGENCY MEDICINE

## 2023-08-17 PROCEDURE — 87804 INFLUENZA ASSAY W/OPTIC: CPT

## 2023-08-17 PROCEDURE — 83880 ASSAY OF NATRIURETIC PEPTIDE: CPT | Performed by: EMERGENCY MEDICINE

## 2023-08-17 PROCEDURE — 85025 COMPLETE CBC W/AUTO DIFF WBC: CPT | Performed by: EMERGENCY MEDICINE

## 2023-08-17 PROCEDURE — 63710000001 ONDANSETRON ODT 4 MG TABLET DISPERSIBLE: Performed by: EMERGENCY MEDICINE

## 2023-08-17 PROCEDURE — 87635 SARS-COV-2 COVID-19 AMP PRB: CPT | Performed by: EMERGENCY MEDICINE

## 2023-08-17 PROCEDURE — 80053 COMPREHEN METABOLIC PANEL: CPT | Performed by: EMERGENCY MEDICINE

## 2023-08-17 PROCEDURE — 71045 X-RAY EXAM CHEST 1 VIEW: CPT

## 2023-08-17 PROCEDURE — 96374 THER/PROPH/DIAG INJ IV PUSH: CPT

## 2023-08-17 PROCEDURE — 87880 STREP A ASSAY W/OPTIC: CPT | Performed by: EMERGENCY MEDICINE

## 2023-08-17 PROCEDURE — 99283 EMERGENCY DEPT VISIT LOW MDM: CPT

## 2023-08-17 PROCEDURE — 87081 CULTURE SCREEN ONLY: CPT | Performed by: EMERGENCY MEDICINE

## 2023-08-17 RX ORDER — ONDANSETRON 4 MG/1
4 TABLET, ORALLY DISINTEGRATING ORAL ONCE
Status: COMPLETED | OUTPATIENT
Start: 2023-08-17 | End: 2023-08-17

## 2023-08-17 RX ORDER — FAMOTIDINE 10 MG/ML
20 INJECTION, SOLUTION INTRAVENOUS ONCE
Status: COMPLETED | OUTPATIENT
Start: 2023-08-17 | End: 2023-08-17

## 2023-08-17 RX ORDER — SODIUM CHLORIDE 0.9 % (FLUSH) 0.9 %
10 SYRINGE (ML) INJECTION AS NEEDED
Status: DISCONTINUED | OUTPATIENT
Start: 2023-08-17 | End: 2023-08-17 | Stop reason: HOSPADM

## 2023-08-17 RX ADMIN — SODIUM CHLORIDE 500 ML: 9 INJECTION, SOLUTION INTRAVENOUS at 08:47

## 2023-08-17 RX ADMIN — Medication 10 ML: at 10:17

## 2023-08-17 RX ADMIN — FAMOTIDINE 20 MG: 10 INJECTION INTRAVENOUS at 10:16

## 2023-08-17 RX ADMIN — ONDANSETRON 4 MG: 4 TABLET, ORALLY DISINTEGRATING ORAL at 08:05

## 2023-08-17 NOTE — OUTREACH NOTE
Sepsis Week 2 Survey      Flowsheet Row Responses   [facility] patient discharged from? [facility]   Does the patient have one of the following disease processes/diagnoses(primary or secondary)? Sepsis  [ER 8/17/23 -COVID]   Week 2 attempt successful? Yes   Call start time 1410   Call end time 1420   General alerts for this patient Metastatic colon cancer to liver   Discharge diagnosis Sepsis   Meds reviewed with patient/caregiver? Yes   Is the patient taking all medications as directed (includes completed medication regime)? Yes   Does the patient have a primary care provider?  Yes   Does the patient have an appointment with their PCP within 7 days of discharge? Yes   Has the patient kept scheduled appointments due by today? Yes   Has home health visited the patient within 72 hours of discharge? N/A   Psychosocial issues? No   Did the patient receive a copy of their discharge instructions? Yes   Nursing interventions Reviewed instructions with patient   What is the patient's perception of their health status since discharge? New symptoms unrelated to diagnosis  [COVID]   Nursing interventions Nurse provided patient education   Is the patient/caregiver able to teach back TIME? I nfection - may have signs and symptoms of an infection, T emperature - higher or lower than normal, M ental Decline - confused, sleepy, difficult to arouse, E xtremely Ill - severe pain, discomfort, shortness of breath   Nursing interventions Nurse provided patient education   Is the patient/caregiver able to teach back signs and symptoms of worsening condition: Fever, Altered mental status(confusion/coma), Hyperthermia   Is the patient/caregiver able to teach back the hierarchy of who to call/visit for symptoms/problems? PCP, Specialist, Home health nurse, Urgent Care, ED, 911 Yes   Week 2 call completed? Yes   Wrap up additional comments Spoke with sister and she states that Pt just returned from ED with COVID. Discussed monitoring sats  and BP. Sister frustrated stating it is just too much, It is hard for Pt to eat due to chemo and she falls alot due to weakness. Asked about having AMB CM to FU and sister declined. Advised her to discuss with PCP and look at possible HH. Sister states she is looking at getting a second opinion from [facility].   Call end time 1420            MATEUS HUSSEIN - Registered Nurse

## 2023-08-17 NOTE — DISCHARGE INSTRUCTIONS
Call your primary care provider today to let them know about your positive COVID results.  Stay well-hydrated.  Return to the ER for persistent difficulty breathing or chest pain.

## 2023-08-17 NOTE — ED PROVIDER NOTES
Time: 8:36 AM EDT  Date of encounter:  8/17/2023  Independent Historian/Clinical History and Information was obtained by:   Patient and Family    History is limited by: N/A    Chief Complaint: Cough, nausea vomiting, sore throat      History of Present Illness:  Patient is a 63 y.o. year old female who presents to the emergency department for evaluation of cough, nausea vomiting and sore throat.  Cough for the past 4 to 5 days.  Vomited x3 today.  No diarrhea.  No abdominal pain.  Complains of sore throat and pain to her lips that they feel is due to her chemotherapy.  Has known colon cancer liver mets, but again is denying abdominal pain today.  No chest pain or difficulty breathing.  Afebrile.    HPI    Patient Care Team  Primary Care Provider: Nilam Willis MD    Past Medical History:     Allergies   Allergen Reactions    Bupropion Other (See Comments)     Caused falls     Past Medical History:   Diagnosis Date    Abdominal pain     CHF (congestive heart failure)     Colon cancer     Coronary arteriosclerosis     Depressive disorder     with anxiety    Encounter for routine adult health examination     Essential hypertension     Generalized anxiety disorder     GERD (gastroesophageal reflux disease)     Hip pain     Hyperlipidemia     Kidney stone     Osteoarthritis     Radial styloid tenosynovitis of left hand     Urinary tract infectious disease     Vitamin D deficiency      Past Surgical History:   Procedure Laterality Date    CARDIAC CATHETERIZATION  04/15/2016    Enlarged left ventricle with reduced contractility.Severe coronary artery disease as described above. [facility].    COLONOSCOPY  2019    COLON CANCER DOMINGO.    COLONOSCOPY N/A 02/14/2022    Procedure: COLONOSCOPY WITH POLYPECTOMY;  Surgeon: Coy Ordoñez MD;  Location: [facility] ENDOSCOPY;  Service: Gastroenterology;  Laterality: N/A;  COLON POLYP, ANASTAMOSIS IN SIGMOID    CORONARY ANGIOPLASTY WITH STENT PLACEMENT  05/06/2012    PTCA  implantation in the vein graft of the OM branch. Done at [facility] in [place], [place].    CORONARY ARTERY BYPASS GRAFT  03/18/2003    Emergent CABG x 5 CAD    DE QUERVAIN'S RELEASE Right 11/30/2009    Release of De Quervain's to the right thumb    DEQUERVAIN RELEASE Left 11/18/2015    Release of de Quervain's to the left wrist.    LIVER SURGERY      PACEMAKER IMPLANTATION      DEFIBRILLATOR    PAP SMEAR  06/28/2012    normal    PROCEDURE GENERIC CONVERTED  11/22/2015    MOST RECENT DIASTOLIC BP < 90 mmHg     PROCEDURE GENERIC CONVERTED  11/22/2015    MOST RECENT SYSTOLIC BP > or = 140 mmHg     PROCEDURE GENERIC CONVERTED  11/22/2015    TOBACCO NON-USER     REPLACEMENT TOTAL HIP LATERAL POSITION      SUBTOTAL COLECTOMY N/A 04/23/2019    sigmoid for colon cancer    TOTAL KNEE ARTHROPLASTY       Family History   Problem Relation Age of Onset    Liver disease Mother     Hyperlipidemia Father     Heart disease Father     Breast cancer Sister     Diabetes Maternal Grandmother     Arthritis Maternal Grandmother     Heart disease Paternal Grandmother     Heart disease Paternal Grandfather     Diabetes Other     Heart disease Other     Hypertension Other     Stroke Other     Colon cancer Other     Coronary artery disease Other     Other Other         Heart surgery       Home Medications:  Prior to Admission medications    Medication Sig Start Date End Date Taking? Authorizing Provider   acetaminophen (TYLENOL) 500 MG tablet Take 1-2 tablets by mouth Every 4 (Four) Hours As Needed for Mild Pain.   Yes Provider, MD Chandler   apixaban (Eliquis) 5 MG tablet tablet Take 1 tablet by mouth Every 12 (Twelve) Hours. 8/16/23  Yes Luis Boyce MD   capecitabine (XELODA) 500 MG chemo tablet Take 2 tablets by mouth 2 (Two) Times a Day. Take 2 tablets by mouth in the AM and 2 tablets in the PM on days 1-14, then off 7 days, on a 21 day cycle 8/16/23  Yes Luis Boyce MD   clonazePAM (KlonoPIN) 1 MG tablet  Take 1 tablet by mouth 2 (Two) Times a Day. 4/10/23  Yes Nilam Willis MD   clopidogrel (PLAVIX) 75 MG tablet Take 1 tablet by mouth Daily. 1/20/23  Yes Nilam Willis MD   cyanocobalamin (VITAMIN B-12) 500 MCG tablet Take 1 tablet by mouth Daily.   Yes Chandler Diamond MD   cyclobenzaprine (FLEXERIL) 10 MG tablet Take 1 tablet by mouth 3 (Three) Times a Day. 6/13/23  Yes Nilam Willis MD   DPH-Lido-AlHydr-MgHydr-Simeth (First Mouthwash, Magic Mouthwash,) suspension Swish and spit 10 mL Every 6 (Six) Hours. 8/16/23  Yes Luis Boyce MD   FLUoxetine (PROzac) 20 MG capsule Take 3 capsules by mouth Daily. 1/30/23  Yes Nilam Willis MD   furosemide (LASIX) 40 MG tablet Take 1 tablet by mouth 2 (Two) Times a Day As Needed (lower extremity swelling or increase in weight of 2lbs or more from current weight. Take with spironolactone.). 8/5/23  Yes Eulalio Rashid MD   isosorbide mononitrate (IMDUR) 30 MG 24 hr tablet Take 1 tablet by mouth Daily.   Yes ProviderChandler MD   losartan (COZAAR) 50 MG tablet Take 0.5 tablets by mouth Daily for 30 days. 8/5/23 9/4/23 Yes Eulalio Rashid MD   metoprolol succinate XL (TOPROL-XL) 25 MG 24 hr tablet Take 1 tablet by mouth Daily. 1/20/23  Yes Nilam Willis MD   nitroglycerin (NITROSTAT) 0.4 MG SL tablet Place 1 tablet under the tongue As Needed for Chest Pain.  - IF PAIN REMAINS AFTER 5 MIN CALL 911 AND REPEAT DOSE MAX 3 TABS IN 15 MINUTES 1/20/23  Yes Nilam Willis MD   ondansetron (ZOFRAN) 8 MG tablet Take 1 tablet by mouth 3 (Three) Times a Day As Needed for Nausea or Vomiting. 7/13/23  Yes Luis Boyce MD   pravastatin (PRAVACHOL) 80 MG tablet Take 1 tablet by mouth Every Night. 1/20/23  Yes Nilam Willis MD   spironolactone (ALDACTONE) 25 MG tablet Take 1 tablet by mouth 2 (Two) Times a Day As Needed (lower extremity swelling or increase in weight of 2lbs or more from current weight. Take with furosemide.). 8/5/23  Yes  "Eulalio Rashid MD   tiZANidine (ZANAFLEX) 4 MG tablet Take 1 tablet by mouth Every 8 (Eight) Hours As Needed for Muscle Spasms. 1/20/23  Yes Nialm Willis MD   vitamin D (ERGOCALCIFEROL) 1.25 MG (82715 UT) capsule capsule Take 1 capsule by mouth 1 (One) Time Per Week. 1/30/23  Yes Nilam Willis MD        Social History:   Social History     Tobacco Use    Smoking status: Never    Smokeless tobacco: Never   Vaping Use    Vaping Use: Never used   Substance Use Topics    Alcohol use: No    Drug use: Never         Review of Systems:  Review of Systems   Constitutional:  Negative for chills and fever.   HENT:  Positive for sore throat. Negative for congestion and rhinorrhea.    Eyes:  Negative for photophobia.   Respiratory:  Positive for cough. Negative for apnea, chest tightness and shortness of breath.    Cardiovascular:  Negative for chest pain and palpitations.   Gastrointestinal:  Positive for nausea and vomiting. Negative for abdominal pain and diarrhea.   Endocrine: Negative.    Genitourinary:  Negative for difficulty urinating and dysuria.   Musculoskeletal:  Negative for back pain, joint swelling and myalgias.   Skin:  Negative for color change and wound.   Allergic/Immunologic: Negative.    Neurological:  Negative for seizures and headaches.   Psychiatric/Behavioral: Negative.     All other systems reviewed and are negative.     Physical Exam:  /56   Pulse 54   Temp 98.6 øF (37 øC)   Resp 20   Ht 160 cm (63\")   Wt 62.6 kg (138 lb 0.1 oz)   SpO2 98%   BMI 24.45 kg/mý     Physical Exam  Vitals and nursing note reviewed.   Constitutional:       General: She is awake.      Appearance: Normal appearance.   HENT:      Head: Normocephalic and atraumatic.      Nose: Nose normal.      Mouth/Throat:      Comments: Upper and lower lip ulcerations  Eyes:      Extraocular Movements: Extraocular movements intact.      Pupils: Pupils are equal, round, and reactive to light.   Cardiovascular:      Rate " and Rhythm: Normal rate and regular rhythm.      Heart sounds: Normal heart sounds.   Pulmonary:      Effort: Pulmonary effort is normal. No respiratory distress.      Breath sounds: Normal breath sounds. No wheezing, rhonchi or rales.   Abdominal:      General: Bowel sounds are normal.      Palpations: Abdomen is soft.      Tenderness: There is no abdominal tenderness. There is no guarding or rebound.      Comments: No rigidity   Musculoskeletal:         General: No tenderness. Normal range of motion.      Cervical back: Normal range of motion and neck supple.   Skin:     General: Skin is warm and dry.      Coloration: Skin is not jaundiced.   Neurological:      General: No focal deficit present.      Mental Status: She is alert and oriented to person, place, and time. Mental status is at baseline.      Sensory: Sensation is intact.      Motor: Motor function is intact.      Coordination: Coordination is intact.   Psychiatric:         Attention and Perception: Attention and perception normal.         Mood and Affect: Mood and affect normal.         Speech: Speech normal.         Behavior: Behavior normal.         Judgment: Judgment normal.                Procedures:  Procedures      Medical Decision Making:      Comorbidities that affect care:    Colon cancer with liver mets, CKD, CHF    External Notes reviewed:    Encounter review: Admission to our facility 8/3/2023 through 8/5/2023 for severe sepsis, acute kidney injury, hypotension, hyperkalemia.      The following orders were placed and all results were independently analyzed by me:  Orders Placed This Encounter   Procedures    COVID-19,CEPHEID/ESTELLA,COR/THEE/PAD/YANA/MAD IN-HOUSE(OR EMERGENT/ADD-ON),NP SWAB IN TRANSPORT MEDIA 3-4 HR TAT, RT-PCR - Swab, Nasopharynx    Influenza Antigen, Rapid - Swab, Nasopharynx    Rapid Strep A Screen - Swab, Throat    Beta Strep Culture, Throat - Swab, Throat    XR Chest 1 View    Comprehensive Metabolic Panel    Lipase    CBC  Auto Differential    BNP    CBC & Differential       Medications Given in the Emergency Department:  Medications   ondansetron ODT (ZOFRAN-ODT) disintegrating tablet 4 mg (4 mg Oral Given 8/17/23 0805)   sodium chloride 0.9 % bolus 500 mL (0 mL Intravenous Stopped 8/17/23 1017)   famotidine (PEPCID) injection 20 mg (20 mg Intravenous Given 8/17/23 1016)        ED Course:         Labs:    Lab Results (last 24 hours)       Procedure Component Value Units Date/Time    COVID-19,CEPHEID/ESTELLA,COR/THEE/PAD/YANA/MAD IN-HOUSE(OR EMERGENT/ADD-ON),NP SWAB IN TRANSPORT MEDIA 3-4 HR TAT, RT-PCR - Swab, Nasopharynx [[id number]]  (Abnormal) Collected: 08/17/23 0755    Specimen: Swab from Nasopharynx Updated: 08/17/23 0857     COVID19 Detected    Narrative:      Fact sheet for providers: https://www.fda.gov/media/318744/download     Fact sheet for patients: https://www.fda.gov/media/683540/download  Fact sheet for providers: https://www.fda.gov/media/590757/download     Fact sheet for patients: https://www.fda.gov/media/600454/download    Influenza Antigen, Rapid - Swab, Nasopharynx [[id number]]  (Normal) Collected: 08/17/23 0755    Specimen: Swab from Nasopharynx Updated: 08/17/23 0822     Influenza A Ag, EIA Negative     Influenza B Ag, EIA Negative    Rapid Strep A Screen - Swab, Throat [[id number]]  (Normal) Collected: 08/17/23 0755    Specimen: Swab from Throat Updated: 08/17/23 0822     Strep A Ag Negative    Beta Strep Culture, Throat - Swab, Throat [[id number]] Collected: 08/17/23 0755    Specimen: Swab from Throat Updated: 08/17/23 0822    Comprehensive Metabolic Panel [[id number]]  (Abnormal) Collected: 08/17/23 0847    Specimen: Blood Updated: 08/17/23 0921     Glucose 138 mg/dL      BUN 28 mg/dL      Creatinine 1.60 mg/dL      Sodium 133 mmol/L      Potassium 4.5 mmol/L      Chloride 98 mmol/L      CO2 25.2 mmol/L      Calcium 8.6 mg/dL      Total Protein 5.9 g/dL      Albumin 3.3 g/dL      ALT (SGPT) 28 U/L      AST (SGOT) 25  U/L      Alkaline Phosphatase 108 U/L      Total Bilirubin 1.4 mg/dL      Globulin 2.6 gm/dL      A/G Ratio 1.3 g/dL      BUN/Creatinine Ratio 17.5     Anion Gap 9.8 mmol/L      eGFR 35.6 mL/min/1.73     Narrative:      GFR Normal >60  Chronic Kidney Disease <60  Kidney Failure <15      CBC & Differential [[id number]]  (Abnormal) Collected: 08/17/23 0847    Specimen: Blood Updated: 08/17/23 0900    Narrative:      The following orders were created for panel order CBC & Differential.  Procedure                               Abnormality         Status                     ---------                               -----------         ------                     CBC Auto Differential[[id number]]        Abnormal            Final result                 Please view results for these tests on the individual orders.    Lipase [[id number]]  (Normal) Collected: 08/17/23 0847    Specimen: Blood Updated: 08/17/23 0921     Lipase 40 U/L     CBC Auto Differential [[id number]]  (Abnormal) Collected: 08/17/23 0847    Specimen: Blood Updated: 08/17/23 0900     WBC 4.87 10*3/mm3      RBC 3.66 10*6/mm3      Hemoglobin 12.3 g/dL      Hematocrit 35.8 %      MCV 97.8 fL      MCH 33.6 pg      MCHC 34.4 g/dL      RDW 16.4 %      RDW-SD 45.4 fl      MPV 12.1 fL      Platelets 91 10*3/mm3      Neutrophil % 83.2 %      Lymphocyte % 8.6 %      Monocyte % 6.4 %      Eosinophil % 1.0 %      Basophil % 0.0 %      Immature Grans % 0.8 %      Neutrophils, Absolute 4.05 10*3/mm3      Lymphocytes, Absolute 0.42 10*3/mm3      Monocytes, Absolute 0.31 10*3/mm3      Eosinophils, Absolute 0.05 10*3/mm3      Basophils, Absolute 0.00 10*3/mm3      Immature Grans, Absolute 0.04 10*3/mm3      nRBC 0.0 /100 WBC     BNP [[id number]]  (Abnormal) Collected: 08/17/23 0847    Specimen: Blood Updated: 08/17/23 0918     proBNP 939.0 pg/mL     Narrative:      Among patients with dyspnea, NT-proBNP is highly sensitive for the detection of acute congestive heart failure. In  addition NT-proBNP of <300 pg/ml effectively rules out acute congestive heart failure with 99% negative predictive value.               Imaging:    XR Chest 1 View    Result Date: 8/17/2023  PROCEDURE: XR CHEST 1 VW  COMPARISON: [facility], CR, XR CHEST 1 VW, 8/03/2023, 16:50.  INDICATIONS: Cough, SOB, FEVER  FINDINGS:  Patient is status post median sternotomy.  Left subclavian transvenous pacemaker is unchanged.  Multiple coronary artery stents are noted to be in place.  Heart size and pulmonary vessels are within normal limits.  Lungs are clear.  No pleural effusion.  No evidence pneumothorax.        1. No acute cardiopulmonary disease.       RENAE HARMON MD       Electronically Signed and Approved By: RENAE HARMON MD on 8/17/2023 at 9:23                Differential Diagnosis and Discussion:    Cough: Differential diagnosis includes but is not limited to pneumonia, acute bronchitis, upper respiratory infection, ACE inhibitor use, allergic reaction, epiglottitis, seasonal allergies, chemical irritants, exercise-induced asthma, viral syndrome.    All labs were reviewed and interpreted by me.  All X-rays impressions were independently interpreted by me.    MDM     Amount and/or Complexity of Data Reviewed  Decide to obtain previous medical records or to obtain history from someone other than the patient: yes             Patient Care Considerations:          Consultants/Shared Management Plan:    None    Social Determinants of Health:    Patient is independent, reliable, and has access to care.       Disposition and Care Coordination:    Discharged: I considered escalation of care by admitting this patient for observation, however the patient has improved and is suitable and  stable for discharge.    I have explained the patient's condition, diagnoses and treatment plan based on the information available to me at this time. I have answered questions and addressed any concerns. The patient has a good   understanding of the patient's diagnosis, condition, and treatment plan as can be expected at this point. The vital signs have been stable. The patient's condition is stable and appropriate for discharge from the emergency department.      The patient will pursue further outpatient evaluation with the primary care physician or other designated or consulting physician as outlined in the discharge instructions. They are agreeable to this plan of care and follow-up instructions have been explained in detail. The patient has received these instructions in written format and have expressed an understanding of the discharge instructions. The patient is aware that any significant change in condition or worsening of symptoms should prompt an immediate return to this or the closest emergency department or call to 911.    Final diagnoses:   COVID-19        ED Disposition       ED Disposition   Discharge    Condition   Stable    Comment   --               This medical record created using voice recognition software.             Marv Dumont MD  08/17/23 [id number]

## 2023-08-19 LAB — BACTERIA SPEC AEROBE CULT: NORMAL

## 2023-08-21 ENCOUNTER — SPECIALTY PHARMACY (OUTPATIENT)
Dept: PHARMACY | Facility: HOSPITAL | Age: 63
End: 2023-08-21
Payer: MEDICARE

## 2023-08-24 ENCOUNTER — HOSPITAL ENCOUNTER (OUTPATIENT)
Dept: CT IMAGING | Facility: HOSPITAL | Age: 63
Discharge: HOME | End: 2023-08-24
Admitting: INTERNAL MEDICINE
Payer: MEDICARE

## 2023-08-24 DIAGNOSIS — C18.7 CANCER OF SIGMOID COLON: ICD-10-CM

## 2023-08-24 LAB — QT INTERVAL: 531 MS

## 2023-08-24 PROCEDURE — 25510000001 IOPAMIDOL PER 1 ML: Performed by: INTERNAL MEDICINE

## 2023-08-24 PROCEDURE — 74177 CT ABD & PELVIS W/CONTRAST: CPT

## 2023-08-24 PROCEDURE — 71260 CT THORAX DX C+: CPT

## 2023-08-24 RX ADMIN — IOPAMIDOL 75 ML: 755 INJECTION, SOLUTION INTRAVENOUS at 19:22

## 2023-08-25 ENCOUNTER — READMISSION MANAGEMENT (OUTPATIENT)
Dept: CALL CENTER | Facility: HOSPITAL | Age: 63
End: 2023-08-25
Payer: MEDICARE

## 2023-08-25 NOTE — OUTREACH NOTE
Sepsis Week 3 Survey      Flowsheet Row Responses   [facility] patient discharged from? [facility]   Does the patient have one of the following disease processes/diagnoses(primary or secondary)? Sepsis   Week 3 attempt successful? Yes   Call start time 1702   Call end time 1714   General alerts for this patient Metastatic colon cancer to liver   Discharge diagnosis Sepsis   Person spoke with today (if not patient) and relationship sister   Meds reviewed with patient/caregiver? Yes   Is the patient taking all medications as directed (includes completed medication regime)? Yes   Does the patient have a primary care provider?  Yes   Has the patient kept scheduled appointments due by today? Yes   Psychosocial issues? No   Comments Per her sister, the pt continues to fall frequently r/t weakness.Pt continues to refuse HH. Per sister the pt gets dizzy. Lips are sore from chemo decreased ability to take po nutrition per sister.Pt is picky eater, no dairy but likes fruit.Attempted to assist w/ideas to get protein/nutrition into pt thru straws,gave several ideas.Suggested she call oncology or outpt dietician for support or assistance. Dx with COVID but improving. Pt remains fatigued.   What is the patient's perception of their health status since discharge? New symptoms unrelated to diagnosis   Is the patient/caregiver able to teach back the hierarchy of who to call/visit for symptoms/problems? PCP, Specialist, Home health nurse, Urgent Care, ED, 911 Yes   Week 3 call completed? Yes   Graduated Yes   Is the patient interested in additional calls from an ambulatory ? No   Call end time 1714            Julianne MENDOZA - Registered Nurse

## 2023-08-28 ENCOUNTER — OFFICE VISIT (OUTPATIENT)
Dept: ONCOLOGY | Facility: HOSPITAL | Age: 63
End: 2023-08-28
Payer: MEDICARE

## 2023-08-28 VITALS
DIASTOLIC BLOOD PRESSURE: 47 MMHG | BODY MASS INDEX: 23.39 KG/M2 | OXYGEN SATURATION: 100 % | TEMPERATURE: 98.3 F | WEIGHT: 132.06 LBS | HEART RATE: 50 BPM | RESPIRATION RATE: 16 BRPM | SYSTOLIC BLOOD PRESSURE: 85 MMHG

## 2023-08-28 DIAGNOSIS — C18.7 CANCER OF SIGMOID COLON: Primary | ICD-10-CM

## 2023-08-28 DIAGNOSIS — I50.9 CHRONIC CONGESTIVE HEART FAILURE, UNSPECIFIED HEART FAILURE TYPE: ICD-10-CM

## 2023-08-28 DIAGNOSIS — I81 PORTAL VEIN THROMBOSIS: ICD-10-CM

## 2023-08-28 PROCEDURE — G0463 HOSPITAL OUTPT CLINIC VISIT: HCPCS | Performed by: INTERNAL MEDICINE

## 2023-08-28 NOTE — PROGRESS NOTES
Chief Complaint  COLON CANCER--PT ON ORAL CHEMO    Nilam Willis MD Goodale, Dianne L, MD Subjective          Erika Bowens presents to [facility] for consideration of ongoing treatment for her metastatic colon cancer.  Recently started on capecitabine.  Her last cycle was complicated by COVID infection and she stopped 4 days short of completing this cycle.  She continues to be very debilitated.  Her blood pressure has also been running very low causing her to fall several times with bruising.  She states that she is finally feeling better from COVID but not back to her baseline.  She remains very weak and tired.  She is trying to eat better.  She remains on Eliquis.  She denies some bruising but no obvious bleeding.    Oncology/Hematology History Overview Note   3/25/2019  Sigmoid colon--Moderately differentiated adenocarcinoma. 9/21/2020 Liver biopsy revealed metastatic colonic adenocarcinoma            Clinical Staging      Stage II ([id number]), recurrent            Treatments      Surgeries       4/19.Liver Ablation at [facility] by Dr. Prieto        1/23/23 Omniseq testing sent.     Cancer of sigmoid colon   5/20/2019 Initial Diagnosis    Colon cancer (HCC)     7/17/2023 -  Chemotherapy    OP COLORECTAL Capecitabine (Dose Selection Required) BID (8 cycles)           Review of Systems   Constitutional:  Positive for appetite change, fatigue and unexpected weight loss. Negative for diaphoresis, fever and unexpected weight gain.   HENT:  Negative for hearing loss, mouth sores, sore throat, swollen glands, trouble swallowing and voice change.    Eyes:  Negative for blurred vision.   Respiratory:  Negative for cough, shortness of breath and wheezing.    Cardiovascular:  Negative for chest pain and palpitations.   Gastrointestinal:  Positive for nausea. Negative for abdominal pain, blood in stool, constipation, diarrhea and vomiting.   Endocrine: Negative for cold  intolerance and heat intolerance.   Genitourinary:  Negative for difficulty urinating, dysuria, frequency, hematuria and urinary incontinence.   Musculoskeletal:  Positive for arthralgias, back pain and myalgias.   Skin:  Negative for rash, skin lesions and wound.   Neurological:  Positive for dizziness, weakness and light-headedness. Negative for seizures, numbness and headache.   Hematological:  Does not bruise/bleed easily.   Psychiatric/Behavioral:  Negative for depressed mood. The patient is not nervous/anxious.    Current Outpatient Medications on File Prior to Visit   Medication Sig Dispense Refill    acetaminophen (TYLENOL) 500 MG tablet Take 1-2 tablets by mouth Every 4 (Four) Hours As Needed for Mild Pain.      apixaban (Eliquis) 5 MG tablet tablet Take 1 tablet by mouth Every 12 (Twelve) Hours. 60 tablet 3    capecitabine (XELODA) 500 MG chemo tablet Take 2 tablets by mouth 2 (Two) Times a Day. Take 2 tablets by mouth in the AM and 2 tablets in the PM on days 1-14, then off 7 days, on a 21 day cycle 56 tablet 5    clonazePAM (KlonoPIN) 1 MG tablet Take 1 tablet by mouth 2 (Two) Times a Day. 180 tablet 1    clopidogrel (PLAVIX) 75 MG tablet Take 1 tablet by mouth Daily. 90 tablet 3    cyanocobalamin (VITAMIN B-12) 500 MCG tablet Take 1 tablet by mouth Daily.      cyclobenzaprine (FLEXERIL) 10 MG tablet Take 1 tablet by mouth 3 (Three) Times a Day. 270 tablet 1    DPH-Lido-AlHydr-MgHydr-Simeth (First Mouthwash, Magic Mouthwash,) suspension Swish and spit 10 mL Every 6 (Six) Hours. 237 mL 1    FLUoxetine (PROzac) 20 MG capsule Take 3 capsules by mouth Daily. 270 capsule 1    furosemide (LASIX) 40 MG tablet Take 1 tablet by mouth 2 (Two) Times a Day As Needed (lower extremity swelling or increase in weight of 2lbs or more from current weight. Take with spironolactone.). 180 tablet 3    isosorbide mononitrate (IMDUR) 30 MG 24 hr tablet Take 1 tablet by mouth Daily.      losartan (COZAAR) 50 MG tablet Take 0.5  tablets by mouth Daily for 30 days. 15 tablet 0    metoprolol succinate XL (TOPROL-XL) 25 MG 24 hr tablet Take 1 tablet by mouth Daily. 90 tablet 3    nitroglycerin (NITROSTAT) 0.4 MG SL tablet Place 1 tablet under the tongue As Needed for Chest Pain.  - IF PAIN REMAINS AFTER 5 MIN CALL 911 AND REPEAT DOSE MAX 3 TABS IN 15 MINUTES 25 tablet 3    ondansetron (ZOFRAN) 8 MG tablet Take 1 tablet by mouth 3 (Three) Times a Day As Needed for Nausea or Vomiting. 30 tablet 5    pravastatin (PRAVACHOL) 80 MG tablet Take 1 tablet by mouth Every Night. 90 tablet 3    spironolactone (ALDACTONE) 25 MG tablet Take 1 tablet by mouth 2 (Two) Times a Day As Needed (lower extremity swelling or increase in weight of 2lbs or more from current weight. Take with furosemide.). 180 tablet 3    tiZANidine (ZANAFLEX) 4 MG tablet Take 1 tablet by mouth Every 8 (Eight) Hours As Needed for Muscle Spasms. 270 tablet 1    vitamin D (ERGOCALCIFEROL) 1.25 MG (78841 UT) capsule capsule Take 1 capsule by mouth 1 (One) Time Per Week. 9 capsule 1     No current facility-administered medications on file prior to visit.       Allergies   Allergen Reactions    Bupropion Other (See Comments)     Caused falls     Past Medical History:   Diagnosis Date    Abdominal pain     CHF (congestive heart failure)     Colon cancer     Coronary arteriosclerosis     Depressive disorder     with anxiety    Encounter for routine adult health examination     Essential hypertension     Generalized anxiety disorder     GERD (gastroesophageal reflux disease)     Hip pain     Hyperlipidemia     Kidney stone     Osteoarthritis     Radial styloid tenosynovitis of left hand     Urinary tract infectious disease     Vitamin D deficiency      Past Surgical History:   Procedure Laterality Date    CARDIAC CATHETERIZATION  04/15/2016    Enlarged left ventricle with reduced contractility.Severe coronary artery disease as described above. [facility].    COLONOSCOPY  2019    COLON  CANCER DOMINGO.    COLONOSCOPY N/A 02/14/2022    Procedure: COLONOSCOPY WITH POLYPECTOMY;  Surgeon: Coy Ordoñez MD;  Location: [facility] ENDOSCOPY;  Service: Gastroenterology;  Laterality: N/A;  COLON POLYP, ANASTAMOSIS IN SIGMOID    CORONARY ANGIOPLASTY WITH STENT PLACEMENT  05/06/2012    PTCA implantation in the vein graft of the OM branch. Done at [facility] in [place], [place].    CORONARY ARTERY BYPASS GRAFT  03/18/2003    Emergent CABG x 5 CAD    DE QUERVAIN'S RELEASE Right 11/30/2009    Release of De Quervain's to the right thumb    DEQUERVAIN RELEASE Left 11/18/2015    Release of de Quervain's to the left wrist.    LIVER SURGERY      PACEMAKER IMPLANTATION      DEFIBRILLATOR    PAP SMEAR  06/28/2012    normal    PROCEDURE GENERIC CONVERTED  11/22/2015    MOST RECENT DIASTOLIC BP < 90 mmHg     PROCEDURE GENERIC CONVERTED  11/22/2015    MOST RECENT SYSTOLIC BP > or = 140 mmHg     PROCEDURE GENERIC CONVERTED  11/22/2015    TOBACCO NON-USER     REPLACEMENT TOTAL HIP LATERAL POSITION      SUBTOTAL COLECTOMY N/A 04/23/2019    sigmoid for colon cancer    TOTAL KNEE ARTHROPLASTY       Social History     Socioeconomic History    Marital status: Single   Tobacco Use    Smoking status: Never    Smokeless tobacco: Never   Vaping Use    Vaping Use: Never used   Substance and Sexual Activity    Alcohol use: No    Drug use: Never    Sexual activity: Never     Family History   Problem Relation Age of Onset    Liver disease Mother     Hyperlipidemia Father     Heart disease Father     Breast cancer Sister     Diabetes Maternal Grandmother     Arthritis Maternal Grandmother     Heart disease Paternal Grandmother     Heart disease Paternal Grandfather     Diabetes Other     Heart disease Other     Hypertension Other     Stroke Other     Colon cancer Other     Coronary artery disease Other     Other Other         Heart surgery       Objective   Physical Exam  Vitals reviewed. Exam conducted with a chaperone  present.   Constitutional:       General: She is not in acute distress.     Appearance: Normal appearance.   Cardiovascular:      Rate and Rhythm: Normal rate and regular rhythm.      Heart sounds: Normal heart sounds. No murmur heard.    No gallop.   Pulmonary:      Effort: Pulmonary effort is normal.      Breath sounds: Normal breath sounds.   Abdominal:      General: Abdomen is flat. Bowel sounds are normal.      Palpations: Abdomen is soft.   Musculoskeletal:      Right lower leg: No edema.      Left lower leg: No edema.   Neurological:      Mental Status: She is alert and oriented to person, place, and time.   Psychiatric:         Mood and Affect: Mood normal.         Behavior: Behavior normal.       Vitals:    08/28/23 1525   BP: (!) 85/47   Pulse: 50   Resp: 16   Temp: 98.3 øF (36.8 øC)   TempSrc: Temporal   SpO2: 100%   Weight: 59.9 kg (132 lb 0.9 oz)   PainSc: 10-Worst pain ever   PainLoc: Generalized     ECOG score: 2         PHQ-9 Total Score:                    Result Review :   The following data was reviewed by: Luis Boyce MD on 08/28/2023:  Lab Results   Component Value Date    HGB 12.3 08/17/2023    HCT 35.8 08/17/2023    MCV 97.8 (H) 08/17/2023    PLT 91 (L) 08/17/2023    WBC 4.87 08/17/2023    NEUTROABS 4.05 08/17/2023    LYMPHSABS 0.42 (L) 08/17/2023    MONOSABS 0.31 08/17/2023    EOSABS 0.05 08/17/2023    BASOSABS 0.00 08/17/2023     Lab Results   Component Value Date    GLUCOSE 138 (H) 08/17/2023    BUN 28 (H) 08/17/2023    CREATININE 1.60 (H) 08/17/2023     (L) 08/17/2023    K 4.5 08/17/2023    CL 98 08/17/2023    CO2 25.2 08/17/2023    CALCIUM 8.6 08/17/2023    PROTEINTOT 5.9 (L) 08/17/2023    ALBUMIN 3.3 (L) 08/17/2023    BILITOT 1.4 (H) 08/17/2023    ALKPHOS 108 08/17/2023    AST 25 08/17/2023    ALT 28 08/17/2023     Lab Results   Component Value Date    MG 2.1 08/03/2023    PHOS 5.8 (H) 08/03/2023    TSH 2.510 08/03/2023     No results found for: IRON, LABIRON, TRANSFERRIN,  TIBC  Lab Results   Component Value Date    FERRITIN 247.0 01/05/2014    PLVMXSSU19 1,526 (H) 11/21/2022     Lab Results   Component Value Date    CEA 3.10 08/07/2023             Assessment and Plan    Diagnoses and all orders for this visit:    1. Cancer of sigmoid colon (Primary)  Assessment & Plan:  Metastatic.  Patient is on palliative treatment with capecitabine.  Last cycle was interrupted secondary to COVID infection.  She remains debilitated.  I will give her a couple of weeks off from further therapy to regain her strength.  I will see her back at that point if she is feeling better plan to resume treatment.  I will dose reduce with further cycles    Orders:  -     CBC and Differential; Future  -     Comprehensive metabolic panel; Future  -     Lab Appointment Request; Future  -     Clinic Appointment Request; Future  -     CEA; Future  -     CBC & Differential; Future  -     Comprehensive Metabolic Panel; Future    2. Portal vein thrombosis  Assessment & Plan:  Continue Eliquis daily.      3. Chronic congestive heart failure, unspecified heart failure type  Assessment & Plan:  Patient does not appear to be volume overloaded at this time but she is markedly hypotensive and symptomatic with multiple falls.  She is on multiple medications that can cause hypotension and orthostasis.  She is not eating or drinking well which is exacerbating the problem.  She will contact her cardiologist today to further adjust her regimen due to the symptomatic hypotension.              Patient Follow Up: 2 w    Patient was given instructions and counseling regarding her condition or for health maintenance advice. Please see specific information pulled into the AVS if appropriate.     Luis Boyce MD    8/29/2023

## 2023-08-29 NOTE — ASSESSMENT & PLAN NOTE
Metastatic.  Patient is on palliative treatment with capecitabine.  Last cycle was interrupted secondary to COVID infection.  She remains debilitated.  I will give her a couple of weeks off from further therapy to regain her strength.  I will see her back at that point if she is feeling better plan to resume treatment.  I will dose reduce with further cycles

## 2023-08-29 NOTE — ASSESSMENT & PLAN NOTE
Patient does not appear to be volume overloaded at this time but she is markedly hypotensive and symptomatic with multiple falls.  She is on multiple medications that can cause hypotension and orthostasis.  She is not eating or drinking well which is exacerbating the problem.  She will contact her cardiologist today to further adjust her regimen due to the symptomatic hypotension.

## 2023-08-30 ENCOUNTER — SPECIALTY PHARMACY (OUTPATIENT)
Dept: PHARMACY | Facility: HOSPITAL | Age: 63
End: 2023-08-30
Payer: MEDICARE

## 2023-08-31 ENCOUNTER — APPOINTMENT (OUTPATIENT)
Dept: CT IMAGING | Facility: HOSPITAL | Age: 63
End: 2023-08-31
Payer: MEDICARE

## 2023-08-31 ENCOUNTER — HOSPITAL ENCOUNTER (EMERGENCY)
Facility: HOSPITAL | Age: 63
Discharge: OTHER INSTITUTION | End: 2023-08-31
Attending: EMERGENCY MEDICINE
Payer: MEDICARE

## 2023-08-31 ENCOUNTER — APPOINTMENT (OUTPATIENT)
Dept: GENERAL RADIOLOGY | Facility: HOSPITAL | Age: 63
End: 2023-08-31
Payer: MEDICARE

## 2023-08-31 VITALS
HEIGHT: 63 IN | BODY MASS INDEX: 23.71 KG/M2 | DIASTOLIC BLOOD PRESSURE: 44 MMHG | SYSTOLIC BLOOD PRESSURE: 80 MMHG | RESPIRATION RATE: 20 BRPM | OXYGEN SATURATION: 95 % | TEMPERATURE: 97.8 F | WEIGHT: 133.82 LBS | HEART RATE: 50 BPM

## 2023-08-31 DIAGNOSIS — S06.5XAA ACUTE SUBDURAL HEMATOMA: Primary | ICD-10-CM

## 2023-08-31 DIAGNOSIS — C18.7 MALIGNANT NEOPLASM OF SIGMOID COLON: ICD-10-CM

## 2023-08-31 DIAGNOSIS — N18.9 CHRONIC KIDNEY DISEASE, UNSPECIFIED CKD STAGE: ICD-10-CM

## 2023-08-31 DIAGNOSIS — Z79.01 CHRONIC ANTICOAGULATION: ICD-10-CM

## 2023-08-31 DIAGNOSIS — G44.209 ACUTE NON INTRACTABLE TENSION-TYPE HEADACHE: ICD-10-CM

## 2023-08-31 DIAGNOSIS — I95.9 HYPOTENSION, UNSPECIFIED HYPOTENSION TYPE: ICD-10-CM

## 2023-08-31 LAB
ALBUMIN SERPL-MCNC: 3.4 G/DL (ref 3.5–5.2)
ALBUMIN/GLOB SERPL: 1.3 G/DL
ALP SERPL-CCNC: 167 U/L (ref 39–117)
ALT SERPL W P-5'-P-CCNC: 33 U/L (ref 1–33)
ANION GAP SERPL CALCULATED.3IONS-SCNC: 12.3 MMOL/L (ref 5–15)
ANISOCYTOSIS BLD QL: NORMAL
AST SERPL-CCNC: 56 U/L (ref 1–32)
BASOPHILS # BLD AUTO: 0.02 10*3/MM3 (ref 0–0.2)
BASOPHILS NFR BLD AUTO: 0.4 % (ref 0–1.5)
BILIRUB SERPL-MCNC: 1.1 MG/DL (ref 0–1.2)
BUN SERPL-MCNC: 15 MG/DL (ref 8–23)
BUN/CREAT SERPL: 8.5 (ref 7–25)
CALCIUM SPEC-SCNC: 9.2 MG/DL (ref 8.6–10.5)
CHLORIDE SERPL-SCNC: 98 MMOL/L (ref 98–107)
CO2 SERPL-SCNC: 23.7 MMOL/L (ref 22–29)
CREAT SERPL-MCNC: 1.76 MG/DL (ref 0.57–1)
DEPRECATED RDW RBC AUTO: 76 FL (ref 37–54)
EGFRCR SERPLBLD CKD-EPI 2021: 31.8 ML/MIN/1.73
EOSINOPHIL # BLD AUTO: 0.07 10*3/MM3 (ref 0–0.4)
EOSINOPHIL NFR BLD AUTO: 1.3 % (ref 0.3–6.2)
ERYTHROCYTE [DISTWIDTH] IN BLOOD BY AUTOMATED COUNT: 21.1 % (ref 12.3–15.4)
GLOBULIN UR ELPH-MCNC: 2.6 GM/DL
GLUCOSE SERPL-MCNC: 133 MG/DL (ref 65–99)
HCT VFR BLD AUTO: 35.9 % (ref 34–46.6)
HGB BLD-MCNC: 11.9 G/DL (ref 12–15.9)
HOLD SPECIMEN: NORMAL
HOLD SPECIMEN: NORMAL
HYPOCHROMIA BLD QL: NORMAL
IMM GRANULOCYTES # BLD AUTO: 0.04 10*3/MM3 (ref 0–0.05)
IMM GRANULOCYTES NFR BLD AUTO: 0.7 % (ref 0–0.5)
INR PPP: 1.66 (ref 0.86–1.15)
LYMPHOCYTES # BLD AUTO: 1.47 10*3/MM3 (ref 0.7–3.1)
LYMPHOCYTES NFR BLD AUTO: 27.4 % (ref 19.6–45.3)
MACROCYTES BLD QL SMEAR: NORMAL
MAGNESIUM SERPL-MCNC: 2 MG/DL (ref 1.6–2.4)
MCH RBC QN AUTO: 35 PG (ref 26.6–33)
MCHC RBC AUTO-ENTMCNC: 33.1 G/DL (ref 31.5–35.7)
MCV RBC AUTO: 105.6 FL (ref 79–97)
MONOCYTES # BLD AUTO: 0.67 10*3/MM3 (ref 0.1–0.9)
MONOCYTES NFR BLD AUTO: 12.5 % (ref 5–12)
NEUTROPHILS NFR BLD AUTO: 3.1 10*3/MM3 (ref 1.7–7)
NEUTROPHILS NFR BLD AUTO: 57.7 % (ref 42.7–76)
NRBC BLD AUTO-RTO: 0 /100 WBC (ref 0–0.2)
PLATELET # BLD AUTO: 186 10*3/MM3 (ref 140–450)
PMV BLD AUTO: 11.7 FL (ref 6–12)
POTASSIUM SERPL-SCNC: 4.3 MMOL/L (ref 3.5–5.2)
PROT SERPL-MCNC: 6 G/DL (ref 6–8.5)
PROTHROMBIN TIME: 19.6 SECONDS (ref 11.8–14.9)
QT INTERVAL: 547 MS
QTC INTERVAL: 505 MS
RBC # BLD AUTO: 3.4 10*6/MM3 (ref 3.77–5.28)
SMALL PLATELETS BLD QL SMEAR: ADEQUATE
SODIUM SERPL-SCNC: 134 MMOL/L (ref 136–145)
TROPONIN T SERPL HS-MCNC: 20 NG/L
WBC MORPH BLD: NORMAL
WBC NRBC COR # BLD: 5.37 10*3/MM3 (ref 3.4–10.8)
WHOLE BLOOD HOLD COAG: NORMAL
WHOLE BLOOD HOLD SPECIMEN: NORMAL

## 2023-08-31 PROCEDURE — 83735 ASSAY OF MAGNESIUM: CPT | Performed by: EMERGENCY MEDICINE

## 2023-08-31 PROCEDURE — 93005 ELECTROCARDIOGRAM TRACING: CPT

## 2023-08-31 PROCEDURE — 99284 EMERGENCY DEPT VISIT MOD MDM: CPT

## 2023-08-31 PROCEDURE — 0 PHYTONADIONE 10 MG/ML SOLUTION 1 ML AMPULE: Performed by: EMERGENCY MEDICINE

## 2023-08-31 PROCEDURE — 36415 COLL VENOUS BLD VENIPUNCTURE: CPT

## 2023-08-31 PROCEDURE — 70450 CT HEAD/BRAIN W/O DYE: CPT

## 2023-08-31 PROCEDURE — 85025 COMPLETE CBC W/AUTO DIFF WBC: CPT | Performed by: EMERGENCY MEDICINE

## 2023-08-31 PROCEDURE — 84484 ASSAY OF TROPONIN QUANT: CPT | Performed by: EMERGENCY MEDICINE

## 2023-08-31 PROCEDURE — 80053 COMPREHEN METABOLIC PANEL: CPT | Performed by: EMERGENCY MEDICINE

## 2023-08-31 PROCEDURE — 96365 THER/PROPH/DIAG IV INF INIT: CPT

## 2023-08-31 PROCEDURE — 93005 ELECTROCARDIOGRAM TRACING: CPT | Performed by: EMERGENCY MEDICINE

## 2023-08-31 PROCEDURE — 85007 BL SMEAR W/DIFF WBC COUNT: CPT | Performed by: EMERGENCY MEDICINE

## 2023-08-31 PROCEDURE — 85610 PROTHROMBIN TIME: CPT | Performed by: EMERGENCY MEDICINE

## 2023-08-31 PROCEDURE — 96375 TX/PRO/DX INJ NEW DRUG ADDON: CPT

## 2023-08-31 PROCEDURE — 71045 X-RAY EXAM CHEST 1 VIEW: CPT

## 2023-08-31 PROCEDURE — 25010000002 PROTHROMBIN COMPLEX CONC HUMAN 500 UNITS KIT: Performed by: EMERGENCY MEDICINE

## 2023-08-31 RX ORDER — SODIUM CHLORIDE 0.9 % (FLUSH) 0.9 %
10 SYRINGE (ML) INJECTION AS NEEDED
Status: DISCONTINUED | OUTPATIENT
Start: 2023-08-31 | End: 2023-08-31 | Stop reason: HOSPADM

## 2023-08-31 RX ORDER — ACETAMINOPHEN 325 MG/1
975 TABLET ORAL ONCE
Status: DISCONTINUED | OUTPATIENT
Start: 2023-08-31 | End: 2023-08-31 | Stop reason: HOSPADM

## 2023-08-31 RX ORDER — KETOROLAC TROMETHAMINE 30 MG/ML
15 INJECTION, SOLUTION INTRAMUSCULAR; INTRAVENOUS ONCE
Status: DISCONTINUED | OUTPATIENT
Start: 2023-08-31 | End: 2023-08-31

## 2023-08-31 RX ADMIN — PHYTONADIONE 10 MG: 10 INJECTION, EMULSION INTRAMUSCULAR; INTRAVENOUS; SUBCUTANEOUS at 07:45

## 2023-08-31 RX ADMIN — PROTHROMBIN, COAGULATION FACTOR VII HUMAN, COAGULATION FACTOR IX HUMAN, COAGULATION FACTOR X HUMAN, PROTEIN C, PROTEIN S HUMAN, AND WATER 1662 UNITS: KIT at 07:32

## 2023-08-31 RX ADMIN — SODIUM CHLORIDE 1000 ML: 9 INJECTION, SOLUTION INTRAVENOUS at 06:33

## 2023-08-31 NOTE — ED PROVIDER NOTES
Time: 6:33 AM EDT  Date of encounter:  8/31/2023  Independent Historian/Clinical History and Information was obtained by:   Patient and Family    History is limited by: Altered Mental Status    Chief Complaint: Generalized weakness and severe headache          History of Present Illness:  Patient is a 63 y.o. year old female with history of metastatic colon cancer on chemotherapy, who presents to the emergency department for evaluation of generalized weakness and severe headache.    She has a history of head injury years ago with almost daily headaches but states this headache is worse this morning.    No fevers or chills or neck stiffness reported.    She did have COVID-19 a couple of weeks ago but already back to normal and last night was feeling fine before bed.    She does have significant cardiac history and previous MIs and pacemaker placement and is anticoagulated on Eliquis.    She has had several falls recently due to weakness but no specific significant head injury noted this week.    HPI    Patient Care Team  Primary Care Provider: Nilam Willis MD    Past Medical History:     Allergies   Allergen Reactions    Bupropion Other (See Comments)     Caused falls     Past Medical History:   Diagnosis Date    Abdominal pain     CHF (congestive heart failure)     Colon cancer     Coronary arteriosclerosis     Depressive disorder     with anxiety    Encounter for routine adult health examination     Essential hypertension     Generalized anxiety disorder     GERD (gastroesophageal reflux disease)     Heart attack     Hip pain     Hyperlipidemia     Kidney stone     Osteoarthritis     Radial styloid tenosynovitis of left hand     Urinary tract infectious disease     Vitamin D deficiency      Past Surgical History:   Procedure Laterality Date    CARDIAC CATHETERIZATION  04/15/2016    Enlarged left ventricle with reduced contractility.Severe coronary artery disease as described above. [facility].     COLONOSCOPY  2019    COLON CANCER DOMINGO.    COLONOSCOPY N/A 02/14/2022    Procedure: COLONOSCOPY WITH POLYPECTOMY;  Surgeon: Coy Ordoñez MD;  Location: [facility] ENDOSCOPY;  Service: Gastroenterology;  Laterality: N/A;  COLON POLYP, ANASTAMOSIS IN SIGMOID    CORONARY ANGIOPLASTY WITH STENT PLACEMENT  05/06/2012    PTCA implantation in the vein graft of the OM branch. Done at [facility] in [place], [place]    CORONARY ARTERY BYPASS GRAFT  03/18/2003    Emergent CABG x 5 CAD    DE QUERVAIN'S RELEASE Right 11/30/2009    Release of De Quervain's to the right thumb    DEQUERVAIN RELEASE Left 11/18/2015    Release of de Quervain's to the left wrist.    LIVER SURGERY      PACEMAKER IMPLANTATION      DEFIBRILLATOR    PAP SMEAR  06/28/2012    normal    PROCEDURE GENERIC CONVERTED  11/22/2015    MOST RECENT DIASTOLIC BP < 90 mmHg     PROCEDURE GENERIC CONVERTED  11/22/2015    MOST RECENT SYSTOLIC BP > or = 140 mmHg     PROCEDURE GENERIC CONVERTED  11/22/2015    TOBACCO NON-USER     REPLACEMENT TOTAL HIP LATERAL POSITION      SUBTOTAL COLECTOMY N/A 04/23/2019    sigmoid for colon cancer    TOTAL KNEE ARTHROPLASTY       Family History   Problem Relation Age of Onset    Liver disease Mother     Hyperlipidemia Father     Heart disease Father     Breast cancer Sister     Diabetes Maternal Grandmother     Arthritis Maternal Grandmother     Heart disease Paternal Grandmother     Heart disease Paternal Grandfather     Diabetes Other     Heart disease Other     Hypertension Other     Stroke Other     Colon cancer Other     Coronary artery disease Other     Other Other         Heart surgery       Home Medications:  Prior to Admission medications    Medication Sig Start Date End Date Taking? Authorizing Provider   acetaminophen (TYLENOL) 500 MG tablet Take 1-2 tablets by mouth Every 4 (Four) Hours As Needed for Mild Pain.   Yes Provider, MD Chandler   apixaban (Eliquis) 5 MG tablet tablet Take 1 tablet by mouth  Every 12 (Twelve) Hours. 8/16/23  Yes Luis Boyce MD   clonazePAM (KlonoPIN) 1 MG tablet Take 1 tablet by mouth 2 (Two) Times a Day. 4/10/23  Yes Nilam Willis MD   clopidogrel (PLAVIX) 75 MG tablet Take 1 tablet by mouth Daily. 1/20/23  Yes Nilam Willis MD   cyanocobalamin (VITAMIN B-12) 500 MCG tablet Take 1 tablet by mouth Daily.   Yes Chandler Diamond MD   cyclobenzaprine (FLEXERIL) 10 MG tablet Take 1 tablet by mouth 3 (Three) Times a Day. 6/13/23  Yes Nilam Willis MD   FLUoxetine (PROzac) 20 MG capsule Take 3 capsules by mouth Daily. 1/30/23  Yes Nilam Willis MD   furosemide (LASIX) 40 MG tablet Take 1 tablet by mouth 2 (Two) Times a Day As Needed (lower extremity swelling or increase in weight of 2lbs or more from current weight. Take with spironolactone.). 8/5/23  Yes Eulalio Rashid MD   losartan (COZAAR) 50 MG tablet Take 0.5 tablets by mouth Daily for 30 days. 8/5/23 9/4/23 Yes Eulalio Rashid MD   metoprolol succinate XL (TOPROL-XL) 25 MG 24 hr tablet Take 1 tablet by mouth Daily. 1/20/23  Yes Nilam Willis MD   spironolactone (ALDACTONE) 25 MG tablet Take 1 tablet by mouth 2 (Two) Times a Day As Needed (lower extremity swelling or increase in weight of 2lbs or more from current weight. Take with furosemide.). 8/5/23  Yes Eulalio Rashid MD   vitamin D (ERGOCALCIFEROL) 1.25 MG (05402 UT) capsule capsule Take 1 capsule by mouth 1 (One) Time Per Week. 1/30/23  Yes Nilam Willis MD   capecitabine (XELODA) 500 MG chemo tablet Take 2 tablets by mouth 2 (Two) Times a Day. Take 2 tablets by mouth in the AM and 2 tablets in the PM on days 1-14, then off 7 days, on a 21 day cycle 8/16/23   Luis Boyce MD   [facility]-AlHydr-MgHydr-Simeth (First Mouthwash, Magic Mouthwash,) suspension Swish and spit 10 mL Every 6 (Six) Hours. 8/16/23   Luis Boyce MD   isosorbide mononitrate (IMDUR) 30 MG 24 hr tablet Take 1 tablet by mouth Daily.    Provider, MD Chandler  "  nitroglycerin (NITROSTAT) 0.4 MG SL tablet Place 1 tablet under the tongue As Needed for Chest Pain.  - IF PAIN REMAINS AFTER 5 MIN CALL 911 AND REPEAT DOSE MAX 3 TABS IN 15 MINUTES 1/20/23   Nilam Willis MD   ondansetron (ZOFRAN) 8 MG tablet Take 1 tablet by mouth 3 (Three) Times a Day As Needed for Nausea or Vomiting. 7/13/23   Luis Boyce MD   pravastatin (PRAVACHOL) 80 MG tablet Take 1 tablet by mouth Every Night. 1/20/23   Nilam Willis MD   tiZANidine (ZANAFLEX) 4 MG tablet Take 1 tablet by mouth Every 8 (Eight) Hours As Needed for Muscle Spasms. 1/20/23   Nilam Willis MD        Social History:   Social History     Tobacco Use    Smoking status: Never    Smokeless tobacco: Never   Vaping Use    Vaping Use: Never used   Substance Use Topics    Alcohol use: No    Drug use: Never         Review of Systems:  Review of Systems   I performed a 10 point review of systems which was all negative, except for the positives found in the HPI above.      Physical Exam:  BP (!) 80/44   Pulse 50   Temp 97.8 °F (36.6 °C) (Oral)   Resp 20   Ht 160 cm (63\")   Wt 60.7 kg (133 lb 13.1 oz)   SpO2 95%   BMI 23.71 kg/m²         Physical Exam   General: Awake alert and in mild distress due to headache, slightly confused per caregiver    HEENT: Head normocephalic atraumatic, eyes PERRLA EOMI, nose normal, oropharynx normal.  Small old area of ecchymosis on inferior aspect of chin    Neck: Supple full range of motion, no meningismus, no lymphadenopathy    Heart: Regular rate and rhythm, no murmurs or rubs, 2+ radial pulses bilaterally    Lungs: Clear to auscultation bilaterally without wheezes or crackles, no respiratory distress    Abdomen: Soft, nontender, nondistended, no rebound or guarding    Skin: Warm, dry, no rash    Musculoskeletal: Normal range of motion, no lower extremity edema    Neurologic: Oriented x3 but slightly confused about events, no motor deficits no sensory deficits    Psychiatric: " Mood appears stable, no psychosis            Procedures:  Procedures      Medical Decision Making:      Comorbidities that affect care:    Cancer, Coronary Artery Disease    External Notes reviewed:    Previous Clinic Note: I reviewed her most recent oncology clinic note from 3 days ago by Dr. Mcintosh regarding her sigmoid colon cancer and underlying history of chronic heart failure      The following orders were placed and all results were independently analyzed by me:  Orders Placed This Encounter   Procedures    XR Chest 1 View    CT Head Without Contrast    [place] Draw    Comprehensive Metabolic Panel    Single High Sensitivity Troponin T    Magnesium    Urinalysis With Microscopic If Indicated (No Culture) - Urine, Clean Catch    CBC Auto Differential    Scan Slide    Lactic Acid, Plasma    Protime-INR    Protime-INR    Protime-INR    NPO Diet NPO Type: Strict NPO    Undress & Gown    Continuous Pulse Oximetry    Vital Signs    Orthostatic Blood Pressure    Monitor for signs for thromboembolic events    IP General Consult (Use specialty-specific consult if known)    Oxygen Therapy- Nasal Cannula; Titrate 1-6 LPM Per SpO2; 90 - 95%    POC Glucose Once    ECG 12 Lead ED Triage Standing Order; Weak / Dizzy / AMS    Insert Peripheral IV    Fall Precautions    CBC & Differential    Green Top (Gel)    Lavender Top    Gold Top - SST    Light Blue Top       Medications Given in the Emergency Department:  Medications   sodium chloride 0.9 % flush 10 mL (has no administration in time range)   acetaminophen (TYLENOL) tablet 975 mg (has no administration in time range)   prothrombin complex conc human (KCentra) IV solution 1,662 Units (1,662 Units Intravenous Given 8/31/23 0732)     And   phytonadione (AQUA-MEPHYTON, VITAMIN K) 10 mg in sodium chloride 0.9 % 50 mL IVPB (10 mg Intravenous New Bag 8/31/23 0745)   sodium chloride 0.9 % bolus 1,000 mL (0 mL Intravenous Stopped 8/31/23 0704)        ED Course:          Labs:    Lab Results (last 24 hours)       Procedure Component Value Units Date/Time    CBC & Differential [[id number]]  (Abnormal) Collected: 08/31/23 0613    Specimen: Blood Updated: 08/31/23 0648    Narrative:      The following orders were created for panel order CBC & Differential.  Procedure                               Abnormality         Status                     ---------                               -----------         ------                     CBC Auto Differential[[id number]]        Abnormal            Final result               Scan Slide[[id number]]                                       Final result                 Please view results for these tests on the individual orders.    Comprehensive Metabolic Panel [[id number]]  (Abnormal) Collected: 08/31/23 0613    Specimen: Blood Updated: 08/31/23 0637     Glucose 133 mg/dL      BUN 15 mg/dL      Creatinine 1.76 mg/dL      Sodium 134 mmol/L      Potassium 4.3 mmol/L      Comment: Slight hemolysis detected by analyzer. Results may be affected.        Chloride 98 mmol/L      CO2 23.7 mmol/L      Calcium 9.2 mg/dL      Total Protein 6.0 g/dL      Albumin 3.4 g/dL      ALT (SGPT) 33 U/L      AST (SGOT) 56 U/L      Alkaline Phosphatase 167 U/L      Total Bilirubin 1.1 mg/dL      Globulin 2.6 gm/dL      A/G Ratio 1.3 g/dL      BUN/Creatinine Ratio 8.5     Anion Gap 12.3 mmol/L      eGFR 31.8 mL/min/1.73     Narrative:      GFR Normal >60  Chronic Kidney Disease <60  Kidney Failure <15      Single High Sensitivity Troponin T [[id number]]  (Abnormal) Collected: 08/31/23 0613    Specimen: Blood Updated: 08/31/23 0637     HS Troponin T 20 ng/L     Narrative:      High Sensitive Troponin T Reference Range:  <10.0 ng/L- Negative Female for AMI  <15.0 ng/L- Negative Male for AMI  >=10 - Abnormal Female indicating possible myocardial injury.  >=15 - Abnormal Male indicating possible myocardial injury.   Clinicians would have to utilize clinical acumen, EKG, Troponin,  and serial changes to determine if it is an Acute Myocardial Infarction or myocardial injury due to an underlying chronic condition.         Magnesium [[id number]]  (Normal) Collected: 08/31/23 0613    Specimen: Blood Updated: 08/31/23 0637     Magnesium 2.0 mg/dL     CBC Auto Differential [[id number]]  (Abnormal) Collected: 08/31/23 0613    Specimen: Blood Updated: 08/31/23 0648     WBC 5.37 10*3/mm3      RBC 3.40 10*6/mm3      Hemoglobin 11.9 g/dL      Hematocrit 35.9 %      .6 fL      MCH 35.0 pg      MCHC 33.1 g/dL      RDW 21.1 %      RDW-SD 76.0 fl      MPV 11.7 fL      Platelets 186 10*3/mm3      Neutrophil % 57.7 %      Lymphocyte % 27.4 %      Monocyte % 12.5 %      Eosinophil % 1.3 %      Basophil % 0.4 %      Immature Grans % 0.7 %      Neutrophils, Absolute 3.10 10*3/mm3      Lymphocytes, Absolute 1.47 10*3/mm3      Monocytes, Absolute 0.67 10*3/mm3      Eosinophils, Absolute 0.07 10*3/mm3      Basophils, Absolute 0.02 10*3/mm3      Immature Grans, Absolute 0.04 10*3/mm3      nRBC 0.0 /100 WBC     Scan Slide [[id number]] Collected: 08/31/23 0613    Specimen: Blood Updated: 08/31/23 0648     Anisocytosis Slight/1+     Hypochromia Slight/1+     Macrocytes Slight/1+     WBC Morphology Normal     Platelet Estimate Adequate    Protime-INR [[id number]]  (Abnormal) Collected: 08/31/23 0613    Specimen: Blood Updated: 08/31/23 0741     Protime 19.6 Seconds      INR 1.66    Narrative:      Suggested Therapeutic Ranges For Oral Anticoagulant Therapy:  Level of Therapy                      INR Target Range  Standard Dose                            2.0-3.0  High Dose                                2.5-3.5  Patients not receiving anticoagulant  Therapy Normal Range                     0.86-1.15             Imaging:    CT Head Without Contrast    Result Date: 8/31/2023  PROCEDURE: CT HEAD WO CONTRAST  COMPARISON:  [facility], CT, CT HEAD WO CONTRAST, 8/03/2023, 17:04. INDICATIONS: DIZZINESS WITH FALLS  X 2 YESTERDAY WITH NO HEAD TRAUMA  PROTOCOL:   Standard imaging protocol performed    RADIATION:   DLP: 954.5mGy*cm   MA and/or KV was adjusted to minimize radiation dose.     TECHNIQUE: After obtaining the patient's consent, CT images were obtained without non-ionic intravenous contrast material.  FINDINGS:  Brain:  Large acute subdural hemorrhage along the right convexity measuring approximately 2.1 cm and thickness at the level of the lateral ventricles.  There is significant mass effect with approximately 1.4 cm right to left midline shift.  Diffuse sulcal effacement noted more prominent on the right.  Asymmetric mass effect on the lateral ventricles noted.  There is mild asymmetric mass effect on the suprasellar and ambient cisterns.  Findings compatible with developing herniation.  Mild asymmetric dilation of the temporal horn right lateral ventricle.  Chronic atrophy and white matter changes again noted similar to previous  Orbits:  Orbits are grossly unremarkable and periorbital soft tissues appear grossly unremarkable.  Sinuses:  Paranasal sinuses are clear.  Mastoid air cells are clear.  Calvarium and soft tissues:  Bony calvarium is intact.  Soft tissues are grossly unremarkable in appearance.         1. Moderate to large subdural hematoma along the right convexity with associated mass effect, right to left midline shift and developing herniation. Findings were discussed with Dr. Kumar at 7:06 a.m.     YAAD KAMINSKI MD       Electronically Signed and Approved By: AYAD KAMINSKI MD on 8/31/2023 at 7:10             XR Chest 1 View    Result Date: 8/31/2023  PROCEDURE: XR CHEST 1 VW  COMPARISON: 8/17/2023.  INDICATIONS: Weak/Dizzy/AMS triage protocol.  FINDINGS:  A single AP (or PA) upright portable chest radiograph was performed.  No cardiac enlargement is seen.  No acute infiltrate is appreciated.  No pleural effusion or pneumothorax is identified.  The patient has undergone median sternotomy and  suspected CABG surgery.  Coronary artery endovascular stents are noted.  There are postoperative changes of the right upper quadrant of the abdomen.  There is a left-sided cardiac implantable electronic device (CIED) in place, seen previously, and unchanged in position.  Chronic calcified granulomatous disease may involve the chest.  No significant interval change is seen since the prior study (or studies).        No acute infiltrate is appreciated.     Please note that portions of this note were completed with a voice recognition program.  VEGA FERREIRA JR, MD       Electronically Signed and Approved By: VEGA FERREIRA JR, MD on 8/31/2023 at 6:47                 Differential Diagnosis and Discussion:    Headache: Differential diagnosis includes but is not limited to migraine, cluster headache, hypertension, tumor, subarachnoid bleeding, pseudotumor cerebri, temporal arteritis, infections, tension headache, and TMJ syndrome.  Weakness: Based on the patient's history, signs, and symptoms, the diffential diagnosis includes but is not limited to meningitis, stroke, sepsis, subarachnoid hemorrhage, intracranial bleeding, encephalitis, acute uti, dehydration, MS, myasthenia gravis, Guillan [place], migraine variant, neuromuscular disorders vertigo, electrolyte imbalance, and metabolic disorders.    All labs were reviewed and interpreted by me.  All X-rays impressions were independently interpreted by me.  EKG was interpreted by me.  CT scan radiology impression was interpreted by me.    MDM     Amount and/or Complexity of Data Reviewed  Clinical lab tests: reviewed  Tests in the radiology section of CPT®: reviewed  Tests in the medicine section of CPT®: reviewed  Decide to obtain previous medical records or to obtain history from someone other than the patient: yes           This patient is a pleasant 63-year-old female with history of TBI and chronic daily headaches, and also history of metastatic colon cancer on  chemotherapy now presenting with severe headache but also noted to have acute on chronic low blood pressure and generalized weakness.      It sounds like she has had several falls this week due to weakness and may have struck her head, and on Eliquis anticoagulation chronically.        She has a normal neurologic exam and awake and alert but family member or friend at the bedside states she is mildly confused.    I do not see any fevers or signs of sepsis and her blood pressure was somewhat low 90s over 60s on arrival.    It looks like she is still on some losartan and Imdur which could be lowering her blood pressure and possibly volume depleted in the setting of chemotherapy.    I am giving her some IV fluids here and reassessing her blood pressure.    I will give her some pain medication for headache and also get a CT scan of the head to rule out intracranial bleeding as she is on Eliquis.        I was notified by the radiologist regarding her CT scan of the head showing a large greater than 2 cm right-sided subdural hematoma with 1.4 cm of midline shift.    I discussed this with patient and caregiver at the bedside and we are ordering IV Kcentra and vitamin K for emergent reversal of her Eliquis anticoagulation in the setting of intracranial bleed.    I am reaching out to [facility] neurosurgery on-call regarding need for emergent transfer there as we do not have neurosurgery available here.        Critical Care Note: Total Critical Care time of 35 minutes. Total critical care time documented does not include time spent on separately billed procedures for services of nurses or physician assistants. I personally saw and examined the patient. I have reviewed all diagnostic interpretations and treatment plans as written. I was present for the key portions of any procedures performed and the inclusive time noted in any critical care statement. Critical care time includes patient management by me, time  spent at the patients bedside,  time to review lab and imaging results, discussing patient care, documentation in the medical record, and time spent with family or caregiver.    Patient Care Considerations:          Consultants/Shared Management Plan:    This plan of management was discussed with the accepting physician at [facility] as well as the neurosurgeon.    Social Determinants of Health:    Patient has presented with family members who are responsible, reliable and will ensure follow up care.      Disposition and Care Coordination:    Transferred: Through independent evaluation of the patient's history, physical, and imperical data, the patient meets criteria to be transferred to another hospital for evaluation/admission.        Final diagnoses:   Acute non intractable tension-type headache   Malignant neoplasm of sigmoid colon   Hypotension, unspecified hypotension type   Chronic kidney disease, unspecified CKD stage   Acute subdural hematoma   Chronic anticoagulation        ED Disposition       ED Disposition   Transfer to Another Facility     Condition   --    Comment   [facility]               This medical record created using voice recognition software.             Alex Kumar MD  08/31/23 0754

## 2023-08-31 NOTE — ED NOTES
PT FAMILY STATES THAT SINCE PT HAS STARTED CHEMO PILLS PT HASN'T BEEN EATING OR DRINKING MUCH. STATES THAT SHE HAS HAD INCREASED FALLS SINCE THEN. FAMILY STATES THAT CHEMO PILLS WERE STARTED AROUND JULY 17TH 2023. FAMILY STATES THAT PT HAS COLON CANCER THAT HAS WENT TO HER LIVER.

## 2023-09-06 LAB
QT INTERVAL: 547 MS
QTC INTERVAL: 505 MS

## 2024-07-05 NOTE — TELEPHONE ENCOUNTER
Incoming Refill Request      Medication requested (name and dose):     Pharmacy where request should be sent: [facility] mail order everything and 90 day supply     Additional details provided by patient: Pt has medicare now and [facility] from now on with 90 day supply thre scripts they want discontinued as she will purchase over the counter now   Discontinue these   Vit d   Omeprazole   Vit B   Aspirin  Tylenol 500     Best call back number: [phone]    Does the patient have less than a 3 day supply:  [] Yes  [] No    Yamila Angulo Rep  01/20/23, 15:25 CST           No

## (undated) DEVICE — THE SINGLE USE ETRAP – POLYP TRAP IS USED FOR SUCTION RETRIEVAL OF ENDOSCOPICALLY REMOVED POLYPS.: Brand: ETRAP

## (undated) DEVICE — COLON KIT: Brand: MEDLINE INDUSTRIES, INC.

## (undated) DEVICE — Device: Brand: DEFENDO AIR/WATER/SUCTION AND BIOPSY VALVE

## (undated) DEVICE — SNAR POLYP CAPTIFLEX XS/OVL 11X2.4MM 240CM 1P/U

## (undated) DEVICE — SOL IRRG H2O PL/BG 1000ML STRL